# Patient Record
Sex: FEMALE | Race: WHITE | NOT HISPANIC OR LATINO | Employment: FULL TIME | URBAN - METROPOLITAN AREA
[De-identification: names, ages, dates, MRNs, and addresses within clinical notes are randomized per-mention and may not be internally consistent; named-entity substitution may affect disease eponyms.]

---

## 2017-04-27 ENCOUNTER — GENERIC CONVERSION - ENCOUNTER (OUTPATIENT)
Dept: OTHER | Facility: OTHER | Age: 42
End: 2017-04-27

## 2017-06-26 ENCOUNTER — LAB REQUISITION (OUTPATIENT)
Dept: LAB | Facility: HOSPITAL | Age: 42
End: 2017-06-26
Payer: COMMERCIAL

## 2017-06-26 ENCOUNTER — ALLSCRIPTS OFFICE VISIT (OUTPATIENT)
Dept: OTHER | Facility: OTHER | Age: 42
End: 2017-06-26

## 2017-06-26 DIAGNOSIS — Z12.31 ENCOUNTER FOR SCREENING MAMMOGRAM FOR MALIGNANT NEOPLASM OF BREAST: ICD-10-CM

## 2017-06-26 DIAGNOSIS — Z01.419 ENCOUNTER FOR GYNECOLOGICAL EXAMINATION WITHOUT ABNORMAL FINDING: ICD-10-CM

## 2017-06-26 PROCEDURE — G0145 SCR C/V CYTO,THINLAYER,RESCR: HCPCS | Performed by: OBSTETRICS & GYNECOLOGY

## 2017-06-26 PROCEDURE — 87624 HPV HI-RISK TYP POOLED RSLT: CPT | Performed by: OBSTETRICS & GYNECOLOGY

## 2017-06-29 LAB — HPV RRNA GENITAL QL NAA+PROBE: NORMAL

## 2017-07-05 ENCOUNTER — HOSPITAL ENCOUNTER (OUTPATIENT)
Dept: RADIOLOGY | Age: 42
Discharge: HOME | End: 2017-07-05
Payer: COMMERCIAL

## 2017-07-05 DIAGNOSIS — Z12.31 ENCOUNTER FOR SCREENING MAMMOGRAM FOR MALIGNANT NEOPLASM OF BREAST: ICD-10-CM

## 2017-07-05 PROCEDURE — G0202 SCR MAMMO BI INCL CAD: HCPCS

## 2017-07-10 LAB
LAB AP GYN PRIMARY INTERPRETATION: NORMAL
LAB AP LMP: NORMAL
Lab: NORMAL

## 2017-08-24 ENCOUNTER — TRANSCRIBE ORDERS (OUTPATIENT)
Dept: ADMINISTRATIVE | Facility: HOSPITAL | Age: 42
End: 2017-08-24

## 2017-08-24 DIAGNOSIS — K21.9 GASTROESOPHAGEAL REFLUX DISEASE, ESOPHAGITIS PRESENCE NOT SPECIFIED: Primary | ICD-10-CM

## 2017-08-30 ENCOUNTER — ALLSCRIPTS OFFICE VISIT (OUTPATIENT)
Dept: OTHER | Facility: OTHER | Age: 42
End: 2017-08-30

## 2017-08-30 DIAGNOSIS — I10 ESSENTIAL (PRIMARY) HYPERTENSION: ICD-10-CM

## 2017-08-30 DIAGNOSIS — E78.00 PURE HYPERCHOLESTEROLEMIA: ICD-10-CM

## 2017-08-30 DIAGNOSIS — D50.9 IRON DEFICIENCY ANEMIA: ICD-10-CM

## 2017-08-31 ENCOUNTER — LAB REQUISITION (OUTPATIENT)
Dept: LAB | Facility: HOSPITAL | Age: 42
End: 2017-08-31
Payer: COMMERCIAL

## 2017-08-31 DIAGNOSIS — E78.00 PURE HYPERCHOLESTEROLEMIA: ICD-10-CM

## 2017-08-31 LAB
ALBUMIN SERPL BCP-MCNC: 3.7 G/DL (ref 3.5–5)
ALP SERPL-CCNC: 77 U/L (ref 46–116)
ALT SERPL W P-5'-P-CCNC: 25 U/L (ref 12–78)
ANION GAP SERPL CALCULATED.3IONS-SCNC: 8 MMOL/L (ref 4–13)
AST SERPL W P-5'-P-CCNC: 19 U/L (ref 5–45)
BASOPHILS # BLD AUTO: 0.02 THOUSANDS/ΜL (ref 0–0.1)
BASOPHILS NFR BLD AUTO: 0 % (ref 0–1)
BILIRUB SERPL-MCNC: 0.58 MG/DL (ref 0.2–1)
BUN SERPL-MCNC: 11 MG/DL (ref 5–25)
CALCIUM SERPL-MCNC: 9.3 MG/DL (ref 8.3–10.1)
CHLORIDE SERPL-SCNC: 102 MMOL/L (ref 100–108)
CHOLEST SERPL-MCNC: 204 MG/DL (ref 50–200)
CK SERPL-CCNC: 61 U/L (ref 26–192)
CO2 SERPL-SCNC: 27 MMOL/L (ref 21–32)
CREAT SERPL-MCNC: 0.65 MG/DL (ref 0.6–1.3)
CREAT UR-MCNC: 149 MG/DL
EOSINOPHIL # BLD AUTO: 0.27 THOUSAND/ΜL (ref 0–0.61)
EOSINOPHIL NFR BLD AUTO: 5 % (ref 0–6)
ERYTHROCYTE [DISTWIDTH] IN BLOOD BY AUTOMATED COUNT: 16.2 % (ref 11.6–15.1)
GFR SERPL CREATININE-BSD FRML MDRD: 109 ML/MIN/1.73SQ M
GLUCOSE P FAST SERPL-MCNC: 88 MG/DL (ref 65–99)
HCT VFR BLD AUTO: 35.4 % (ref 34.8–46.1)
HDLC SERPL-MCNC: 53 MG/DL (ref 40–60)
HGB BLD-MCNC: 11.2 G/DL (ref 11.5–15.4)
LDLC SERPL CALC-MCNC: 103 MG/DL (ref 0–100)
LYMPHOCYTES # BLD AUTO: 2.02 THOUSANDS/ΜL (ref 0.6–4.47)
LYMPHOCYTES NFR BLD AUTO: 34 % (ref 14–44)
MCH RBC QN AUTO: 24.2 PG (ref 26.8–34.3)
MCHC RBC AUTO-ENTMCNC: 31.6 G/DL (ref 31.4–37.4)
MCV RBC AUTO: 77 FL (ref 82–98)
MICROALBUMIN UR-MCNC: 14.8 MG/L (ref 0–20)
MICROALBUMIN/CREAT 24H UR: 10 MG/G CREATININE (ref 0–30)
MONOCYTES # BLD AUTO: 0.43 THOUSAND/ΜL (ref 0.17–1.22)
MONOCYTES NFR BLD AUTO: 7 % (ref 4–12)
NEUTROPHILS # BLD AUTO: 3.12 THOUSANDS/ΜL (ref 1.85–7.62)
NEUTS SEG NFR BLD AUTO: 54 % (ref 43–75)
NRBC BLD AUTO-RTO: 0 /100 WBCS
PLATELET # BLD AUTO: 248 THOUSANDS/UL (ref 149–390)
PMV BLD AUTO: 11.3 FL (ref 8.9–12.7)
POTASSIUM SERPL-SCNC: 4 MMOL/L (ref 3.5–5.3)
PROT SERPL-MCNC: 7.1 G/DL (ref 6.4–8.2)
RBC # BLD AUTO: 4.62 MILLION/UL (ref 3.81–5.12)
SODIUM SERPL-SCNC: 137 MMOL/L (ref 136–145)
TRIGL SERPL-MCNC: 240 MG/DL
WBC # BLD AUTO: 5.87 THOUSAND/UL (ref 4.31–10.16)

## 2017-08-31 PROCEDURE — 82550 ASSAY OF CK (CPK): CPT | Performed by: NURSE PRACTITIONER

## 2017-08-31 PROCEDURE — 85025 COMPLETE CBC W/AUTO DIFF WBC: CPT | Performed by: NURSE PRACTITIONER

## 2017-08-31 PROCEDURE — 80061 LIPID PANEL: CPT | Performed by: NURSE PRACTITIONER

## 2017-08-31 PROCEDURE — 36415 COLL VENOUS BLD VENIPUNCTURE: CPT

## 2017-08-31 PROCEDURE — 82570 ASSAY OF URINE CREATININE: CPT | Performed by: NURSE PRACTITIONER

## 2017-08-31 PROCEDURE — 80053 COMPREHEN METABOLIC PANEL: CPT | Performed by: NURSE PRACTITIONER

## 2017-08-31 PROCEDURE — 86003 ALLG SPEC IGE CRUDE XTRC EA: CPT | Performed by: NURSE PRACTITIONER

## 2017-08-31 PROCEDURE — 86003 ALLG SPEC IGE CRUDE XTRC EA: CPT

## 2017-08-31 PROCEDURE — 82043 UR ALBUMIN QUANTITATIVE: CPT | Performed by: NURSE PRACTITIONER

## 2017-09-01 LAB
ALLERGEN COMMENT: ABNORMAL
ALLERGEN COMMENT: NORMAL
ALMOND IGE QN: 0.15 KUA/I
BRAZIL NUT IGE QN: <0.1 KUA/I
CASHEW NUT IGE QN: <0.1 KUA/I
HAZELNUT IGE QN: 0.94 KUA/L
PEANUT (RARA H) 1 IGE QN: <0.1 KUA/I
PEANUT (RARA H) 2 IGE QN: <0.1 KUA/I
PEANUT (RARA H) 3 IGE QN: <0.1 KUA/I
PEANUT (RARA H) 8 IGE QN: 0.37 KUA/I
PEANUT (RARA H) 9 IGE QN: <0.1 KUA/I
PEANUT IGE QN: 0.2 KUA/I
WALNUT IGE QN: <0.1 KUA/I

## 2017-09-20 ENCOUNTER — HOSPITAL ENCOUNTER (OUTPATIENT)
Dept: RADIOLOGY | Facility: HOSPITAL | Age: 42
Discharge: HOME | End: 2017-09-20
Attending: OTOLARYNGOLOGY
Payer: COMMERCIAL

## 2017-09-20 ENCOUNTER — GENERIC CONVERSION - ENCOUNTER (OUTPATIENT)
Dept: OTHER | Facility: OTHER | Age: 42
End: 2017-09-20

## 2017-09-20 DIAGNOSIS — K21.9 GASTROESOPHAGEAL REFLUX DISEASE, ESOPHAGITIS PRESENCE NOT SPECIFIED: ICD-10-CM

## 2017-09-20 PROCEDURE — G8997 SWALLOW GOAL STATUS: HCPCS

## 2017-09-20 PROCEDURE — 74220 X-RAY XM ESOPHAGUS 1CNTRST: CPT

## 2017-09-20 PROCEDURE — G8996 SWALLOW CURRENT STATUS: HCPCS

## 2017-09-20 PROCEDURE — 92611 MOTION FLUOROSCOPY/SWALLOW: CPT

## 2017-09-20 PROCEDURE — 74230 X-RAY XM SWLNG FUNCJ C+: CPT

## 2017-09-20 PROCEDURE — G8998 SWALLOW D/C STATUS: HCPCS

## 2017-09-26 ENCOUNTER — GENERIC CONVERSION - ENCOUNTER (OUTPATIENT)
Dept: OTHER | Facility: OTHER | Age: 42
End: 2017-09-26

## 2017-10-25 NOTE — PROGRESS NOTES
Assessment  1  Benign hypertension (401 1) (I10)   2  Hypercholesterolemia (272 0) (E78 00)   3  Dysphagia (787 20) (R13 10)   4  Family history of myocardial infarction (V17 3) (Z82 49) : Sister    Plan   Hypercholesterolemia    · Simvastatin 10 MG Oral Tablet; TAKE 1 TABLET DAILY   · (1) CBC/PLT/DIFF; Status:Active; Requested for:70Yra5999;    · (1) CK (CPK); Status:Active; Requested for:30Aug2017;    · (1) COMPREHENSIVE METABOLIC PANEL; Status:Active; Requested for:04Fbk5772;    · (1) LIPID PANEL, FASTING; Status:Active; Requested for:38Rae3997;    · (1) MICROALBUMIN CREATININE RATIO, RANDOM URINE; Status:Active; Requested for:30Aug2017;   Need for immunization against influenza    · Fluzone Quadrivalent 0 5 ML Intramuscular Suspension Prefilled Syringe  Screening for depression    · *VB-Depression Screening; Status:Complete;   Done: 51OMA6551 09:44AM   · *VB-Depression Screening ; every 1 year; Last 30Aug2017; Next 30Aug2018; Status:Active    HydroCHLOROthiazide 25 MG Oral Tablet; TAKE 1 TABLET DAILY  Requested for: 30Aug2017; Last Rx:30Aug2017; Status: ACTIVE Ordered  Rx By: Jazmin Vicente; Dispense: 90 Days ; #:90 Tablet; Refill: 1;   For: Benign hypertension; JOSHUA = N; Verified Transmission to [place] [address]; Last Updated By: System, [employer]Script[employer]; 9/1/2017 3:27:51 PM     Annotations              Pt uses [facility]  Metoprolol Succinate ER 50 MG Oral Tablet Extended Release 24 Hour; TAKE 1 TABLET DAILY; Therapy: 74Vav0466 to (Evaluate:81Atp1227)  Requested for: 30Aug2017; Last Rx:54Rfu7997; Status: ACTIVE Ordered  Rx By: Jazmin Vicente; Dispense: 90 Days ; #:90 Tablet Extended Release 24 Hour; Refill: 1;   For: Benign hypertension; JOSHUA = N; Verified Transmission to [place] [address]; Last Updated By: System, SureScripts; 9/1/2017 3:27:51 PM  STRESS TEST ONLY, EXERCISE; Status:Hold For - Scheduling; Requested for:30Aug2017;   Perform:[facility]; Due:44Zqh6309; Ordered; For:Benign hypertension, Hypercholesterolemia; Ordered By:Jose Antonio Omer;      Discussion/Summary  Discussion Summary:   HTN - Recommend checking BP at home  It is stable today  Will continue with same medications  - Will check lab work and call patient if medication needs to be changed  Reviewed prior lab work from Nov 2016 and cholesterol was stable and improving at that time  Since, the patient has cut out red meat from her diet, but still continues to eat poorly when out to eat  Reviewed healthy diet with patient  Dr Liv Domingo, due to pt personal active problem list of HTN and HDL as well as family history of sister with MI at age 48, will order exercise stress test for patient  up 6 months  Counseling Documentation With Imm: The patient was counseled regarding diagnostic results,-- instructions for management,-- risk factor reductions,-- prognosis,-- patient and family education,-- impressions,-- risks and benefits of treatment options,-- importance of compliance with treatment  Medication SE Review and Pt Understands Tx: Possible side effects of new medications were reviewed with the patient/guardian today  The treatment plan was reviewed with the patient/guardian  The patient/guardian understands and agrees with the treatment plan      Chief Complaint  Chief Complaint Free Text Note Form: PT here for follow up PT stated she did not have lab work done  History of Present Illness  HPI: Patient is here for 6 month follow up  She did not have her lab work done  She is complaint with her medications and has not experienced any side effects   - Patient saw ENT in Dr Patrice Tobin in [place]  She was having trouble swallowing  He ordered for her to go for a barium swallow with video on Sept 20  She had complained of this in the past, prior notes reviewed  She has previously been referred by our practice to GI for an endoscopy but never went   She did feel it got better, and has not been an issue lately  She notices most difficulty with sandwiches and dry chicken  Advised patient to have all records from ENT sent to our office  is concerned that she has an umbilical hernia  She is suspicious because her abdomen in bulging around her umbilicus  She was a surrogate 4 years ago and thinks that may be when it happened  She denies any abdominal pain or new symptoms, she just notices a bump on her belly button  She states she is not worried, concerned or bothered by the bump, but wanted to mention it  Hyperlipidemia (Follow-Up): The patient states her hyperlipidemia has been stable since the last visit  Comorbid Illnesses: hypertension  Symptoms: denies chest pain,-- denies intermittent leg claudication,-- denies muscle pain-- and-- denies muscle weakness  Medications: the patient is adherent with her medication regimen  -- She denies medication side effects  the patient's LDL goal is <130 mg/dL  -- the patient's last LDL was 100 mg/dL  -- Nov 2016  The patient is due for a lipid panel-- and-- liver function tests  Additional History: Patient cut out red meat since last visit  She will have her labs checked this weak  Diet could be better  Hypertension (Follow-Up): The patient presents for follow-up of essential hypertension  The patient states she has been doing well with her blood pressure control since the last visit  She has no comorbid illnesses  Symptoms: denies impaired vision,-- denies dyspnea,-- denies chest pain,-- denies intermittent leg claudication-- and-- denies lower extremity edema  Associated symptoms include no headache  Home monitoring: The patient is not checking blood pressure at home  Medications: the patient is adherent with her medication regimen  -- She denies medication side effects  The patient is due for a lipid panel,-- a serum creatinine,-- an eye exam-- and-- a urine microalbumin  Additional History: Reports her sister had a heart attack at age 48        Review of Systems  PHQ-9 Depression Scale: Over the past 2 weeks, how often have you been bothered by the following problems? 1 ) Little interest or pleasure in doing things? Not at all    2 ) Feeling down, depressed or hopeless? Not at all    3 ) Trouble falling asleep or sleeping too much? Not at all    4 ) Feeling tired or having little energy? Several days  5 ) Poor appetite or overeating? Several days  6 ) Feeling bad about yourself, or that you are a failure, or have let yourself or your family down? Not at all    7 ) Trouble concentrating on things, such as reading a newspaper or watching television? Not at all    8 ) Moving or speaking so slowly that other people could have noticed, or the opposite, moving or speaking faster than usual? Not at all  How difficult have these problems made it for you to do your work, take care of things at home, or get along with people? Not at all  Score 2   Complete-Female:   Constitutional: recent 10 lbs over 1 year lb weight gain, but-- no fever,-- not feeling poorly,-- no chills,-- not feeling tired-- and-- no recent weight loss  Eyes: no eyesight problems  Cardiovascular: the heart rate was not slow,-- no chest pain,-- the heart rate was not fast,-- no palpitations-- and-- no lower extremity edema  Respiratory: no shortness of breath,-- no cough,-- no wheezing-- and-- no shortness of breath during exertion  Musculoskeletal: no arthralgias-- and-- no myalgias  Neurological: no headache  Active Problems  1  Anxiety (300 00) (F41 9)   2  Benign hypertension (401 1) (I10)   3  Dysphagia (787 20) (R13 10)   4  Extrinsic asthma (493 00) (J45 909)   5  Generalized anxiety disorder (300 02) (F41 1)   6  Hypercholesterolemia (272 0) (E78 00)   7  Peanut allergy (V15 01) (Z91 010)    Past Medical History  1  Denied: History of Carrier Of STD   2  History of Gestational Diabetes Mellitus (V12 21)   3  History of hypertension (V12 59) (Z86 79)   4   History of impacted cerumen (V12 49) (Z86 69)   5  History of migraine with aura (V12 49) (Z86 69)   6  History of pregnancy (V13 29)   7  Denied: History of Mental Status Change   8  History of Sore throat (462) (J02 9)   9  History of Trigger finger (727 03) (M65 30)   10  History of Yeast infection (112 9) (B37 9)  Active Problems And Past Medical History Reviewed: The active problems and past medical history were reviewed and updated today  Surgical History  1  History of Dental Surgery   2  History of Hand Incision Tendon Sheath Of A Finger  Surgical History Reviewed: The surgical history was reviewed and updated today  Family History  Mother    1  Family history of Cancer   2  Family history of Hypertension (V17 49)   3  Family history of Type 2 Diabetes Mellitus  Father    4  Family history of Hypertension (V17 49)  Sister    5  Family history of myocardial infarction (V17 3) (Z82 49)  Maternal Grandfather    6  Family history of Myocardial Infarction Arrhythmias  Paternal Grandfather    9  Family history of Stroke Syndrome (V17 1)  Family History Reviewed: The family history was reviewed and updated today  Social History   · Being A Social Drinker   · Daily Coffee Consumption (2  Cups/Day)   · Denied: History of Drug Use   · Denied: History of Exercise Habits   · Marital History - Currently    · Denied: History of Mental disability   · Never A Smoker   · Never Used Drugs   · Occupation: Homemaker   · Two children  Social History Reviewed: The social history was reviewed and updated today  The social history was reviewed and is unchanged  Current Meds   1  HydroCHLOROthiazide 25 MG Oral Tablet; TAKE 1 TABLET DAILY  Requested for: 88Ttv1706; Last   Rx:70Hjj1080; Status: ACTIVE - Renewal Denied Ordered   2  Metoprolol Succinate ER 50 MG Oral Tablet Extended Release 24 Hour; TAKE 1 TABLET DAILY;    Therapy: 41MSR6169 to (Evaluate:84Bpq4714)  Requested for: 12Hxd1209; Last Rx:58Hez2004; Status: ACTIVE - Renewal Denied Ordered   3  Simvastatin 10 MG Oral Tablet; TAKE 1 TABLET DAILY; Therapy: 04Xvw4792 to (Evaluate:21Aoo9843)  Requested for: 81Jdr1313; Last Rx:38Vtu7408;   Status: ACTIVE - Renewal Denied Ordered   4  Ventolin  (90 Base) MCG/ACT Inhalation Aerosol Solution; INHALE 1-2 PUFFS EVERY 4-6   HOURS AS NEEDED AND AS DIRECTED; Therapy: 60MCG3385 to (Last Bette Rail)  Requested for: 90Kye5438 Ordered  Medication List Reviewed: The medication list was reviewed and updated today  Allergies  1  Codeine Sulfate TABS  2  Other   3  Peanuts   4  Seasonal    Vitals  Vital Signs    Recorded: 30Aug2017 09:44AM   Temperature 98 F, Tympanic   Heart Rate 82   Systolic 133, LUE, Sitting   Diastolic 80, LUE, Sitting   Height 5 ft 3 5 in   Weight 198 lb 2 oz   BMI Calculated 34 55   BSA Calculated 1 94   O2 Saturation 99, RA     Physical Exam    Constitutional   General appearance: No acute distress, well appearing and well nourished  overweight  Eyes   Conjunctiva and lids: No swelling, erythema or discharge  Pupils and irises: Equal, round and reactive to light  Ears, Nose, Mouth, and Throat   External inspection of ears and nose: Normal     Oropharynx: Normal with no erythema, edema, exudate or lesions  Pulmonary   Respiratory effort: No increased work of breathing or signs of respiratory distress  Auscultation of lungs: Clear to auscultation  Cardiovascular   Auscultation of heart: Normal rate and rhythm, normal S1 and S2, without murmurs  Examination of extremities for edema and/or varicosities: Normal     Carotid pulses: Normal     Abdomen   Abdomen: Non-tender, no masses  -- (small 1 inch protrusion noted within umbilicus, depressible, no pain  No masses noted  )   Liver and spleen: No hepatomegaly or splenomegaly  Lymphatic   Palpation of lymph nodes in neck: No lymphadenopathy      Musculoskeletal   Gait and station: Normal     Psychiatric   Orientation to person, place, and time: Normal     Mood and affect: Normal          Results/Data  *VB-Depression Screening 43Qjr7366 09:44AM Corrinne Parson     Test Name Result Flag Reference   Depression Scale Result      Depression Screen - Negative For Symptoms       Future Appointments    Date/Time Provider Specialty Site   02/14/2018 04:30 PM Toby Boas, MD Internal Medicine [facility]     Signatures   Electronically signed by : Matilde Velásquez;  Aug 30 2017 12:09PM EST                       (Author)    Electronically signed by : Andreas Leon MD; Sep  2 2017 10:42PM EST                       (Validation)

## 2017-10-28 ENCOUNTER — GENERIC CONVERSION - ENCOUNTER (OUTPATIENT)
Dept: OTHER | Facility: OTHER | Age: 42
End: 2017-10-28

## 2018-01-10 NOTE — MISCELLANEOUS
Message   Recorded as Task   Date: 09/13/2017 03:58 PM, Created By: Emilie Trivedi   Task Name: Call Back   Assigned To: Rickey Favorite   Regarding Patient: Emmanuel Harrison, Status: Active   Comment:    Judy Nichols - 13 Sep 2017 3:58 PM     TASK CREATED  Caller: Self; Care Coordination; [phone] (Home); [phone]  (Work)  This patient called the office today and would like someone to give her a call back to review the bloodwork she had done at the end of August which is in her chart  If she does not answer the phone, you are able to leave to results as a message  The best phone number to call her back at is [phone]   Robbie Jose Raul - 13 Sep 2017 4:46 PM     TASK EDITED  left message for pt and asked her to call back to discuss labs   Gucci Briones - 14 Sep 2017 5:30 PM     TASK REPLIED TO: Previously Assigned To [facility] Clinical,team                   Called LM requesting return clal to [facility]  Needs to go for further labs to evaluate anemia  Continue statin and fish oil for cholesterol  Jossy Martines - 14 Sep 2017 6:11 PM     TASK REASSIGNED: Previously Assigned To Gi Cervantes - 18 Sep 2017 4:29 PM     TASK EDITED  Message left on [phone]  requesting a call back to review results  Indu Love - 21 Sep 2017 12:17 PM     TASK EDITED  Message left on [phone] requesting a call back to review blood testing results  Judy Nichols - 25 Sep 2017 11:01 AM     TASK EDITED  This patient called the office today and was returning your phone call  The best number to reach her at is [phone]  She is a  and it is hard for her to answer her phone  Please leave all the informaiton on her answering machine  Clementine Wong - 26 Sep 2017 11:06 AM     TASK EDITED  Please call patient back at [phone]  you can leave a message    do NOT use the home number that is in previous messages     Indu Love - 26 Sep 2017 5:03 PM     TASK EDITED  Called and reviewed the results with Johanna Darden  All of her questions were answered  I will mail the additional testing orders to her  She was informed that the testing is not fasting  Indu Love - 26 Sep 2017 5:05 PM     TASK EDITED        Active Problems    1  Anxiety (300 00) (F41 9)   2  Benign hypertension (401 1) (I10)   3  Dysphagia (787 20) (R13 10)   4  Extrinsic asthma (493 00) (J45 909)   5  Generalized anxiety disorder (300 02) (F41 1)   6  Hypercholesterolemia (272 0) (E78 00)   7  Hypertriglyceridemia (272 1) (E78 1)   8  Microcytic anemia (280 9) (D50 9)   9  Need for immunization against influenza (V04 81) (Z23)   10  Peanut allergy (V15 01) (Z91 010)   11  Screening for depression (V79 0) (Z13 89)    Current Meds   1  Fish Oil 1000 MG Oral Capsule; Take 1 capsule twice daily; Therapy: 05Jsv9224 to (Last Rx:88Ffl9826) Ordered   2  HydroCHLOROthiazide 25 MG Oral Tablet; TAKE 1 TABLET DAILY  Requested for:   79Ugg4787; Last Rx:15Xfm6218 Ordered   3  Metoprolol Succinate ER 50 MG Oral Tablet Extended Release 24 Hour; TAKE 1 TABLET   DAILY; Therapy: 10RZR2010 to (Evaluate:01Oct2017)  Requested for: 55Tha1637; Last   Rx:41Ysl3407 Ordered   4  Simvastatin 10 MG Oral Tablet; TAKE 1 TABLET DAILY; Therapy: 46Xve1051 to (Evaluate:25Mar2018)  Requested for: 64Beg3662; Last   Rx:08Tyh5998; Status: ACTIVE - Retrospective By Protocol Authorization Ordered   5  Ventolin  (90 Base) MCG/ACT Inhalation Aerosol Solution; INHALE 1-2 PUFFS   EVERY 4-6 HOURS AS NEEDED AND AS DIRECTED; Therapy: 35KAJ0574 to (Last Rx:48Nsh3073)  Requested for: 10Mns8238 Ordered    Allergies    1  Codeine Sulfate TABS    2  Almonds   3  Hazelnuts   4  Other   5  Peanuts   6   Seasonal    Signatures   Electronically signed by : Sola Treviño RN; Sep 26 2017  5:05PM EST                       (Author)

## 2018-01-12 VITALS
SYSTOLIC BLOOD PRESSURE: 128 MMHG | WEIGHT: 194 LBS | BODY MASS INDEX: 33.12 KG/M2 | DIASTOLIC BLOOD PRESSURE: 84 MMHG | HEIGHT: 64 IN

## 2018-01-13 VITALS
HEART RATE: 82 BPM | TEMPERATURE: 98 F | BODY MASS INDEX: 33.83 KG/M2 | WEIGHT: 198.13 LBS | OXYGEN SATURATION: 99 % | DIASTOLIC BLOOD PRESSURE: 80 MMHG | SYSTOLIC BLOOD PRESSURE: 122 MMHG | HEIGHT: 64 IN

## 2018-01-14 NOTE — PROCEDURES
Procedures by OZIEL Gallego at 9/20/2017   9:45 AM      Author:  OZIEL Gallego Service:  (none) Author Type:  Speech and Language Pathologist    Filed:  9/20/2017 11:04 AM Date of Service:  9/20/2017  9:45 AM Status:  Signed    :  OZIEL Gallego (Speech and Language Pathologist)        Pre-procedure Diagnoses:       1  Gastroesophageal reflux disease, esophagitis presence not specified [K21 9]       2  Pharyngoesophageal dysphagia [R13 14]                Procedures:       1  FL BARIUM Maria Luisa  SPEECH [XQA699 (Custom)]                                                    Video Swallow Study      Patient Name: Roni Baxter  FGBUJ'R Date: 9/20/2017  par  par  Past Medical History  No past medical history on file  par  Past Surgical History  No past surgical history on file  General Information:    35 yo female referred to [facility]  for a VBS by Dr Jabier Santiago  for dysphagia w/  c/o food getting stuck in mid chest, usually dry meats, ie chicken and turkey and also hoagies  Liquid wash does not aid w/ clearance  Occas need for regurgitation  Pt has not seen GI doctor; laryngoscopy by ENT showed mild Teja's edema, laryngeal edema, and swollen L pyriform sinus  Pt was also noted to have mild hoarseness  Pt was started on  Zyrtek and omeprazole  Cognition:  WNL    Speech/Swallow Mech: Oral motor movements appeared  WNL; Dentition was  WNL; Cough was strong  Respiratory Status: WNL on RA;   Current diet: regular w/ thin liquids  Prior VBS none  Barium swallow/esophagram completed prior to this VBS  Pt was seen in radiology for a Video Barium Swallow Study, pt stood and was viewed laterally with the following consistencies: puree, soft/solid, hard solid, Nectar thick liquids, and thin liquids   Barium tablet was assessed on esophagram        Results are as follows:     **Images are available for review on PACS          Oral Stage:   bolus retrieval, mastication, manipulation, and transfer of all consistencies were WNL  Pharyngeal Stage:   Swallow initiation was timely w/ complete epiglottic inversion and airway closure  No pharyngeal retention, laryngeal penetration or aspiration was observed  Esophageal Stage:   see formal esophagram report for complete assessment  Briefly assessed, no overt abnormality, all consistencies cleared the esophagus into the stomach w/o difficulty  Assessment Summary:   Oral and pharyngeal stages of swallowing appear WNL    Diagnosis/Prognosis:            Recommendations:   Regular diet w/ thin liquids  meds as tolerated   no follow up tx needed                               Received for:Provider  EPIC   Sep 20 2017 11:05AM [facility] Standard Time

## 2018-01-15 NOTE — MISCELLANEOUS
Message   Recorded as Task   Date: 09/13/2017 03:58 PM, Created By: Gerardo Elliott   Task Name: Call Back   Assigned To: Quirino Castro   Regarding Patient: Tish Palacios, Status: Active   Comment:    Judy Nichols - 13 Sep 2017 3:58 PM     TASK CREATED  Caller: Self; Care Coordination; [phone] (Home); [phone] (Work)  This patient called the office today and would like someone to give her a call back to review the bloodwork she had done at the end of August which is in her chart  If she does not answer the phone, you are able to leave to results as a message  The best phone number to call her back at is [phone]   Kevin Julian - 13 Sep 2017 4:46 PM     TASK EDITED  left message for pt and asked her to call back to discuss labs   Gucci Briones - 14 Sep 2017 5:30 PM     TASK REPLIED TO: Previously Assigned To hospitals Clinical,team                   Called LMOM requesting return clal to [facility]  Needs to go for further labs to evaluate anemia  Continue statin and fish oil for cholesterol  Jossy Martines - 14 Sep 2017 6:11 PM     TASK REASSIGNED: Previously Assigned To Fartun Benitez - 18 Sep 2017 4:29 PM     TASK EDITED  Message left on [phone]  requesting a call back to review results  Indu Love - 21 Sep 2017 12:17 PM     TASK EDITED  Message left on [phone] requesting a call back to review blood testing results  SimoneJudy - 25 Sep 2017 11:01 AM     TASK EDITED  This patient called the office today and was returning your phone call  The best number to reach her at is [phone]  She is a  and it is hard for her to answer her phone  Please leave all the informaiton on her answering machine  Clementine Wong - 26 Sep 2017 11:06 AM     TASK EDITED  Please call patient back at [phone]  you can leave a message    do NOT use the home number that is in previous messages     Indu Love - 26 Sep 2017 5:03 PM     TASK EDITED  Called and reviewed the results with Yanique Dixon  All of her questions were answered  I will mail the additional testing orders to her  She was informed that the testing is not fasting  Active Problems    1  Anxiety (300 00) (F41 9)   2  Benign hypertension (401 1) (I10)   3  Dysphagia (787 20) (R13 10)   4  Extrinsic asthma (493 00) (J45 909)   5  Generalized anxiety disorder (300 02) (F41 1)   6  Hypercholesterolemia (272 0) (E78 00)   7  Hypertriglyceridemia (272 1) (E78 1)   8  Microcytic anemia (280 9) (D50 9)   9  Need for immunization against influenza (V04 81) (Z23)   10  Peanut allergy (V15 01) (Z91 010)   11  Screening for depression (V79 0) (Z13 89)    Current Meds   1  Fish Oil 1000 MG Oral Capsule; Take 1 capsule twice daily; Therapy: 43Gvr1919 to (Last Rx:62Jgi1669) Ordered   2  HydroCHLOROthiazide 25 MG Oral Tablet; TAKE 1 TABLET DAILY  Requested for:   37Cie3296; Last Rx:20Ozf6272 Ordered   3  Metoprolol Succinate ER 50 MG Oral Tablet Extended Release 24 Hour; TAKE 1 TABLET   DAILY; Therapy: 49QST1099 to (Evaluate:01Oct2017)  Requested for: 92Tej1898; Last   Rx:81Xim9002 Ordered   4  Simvastatin 10 MG Oral Tablet; TAKE 1 TABLET DAILY; Therapy: 67Qhf9223 to (Evaluate:09Exs5695)  Requested for: 97Dyv6794; Last   Rx:47Ydk2155 Ordered   5  Ventolin  (90 Base) MCG/ACT Inhalation Aerosol Solution; INHALE 1-2 PUFFS   EVERY 4-6 HOURS AS NEEDED AND AS DIRECTED; Therapy: 25OQJ2240 to (Last Rx:19Pqx4097)  Requested for: 38Ryw2083 Ordered    Allergies    1  Codeine Sulfate TABS    2  Almonds   3  Hazelnuts   4  Other   5  Peanuts   6   Seasonal    Plan  Hypercholesterolemia    · Simvastatin 10 MG Oral Tablet; TAKE 1 TABLET DAILY    Signatures   Electronically signed by : Miesha Chung RN; Sep 26 2017  5:03PM EST                       (Author)

## 2018-02-26 RX ORDER — HYDROCHLOROTHIAZIDE 25 MG/1
TABLET ORAL
Qty: 90 TABLET | Refills: 1 | OUTPATIENT
Start: 2018-02-26

## 2018-02-26 RX ORDER — METOPROLOL SUCCINATE 50 MG/1
TABLET, EXTENDED RELEASE ORAL
Qty: 90 TABLET | Refills: 1 | OUTPATIENT
Start: 2018-02-26

## 2018-03-20 ENCOUNTER — OFFICE VISIT (OUTPATIENT)
Dept: INTERNAL MEDICINE CLINIC | Facility: CLINIC | Age: 43
End: 2018-03-20
Payer: COMMERCIAL

## 2018-03-20 VITALS
DIASTOLIC BLOOD PRESSURE: 90 MMHG | SYSTOLIC BLOOD PRESSURE: 130 MMHG | OXYGEN SATURATION: 98 % | BODY MASS INDEX: 33.42 KG/M2 | TEMPERATURE: 97.9 F | HEART RATE: 76 BPM | HEIGHT: 65 IN | WEIGHT: 200.6 LBS

## 2018-03-20 DIAGNOSIS — I10 BENIGN HYPERTENSION: Primary | ICD-10-CM

## 2018-03-20 DIAGNOSIS — E78.00 HYPERCHOLESTEROLEMIA: ICD-10-CM

## 2018-03-20 PROBLEM — D50.9 MICROCYTIC ANEMIA: Status: ACTIVE | Noted: 2017-09-07

## 2018-03-20 PROBLEM — E78.1 HYPERTRIGLYCERIDEMIA: Status: ACTIVE | Noted: 2017-09-07

## 2018-03-20 PROCEDURE — 99214 OFFICE O/P EST MOD 30 MIN: CPT | Performed by: NURSE PRACTITIONER

## 2018-03-20 RX ORDER — CHLORAL HYDRATE 500 MG
2 CAPSULE ORAL
COMMUNITY
Start: 2017-09-07

## 2018-03-20 RX ORDER — METOPROLOL SUCCINATE 50 MG/1
50 TABLET, EXTENDED RELEASE ORAL DAILY
Qty: 90 TABLET | Refills: 0 | Status: SHIPPED | OUTPATIENT
Start: 2018-03-20 | End: 2018-06-19 | Stop reason: SDUPTHER

## 2018-03-20 RX ORDER — ALBUTEROL SULFATE 90 UG/1
1-2 AEROSOL, METERED RESPIRATORY (INHALATION) AS NEEDED
COMMUNITY
Start: 2014-02-06 | End: 2018-04-23 | Stop reason: SDUPTHER

## 2018-03-20 RX ORDER — LISINOPRIL 10 MG/1
10 TABLET ORAL DAILY
Qty: 30 TABLET | Refills: 1 | Status: SHIPPED | OUTPATIENT
Start: 2018-03-20 | End: 2018-04-09 | Stop reason: SINTOL

## 2018-03-20 RX ORDER — HYDROCHLOROTHIAZIDE 25 MG/1
1 TABLET ORAL DAILY
COMMUNITY
End: 2018-03-20 | Stop reason: SDUPTHER

## 2018-03-20 RX ORDER — METOPROLOL SUCCINATE 50 MG/1
1 TABLET, EXTENDED RELEASE ORAL DAILY
COMMUNITY
Start: 2016-12-02 | End: 2018-03-20 | Stop reason: SDUPTHER

## 2018-03-20 RX ORDER — HYDROCHLOROTHIAZIDE 25 MG/1
25 TABLET ORAL DAILY
Qty: 90 TABLET | Refills: 0 | Status: SHIPPED | OUTPATIENT
Start: 2018-03-20 | End: 2018-06-19 | Stop reason: SDUPTHER

## 2018-03-20 RX ORDER — SIMVASTATIN 10 MG
10 TABLET ORAL DAILY
Qty: 30 TABLET | Refills: 0 | Status: SHIPPED | OUTPATIENT
Start: 2018-03-20 | End: 2018-04-09 | Stop reason: DRUGHIGH

## 2018-03-20 RX ORDER — SIMVASTATIN 10 MG
1 TABLET ORAL DAILY
COMMUNITY
Start: 2016-08-29 | End: 2018-03-20 | Stop reason: SDUPTHER

## 2018-03-20 RX ORDER — LORATADINE 10 MG/1
10 TABLET ORAL
COMMUNITY

## 2018-03-20 NOTE — PROGRESS NOTES
Assessment/Plan:    No problem-specific Assessment & Plan notes found for this encounter  Diagnoses and all orders for this visit:    Benign hypertension  -     lisinopril (ZESTRIL) 10 mg tablet; Take 1 tablet (10 mg total) by mouth daily  -     CBC and differential; Future  -     Comprehensive metabolic panel; Future  -     Microalbumin / creatinine urine ratio  -     hydrochlorothiazide (HYDRODIURIL) 25 mg tablet; Take 1 tablet (25 mg total) by mouth daily  -     metoprolol succinate (TOPROL-XL) 50 mg 24 hr tablet; Take 1 tablet (50 mg total) by mouth daily    Hypercholesterolemia  -     Lipid Panel with Direct LDL reflex; Future  -     CK (with reflex to MB); Future  -     simvastatin (ZOCOR) 10 mg tablet; Take 1 tablet (10 mg total) by mouth daily    Other orders  -     albuterol (VENTOLIN HFA) 90 mcg/act inhaler; Inhale 1-2 puffs  -     Discontinue: simvastatin (ZOCOR) 10 mg tablet; Take 1 tablet by mouth daily  -     Discontinue: metoprolol succinate (TOPROL-XL) 50 mg 24 hr tablet; Take 1 tablet by mouth daily  -     Discontinue: hydrochlorothiazide (HYDRODIURIL) 25 mg tablet; Take 1 tablet by mouth daily  -     Omega-3 Fatty Acids (FISH OIL) 1,000 mg; Take 1 capsule by mouth 2 (two) times a day  -     loratadine (CLARITIN) 10 mg tablet; Take 10 mg by mouth daily  -     ELDERBERRY PO; Take 1 capsule by mouth daily      Will add lisinopril 10mg to regimen for blood pressure control  Labs ordered and medications refilled  Patient will follow up in 2 weeks to review labs and recheck blood pressure  Educated that Dosing for hypertension medication adjusted today  Advised to change positions slowly and not stand up too quickly until it is clear how the medication is affecting the patient  Monitor for dizziness, lightheadedness, headaches, or passing out  This may be an indication that blood pressure is too low and warrants a follow-up and medication adjustment  Check blood pressure at home 3 times weekly  Goal blood pressure is below 140/90  Exercise 30 minutes three times weekly as tolerated  Recommend yearly eye exam  Also educated that: You are started on an ACE inhibitor today  As discussed the most common side effect of this medication is a dry consistent cough  This is resolved on own with stopping the medication after 3-4 days  Please contact our office if you develop this cough and we can change your prescription to another hypertensive medication  Additionally an uncommon side effect, but life threatening, is to develop a reaction called angioedema  This is when your face, mouth, and/or tongue begin to swell  As discussed this is a life threatening reaction and you should call 911 immediately  Subjective:      Patient ID: Lauren Conner is a 39 y o  female  Patient presents for a follow-up visit on her hypertension and hyperlipidemia  She did not have any labs completed prior to this visit  Hypertension   This is a chronic problem  The current episode started more than 1 year ago  The problem is unchanged  The problem is uncontrolled  Associated symptoms include headaches (occasionally), neck pain (is being followed by a chiropractor) and PND (occasionally)  Pertinent negatives include no anxiety, blurred vision, chest pain, malaise/fatigue, orthopnea, palpitations, peripheral edema, shortness of breath or sweats  There are no associated agents to hypertension  Risk factors for coronary artery disease include dyslipidemia  Past treatments include beta blockers and diuretics  The current treatment provides mild improvement  There are no compliance problems  Hyperlipidemia   This is a chronic problem  The current episode started more than 1 year ago  Condition status: Unknown, patient has not had labs completed since starting statin  Factors aggravating her hyperlipidemia include beta blockers and thiazides   Pertinent negatives include no chest pain, focal sensory loss, leg pain, myalgias or shortness of breath  Current antihyperlipidemic treatment includes statins  Improvement on treatment: unknown at this time  There are no compliance problems  Risk factors for coronary artery disease include dyslipidemia and hypertension  The following portions of the patient's history were reviewed and updated as appropriate: allergies, current medications, past family history, past medical history, past social history, past surgical history and problem list     Review of Systems   Constitutional: Negative for chills, fatigue, fever and malaise/fatigue  HENT: Negative for sore throat  Eyes: Negative for blurred vision and pain  Respiratory: Negative for cough, chest tightness, shortness of breath and wheezing  Cardiovascular: Positive for PND (occasionally)  Negative for chest pain, palpitations, orthopnea and leg swelling  Gastrointestinal: Negative for abdominal distention, diarrhea, nausea and vomiting  Genitourinary: Negative for dysuria  Musculoskeletal: Positive for neck pain (is being followed by a chiropractor)  Negative for myalgias  Skin: Negative for rash  Neurological: Positive for headaches (occasionally)  Negative for dizziness and light-headedness  Psychiatric/Behavioral: The patient is not nervous/anxious            Past Medical History:   Diagnosis Date    Gestational diabetes mellitus     Hypertension     Pregnancy     Resolved: 07/29/2015    Trigger finger     Resolved: 12/02/2016    Yeast infection     Resolved: 07/29/2015         Current Outpatient Prescriptions:     albuterol (VENTOLIN HFA) 90 mcg/act inhaler, Inhale 1-2 puffs, Disp: , Rfl:     ELDERBERRY PO, Take 1 capsule by mouth daily, Disp: , Rfl:     hydrochlorothiazide (HYDRODIURIL) 25 mg tablet, Take 1 tablet (25 mg total) by mouth daily, Disp: 90 tablet, Rfl: 0    loratadine (CLARITIN) 10 mg tablet, Take 10 mg by mouth daily, Disp: , Rfl:     metoprolol succinate (TOPROL-XL) 50 mg 24 hr tablet, Take 1 tablet (50 mg total) by mouth daily, Disp: 90 tablet, Rfl: 0    Omega-3 Fatty Acids (FISH OIL) 1,000 mg, Take 1 capsule by mouth 2 (two) times a day, Disp: , Rfl:     simvastatin (ZOCOR) 10 mg tablet, Take 1 tablet (10 mg total) by mouth daily, Disp: 30 tablet, Rfl: 0    lisinopril (ZESTRIL) 10 mg tablet, Take 1 tablet (10 mg total) by mouth daily, Disp: 30 tablet, Rfl: 1    Allergies   Allergen Reactions    Codeine Shortness Of Breath    Percival (Diagnostic)     Nuts     Other      Annotation - [id number]: 9/2/15 ORAL ALLERGY SYNDROME    Peanut (Diagnostic)     Pollen Extract        Social History   Past Surgical History:   Procedure Laterality Date    INCISION TENDON SHEATH HAND      of a finger    WISDOM TOOTH EXTRACTION       Family History   Problem Relation Age of Onset    Endometrial cancer Mother      45s    Diabetes type II Mother     Hypertension Father     Heart attack Sister     Other Maternal Grandfather      Myocardial infarction arrhythmias    Stroke Paternal Grandfather      Syndrome       Objective:  /90 (BP Location: Left arm, Patient Position: Sitting, Cuff Size: Adult)   Pulse 76   Temp 97 9 °F (36 6 °C) (Oral)   Ht 5' 5" (1 651 m)   Wt 91 kg (200 lb 9 6 oz)   SpO2 98%   BMI 33 38 kg/m²        Physical Exam   Constitutional: She is oriented to person, place, and time  She appears well-developed and well-nourished  No distress  HENT:   Head: Normocephalic and atraumatic  Right Ear: External ear normal    Left Ear: External ear normal    Mouth/Throat: Oropharynx is clear and moist    Eyes: Conjunctivae are normal  Pupils are equal, round, and reactive to light  No scleral icterus  Neck: Normal range of motion  Neck supple  No thyromegaly present  Cardiovascular: Normal rate, regular rhythm and normal heart sounds  Pulmonary/Chest: Effort normal and breath sounds normal  No respiratory distress  Abdominal: Soft   Bowel sounds are normal  She exhibits no distension  Musculoskeletal: Normal range of motion  She exhibits no edema  Neurological: She is alert and oriented to person, place, and time  Skin: Skin is warm and dry  Psychiatric: She has a normal mood and affect  Her behavior is normal  Judgment and thought content normal    Vitals reviewed

## 2018-03-20 NOTE — PATIENT INSTRUCTIONS
Will add lisinopril 10mg to regimen for blood pressure control  Labs ordered and medications refilled  Patient will follow up in 2 weeks to review labs and recheck blood pressure  Educated that Dosing for hypertension medication adjusted today  Advised to change positions slowly and not stand up too quickly until it is clear how the medication is affecting the patient  Monitor for dizziness, lightheadedness, headaches, or passing out  This may be an indication that blood pressure is too low and warrants a follow-up and medication adjustment  Check blood pressure at home 3 times weekly  Goal blood pressure is below 140/90  Exercise 30 minutes three times weekly as tolerated  Recommend yearly eye exam  Also educated that: You are started on an ACE inhibitor today  As discussed the most common side effect of this medication is a dry consistent cough  This is resolved on own with stopping the medication after 3-4 days  Please contact our office if you develop this cough and we can change your prescription to another hypertensive medication  Additionally an uncommon side effect, but life threatening, is to develop a reaction called angioedema  This is when your face, mouth, and/or tongue begin to swell  As discussed this is a life threatening reaction and you should call 911 immediately

## 2018-03-25 DIAGNOSIS — E78.00 HYPERCHOLESTEROLEMIA: ICD-10-CM

## 2018-03-26 ENCOUNTER — APPOINTMENT (OUTPATIENT)
Dept: URGENT CARE | Facility: MEDICAL CENTER | Age: 43
End: 2018-03-26
Payer: COMMERCIAL

## 2018-03-26 PROCEDURE — 99214 OFFICE O/P EST MOD 30 MIN: CPT

## 2018-03-26 RX ORDER — SIMVASTATIN 10 MG
TABLET ORAL
Qty: 90 TABLET | Refills: 1 | OUTPATIENT
Start: 2018-03-26

## 2018-03-26 NOTE — TELEPHONE ENCOUNTER
Automated refill request received for this patient  Please contact patient and    1  Verify that patient is taking this medication (verify with patient and with prior records)  2  Verify the patients dose for the medication  3   Verify that patient needs refills  Once this is done and if rx needed please resend  Please let me know if any questions      Thanks   Paul Snell

## 2018-03-30 ENCOUNTER — CLINICAL SUPPORT (OUTPATIENT)
Dept: INTERNAL MEDICINE CLINIC | Facility: CLINIC | Age: 43
End: 2018-03-30
Payer: COMMERCIAL

## 2018-03-30 DIAGNOSIS — E78.00 HYPERCHOLESTEREMIA: ICD-10-CM

## 2018-03-30 DIAGNOSIS — I10 BENIGN HYPERTENSION: Primary | ICD-10-CM

## 2018-03-30 LAB
ALBUMIN SERPL BCP-MCNC: 3.9 G/DL (ref 3.5–5)
ALP SERPL-CCNC: 67 U/L (ref 46–116)
ALT SERPL W P-5'-P-CCNC: 24 U/L (ref 12–78)
ANION GAP SERPL CALCULATED.3IONS-SCNC: 8 MMOL/L (ref 4–13)
AST SERPL W P-5'-P-CCNC: 15 U/L (ref 5–45)
BASOPHILS # BLD AUTO: 0.05 THOUSANDS/ΜL (ref 0–0.1)
BASOPHILS NFR BLD AUTO: 1 % (ref 0–1)
BILIRUB SERPL-MCNC: 1.05 MG/DL (ref 0.2–1)
BUN SERPL-MCNC: 18 MG/DL (ref 5–25)
CALCIUM SERPL-MCNC: 9.1 MG/DL (ref 8.3–10.1)
CHLORIDE SERPL-SCNC: 103 MMOL/L (ref 100–108)
CHOLEST SERPL-MCNC: 221 MG/DL (ref 50–200)
CK SERPL-CCNC: 51 U/L (ref 26–192)
CO2 SERPL-SCNC: 27 MMOL/L (ref 21–32)
CREAT SERPL-MCNC: 0.82 MG/DL (ref 0.6–1.3)
CREAT UR-MCNC: 337 MG/DL
EOSINOPHIL # BLD AUTO: 0.28 THOUSAND/ΜL (ref 0–0.61)
EOSINOPHIL NFR BLD AUTO: 4 % (ref 0–6)
ERYTHROCYTE [DISTWIDTH] IN BLOOD BY AUTOMATED COUNT: 16.2 % (ref 11.6–15.1)
GFR SERPL CREATININE-BSD FRML MDRD: 87 ML/MIN/1.73SQ M
GLUCOSE P FAST SERPL-MCNC: 93 MG/DL (ref 65–99)
HCT VFR BLD AUTO: 39 % (ref 34.8–46.1)
HDLC SERPL-MCNC: 58 MG/DL (ref 40–60)
HGB BLD-MCNC: 12.8 G/DL (ref 11.5–15.4)
LDLC SERPL CALC-MCNC: 120 MG/DL (ref 0–100)
LYMPHOCYTES # BLD AUTO: 2.96 THOUSANDS/ΜL (ref 0.6–4.47)
LYMPHOCYTES NFR BLD AUTO: 41 % (ref 14–44)
MCH RBC QN AUTO: 26.1 PG (ref 26.8–34.3)
MCHC RBC AUTO-ENTMCNC: 32.8 G/DL (ref 31.4–37.4)
MCV RBC AUTO: 79 FL (ref 82–98)
MICROALBUMIN UR-MCNC: 40.4 MG/L (ref 0–20)
MICROALBUMIN/CREAT 24H UR: 12 MG/G CREATININE (ref 0–30)
MONOCYTES # BLD AUTO: 0.69 THOUSAND/ΜL (ref 0.17–1.22)
MONOCYTES NFR BLD AUTO: 10 % (ref 4–12)
NEUTROPHILS # BLD AUTO: 3.18 THOUSANDS/ΜL (ref 1.85–7.62)
NEUTS SEG NFR BLD AUTO: 44 % (ref 43–75)
NRBC BLD AUTO-RTO: 0 /100 WBCS
PLATELET # BLD AUTO: 283 THOUSANDS/UL (ref 149–390)
PMV BLD AUTO: 11.2 FL (ref 8.9–12.7)
POTASSIUM SERPL-SCNC: 3.7 MMOL/L (ref 3.5–5.3)
PROT SERPL-MCNC: 7.3 G/DL (ref 6.4–8.2)
RBC # BLD AUTO: 4.91 MILLION/UL (ref 3.81–5.12)
SODIUM SERPL-SCNC: 138 MMOL/L (ref 136–145)
TRIGL SERPL-MCNC: 213 MG/DL
WBC # BLD AUTO: 7.17 THOUSAND/UL (ref 4.31–10.16)

## 2018-03-30 PROCEDURE — 82550 ASSAY OF CK (CPK): CPT | Performed by: NURSE PRACTITIONER

## 2018-03-30 PROCEDURE — 80053 COMPREHEN METABOLIC PANEL: CPT | Performed by: NURSE PRACTITIONER

## 2018-03-30 PROCEDURE — 85025 COMPLETE CBC W/AUTO DIFF WBC: CPT | Performed by: NURSE PRACTITIONER

## 2018-03-30 PROCEDURE — 82570 ASSAY OF URINE CREATININE: CPT | Performed by: NURSE PRACTITIONER

## 2018-03-30 PROCEDURE — 80061 LIPID PANEL: CPT | Performed by: NURSE PRACTITIONER

## 2018-03-30 PROCEDURE — 82043 UR ALBUMIN QUANTITATIVE: CPT | Performed by: NURSE PRACTITIONER

## 2018-03-30 PROCEDURE — 36415 COLL VENOUS BLD VENIPUNCTURE: CPT

## 2018-04-02 ENCOUNTER — APPOINTMENT (OUTPATIENT)
Dept: URGENT CARE | Facility: MEDICAL CENTER | Age: 43
End: 2018-04-02
Payer: COMMERCIAL

## 2018-04-02 PROCEDURE — 99213 OFFICE O/P EST LOW 20 MIN: CPT | Performed by: NURSE PRACTITIONER

## 2018-04-09 ENCOUNTER — OFFICE VISIT (OUTPATIENT)
Dept: INTERNAL MEDICINE CLINIC | Age: 43
End: 2018-04-09
Payer: COMMERCIAL

## 2018-04-09 VITALS
WEIGHT: 197.2 LBS | BODY MASS INDEX: 32.86 KG/M2 | OXYGEN SATURATION: 98 % | TEMPERATURE: 97.6 F | DIASTOLIC BLOOD PRESSURE: 80 MMHG | SYSTOLIC BLOOD PRESSURE: 122 MMHG | HEART RATE: 72 BPM | HEIGHT: 65 IN

## 2018-04-09 DIAGNOSIS — I10 BENIGN HYPERTENSION: Primary | ICD-10-CM

## 2018-04-09 DIAGNOSIS — E78.00 HYPERCHOLESTEROLEMIA: ICD-10-CM

## 2018-04-09 DIAGNOSIS — E78.1 HYPERTRIGLYCERIDEMIA: ICD-10-CM

## 2018-04-09 PROCEDURE — 99214 OFFICE O/P EST MOD 30 MIN: CPT | Performed by: NURSE PRACTITIONER

## 2018-04-09 RX ORDER — SIMVASTATIN 20 MG
20 TABLET ORAL
Qty: 90 TABLET | Refills: 1 | Status: SHIPPED | OUTPATIENT
Start: 2018-04-09 | End: 2018-10-04 | Stop reason: SDUPTHER

## 2018-04-09 RX ORDER — LOSARTAN POTASSIUM 50 MG/1
50 TABLET ORAL DAILY
Qty: 30 TABLET | Refills: 0 | Status: SHIPPED | OUTPATIENT
Start: 2018-04-09 | End: 2018-04-23 | Stop reason: SDUPTHER

## 2018-04-09 NOTE — PROGRESS NOTES
Assessment/Plan:    No problem-specific Assessment & Plan notes found for this encounter  Diagnoses and all orders for this visit:    Benign hypertension  -     losartan (COZAAR) 50 mg tablet; Take 1 tablet (50 mg total) by mouth daily    Hypercholesterolemia  -     simvastatin (ZOCOR) 20 mg tablet; Take 1 tablet (20 mg total) by mouth daily at bedtime    Hypertriglyceridemia  -     simvastatin (ZOCOR) 20 mg tablet; Take 1 tablet (20 mg total) by mouth daily at bedtime      Due to cough, will discontinue the lisinopril and start losartan  Dosing for hypertension medication adjusted today  Advised to change positions slowly and not stand up too quickly until it is clear how the medication is affecting the patient  Monitor for dizziness, lightheadedness, headaches, or passing out  This may be an indication that blood pressure is too low and warrants a follow-up and medication adjustment  Check blood pressure at home 3 times weekly  Cholesterol is still not at goal  Will increase simvastatin from 10mg to 20mg daily  Return in 2 weeks for blood pressure recheck or sooner as needed  Subjective:      Patient ID: Kurtis Blanco is a 39 y o  female  Patient presents for a 2 week follow up on hypertension and to review her lab results  She reports that she is feeling well on her new addition of lisinopril to her medication regimen  She does report that she has a dry cough, which she initially thought was related to a recent cold, but does admit that it has been lingering  She describes the cough as feeling like she needs to clear her throat  Her other labs were reviewed, as well  She is on 10mg of simvastatin, which she has been on for some time  She denies myalgias or chest pain while taking it           The following portions of the patient's history were reviewed and updated as appropriate: allergies, current medications, past family history, past medical history, past social history, past surgical history and problem list     Review of Systems   Constitutional: Negative for chills, fatigue and fever  HENT: Negative for congestion, sinus pain, sinus pressure, sore throat and trouble swallowing  Eyes: Negative for pain  Respiratory: Positive for cough (dry cough since starting lisinopril)  Negative for chest tightness, shortness of breath and wheezing  Cardiovascular: Negative for chest pain, palpitations and leg swelling  Gastrointestinal: Negative for abdominal pain, constipation, diarrhea, nausea and vomiting  Genitourinary: Negative for dysuria  Musculoskeletal: Negative for gait problem  Skin: Negative for rash  Neurological: Negative for dizziness, light-headedness and headaches  Hematological: Does not bruise/bleed easily  Psychiatric/Behavioral: The patient is not nervous/anxious            Past Medical History:   Diagnosis Date    Gestational diabetes mellitus     Hypertension     Pregnancy     Resolved: 07/29/2015    Trigger finger     Resolved: 12/02/2016    Yeast infection     Resolved: 07/29/2015         Current Outpatient Prescriptions:     albuterol (VENTOLIN HFA) 90 mcg/act inhaler, Inhale 1-2 puffs, Disp: , Rfl:     hydrochlorothiazide (HYDRODIURIL) 25 mg tablet, Take 1 tablet (25 mg total) by mouth daily, Disp: 90 tablet, Rfl: 0    loratadine (CLARITIN) 10 mg tablet, Take 10 mg by mouth daily, Disp: , Rfl:     metoprolol succinate (TOPROL-XL) 50 mg 24 hr tablet, Take 1 tablet (50 mg total) by mouth daily, Disp: 90 tablet, Rfl: 0    Omega-3 Fatty Acids (FISH OIL) 1,000 mg, Take 1 capsule by mouth 2 (two) times a day, Disp: , Rfl:     ELDERBERRY PO, Take 1 capsule by mouth daily, Disp: , Rfl:     losartan (COZAAR) 50 mg tablet, Take 1 tablet (50 mg total) by mouth daily, Disp: 30 tablet, Rfl: 0    simvastatin (ZOCOR) 20 mg tablet, Take 1 tablet (20 mg total) by mouth daily at bedtime, Disp: 90 tablet, Rfl: 1    Allergies   Allergen Reactions    Codeine Shortness Of Breath    Pollen Extract Nasal Congestion    [place] (Diagnostic)     Nuts     Other      Annotation - [id number]: 9/2/15 ORAL ALLERGY SYNDROME    Peanut (Diagnostic)        Social History   Past Surgical History:   Procedure Laterality Date    INCISION TENDON SHEATH HAND      of a finger    WISDOM TOOTH EXTRACTION       Family History   Problem Relation Age of Onset    Endometrial cancer Mother      45s    Diabetes type II Mother     Hypertension Father     Heart attack Sister     Other Maternal Grandfather      Myocardial infarction arrhythmias    Stroke Paternal Grandfather      Syndrome       Objective:  /80 (BP Location: Left arm, Patient Position: Sitting, Cuff Size: Large)   Pulse 72   Temp 97 6 °F (36 4 °C) (Tympanic)   Ht 5' 5" (1 651 m)   Wt 89 4 kg (197 lb 3 2 oz)   SpO2 98%   BMI 32 82 kg/m²     Recent Results (from the past 504 hour(s))   CK (with reflex to MB)    Collection Time: 03/30/18  9:56 AM   Result Value Ref Range    Total CK 51 26 - 192 U/L   CBC and differential    Collection Time: 03/30/18  9:56 AM   Result Value Ref Range    WBC 7 17 4 31 - 10 16 Thousand/uL    RBC 4 91 3 81 - 5 12 Million/uL    Hemoglobin 12 8 11 5 - 15 4 g/dL    Hematocrit 39 0 34 8 - 46 1 %    MCV 79 (L) 82 - 98 fL    MCH 26 1 (L) 26 8 - 34 3 pg    MCHC 32 8 31 4 - 37 4 g/dL    RDW 16 2 (H) 11 6 - 15 1 %    MPV 11 2 8 9 - 12 7 fL    Platelets 478 430 - 519 Thousands/uL    nRBC 0 /100 WBCs    Neutrophils Relative 44 43 - 75 %    Lymphocytes Relative 41 14 - 44 %    Monocytes Relative 10 4 - 12 %    Eosinophils Relative 4 0 - 6 %    Basophils Relative 1 0 - 1 %    Neutrophils Absolute 3 18 1 85 - 7 62 Thousands/µL    Lymphocytes Absolute 2 96 0 60 - 4 47 Thousands/µL    Monocytes Absolute 0 69 0 17 - 1 22 Thousand/µL    Eosinophils Absolute 0 28 0 00 - 0 61 Thousand/µL    Basophils Absolute 0 05 0 00 - 0 10 Thousands/µL   Comprehensive metabolic panel    Collection Time: 03/30/18  9:56 AM   Result Value Ref Range    Sodium 138 136 - 145 mmol/L    Potassium 3 7 3 5 - 5 3 mmol/L    Chloride 103 100 - 108 mmol/L    CO2 27 21 - 32 mmol/L    Anion Gap 8 4 - 13 mmol/L    BUN 18 5 - 25 mg/dL    Creatinine 0 82 0 60 - 1 30 mg/dL    Glucose, Fasting 93 65 - 99 mg/dL    Calcium 9 1 8 3 - 10 1 mg/dL    AST 15 5 - 45 U/L    ALT 24 12 - 78 U/L    Alkaline Phosphatase 67 46 - 116 U/L    Total Protein 7 3 6 4 - 8 2 g/dL    Albumin 3 9 3 5 - 5 0 g/dL    Total Bilirubin 1 05 (H) 0 20 - 1 00 mg/dL    eGFR 87 ml/min/1 73sq m   Lipid Panel with Direct LDL reflex    Collection Time: 03/30/18  9:56 AM   Result Value Ref Range    Cholesterol 221 (H) 50 - 200 mg/dL    Triglycerides 213 (H) <=150 mg/dL    HDL, Direct 58 40 - 60 mg/dL    LDL Calculated 120 (H) 0 - 100 mg/dL   Microalbumin / creatinine urine ratio    Collection Time: 03/30/18  9:56 AM   Result Value Ref Range    Creatinine, Ur 337 0 mg/dL    Microalbum  ,U,Random 40 4 (H) 0 0 - 20 0 mg/L    Microalb Creat Ratio 12 0 - 30 mg/g creatinine        Physical Exam   Constitutional: She is oriented to person, place, and time  She appears well-developed and well-nourished  No distress  HENT:   Head: Normocephalic and atraumatic  Right Ear: External ear normal    Left Ear: External ear normal    Mouth/Throat: Oropharynx is clear and moist    Eyes: Conjunctivae are normal  Pupils are equal, round, and reactive to light  No scleral icterus  Neck: Normal range of motion  Neck supple  No thyromegaly present  Cardiovascular: Normal rate, regular rhythm and normal heart sounds  Pulmonary/Chest: Effort normal and breath sounds normal  No respiratory distress  Abdominal: Soft  Bowel sounds are normal  She exhibits no distension  Musculoskeletal: Normal range of motion  She exhibits no edema  Neurological: She is alert and oriented to person, place, and time  Skin: Skin is warm and dry  Psychiatric: She has a normal mood and affect   Her behavior is normal  Judgment and thought content normal    Vitals reviewed

## 2018-04-09 NOTE — PATIENT INSTRUCTIONS
Due to cough, will discontinue the lisinopril and start losartan  Dosing for hypertension medication adjusted today  Advised to change positions slowly and not stand up too quickly until it is clear how the medication is affecting the patient  Monitor for dizziness, lightheadedness, headaches, or passing out  This may be an indication that blood pressure is too low and warrants a follow-up and medication adjustment  Check blood pressure at home 3 times weekly  Cholesterol is still not at goal  Will increase simvastatin from 10mg to 20mg daily  Return in 2 weeks for blood pressure recheck or sooner as needed

## 2018-04-23 ENCOUNTER — OFFICE VISIT (OUTPATIENT)
Dept: INTERNAL MEDICINE CLINIC | Age: 43
End: 2018-04-23
Payer: COMMERCIAL

## 2018-04-23 VITALS
BODY MASS INDEX: 33.73 KG/M2 | HEIGHT: 64 IN | DIASTOLIC BLOOD PRESSURE: 78 MMHG | TEMPERATURE: 98 F | HEART RATE: 75 BPM | SYSTOLIC BLOOD PRESSURE: 114 MMHG | RESPIRATION RATE: 20 BRPM | WEIGHT: 197.6 LBS | OXYGEN SATURATION: 98 %

## 2018-04-23 DIAGNOSIS — I10 BENIGN HYPERTENSION: Primary | ICD-10-CM

## 2018-04-23 DIAGNOSIS — J45.40 MODERATE PERSISTENT EXTRINSIC ASTHMA WITHOUT COMPLICATION: ICD-10-CM

## 2018-04-23 PROCEDURE — 99213 OFFICE O/P EST LOW 20 MIN: CPT | Performed by: NURSE PRACTITIONER

## 2018-04-23 RX ORDER — LOSARTAN POTASSIUM 50 MG/1
50 TABLET ORAL DAILY
Qty: 90 TABLET | Refills: 1 | Status: SHIPPED | OUTPATIENT
Start: 2018-04-23 | End: 2018-10-04 | Stop reason: SDUPTHER

## 2018-04-23 RX ORDER — ALBUTEROL SULFATE 90 UG/1
1-2 AEROSOL, METERED RESPIRATORY (INHALATION) AS NEEDED
Qty: 2 INHALER | Refills: 0 | Status: SHIPPED | OUTPATIENT
Start: 2018-04-23 | End: 2019-11-08 | Stop reason: SDUPTHER

## 2018-04-23 NOTE — PROGRESS NOTES
Assessment/Plan:    No problem-specific Assessment & Plan notes found for this encounter  Diagnoses and all orders for this visit:    Benign hypertension  -     losartan (COZAAR) 50 mg tablet; Take 1 tablet (50 mg total) by mouth daily    Moderate persistent extrinsic asthma without complication  -     albuterol (VENTOLIN HFA) 90 mcg/act inhaler; Inhale 1-2 puffs as needed for wheezing        Will continue with current dosing of losartan at this time  No changes to medications at present  Will have patient follow up in 3 months or sooner as needed  Did mention to patient that she does qualify for the pneumonia vaccine based on her diagnosis of asthma  She has declined at this time however will consider in the future  Subjective:      Patient ID: Yue Alvarez is a 39 y o  female  Patient presents for 2 week follow-up on her hypertension  She was previously placed on lisinopril, however she developed a cough and the lisinopril was discontinued  She was subsequently placed on losartan and this appointment is to evaluate the efficacy of the losartan  The patient reports that she is feeling well on the current medications that she is on  The patient denies any side effects on losartan, including dizziness or lightheadedness  The following portions of the patient's history were reviewed and updated as appropriate: allergies, current medications, past family history, past medical history, past social history, past surgical history and problem list     Review of Systems   Constitutional: Negative for chills and fever  HENT: Negative for sinus pain (the patient believes that this is related to allergies) and trouble swallowing  Eyes: Negative for pain  Respiratory: Negative for cough (The cough that the patient had while on lisinopril has resolved    The patient does report a slight tickle in the back of her throat, but she does believe that this might be related to allergies ), chest tightness, shortness of breath and wheezing  Cardiovascular: Negative for chest pain, palpitations and leg swelling  Gastrointestinal: Negative for abdominal pain, diarrhea, nausea and vomiting  Genitourinary: Negative for dysuria  Musculoskeletal: Negative for gait problem  Skin: Negative for rash  Neurological: Negative for dizziness, syncope, numbness and headaches  Hematological: Does not bruise/bleed easily  Psychiatric/Behavioral: Negative for confusion and decreased concentration  The patient is not nervous/anxious            Past Medical History:   Diagnosis Date    Gestational diabetes mellitus     Hypertension     Pregnancy     Resolved: 07/29/2015    Trigger finger     Resolved: 12/02/2016    Yeast infection     Resolved: 07/29/2015         Current Outpatient Prescriptions:     albuterol (VENTOLIN HFA) 90 mcg/act inhaler, Inhale 1-2 puffs as needed for wheezing, Disp: 2 Inhaler, Rfl: 0    ELDERBERRY PO, Take 1 capsule by mouth daily, Disp: , Rfl:     hydrochlorothiazide (HYDRODIURIL) 25 mg tablet, Take 1 tablet (25 mg total) by mouth daily, Disp: 90 tablet, Rfl: 0    loratadine (CLARITIN) 10 mg tablet, Take 10 mg by mouth daily, Disp: , Rfl:     losartan (COZAAR) 50 mg tablet, Take 1 tablet (50 mg total) by mouth daily, Disp: 90 tablet, Rfl: 1    metoprolol succinate (TOPROL-XL) 50 mg 24 hr tablet, Take 1 tablet (50 mg total) by mouth daily, Disp: 90 tablet, Rfl: 0    Omega-3 Fatty Acids (FISH OIL) 1,000 mg, Take 2 capsules by mouth daily  , Disp: , Rfl:     simvastatin (ZOCOR) 20 mg tablet, Take 1 tablet (20 mg total) by mouth daily at bedtime, Disp: 90 tablet, Rfl: 1    Allergies   Allergen Reactions    Codeine Shortness Of Breath    Pollen Extract Nasal Congestion    [place] (Diagnostic)     Nuts     Other      Annotation - [id number]: 9/2/15 ORAL ALLERGY SYNDROME    Peanut (Diagnostic)        Social History   Past Surgical History:   Procedure Laterality Date    INCISION TENDON SHEATH HAND      of a finger    WISDOM TOOTH EXTRACTION       Family History   Problem Relation Age of Onset    Endometrial cancer Mother      45s    Diabetes type II Mother     Hypertension Father     Heart attack Sister     Other Maternal Grandfather      Myocardial infarction arrhythmias    Stroke Paternal Grandfather      Syndrome       Objective:  /78 (BP Location: Left arm, Patient Position: Sitting, Cuff Size: Adult)   Pulse 75   Temp 98 °F (36 7 °C) (Tympanic)   Resp 20   Ht 5' 3 82" (1 621 m)   Wt 89 6 kg (197 lb 9 6 oz)   SpO2 98%   BMI 34 11 kg/m²        Physical Exam   Constitutional: She is oriented to person, place, and time  She appears well-developed and well-nourished  No distress  HENT:   Head: Normocephalic and atraumatic  Right Ear: External ear normal    Left Ear: External ear normal    Mouth/Throat: Oropharynx is clear and moist    Eyes: Conjunctivae are normal  Pupils are equal, round, and reactive to light  No scleral icterus  Neck: Normal range of motion  Neck supple  No thyromegaly present  Cardiovascular: Normal rate, regular rhythm and normal heart sounds  Pulmonary/Chest: Effort normal and breath sounds normal  No respiratory distress  Abdominal: Soft  Bowel sounds are normal  She exhibits no distension  Musculoskeletal: Normal range of motion  She exhibits no edema  Neurological: She is alert and oriented to person, place, and time  Skin: Skin is warm and dry  No rash noted  Psychiatric: She has a normal mood and affect  Her behavior is normal  Judgment and thought content normal    Vitals reviewed

## 2018-05-12 DIAGNOSIS — E78.00 HYPERCHOLESTEROLEMIA: ICD-10-CM

## 2018-05-14 RX ORDER — SIMVASTATIN 10 MG
TABLET ORAL
Qty: 30 TABLET | Refills: 0 | OUTPATIENT
Start: 2018-05-14

## 2018-06-13 DIAGNOSIS — J45.40 MODERATE PERSISTENT EXTRINSIC ASTHMA WITHOUT COMPLICATION: ICD-10-CM

## 2018-06-19 DIAGNOSIS — I10 BENIGN HYPERTENSION: ICD-10-CM

## 2018-06-19 RX ORDER — METOPROLOL SUCCINATE 50 MG/1
50 TABLET, EXTENDED RELEASE ORAL DAILY
Qty: 10 TABLET | Refills: 0 | Status: SHIPPED | OUTPATIENT
Start: 2018-06-19 | End: 2018-10-04 | Stop reason: SDUPTHER

## 2018-06-19 RX ORDER — HYDROCHLOROTHIAZIDE 25 MG/1
25 TABLET ORAL DAILY
Qty: 10 TABLET | Refills: 0 | Status: SHIPPED | OUTPATIENT
Start: 2018-06-19 | End: 2018-10-04 | Stop reason: SDUPTHER

## 2018-06-19 RX ORDER — HYDROCHLOROTHIAZIDE 25 MG/1
25 TABLET ORAL DAILY
Qty: 90 TABLET | Refills: 0 | Status: SHIPPED | OUTPATIENT
Start: 2018-06-19 | End: 2018-06-19 | Stop reason: SDUPTHER

## 2018-06-19 RX ORDER — METOPROLOL SUCCINATE 50 MG/1
50 TABLET, EXTENDED RELEASE ORAL DAILY
Qty: 90 TABLET | Refills: 0 | Status: SHIPPED | OUTPATIENT
Start: 2018-06-19 | End: 2018-06-19 | Stop reason: SDUPTHER

## 2018-07-02 ENCOUNTER — ANNUAL EXAM (OUTPATIENT)
Dept: OBGYN CLINIC | Facility: CLINIC | Age: 43
End: 2018-07-02
Payer: COMMERCIAL

## 2018-07-02 VITALS
DIASTOLIC BLOOD PRESSURE: 78 MMHG | WEIGHT: 198 LBS | BODY MASS INDEX: 33.8 KG/M2 | HEIGHT: 64 IN | SYSTOLIC BLOOD PRESSURE: 116 MMHG

## 2018-07-02 DIAGNOSIS — Z12.31 ENCOUNTER FOR SCREENING MAMMOGRAM FOR MALIGNANT NEOPLASM OF BREAST: Primary | ICD-10-CM

## 2018-07-02 DIAGNOSIS — Z01.419 ENCOUNTER FOR ANNUAL ROUTINE GYNECOLOGICAL EXAMINATION: Primary | ICD-10-CM

## 2018-07-02 PROCEDURE — S0612 ANNUAL GYNECOLOGICAL EXAMINA: HCPCS | Performed by: OBSTETRICS & GYNECOLOGY

## 2018-07-02 NOTE — PROGRESS NOTES
Assessment/Plan:    Pap smear deferred due to low risk status  Encouraged self-breast examination as well as calcium supplementation  Continue annual mammogram   Again discussed treatment options for menorrhagia including medical versus surgical   Her CBC was stable from March  She will continue to follow-up with her family physician as scheduled for hypertension hypercholesterolemia, strong family history of heart disease  She was given names of general surgeons to be further evaluated on umbilical hernia  Return to office in 1 year  No problem-specific Assessment & Plan notes found for this encounter  Diagnoses and all orders for this visit:    Encounter for annual routine gynecological examination          Subjective:      Patient ID: Willow Caldera is a 39 y o  female  HPI     This is a 53-year-old female  (last pregnancy patient was steroid get,  x3) presents for annual gyn exam   Patient states her menstrual cycles are regular every 4 weeks lasting 5-7 days described as heavy on day 2 and 3  She denies any breakthrough bleeding  She states that she did have a Miren IUD before first and second pregnancy and did see significant improvement in her menstrual cycle however now she was concerned about weight gain and was reluctant to go back to the Mirena  She does have a history of hypertension hypercholesterolemia which is being followed through her family physician  She denies any changes in bowel function  Over the year she has had increase episodes of mixed urinary incontinence  She is sexually active and has been in a monogamous relationship with her  for over 15 years  All her Pap smears have been normal   Method of contraception is vasectomy  Last Pap smear  within normal limits      The following portions of the patient's history were reviewed and updated as appropriate: allergies, current medications, past family history, past medical history, past social history, past surgical history and problem list     Review of Systems   Constitutional: Negative for fatigue, fever and unexpected weight change  Respiratory: Negative for cough, chest tightness, shortness of breath and wheezing  Cardiovascular: Negative  Negative for chest pain and palpitations  Gastrointestinal: Negative  Negative for abdominal distention, abdominal pain, blood in stool, constipation, diarrhea, nausea and vomiting  Genitourinary: Negative  Negative for difficulty urinating, dyspareunia, dysuria, flank pain, frequency, genital sores, hematuria, pelvic pain, urgency, vaginal bleeding, vaginal discharge and vaginal pain  Skin: Negative for rash  Objective:      /78   Ht 5' 4" (1 626 m)   Wt 89 8 kg (198 lb)   LMP 06/04/2018 (Exact Date)   Breastfeeding? No   BMI 33 99 kg/m²          Physical Exam   Constitutional: She appears well-developed and well-nourished  Cardiovascular: Normal rate and regular rhythm  Pulmonary/Chest: Effort normal and breath sounds normal  Right breast exhibits no inverted nipple, no mass, no nipple discharge, no skin change and no tenderness  Left breast exhibits no inverted nipple, no mass, no nipple discharge, no skin change and no tenderness  Abdominal: Soft  Bowel sounds are normal  She exhibits no distension  There is no tenderness  There is no rebound and no guarding  Small umbilical hernia noted   Genitourinary: Vagina normal and uterus normal  There is no lesion on the right labia  There is no lesion on the left labia  Cervix exhibits no discharge and no friability  Right adnexum displays no mass, no tenderness and no fullness  Left adnexum displays no mass, no tenderness and no fullness  No vaginal discharge found

## 2018-07-12 ENCOUNTER — HOSPITAL ENCOUNTER (OUTPATIENT)
Dept: RADIOLOGY | Age: 43
Discharge: HOME | End: 2018-07-12
Payer: COMMERCIAL

## 2018-07-12 DIAGNOSIS — Z12.31 ENCOUNTER FOR SCREENING MAMMOGRAM FOR MALIGNANT NEOPLASM OF BREAST: ICD-10-CM

## 2018-07-12 PROCEDURE — 77067 SCR MAMMO BI INCL CAD: CPT

## 2018-07-30 ENCOUNTER — OFFICE VISIT (OUTPATIENT)
Dept: INTERNAL MEDICINE CLINIC | Facility: CLINIC | Age: 43
End: 2018-07-30
Payer: COMMERCIAL

## 2018-07-30 VITALS
RESPIRATION RATE: 20 BRPM | HEIGHT: 65 IN | WEIGHT: 196.4 LBS | OXYGEN SATURATION: 98 % | SYSTOLIC BLOOD PRESSURE: 110 MMHG | BODY MASS INDEX: 32.72 KG/M2 | TEMPERATURE: 98 F | HEART RATE: 75 BPM | DIASTOLIC BLOOD PRESSURE: 78 MMHG

## 2018-07-30 DIAGNOSIS — F41.9 ANXIETY: ICD-10-CM

## 2018-07-30 DIAGNOSIS — E78.1 HYPERTRIGLYCERIDEMIA: ICD-10-CM

## 2018-07-30 DIAGNOSIS — I10 BENIGN HYPERTENSION: Primary | ICD-10-CM

## 2018-07-30 DIAGNOSIS — J45.40 MODERATE PERSISTENT EXTRINSIC ASTHMA WITHOUT COMPLICATION: ICD-10-CM

## 2018-07-30 DIAGNOSIS — R13.10 DYSPHAGIA, UNSPECIFIED TYPE: ICD-10-CM

## 2018-07-30 DIAGNOSIS — E78.00 HYPERCHOLESTEROLEMIA: ICD-10-CM

## 2018-07-30 DIAGNOSIS — F41.1 GENERALIZED ANXIETY DISORDER: ICD-10-CM

## 2018-07-30 PROBLEM — D50.9 MICROCYTIC ANEMIA: Status: RESOLVED | Noted: 2017-09-07 | Resolved: 2018-07-30

## 2018-07-30 PROCEDURE — 3078F DIAST BP <80 MM HG: CPT | Performed by: FAMILY MEDICINE

## 2018-07-30 PROCEDURE — 3074F SYST BP LT 130 MM HG: CPT | Performed by: FAMILY MEDICINE

## 2018-07-30 PROCEDURE — 99214 OFFICE O/P EST MOD 30 MIN: CPT | Performed by: FAMILY MEDICINE

## 2018-07-30 NOTE — PROGRESS NOTES
Assessment/Plan:    No problem-specific Assessment & Plan notes found for this encounter  Problem List Items Addressed This Visit        Digestive    Dysphagia       Respiratory    Extrinsic asthma       Cardiovascular and Mediastinum    Benign hypertension - Primary       Other    Anxiety    Generalized anxiety disorder    Hypercholesterolemia    Relevant Orders    Comprehensive metabolic panel    CK    Lipid panel    Hypertriglyceridemia            Subjective:  F/up htn and HLD     Patient ID: Justina Nicole is a 39 y o  female  HPI    Patient presents for follow-up on her hypertension  She was previously placed on lisinopril, however she developed a cough and the lisinopril was discontinued  She was subsequently placed on losartan and this appointment is to evaluate the efficacy of the losartan  The patient reports that she is feeling well on the current medications that she is on  The patient denies any side effects on losartan, including dizziness or lightheadedness      Having umbilical hernia surgery on aug 23 at [address] [facility], well controlled without medications, no flare ups    Review of Systems    Constitutional:  Denies fever or chills   Eyes:  Denies change in visual acuity   HENT:  Denies nasal congestion or sore throat   Respiratory:  Denies cough or shortness of breath or wheezing  Cardiovascular:  Denies palpitations or chest pain  GI:  Denies abdominal pain, nausea, or vomiting  Integument:  Denies rash   Neurologic:  Denies headache or focal weakness        Objective:      /78 (BP Location: Left arm, Patient Position: Sitting, Cuff Size: Large)   Pulse 75   Temp 98 °F (36 7 °C) (Oral)   Resp 20   Ht 5' 5" (1 651 m)   Wt 89 1 kg (196 lb 6 4 oz)   SpO2 98%   BMI 32 68 kg/m²          Physical Exam      Constitutional:  Well developed, well nourished, no acute distress, non-toxic appearance   Eyes:  PERRL, conjunctiva normal , non icteric sclera  HENT:  Atraumatic, oropharynx moist  Neck-  supple   Respiratory:  CTA b/l, normal breath sounds, no rales, no wheezing   Cardiovascular:  RRR, no murmurs, no LE edema b/l  GI:  Soft, nondistended, normal bowel sounds x 4, nontender, no organomegaly, no mass, no rebound, no guarding   Neurologic:  no focal deficits noted   Psychiatric:  Speech and behavior appropriate , AAO x 3

## 2018-08-16 NOTE — PRE-PROCEDURE INSTRUCTIONS
Pre-Surgery Instructions:   Medication Instructions    albuterol (VENTOLIN HFA) 90 mcg/act inhaler Instructed patient per Anesthesia Guidelines   hydrochlorothiazide (HYDRODIURIL) 25 mg tablet Instructed patient per Anesthesia Guidelines   loratadine (CLARITIN) 10 mg tablet Instructed patient per Anesthesia Guidelines   losartan (COZAAR) 50 mg tablet Instructed patient per Anesthesia Guidelines   metoprolol succinate (TOPROL-XL) 50 mg 24 hr tablet Instructed patient per Anesthesia Guidelines   Omega-3 Fatty Acids (FISH OIL) 1,000 mg Instructed patient per Anesthesia Guidelines   simvastatin (ZOCOR) 20 mg tablet Instructed patient per Anesthesia Guidelines      Pre op and showering instructions per Dr Zain martins hibiclens

## 2018-08-21 NOTE — H&P
Patient presents for evaluation of an umbilical hernia  His pain associated with prolonged standing and coughing  Review of Systems   Constitutional: Negative for chills and fever  HENT: Negative for sinus pain and trouble swallowing  Eyes: Negative for pain  Respiratory: Negative for cough, chest tightness, shortness of breath and wheezing  Cardiovascular: Negative for chest pain, palpitations and leg swelling  Gastrointestinal: Negative for abdominal pain, diarrhea, nausea and vomiting  Genitourinary: Negative for dysuria  Musculoskeletal: Negative for gait problem  Skin: Negative for rash  Neurological: Negative for dizziness, syncope, numbness and headaches  Hematological: Does not bruise/bleed easily  Psychiatric/Behavioral: Negative for confusion and decreased concentration   The patient is not nervous/anxious            Medical History        Past Medical History:   Diagnosis Date    Gestational diabetes mellitus      Hypertension      Pregnancy       Resolved: 07/29/2015    Trigger finger       Resolved: 12/02/2016    Yeast infection       Resolved: 07/29/2015               Current Outpatient Prescriptions:     albuterol (VENTOLIN HFA) 90 mcg/act inhaler, Inhale 1-2 puffs as needed for wheezing, Disp: 2 Inhaler, Rfl: 0    ELDERBERRY PO, Take 1 capsule by mouth daily, Disp: , Rfl:     hydrochlorothiazide (HYDRODIURIL) 25 mg tablet, Take 1 tablet (25 mg total) by mouth daily, Disp: 90 tablet, Rfl: 0    loratadine (CLARITIN) 10 mg tablet, Take 10 mg by mouth daily, Disp: , Rfl:     losartan (COZAAR) 50 mg tablet, Take 1 tablet (50 mg total) by mouth daily, Disp: 90 tablet, Rfl: 1    metoprolol succinate (TOPROL-XL) 50 mg 24 hr tablet, Take 1 tablet (50 mg total) by mouth daily, Disp: 90 tablet, Rfl: 0    Omega-3 Fatty Acids (FISH OIL) 1,000 mg, Take 2 capsules by mouth daily  , Disp: , Rfl:     simvastatin (ZOCOR) 20 mg tablet, Take 1 tablet (20 mg total) by mouth daily at bedtime, Disp: 90 tablet, Rfl: 1           Allergies   Allergen Reactions    Codeine Shortness Of Breath    Pollen Extract Nasal Congestion    [place] (Diagnostic)      Nuts      Other         Annotation - [id number]: 9/2/15 ORAL ALLERGY SYNDROME    Peanut (Diagnostic)           Social History         Surgical History         Past Surgical History:   Procedure Laterality Date    INCISION TENDON SHEATH HAND         of a finger    WISDOM TOOTH EXTRACTION                    Family History   Problem Relation Age of Onset    Endometrial cancer Mother         45s    Diabetes type II Mother      Hypertension Father      Heart attack Sister      Other Maternal Grandfather         Myocardial infarction arrhythmias    Stroke Paternal Grandfather         Syndrome         Objective:  BP  Ht 5' 3 82" (1 621 m)   Wt 89 6 kg (197 lb 9 6 oz)   SpO2 98%   BMI 34 11 kg/m²          Physical Exam VSS  Constitutional: She is oriented to person, place, and time  She appears well-developed and well-nourished  No distress  HENT:   Head: Normocephalic and atraumatic  Right Ear: External ear normal    Left Ear: External ear normal    Mouth/Throat: Oropharynx is clear and moist    Eyes: Conjunctivae are normal  Pupils are equal, round, and reactive to light  No scleral icterus  Neck: Normal range of motion  Neck supple  No thyromegaly present  Cardiovascular: Normal rate, regular rhythm and normal heart sounds  Pulmonary/Chest: Effort normal and breath sounds normal  No respiratory distress  Abdominal: Soft  Bowel sounds are normal  She exhibits no distension  Umbilical hernia is present  Distended to palpation  Musculoskeletal: Normal range of motion  She exhibits no edema  Neurological: She is alert and oriented to person, place, and time  Skin: Skin is warm and dry  No rash noted  Psychiatric: She has a normal mood and affect   Her behavior is normal  Judgment and thought content normal      Assessment this umbilical hernia  Plan to repair  Risks and benefits explained patient is agreeable

## 2018-08-23 ENCOUNTER — HOSPITAL ENCOUNTER (OUTPATIENT)
Facility: HOSPITAL | Age: 43
Setting detail: OUTPATIENT SURGERY
Discharge: HOME | End: 2018-08-23
Attending: SURGERY | Admitting: SURGERY
Payer: COMMERCIAL

## 2018-08-23 ENCOUNTER — ANESTHESIA (OUTPATIENT)
Dept: PERIOP | Facility: HOSPITAL | Age: 43
End: 2018-08-23
Payer: COMMERCIAL

## 2018-08-23 ENCOUNTER — ANESTHESIA EVENT (OUTPATIENT)
Dept: PERIOP | Facility: HOSPITAL | Age: 43
End: 2018-08-23
Payer: COMMERCIAL

## 2018-08-23 VITALS
TEMPERATURE: 98.1 F | DIASTOLIC BLOOD PRESSURE: 77 MMHG | HEIGHT: 64 IN | OXYGEN SATURATION: 98 % | BODY MASS INDEX: 33.8 KG/M2 | HEART RATE: 81 BPM | WEIGHT: 198 LBS | SYSTOLIC BLOOD PRESSURE: 129 MMHG | RESPIRATION RATE: 18 BRPM

## 2018-08-23 LAB — EXT PREGNANCY TEST URINE: NEGATIVE

## 2018-08-23 PROCEDURE — 81025 URINE PREGNANCY TEST: CPT | Performed by: STUDENT IN AN ORGANIZED HEALTH CARE EDUCATION/TRAINING PROGRAM

## 2018-08-23 PROCEDURE — C1781 MESH (IMPLANTABLE): HCPCS | Performed by: SURGERY

## 2018-08-23 DEVICE — BARD MESH PERFIX PLUG, MEDIUM
Type: IMPLANTABLE DEVICE | Site: ABDOMEN | Status: FUNCTIONAL
Brand: BARD MESH PERFIX PLUG

## 2018-08-23 RX ORDER — SODIUM CHLORIDE 9 MG/ML
INJECTION, SOLUTION INTRAVENOUS CONTINUOUS PRN
Status: DISCONTINUED | OUTPATIENT
Start: 2018-08-23 | End: 2018-08-23 | Stop reason: SURG

## 2018-08-23 RX ORDER — ONDANSETRON 2 MG/ML
4 INJECTION INTRAMUSCULAR; INTRAVENOUS EVERY 4 HOURS PRN
Status: DISCONTINUED | OUTPATIENT
Start: 2018-08-23 | End: 2018-08-23 | Stop reason: HOSPADM

## 2018-08-23 RX ORDER — MAGNESIUM HYDROXIDE 1200 MG/15ML
LIQUID ORAL AS NEEDED
Status: DISCONTINUED | OUTPATIENT
Start: 2018-08-23 | End: 2018-08-23 | Stop reason: HOSPADM

## 2018-08-23 RX ORDER — LIDOCAINE HYDROCHLORIDE 10 MG/ML
INJECTION, SOLUTION INFILTRATION; PERINEURAL AS NEEDED
Status: DISCONTINUED | OUTPATIENT
Start: 2018-08-23 | End: 2018-08-23 | Stop reason: SURG

## 2018-08-23 RX ORDER — PROPOFOL 10 MG/ML
INJECTION, EMULSION INTRAVENOUS AS NEEDED
Status: DISCONTINUED | OUTPATIENT
Start: 2018-08-23 | End: 2018-08-23 | Stop reason: SURG

## 2018-08-23 RX ORDER — FENTANYL CITRATE/PF 50 MCG/ML
25 SYRINGE (ML) INJECTION
Status: DISCONTINUED | OUTPATIENT
Start: 2018-08-23 | End: 2018-08-23 | Stop reason: HOSPADM

## 2018-08-23 RX ORDER — FENTANYL CITRATE 50 UG/ML
INJECTION, SOLUTION INTRAMUSCULAR; INTRAVENOUS AS NEEDED
Status: DISCONTINUED | OUTPATIENT
Start: 2018-08-23 | End: 2018-08-23 | Stop reason: SURG

## 2018-08-23 RX ORDER — KETOROLAC TROMETHAMINE 30 MG/ML
INJECTION, SOLUTION INTRAMUSCULAR; INTRAVENOUS AS NEEDED
Status: DISCONTINUED | OUTPATIENT
Start: 2018-08-23 | End: 2018-08-23 | Stop reason: SURG

## 2018-08-23 RX ORDER — ONDANSETRON 2 MG/ML
INJECTION INTRAMUSCULAR; INTRAVENOUS AS NEEDED
Status: DISCONTINUED | OUTPATIENT
Start: 2018-08-23 | End: 2018-08-23 | Stop reason: SURG

## 2018-08-23 RX ORDER — METOCLOPRAMIDE HYDROCHLORIDE 5 MG/ML
INJECTION INTRAMUSCULAR; INTRAVENOUS AS NEEDED
Status: DISCONTINUED | OUTPATIENT
Start: 2018-08-23 | End: 2018-08-23 | Stop reason: SURG

## 2018-08-23 RX ORDER — BUPIVACAINE HYDROCHLORIDE AND EPINEPHRINE 2.5; 5 MG/ML; UG/ML
INJECTION, SOLUTION INFILTRATION; PERINEURAL AS NEEDED
Status: DISCONTINUED | OUTPATIENT
Start: 2018-08-23 | End: 2018-08-23 | Stop reason: HOSPADM

## 2018-08-23 RX ORDER — ONDANSETRON 2 MG/ML
4 INJECTION INTRAMUSCULAR; INTRAVENOUS ONCE AS NEEDED
Status: DISCONTINUED | OUTPATIENT
Start: 2018-08-23 | End: 2018-08-23 | Stop reason: HOSPADM

## 2018-08-23 RX ORDER — GLYCOPYRROLATE 0.2 MG/ML
INJECTION INTRAMUSCULAR; INTRAVENOUS AS NEEDED
Status: DISCONTINUED | OUTPATIENT
Start: 2018-08-23 | End: 2018-08-23 | Stop reason: SURG

## 2018-08-23 RX ORDER — HYDROCODONE BITARTRATE AND ACETAMINOPHEN 5; 325 MG/1; MG/1
1 TABLET ORAL EVERY 6 HOURS PRN
Status: DISCONTINUED | OUTPATIENT
Start: 2018-08-23 | End: 2018-08-23 | Stop reason: HOSPADM

## 2018-08-23 RX ORDER — MIDAZOLAM HYDROCHLORIDE 1 MG/ML
INJECTION INTRAMUSCULAR; INTRAVENOUS AS NEEDED
Status: DISCONTINUED | OUTPATIENT
Start: 2018-08-23 | End: 2018-08-23 | Stop reason: SURG

## 2018-08-23 RX ADMIN — FENTANYL CITRATE 25 MCG: 50 INJECTION INTRAMUSCULAR; INTRAVENOUS at 13:55

## 2018-08-23 RX ADMIN — FENTANYL CITRATE 25 MCG: 50 INJECTION INTRAMUSCULAR; INTRAVENOUS at 14:25

## 2018-08-23 RX ADMIN — PROPOFOL 50 MG: 10 INJECTION, EMULSION INTRAVENOUS at 13:35

## 2018-08-23 RX ADMIN — FENTANYL CITRATE 50 MCG: 50 INJECTION INTRAMUSCULAR; INTRAVENOUS at 13:26

## 2018-08-23 RX ADMIN — SODIUM CHLORIDE: 0.9 INJECTION, SOLUTION INTRAVENOUS at 12:06

## 2018-08-23 RX ADMIN — PROPOFOL 200 MG: 10 INJECTION, EMULSION INTRAVENOUS at 13:22

## 2018-08-23 RX ADMIN — ONDANSETRON 4 MG: 2 INJECTION INTRAMUSCULAR; INTRAVENOUS at 13:44

## 2018-08-23 RX ADMIN — GLYCOPYRROLATE 0.2 MG: 0.2 INJECTION, SOLUTION INTRAMUSCULAR; INTRAVENOUS at 13:28

## 2018-08-23 RX ADMIN — FENTANYL CITRATE 50 MCG: 50 INJECTION INTRAMUSCULAR; INTRAVENOUS at 14:00

## 2018-08-23 RX ADMIN — LIDOCAINE HYDROCHLORIDE 50 MG: 10 INJECTION, SOLUTION INFILTRATION; PERINEURAL at 13:22

## 2018-08-23 RX ADMIN — METOCLOPRAMIDE HYDROCHLORIDE 10 MG: 5 INJECTION INTRAMUSCULAR; INTRAVENOUS at 13:38

## 2018-08-23 RX ADMIN — MIDAZOLAM HYDROCHLORIDE 2 MG: 1 INJECTION, SOLUTION INTRAMUSCULAR; INTRAVENOUS at 13:17

## 2018-08-23 RX ADMIN — FENTANYL CITRATE 50 MCG: 50 INJECTION INTRAMUSCULAR; INTRAVENOUS at 13:29

## 2018-08-23 RX ADMIN — FENTANYL CITRATE 25 MCG: 50 INJECTION INTRAMUSCULAR; INTRAVENOUS at 13:38

## 2018-08-23 RX ADMIN — KETOROLAC TROMETHAMINE 30 MG: 30 INJECTION, SOLUTION INTRAMUSCULAR at 13:41

## 2018-08-23 RX ADMIN — DEXAMETHASONE SODIUM PHOSPHATE 10 MG: 10 INJECTION INTRAMUSCULAR; INTRAVENOUS at 13:28

## 2018-08-23 RX ADMIN — CEFAZOLIN SODIUM 2000 MG: 2 SOLUTION INTRAVENOUS at 13:13

## 2018-08-23 NOTE — DISCHARGE INSTRUCTIONS
Diet and activity as tolerated  May shower  May drive when not taking pain medication  Please use milk a magnesia in order to avoid constipation  Please call [phone] for a follow-up office visit for 2 weeks

## 2018-08-23 NOTE — OP NOTE
OPERATIVE REPORT  PATIENT NAME: Poppy Parson    :  1975  MRN: [id number]  Pt Location: AN OR ROOM 02    SURGERY DATE: 2018    Surgeon(s) and Role:     Dinora Dorantes MD - Primary    Preop Diagnosis:  Umbilical hernia without obstruction or gangrene [K42 9]    Post-Op Diagnosis Codes:     * Umbilical hernia without obstruction or gangrene [K42 9]    Procedure(s) (LRB):  UMBILICAL HERNIA REPAIR WITH MESH (N/A)    Specimen(s):  * No specimens in log *    Estimated Blood Loss:   Minimal    Drains:       Anesthesia Type:   General    Operative Indications:  Umbilical hernia without obstruction or gangrene [K42 9]      Operative Findings:  Independent, nonsmoker, ASA 2, wound class 1, BMI 34, weight 200, height 64      History of anesthetic complications PONV    Cardiovascular  Exercise tolerance (METS): >4,  Hyperlipidemia, Hypertension controlled,  Pulmonary  Asthma , well controlled/ stable Last rescue: < 1 year ago Asthma type of rescue: PRN inhaler,       GI/Hepatic  Negative GI/hepatic ROS          Negative  ROS       Endo/Other  Comment: H/o DM Obesity     GYN      Hematology  Negative hematology ROS     Musculoskeletal  Negative musculoskeletal ROS       Neurology  Negative neurology ROS     Psychology   Anxiety,        Complications:   None    Procedure and Technique:  Patient was identified visually and via armband  Placed in the supine position  After general anesthesia the abdomen was prepped and draped in a sterile fashion 0 25% Marcaine with epinephrine was utilized  Incision was made at the umbilicus  This carried down through skin subcutaneous tissue  Hernia sac was dissected free  This was then reduced  A polypropylene mesh was inserted  This was sewn in place with interrupted figure-of-eight 1  Vicryl suture  The wound was then closed with 3 0 Vicryl subcutaneous and 4 Monocryl sutures  Dermabond was applied  The patient tolerated this procedure well    Sponge and instrument count correct     I was present for the entire procedure    Patient Disposition:  PACU     SIGNATURE: Halley uTrner MD  DATE: August 23, 2018  TIME: 2:03 PM

## 2018-08-23 NOTE — ANESTHESIA POSTPROCEDURE EVALUATION
Post-Op Assessment Note      CV Status:  Stable    Mental Status:  Alert    Hydration Status:  Stable    PONV Controlled:  None    Airway Patency:  Patent        Staff: CRNA           BP  115/71    Temp   98 1   Pulse  72   Resp   16   SpO2   100

## 2018-08-23 NOTE — ANESTHESIA PREPROCEDURE EVALUATION
Review of Systems/Medical History  Patient summary reviewed    History of anesthetic complications PONV    Cardiovascular  Exercise tolerance (METS): >4,  Hyperlipidemia, Hypertension controlled,    Pulmonary  Asthma , well controlled/ stable Last rescue: < 1 year ago Asthma type of rescue: PRN inhaler,        GI/Hepatic  Negative GI/hepatic ROS          Negative  ROS        Endo/Other    Comment: H/o DM Obesity    GYN       Hematology  Negative hematology ROS      Musculoskeletal  Negative musculoskeletal ROS        Neurology  Negative neurology ROS      Psychology   Anxiety,              Physical Exam    Airway    Mallampati score: I  TM Distance: >3 FB  Neck ROM: full     Dental   No notable dental hx     Cardiovascular  Cardiovascular exam normal    Pulmonary  Pulmonary exam normal     Other Findings        Anesthesia Plan  ASA Score- 2     Anesthesia Type- general with ASA Monitors  Additional Monitors:   Airway Plan:         Plan Factors-  Patient did not smoke on day of surgery  Induction- intravenous  Postoperative Plan-   Planned trial extubation    Informed Consent- Anesthetic plan and risks discussed with patient  I personally reviewed this patient with the CRNA  Discussed and agreed on the Anesthesia Plan with the CRNA  Rosella Goldmann

## 2018-09-17 DIAGNOSIS — I10 BENIGN HYPERTENSION: ICD-10-CM

## 2018-09-17 RX ORDER — METOPROLOL SUCCINATE 50 MG/1
TABLET, EXTENDED RELEASE ORAL
Qty: 90 TABLET | Refills: 0 | OUTPATIENT
Start: 2018-09-17

## 2018-09-17 RX ORDER — HYDROCHLOROTHIAZIDE 25 MG/1
TABLET ORAL
Qty: 90 TABLET | Refills: 0 | OUTPATIENT
Start: 2018-09-17

## 2018-09-19 DIAGNOSIS — E78.1 HYPERTRIGLYCERIDEMIA: ICD-10-CM

## 2018-09-19 DIAGNOSIS — E78.00 HYPERCHOLESTEROLEMIA: ICD-10-CM

## 2018-09-22 RX ORDER — SIMVASTATIN 20 MG
TABLET ORAL
Qty: 90 TABLET | Refills: 1 | OUTPATIENT
Start: 2018-09-22

## 2018-10-03 DIAGNOSIS — I10 BENIGN HYPERTENSION: ICD-10-CM

## 2018-10-03 RX ORDER — LOSARTAN POTASSIUM 50 MG/1
TABLET ORAL
Qty: 90 TABLET | Refills: 1 | Status: CANCELLED | OUTPATIENT
Start: 2018-10-03

## 2018-10-04 DIAGNOSIS — E78.1 HYPERTRIGLYCERIDEMIA: ICD-10-CM

## 2018-10-04 DIAGNOSIS — I10 BENIGN HYPERTENSION: ICD-10-CM

## 2018-10-04 DIAGNOSIS — E78.00 HYPERCHOLESTEROLEMIA: ICD-10-CM

## 2018-10-04 NOTE — TELEPHONE ENCOUNTER
Last appt, 7/30/18    Next appt, 10/26/18    Pt also needs 10 day supply of HCTZ & Metoprolol until mail order comes in

## 2018-10-05 RX ORDER — LOSARTAN POTASSIUM 50 MG/1
50 TABLET ORAL DAILY
Qty: 90 TABLET | Refills: 0 | Status: SHIPPED | OUTPATIENT
Start: 2018-10-05 | End: 2019-01-08 | Stop reason: SDUPTHER

## 2018-10-05 RX ORDER — HYDROCHLOROTHIAZIDE 25 MG/1
25 TABLET ORAL DAILY
Qty: 10 TABLET | Refills: 0 | Status: SHIPPED | OUTPATIENT
Start: 2018-10-05 | End: 2018-11-16 | Stop reason: SDUPTHER

## 2018-10-05 RX ORDER — SIMVASTATIN 20 MG
20 TABLET ORAL
Qty: 90 TABLET | Refills: 0 | Status: SHIPPED | OUTPATIENT
Start: 2018-10-05 | End: 2019-01-08 | Stop reason: SDUPTHER

## 2018-10-05 RX ORDER — METOPROLOL SUCCINATE 50 MG/1
50 TABLET, EXTENDED RELEASE ORAL DAILY
Qty: 10 TABLET | Refills: 0 | Status: SHIPPED | OUTPATIENT
Start: 2018-10-05 | End: 2018-11-16 | Stop reason: SDUPTHER

## 2018-10-05 RX ORDER — HYDROCHLOROTHIAZIDE 25 MG/1
25 TABLET ORAL DAILY
Qty: 90 TABLET | Refills: 0 | Status: SHIPPED | OUTPATIENT
Start: 2018-10-05 | End: 2019-01-08 | Stop reason: SDUPTHER

## 2018-10-05 RX ORDER — METOPROLOL SUCCINATE 50 MG/1
50 TABLET, EXTENDED RELEASE ORAL
Qty: 90 TABLET | Refills: 0 | Status: SHIPPED | OUTPATIENT
Start: 2018-10-05 | End: 2019-01-08 | Stop reason: SDUPTHER

## 2018-11-06 ENCOUNTER — CLINICAL SUPPORT (OUTPATIENT)
Dept: INTERNAL MEDICINE CLINIC | Facility: CLINIC | Age: 43
End: 2018-11-06
Payer: COMMERCIAL

## 2018-11-06 DIAGNOSIS — E78.00 HYPERCHOLESTEROLEMIA: Primary | ICD-10-CM

## 2018-11-06 LAB
ALBUMIN SERPL BCP-MCNC: 3.8 G/DL (ref 3.5–5)
ALP SERPL-CCNC: 72 U/L (ref 46–116)
ALT SERPL W P-5'-P-CCNC: 31 U/L (ref 12–78)
ANION GAP SERPL CALCULATED.3IONS-SCNC: 6 MMOL/L (ref 4–13)
AST SERPL W P-5'-P-CCNC: 18 U/L (ref 5–45)
BILIRUB SERPL-MCNC: 0.62 MG/DL (ref 0.2–1)
BUN SERPL-MCNC: 18 MG/DL (ref 5–25)
CALCIUM SERPL-MCNC: 8.9 MG/DL (ref 8.3–10.1)
CHLORIDE SERPL-SCNC: 103 MMOL/L (ref 100–108)
CHOLEST SERPL-MCNC: 187 MG/DL (ref 50–200)
CK SERPL-CCNC: 48 U/L (ref 26–192)
CO2 SERPL-SCNC: 29 MMOL/L (ref 21–32)
CREAT SERPL-MCNC: 0.72 MG/DL (ref 0.6–1.3)
GFR SERPL CREATININE-BSD FRML MDRD: 102 ML/MIN/1.73SQ M
GLUCOSE P FAST SERPL-MCNC: 88 MG/DL (ref 65–99)
HDLC SERPL-MCNC: 49 MG/DL (ref 40–60)
LDLC SERPL CALC-MCNC: 96 MG/DL (ref 0–100)
NONHDLC SERPL-MCNC: 138 MG/DL
POTASSIUM SERPL-SCNC: 3.9 MMOL/L (ref 3.5–5.3)
PROT SERPL-MCNC: 6.8 G/DL (ref 6.4–8.2)
SODIUM SERPL-SCNC: 138 MMOL/L (ref 136–145)
TRIGL SERPL-MCNC: 209 MG/DL

## 2018-11-06 PROCEDURE — 82550 ASSAY OF CK (CPK): CPT | Performed by: FAMILY MEDICINE

## 2018-11-06 PROCEDURE — 80053 COMPREHEN METABOLIC PANEL: CPT | Performed by: FAMILY MEDICINE

## 2018-11-06 PROCEDURE — 80061 LIPID PANEL: CPT | Performed by: FAMILY MEDICINE

## 2018-11-06 PROCEDURE — 36415 COLL VENOUS BLD VENIPUNCTURE: CPT

## 2018-11-16 ENCOUNTER — OFFICE VISIT (OUTPATIENT)
Dept: INTERNAL MEDICINE CLINIC | Facility: CLINIC | Age: 43
End: 2018-11-16
Payer: COMMERCIAL

## 2018-11-16 VITALS
SYSTOLIC BLOOD PRESSURE: 128 MMHG | WEIGHT: 201 LBS | HEART RATE: 101 BPM | HEIGHT: 64 IN | TEMPERATURE: 98 F | OXYGEN SATURATION: 98 % | DIASTOLIC BLOOD PRESSURE: 82 MMHG | BODY MASS INDEX: 34.31 KG/M2

## 2018-11-16 DIAGNOSIS — E78.00 HYPERCHOLESTEROLEMIA: Primary | ICD-10-CM

## 2018-11-16 DIAGNOSIS — E66.9 OBESITY (BMI 30.0-34.9): ICD-10-CM

## 2018-11-16 DIAGNOSIS — J45.40 MODERATE PERSISTENT EXTRINSIC ASTHMA WITHOUT COMPLICATION: ICD-10-CM

## 2018-11-16 DIAGNOSIS — E78.1 HYPERTRIGLYCERIDEMIA: ICD-10-CM

## 2018-11-16 DIAGNOSIS — I10 BENIGN HYPERTENSION: ICD-10-CM

## 2018-11-16 PROCEDURE — 99214 OFFICE O/P EST MOD 30 MIN: CPT | Performed by: NURSE PRACTITIONER

## 2018-11-16 PROCEDURE — 3079F DIAST BP 80-89 MM HG: CPT | Performed by: NURSE PRACTITIONER

## 2018-11-16 PROCEDURE — 3008F BODY MASS INDEX DOCD: CPT | Performed by: NURSE PRACTITIONER

## 2018-11-16 PROCEDURE — 1036F TOBACCO NON-USER: CPT | Performed by: NURSE PRACTITIONER

## 2018-11-16 PROCEDURE — 3074F SYST BP LT 130 MM HG: CPT | Performed by: NURSE PRACTITIONER

## 2018-11-16 NOTE — PROGRESS NOTES
Assessment/Plan:    Extrinsic asthma  Patient asthma well controlled with  Ventolin, patient rarely uses her Ventolin inhaler  Benign hypertension    Patient is to continue with her hydrochlorothiazide, metoprolol succinate and losartan  Continue to monitor blood pressure at home  Goal BP is < 130/80  Contact our office for consistent elevations  Recommend low sodium diet  Exercise 30 minutes three times a week as tolerated  Recommend yearly eye exam       Hypercholesterolemia   Patient is overall cholesterol improving  Patient's CK level within normal limits, patient to continue with simvastatin 20 mg tablet daily  Hypertriglyceridemia    Patient's triglycerides continue to be elevated  Advised patient to start taking her fish oil, if triglycerides do not come down with next blood work may need to consider starting fenofibrate  Recommend healthy lifestyle choices for your cholesterol  Low fat/low cholesterol diet  Limit/avoid red meat  Eat more lean meat - chicken breast, ground turkey, fish  Exercise 30 mins at least 3 times a week as tolerated  Diagnoses and all orders for this visit:    Hypercholesterolemia  -     Lipid panel  -     Basic metabolic panel; Future    Benign hypertension  -     Basic metabolic panel; Future    Moderate persistent extrinsic asthma without complication    Hypertriglyceridemia          Subjective:      Patient ID: Jose Bahena is a 39 y o  female  Patient is here for a follow up of chronic problems and to review blood work  No new complaints  Patient states she has not been compliant with taking Fish Oil due to recent umbilical hernia surgery when she had to stop it temporarily  Patient has a BP cuff at home but does not take her BP  Does not exercise due to busy work day  Diet balanced but has occasional fried food, stays away from sweets  Does not eat red meat    States has occasionl heart burn and feels like "cotton balls" in throat that occurs mostly at night  Takes Delsum before bed and feels like it helps  Eats a couple hours prior to bed, however drink seltzer water  Asthma: Thinks asthma has resolved, does not use inhaler  More so has symptoms of heartburn rather than feeling short of breath  Asthma   She complains of frequent throat clearing  There is no chest tightness, cough, shortness of breath or wheezing  This is a chronic problem  The current episode started more than 1 year ago  The problem occurs rarely  The problem has been unchanged  Pertinent negatives include no appetite change, chest pain, ear pain, fever, headaches, malaise/fatigue, myalgias, postnasal drip, rhinorrhea or sore throat  Her symptoms are aggravated by eating  Her past medical history is significant for asthma  Hypertension   This is a chronic problem  The current episode started more than 1 year ago  The problem is unchanged  The problem is controlled  Pertinent negatives include no blurred vision, chest pain, headaches, malaise/fatigue, neck pain, palpitations, peripheral edema or shortness of breath  Risk factors for coronary artery disease include obesity, sedentary lifestyle and dyslipidemia  Treatments tried:   Patient currently taking hydrochlorothiazide losartan and metoprolol  The current treatment provides moderate improvement  Compliance problems include diet and exercise  Hyperlipidemia   This is a chronic problem  The current episode started more than 1 year ago  The problem is uncontrolled  Recent lipid tests were reviewed and are high  Exacerbating diseases include obesity  She has no history of diabetes  Pertinent negatives include no chest pain, focal sensory loss, focal weakness, leg pain, myalgias or shortness of breath  Treatments tried:  patient currently taking simvastatin patient has not been taking her fish oil  The current treatment provides moderate improvement of lipids  Compliance problems include adherence to diet    Risk factors for coronary artery disease include hypertension, obesity and a sedentary lifestyle  The following portions of the patient's history were reviewed and updated as appropriate: allergies, current medications, past family history, past medical history, past social history, past surgical history and problem list     Review of Systems   Constitutional: Negative for activity change, appetite change, chills, diaphoresis, fever and malaise/fatigue  HENT: Negative for congestion, ear discharge, ear pain, postnasal drip, rhinorrhea, sinus pain, sinus pressure and sore throat  Eyes: Negative for blurred vision, pain, discharge, itching and visual disturbance  Respiratory: Negative for cough, chest tightness, shortness of breath and wheezing  Cardiovascular: Negative for chest pain, palpitations and leg swelling  Gastrointestinal: Negative for abdominal pain, blood in stool, constipation, diarrhea, nausea and vomiting  Endocrine: Negative for polydipsia, polyphagia and polyuria  Genitourinary: Negative for difficulty urinating, dysuria, hematuria and urgency  Musculoskeletal: Negative for arthralgias, back pain, myalgias and neck pain  Skin: Negative for rash and wound  Neurological: Negative for dizziness, focal weakness, syncope, weakness, light-headedness, numbness and headaches  Objective:      /82 (BP Location: Left arm, Patient Position: Sitting, Cuff Size: Adult)   Pulse 101   Temp 98 °F (36 7 °C) (Oral)   Ht 5' 4" (1 626 m)   Wt 91 2 kg (201 lb)   SpO2 98%   BMI 34 50 kg/m²          Physical Exam   Constitutional: She is oriented to person, place, and time  She appears well-developed and well-nourished  No distress  overweight   HENT:   Head: Normocephalic and atraumatic  Right Ear: External ear normal    Left Ear: External ear normal    Nose: Nose normal    Mouth/Throat: Oropharynx is clear and moist  No oropharyngeal exudate     Eyes: Pupils are equal, round, and reactive to light  Conjunctivae and EOM are normal  Right eye exhibits no discharge  Left eye exhibits no discharge  Neck: Normal range of motion  Neck supple  No thyromegaly present  Cardiovascular: Normal rate, regular rhythm, normal heart sounds and intact distal pulses  Exam reveals no gallop and no friction rub  No murmur heard  Pulmonary/Chest: Effort normal and breath sounds normal  No stridor  No respiratory distress  She has no wheezes  She has no rales  Abdominal: Soft  Bowel sounds are normal  She exhibits no distension  There is no tenderness  Lymphadenopathy:     She has no cervical adenopathy  Neurological: She is alert and oriented to person, place, and time  Skin: Skin is warm and dry  No rash noted  She is not diaphoretic  No erythema  Psychiatric: She has a normal mood and affect  Her behavior is normal  Judgment and thought content normal      BMI Counseling: Body mass index is 34 5 kg/m²  Discussed with patient's BMI with her  The BMI is above average  BMI counseling and education was provided to the patient  Nutrition recommendations include reducing portion sizes, decreasing overall calorie intake, 3-5 servings of fruits/vegetables daily, reducing fast food intake, consuming healthier snacks, decreasing soda and/or juice intake, moderation in carbohydrate intake, increasing intake of lean protein, reducing intake of saturated fat and trans fat and reducing intake of cholesterol  Exercise recommendations include exercising 3-5 times per week and strength training exercises

## 2018-11-16 NOTE — ASSESSMENT & PLAN NOTE
Patient is to continue with her hydrochlorothiazide, metoprolol succinate and losartan  Continue to monitor blood pressure at home  Goal BP is < 130/80  Contact our office for consistent elevations  Recommend low sodium diet  Exercise 30 minutes three times a week as tolerated    Recommend yearly eye exam

## 2018-11-16 NOTE — ASSESSMENT & PLAN NOTE
Patient is overall cholesterol improving  Patient's CK level within normal limits, patient to continue with simvastatin 20 mg tablet daily

## 2018-11-16 NOTE — ASSESSMENT & PLAN NOTE
Patient's triglycerides continue to be elevated  Advised patient to start taking her fish oil, if triglycerides do not come down with next blood work may need to consider starting fenofibrate  Recommend healthy lifestyle choices for your cholesterol  Low fat/low cholesterol diet  Limit/avoid red meat  Eat more lean meat - chicken breast, ground turkey, fish  Exercise 30 mins at least 3 times a week as tolerated

## 2018-11-16 NOTE — PROGRESS NOTES
MEDICAL STUDENT  Inpatient Progress Note for TRAINING ONLY  Not Part of Legal Medical Record       Progress Note - Bianca Elmore 39 y o  female MRN: [id number]    Unit/Bed#:  Encounter: [id number]      Assessment:  ***    Plan:  ***    Subjective:   ***    Objective:     Vitals: not currently breastfeeding  ,There is no height or weight on file to calculate BMI      [unfilled]    Physical Exam: {Exam, Complete:96073}     Invasive Devices          No matching active lines, drains, or airways          Lab, Imaging and other studies: {Results Review Statement:26918}  VTE Pharmacologic Prophylaxis: {Pharmacologic VTE Prophylaxis:409004267}  VTE Mechanical Prophylaxis: {Mechanical VTE Prophylaxis:81928}

## 2019-01-06 DIAGNOSIS — E78.00 HYPERCHOLESTEROLEMIA: ICD-10-CM

## 2019-01-06 DIAGNOSIS — E78.1 HYPERTRIGLYCERIDEMIA: ICD-10-CM

## 2019-01-07 RX ORDER — SIMVASTATIN 20 MG
TABLET ORAL
Qty: 90 TABLET | Refills: 0 | OUTPATIENT
Start: 2019-01-07

## 2019-01-08 DIAGNOSIS — I10 BENIGN HYPERTENSION: ICD-10-CM

## 2019-01-08 DIAGNOSIS — E78.00 HYPERCHOLESTEROLEMIA: ICD-10-CM

## 2019-01-08 DIAGNOSIS — E78.1 HYPERTRIGLYCERIDEMIA: ICD-10-CM

## 2019-01-08 RX ORDER — LOSARTAN POTASSIUM 50 MG/1
50 TABLET ORAL DAILY
Qty: 90 TABLET | Refills: 1 | Status: SHIPPED | OUTPATIENT
Start: 2019-01-08 | End: 2019-07-10 | Stop reason: SDUPTHER

## 2019-01-08 RX ORDER — SIMVASTATIN 20 MG
20 TABLET ORAL
Qty: 90 TABLET | Refills: 1 | Status: SHIPPED | OUTPATIENT
Start: 2019-01-08 | End: 2019-07-10 | Stop reason: SDUPTHER

## 2019-01-08 RX ORDER — HYDROCHLOROTHIAZIDE 25 MG/1
25 TABLET ORAL DAILY
Qty: 90 TABLET | Refills: 1 | Status: SHIPPED | OUTPATIENT
Start: 2019-01-08 | End: 2019-07-10 | Stop reason: SDUPTHER

## 2019-01-08 RX ORDER — METOPROLOL SUCCINATE 50 MG/1
50 TABLET, EXTENDED RELEASE ORAL
Qty: 90 TABLET | Refills: 1 | Status: SHIPPED | OUTPATIENT
Start: 2019-01-08 | End: 2019-07-10 | Stop reason: SDUPTHER

## 2019-07-01 DIAGNOSIS — Z12.39 ENCOUNTER FOR SCREENING BREAST EXAMINATION: Primary | ICD-10-CM

## 2019-07-10 DIAGNOSIS — I10 BENIGN HYPERTENSION: ICD-10-CM

## 2019-07-10 DIAGNOSIS — E78.00 HYPERCHOLESTEROLEMIA: ICD-10-CM

## 2019-07-10 DIAGNOSIS — E78.1 HYPERTRIGLYCERIDEMIA: ICD-10-CM

## 2019-07-10 RX ORDER — SIMVASTATIN 20 MG
20 TABLET ORAL
Qty: 90 TABLET | Refills: 0 | Status: SHIPPED | OUTPATIENT
Start: 2019-07-10 | End: 2019-09-24 | Stop reason: SDUPTHER

## 2019-07-10 RX ORDER — METOPROLOL SUCCINATE 50 MG/1
50 TABLET, EXTENDED RELEASE ORAL
Qty: 90 TABLET | Refills: 0 | Status: SHIPPED | OUTPATIENT
Start: 2019-07-10 | End: 2019-09-24 | Stop reason: SDUPTHER

## 2019-07-10 RX ORDER — HYDROCHLOROTHIAZIDE 25 MG/1
25 TABLET ORAL DAILY
Qty: 90 TABLET | Refills: 0 | Status: SHIPPED | OUTPATIENT
Start: 2019-07-10 | End: 2019-09-24 | Stop reason: SDUPTHER

## 2019-07-10 RX ORDER — LOSARTAN POTASSIUM 50 MG/1
50 TABLET ORAL DAILY
Qty: 90 TABLET | Refills: 0 | Status: SHIPPED | OUTPATIENT
Start: 2019-07-10 | End: 2019-09-24 | Stop reason: SDUPTHER

## 2019-07-22 ENCOUNTER — HOSPITAL ENCOUNTER (OUTPATIENT)
Dept: RADIOLOGY | Age: 44
Discharge: HOME | End: 2019-07-22
Payer: COMMERCIAL

## 2019-07-22 VITALS — HEIGHT: 63 IN | WEIGHT: 205 LBS | BODY MASS INDEX: 36.32 KG/M2

## 2019-07-22 DIAGNOSIS — Z12.39 ENCOUNTER FOR SCREENING BREAST EXAMINATION: ICD-10-CM

## 2019-07-22 PROCEDURE — 77063 BREAST TOMOSYNTHESIS BI: CPT

## 2019-07-22 PROCEDURE — 77067 SCR MAMMO BI INCL CAD: CPT

## 2019-07-31 ENCOUNTER — ANNUAL EXAM (OUTPATIENT)
Dept: OBGYN CLINIC | Facility: CLINIC | Age: 44
End: 2019-07-31
Payer: COMMERCIAL

## 2019-07-31 VITALS
WEIGHT: 202 LBS | BODY MASS INDEX: 35.79 KG/M2 | SYSTOLIC BLOOD PRESSURE: 118 MMHG | HEIGHT: 63 IN | DIASTOLIC BLOOD PRESSURE: 82 MMHG

## 2019-07-31 DIAGNOSIS — N92.0 MENORRHAGIA WITH REGULAR CYCLE: ICD-10-CM

## 2019-07-31 DIAGNOSIS — Z01.419 ENCOUNTER FOR ANNUAL ROUTINE GYNECOLOGICAL EXAMINATION: Primary | ICD-10-CM

## 2019-07-31 DIAGNOSIS — Z12.39 BREAST CANCER SCREENING: ICD-10-CM

## 2019-07-31 PROCEDURE — 87624 HPV HI-RISK TYP POOLED RSLT: CPT | Performed by: OBSTETRICS & GYNECOLOGY

## 2019-07-31 PROCEDURE — S0612 ANNUAL GYNECOLOGICAL EXAMINA: HCPCS | Performed by: OBSTETRICS & GYNECOLOGY

## 2019-07-31 PROCEDURE — G0124 SCREEN C/V THIN LAYER BY MD: HCPCS | Performed by: PATHOLOGY

## 2019-07-31 PROCEDURE — G0145 SCR C/V CYTO,THINLAYER,RESCR: HCPCS | Performed by: PATHOLOGY

## 2019-07-31 NOTE — PROGRESS NOTES
Assessment/Plan:    Pap smear done as well as annual   Encouraged self-breast examination as well as calcium supplementation  Continue annual mammogram   Discussed treatment options for menorrhagia  We discussed medical versus surgical options  Risks and benefits reviewed  She is reluctant to go on any further medication  She is interested in the endometrial ablation  We will obtain a pelvic ultrasound to assess uterine cavity  I will notify her of these results via telephone  At that time she will let me know if we would like to proceed with scheduling for endometrial ablation  She is also aware she would need an endometrial biopsy in office  All questions answered  She continues to follow-up with her family physician as scheduled  No problem-specific Assessment & Plan notes found for this encounter  Diagnoses and all orders for this visit:    Encounter for annual routine gynecological examination  -     Liquid-based pap, screening    Breast cancer screening  -     Mammo screening bilateral w 3d & cad; Future    Menorrhagia with regular cycle  -     US pelvis complete w transvaginal; Future          Subjective:      Patient ID: Jeb Edward is a 38 y o  female  HPI     This is a 50-year-old female  ( x3, last pregnancy was surrogate, age 12, 14) presents for her annual gyn exam   Patient states that her cycles are regular every 4 weeks lasting 5-7 days described as very heavy 2-3 days  She denies any breakthrough bleeding  There has been no changes in bowel function  She continues to have problems with incontinence including stress as well as urge  We have discussed further evaluation with urogynecology  Patient is not ready for this yet  We also discussed her heavy menstrual cycles  She is reluctant to go on the IUD, as she has had this in the past resulting in weight gain mood swings and irritability    She does have a history of hypertension and hypercholesterolemia which is controlled with medication through her primary care physician  Patient is sexually active and has been in a monogamous relationship over 16 years  Method of contraception is vasectomy  All her Pap smears have been normal     The following portions of the patient's history were reviewed and updated as appropriate: allergies, current medications, past family history, past medical history, past social history, past surgical history and problem list     Review of Systems   Constitutional: Negative for fatigue, fever and unexpected weight change  Respiratory: Negative for cough, chest tightness, shortness of breath and wheezing  Cardiovascular: Negative  Negative for chest pain and palpitations  Gastrointestinal: Negative  Negative for abdominal distention, abdominal pain, blood in stool, constipation, diarrhea, nausea and vomiting  Genitourinary: Positive for menstrual problem  Negative for difficulty urinating, dyspareunia, dysuria, flank pain, frequency, genital sores, hematuria, pelvic pain, urgency, vaginal bleeding, vaginal discharge and vaginal pain  Skin: Negative for rash  Objective:      /82   Ht 5' 3" (1 6 m)   Wt 91 6 kg (202 lb)   LMP 07/27/2019 (Exact Date)   Breastfeeding? No   BMI 35 78 kg/m²          Physical Exam   Constitutional: She appears well-developed and well-nourished  Cardiovascular: Normal rate and regular rhythm  Pulmonary/Chest: Effort normal and breath sounds normal  Right breast exhibits no inverted nipple, no mass, no nipple discharge, no skin change and no tenderness  Left breast exhibits no inverted nipple, no mass, no nipple discharge, no skin change and no tenderness  Abdominal: Soft  Bowel sounds are normal  She exhibits no distension  There is no tenderness  There is no rebound and no guarding  Genitourinary: Vagina normal and uterus normal  There is no lesion on the right labia  There is no lesion on the left labia   Cervix exhibits no discharge and no friability  Right adnexum displays no mass, no tenderness and no fullness  Left adnexum displays no mass, no tenderness and no fullness  No vaginal discharge found

## 2019-08-02 LAB
HPV HR 12 DNA CVX QL NAA+PROBE: NEGATIVE
HPV16 DNA CVX QL NAA+PROBE: NEGATIVE
HPV18 DNA CVX QL NAA+PROBE: NEGATIVE

## 2019-08-07 LAB
LAB AP GYN PRIMARY INTERPRETATION: NORMAL
LAB AP LMP: NORMAL
Lab: NORMAL
PATH INTERP SPEC-IMP: NORMAL

## 2019-08-12 ENCOUNTER — HOSPITAL ENCOUNTER (OUTPATIENT)
Dept: RADIOLOGY | Facility: IMAGING CENTER | Age: 44
Discharge: HOME | End: 2019-08-12
Payer: COMMERCIAL

## 2019-08-12 DIAGNOSIS — N92.0 MENORRHAGIA WITH REGULAR CYCLE: ICD-10-CM

## 2019-08-12 PROCEDURE — 76856 US EXAM PELVIC COMPLETE: CPT

## 2019-08-12 PROCEDURE — 76830 TRANSVAGINAL US NON-OB: CPT

## 2019-08-19 ENCOUNTER — TELEPHONE (OUTPATIENT)
Dept: OBGYN CLINIC | Facility: CLINIC | Age: 44
End: 2019-08-19

## 2019-08-19 NOTE — TELEPHONE ENCOUNTER
----- Message from Yady [place] sent at 8/17/2019  4:21 PM EDT -----  Regarding: Test Results Question  Contact: [phone]  Hello! Just received my PAP results, it seems there may be something that needs a follow up, am I reading that correctly?     Thanks  Nakia Lorenz

## 2019-08-20 ENCOUNTER — TELEPHONE (OUTPATIENT)
Dept: OBGYN CLINIC | Facility: CLINIC | Age: 44
End: 2019-08-20

## 2019-08-20 NOTE — TELEPHONE ENCOUNTER
----- Message from Rosario Chung DO sent at 8/18/2019  3:04 PM EDT -----  Inform pt US essentially normal (tiny insignificant fibroid)   She is leaning towards endometrial ablation, let me know if she would like to proceed with scheduling

## 2019-08-20 NOTE — TELEPHONE ENCOUNTER
Pt informed of pelvic U/S results  Pt would like to proceed with scheduling ablation  Needs EMB prior to procedure

## 2019-09-17 ENCOUNTER — TRANSCRIBE ORDERS (OUTPATIENT)
Dept: OBGYN CLINIC | Facility: CLINIC | Age: 44
End: 2019-09-17

## 2019-09-17 ENCOUNTER — PREP FOR PROCEDURE (OUTPATIENT)
Dept: OBGYN CLINIC | Facility: CLINIC | Age: 44
End: 2019-09-17

## 2019-09-17 ENCOUNTER — PROCEDURE VISIT (OUTPATIENT)
Dept: OBGYN CLINIC | Facility: CLINIC | Age: 44
End: 2019-09-17
Payer: COMMERCIAL

## 2019-09-17 VITALS
DIASTOLIC BLOOD PRESSURE: 84 MMHG | SYSTOLIC BLOOD PRESSURE: 122 MMHG | WEIGHT: 204.8 LBS | BODY MASS INDEX: 36.29 KG/M2 | HEIGHT: 63 IN

## 2019-09-17 DIAGNOSIS — N92.0 MENORRHAGIA WITH REGULAR CYCLE: Primary | ICD-10-CM

## 2019-09-17 DIAGNOSIS — Z01.818 PREOP TESTING: Primary | ICD-10-CM

## 2019-09-17 PROCEDURE — 88305 TISSUE EXAM BY PATHOLOGIST: CPT | Performed by: PATHOLOGY

## 2019-09-17 PROCEDURE — 58100 BIOPSY OF UTERUS LINING: CPT | Performed by: OBSTETRICS & GYNECOLOGY

## 2019-09-17 NOTE — PROGRESS NOTES
Procedures  Patient was consented for endometrial biopsy  Next the cervix was cleansed with Betadine solution  The anterior lip of the cervix was grasped using an Allis clamp  Cervical os was then dilated without difficulty  Uterus sounded to 10 5 cm  2 passes were obtained with ample amount of tissue  Patient tolerated the procedure well  She will call for results in 1 week  Next consent was obtained today for examine anesthesia, D and C, NovaSure ablation, hysteroscopy  Risks and benefits reviewed including failure rate less than 5%  Patient will have pre-admission labs including CBC with diff, basic metabolic panel, pregnancy test an EKG  She does take all her medication at night time including her cholesterol and hypertension medicine  Patient will also schedule a postop visit 2-3 weeks from her postop date

## 2019-09-21 ENCOUNTER — TRANSCRIBE ORDERS (OUTPATIENT)
Dept: ADMINISTRATIVE | Facility: HOSPITAL | Age: 44
End: 2019-09-21

## 2019-09-21 ENCOUNTER — APPOINTMENT (OUTPATIENT)
Dept: LAB | Facility: IMAGING CENTER | Age: 44
End: 2019-09-21
Payer: COMMERCIAL

## 2019-09-21 DIAGNOSIS — Z01.818 PREOP TESTING: ICD-10-CM

## 2019-09-21 LAB
ALBUMIN SERPL BCP-MCNC: 3.8 G/DL (ref 3.5–5)
ALP SERPL-CCNC: 70 U/L (ref 46–116)
ALT SERPL W P-5'-P-CCNC: 25 U/L (ref 12–78)
ANION GAP SERPL CALCULATED.3IONS-SCNC: 5 MMOL/L (ref 4–13)
AST SERPL W P-5'-P-CCNC: 15 U/L (ref 5–45)
B-HCG SERPL-ACNC: <2 MIU/ML
BASOPHILS # BLD AUTO: 0.05 THOUSANDS/ΜL (ref 0–0.1)
BASOPHILS NFR BLD AUTO: 1 % (ref 0–1)
BILIRUB SERPL-MCNC: 0.78 MG/DL (ref 0.2–1)
BUN SERPL-MCNC: 13 MG/DL (ref 5–25)
CALCIUM SERPL-MCNC: 9.3 MG/DL (ref 8.3–10.1)
CHLORIDE SERPL-SCNC: 102 MMOL/L (ref 100–108)
CO2 SERPL-SCNC: 28 MMOL/L (ref 21–32)
CREAT SERPL-MCNC: 0.71 MG/DL (ref 0.6–1.3)
EOSINOPHIL # BLD AUTO: 0.43 THOUSAND/ΜL (ref 0–0.61)
EOSINOPHIL NFR BLD AUTO: 6 % (ref 0–6)
ERYTHROCYTE [DISTWIDTH] IN BLOOD BY AUTOMATED COUNT: 16.3 % (ref 11.6–15.1)
GFR SERPL CREATININE-BSD FRML MDRD: 103 ML/MIN/1.73SQ M
GLUCOSE SERPL-MCNC: 87 MG/DL (ref 65–140)
HCT VFR BLD AUTO: 39 % (ref 34.8–46.1)
HGB BLD-MCNC: 12 G/DL (ref 11.5–15.4)
IMM GRANULOCYTES # BLD AUTO: 0.02 THOUSAND/UL (ref 0–0.2)
IMM GRANULOCYTES NFR BLD AUTO: 0 % (ref 0–2)
LYMPHOCYTES # BLD AUTO: 2.21 THOUSANDS/ΜL (ref 0.6–4.47)
LYMPHOCYTES NFR BLD AUTO: 30 % (ref 14–44)
MCH RBC QN AUTO: 25 PG (ref 26.8–34.3)
MCHC RBC AUTO-ENTMCNC: 30.8 G/DL (ref 31.4–37.4)
MCV RBC AUTO: 81 FL (ref 82–98)
MONOCYTES # BLD AUTO: 0.53 THOUSAND/ΜL (ref 0.17–1.22)
MONOCYTES NFR BLD AUTO: 7 % (ref 4–12)
NEUTROPHILS # BLD AUTO: 4.11 THOUSANDS/ΜL (ref 1.85–7.62)
NEUTS SEG NFR BLD AUTO: 56 % (ref 43–75)
NRBC BLD AUTO-RTO: 0 /100 WBCS
PLATELET # BLD AUTO: 290 THOUSANDS/UL (ref 149–390)
PMV BLD AUTO: 12.2 FL (ref 8.9–12.7)
POTASSIUM SERPL-SCNC: 3.5 MMOL/L (ref 3.5–5.3)
PROT SERPL-MCNC: 7.9 G/DL (ref 6.4–8.2)
RBC # BLD AUTO: 4.8 MILLION/UL (ref 3.81–5.12)
SODIUM SERPL-SCNC: 135 MMOL/L (ref 136–145)
WBC # BLD AUTO: 7.35 THOUSAND/UL (ref 4.31–10.16)

## 2019-09-21 PROCEDURE — 36415 COLL VENOUS BLD VENIPUNCTURE: CPT

## 2019-09-21 PROCEDURE — 80053 COMPREHEN METABOLIC PANEL: CPT

## 2019-09-21 PROCEDURE — 84702 CHORIONIC GONADOTROPIN TEST: CPT

## 2019-09-21 PROCEDURE — 85025 COMPLETE CBC W/AUTO DIFF WBC: CPT

## 2019-09-23 ENCOUNTER — TELEPHONE (OUTPATIENT)
Dept: OBGYN CLINIC | Facility: CLINIC | Age: 44
End: 2019-09-23

## 2019-09-23 PROBLEM — N92.0 MENORRHAGIA WITH REGULAR CYCLE: Status: ACTIVE | Noted: 2019-09-23

## 2019-09-24 ENCOUNTER — OFFICE VISIT (OUTPATIENT)
Dept: INTERNAL MEDICINE CLINIC | Facility: CLINIC | Age: 44
End: 2019-09-24
Payer: COMMERCIAL

## 2019-09-24 VITALS
TEMPERATURE: 98 F | BODY MASS INDEX: 37.91 KG/M2 | WEIGHT: 206 LBS | DIASTOLIC BLOOD PRESSURE: 82 MMHG | SYSTOLIC BLOOD PRESSURE: 126 MMHG | HEIGHT: 62 IN | HEART RATE: 73 BPM | OXYGEN SATURATION: 97 %

## 2019-09-24 DIAGNOSIS — E78.00 HYPERCHOLESTEROLEMIA: ICD-10-CM

## 2019-09-24 DIAGNOSIS — E78.1 HYPERTRIGLYCERIDEMIA: ICD-10-CM

## 2019-09-24 DIAGNOSIS — Z01.818 PREOPERATIVE TESTING: Primary | ICD-10-CM

## 2019-09-24 DIAGNOSIS — I10 BENIGN HYPERTENSION: ICD-10-CM

## 2019-09-24 PROCEDURE — 93000 ELECTROCARDIOGRAM COMPLETE: CPT | Performed by: NURSE PRACTITIONER

## 2019-09-24 PROCEDURE — 99242 OFF/OP CONSLTJ NEW/EST SF 20: CPT | Performed by: NURSE PRACTITIONER

## 2019-09-24 RX ORDER — LOSARTAN POTASSIUM 50 MG/1
50 TABLET ORAL DAILY
Qty: 90 TABLET | Refills: 1 | Status: SHIPPED | OUTPATIENT
Start: 2019-09-24 | End: 2020-03-24 | Stop reason: SDUPTHER

## 2019-09-24 RX ORDER — METOPROLOL SUCCINATE 50 MG/1
50 TABLET, EXTENDED RELEASE ORAL
Qty: 90 TABLET | Refills: 1 | Status: SHIPPED | OUTPATIENT
Start: 2019-09-24 | End: 2020-03-24 | Stop reason: SDUPTHER

## 2019-09-24 RX ORDER — SIMVASTATIN 20 MG
20 TABLET ORAL
Qty: 90 TABLET | Refills: 1 | Status: SHIPPED | OUTPATIENT
Start: 2019-09-24 | End: 2020-01-27 | Stop reason: SDUPTHER

## 2019-09-24 RX ORDER — HYDROCHLOROTHIAZIDE 25 MG/1
25 TABLET ORAL DAILY
Qty: 90 TABLET | Refills: 1 | Status: SHIPPED | OUTPATIENT
Start: 2019-09-24 | End: 2020-03-24 | Stop reason: SDUPTHER

## 2019-09-24 NOTE — PROGRESS NOTES
Assessment/Plan:       Problem List Items Addressed This Visit        Cardiovascular and Mediastinum    Benign hypertension     BP stable on current medications, continue present regimen, meds filled today  Relevant Medications    hydrochlorothiazide (HYDRODIURIL) 25 mg tablet    losartan (COZAAR) 50 mg tablet    metoprolol succinate (TOPROL-XL) 50 mg 24 hr tablet       Other    Preoperative testing - Primary     EKG that was ordered by Dr Lilly Price for ablation was completed in office today  NSR, no ectopy  No previous for comparison  Confirmed with Dr Netta Jamil  Relevant Orders    POCT ECG    Hypertriglyceridemia     On simvastatin, check lipid panel before next appt  Relevant Medications    simvastatin (ZOCOR) 20 mg tablet    Other Relevant Orders    Lipid panel    Hypercholesterolemia     On simvastatin  Check lipid panel  Relevant Medications    simvastatin (ZOCOR) 20 mg tablet    Other Relevant Orders    Lipid panel            Subjective:      Patient ID: Kristen Figueroa is a 38 y o  female  Patient presents today for a follow-up on hypertension and hyperlipidemia  She will be having an ablation procedure performed by Dr Caitie Sandoval on 10/14 and has in order for an EKG that she would like to have completed today as part of her pre surgery testing  Her last labs were performed on 09/21 as part of her preop testing  Her cholesterol was last checked in November 2018  She denies any new concerns today  She has been taking her medications as prescribed  The following portions of the patient's history were reviewed and updated as appropriate: allergies, current medications, past family history, past medical history, past social history, past surgical history and problem list     Review of Systems   Constitutional: Positive for fatigue (towards the end of the day)  Negative for chills, diaphoresis and fever     HENT: Negative for ear pain, facial swelling and trouble swallowing  Respiratory: Negative for cough and shortness of breath  Cardiovascular: Negative for chest pain, palpitations and leg swelling  Gastrointestinal: Negative for abdominal pain, blood in stool, constipation, diarrhea, nausea and vomiting  Genitourinary: Positive for menstrual problem (Heavy periods, which is why the patient is having ablation as noted in HPI)  Negative for dysuria  Musculoskeletal: Positive for gait problem (Due to arthritis in feet)  Negative for myalgias  Skin: Negative for rash and wound  Neurological: Negative for dizziness, syncope, light-headedness and headaches  Hematological: Does not bruise/bleed easily  Psychiatric/Behavioral: Negative for sleep disturbance and suicidal ideas  The patient is nervous/anxious (Stable)            Past Medical History:   Diagnosis Date    Anemia     Anxiety     Asthma     Gestational diabetes mellitus     Hyperlipidemia     Hypertension     PONV (postoperative nausea and vomiting)     Pregnancy     Resolved: 07/29/2015    Trigger finger     Resolved: 12/02/2016    Yeast infection     Resolved: 07/29/2015         Current Outpatient Medications:     albuterol (VENTOLIN HFA) 90 mcg/act inhaler, Inhale 1-2 puffs as needed for wheezing, Disp: 2 Inhaler, Rfl: 0    hydrochlorothiazide (HYDRODIURIL) 25 mg tablet, Take 1 tablet (25 mg total) by mouth daily, Disp: 90 tablet, Rfl: 1    loratadine (CLARITIN) 10 mg tablet, Take 10 mg by mouth daily at bedtime  , Disp: , Rfl:     losartan (COZAAR) 50 mg tablet, Take 1 tablet (50 mg total) by mouth daily, Disp: 90 tablet, Rfl: 1    metoprolol succinate (TOPROL-XL) 50 mg 24 hr tablet, Take 1 tablet (50 mg total) by mouth daily at bedtime, Disp: 90 tablet, Rfl: 1    Omega-3 Fatty Acids (FISH OIL) 1,000 mg, Take 2 capsules by mouth daily at bedtime  , Disp: , Rfl:     simvastatin (ZOCOR) 20 mg tablet, Take 1 tablet (20 mg total) by mouth daily at bedtime, Disp: 90 tablet, Rfl: 1    Allergies   Allergen Reactions    Pollen Extract Nasal Congestion       Social History   Past Surgical History:   Procedure Laterality Date    HERNIA REPAIR      INCISION TENDON SHEATH HAND      of a finger    AR REPAIR UMBILICAL HQII,0+Q/I,JNARW N/A 8/23/2018    Procedure: UMBILICAL HERNIA REPAIR WITH MESH;  Surgeon: Bhakti Nuñez MD;  Location: [facility];  Service: General    WISDOM TOOTH EXTRACTION       Family History   Problem Relation Age of Onset    Endometrial cancer Mother 55    Diabetes type II Mother     Hypertension Father     Heart attack Sister     Other Maternal Grandfather         Myocardial infarction arrhythmias    Stroke Paternal Grandfather         Syndrome    No Known Problems Daughter     No Known Problems Maternal Grandmother     No Known Problems Paternal Grandmother     No Known Problems Brother     No Known Problems Son     No Known Problems Paternal Aunt        Objective:  /82   Pulse 73   Temp 98 °F (36 7 °C) (Oral)   Ht 5' 2" (1 575 m)   Wt 93 4 kg (206 lb)   SpO2 97%   BMI 37 68 kg/m²        Physical Exam   Constitutional: She is oriented to person, place, and time  She appears well-developed and well-nourished  No distress  HENT:   Head: Normocephalic and atraumatic  Right Ear: External ear normal    Left Ear: External ear normal    Mouth/Throat: Oropharynx is clear and moist    Eyes: Pupils are equal, round, and reactive to light  Conjunctivae are normal  No scleral icterus  Neck: Normal range of motion  Neck supple  No thyromegaly present  Cardiovascular: Normal rate, regular rhythm and normal heart sounds  Pulmonary/Chest: Effort normal and breath sounds normal  No respiratory distress  Abdominal: Soft  Bowel sounds are normal  She exhibits no distension  There is no tenderness  Musculoskeletal: Normal range of motion  She exhibits no edema  Lymphadenopathy:     She has no cervical adenopathy     Neurological: She is alert and oriented to person, place, and time  Skin: Skin is warm and dry  Psychiatric: She has a normal mood and affect   Her behavior is normal  Judgment and thought content normal

## 2019-09-24 NOTE — ASSESSMENT & PLAN NOTE
EKG that was ordered by Dr Jenelle Bernard for ablation was completed in office today  NSR, no ectopy  No previous for comparison  Confirmed with Dr Jose Andres

## 2019-09-30 ENCOUNTER — TELEPHONE (OUTPATIENT)
Dept: OBGYN CLINIC | Facility: CLINIC | Age: 44
End: 2019-09-30

## 2019-09-30 NOTE — TELEPHONE ENCOUNTER
Patient would like to have "anti nausea" medication with her anesthesia that she was given with a prior surgery   I told her that she may have to discuss it with anesthesia

## 2019-10-02 ENCOUNTER — ANESTHESIA EVENT (OUTPATIENT)
Dept: PERIOP | Facility: HOSPITAL | Age: 44
End: 2019-10-02
Payer: COMMERCIAL

## 2019-10-02 DIAGNOSIS — Z91.89 RISK FACTORS FOR OBSTRUCTIVE SLEEP APNEA: Primary | ICD-10-CM

## 2019-10-10 NOTE — H&P
H&P Exam - Gynecology   Perla Champagne 38 y o  female MRN: [id number]  Unit/Bed#:  Encounter: [id number]    Assessment/Plan     Assessment:  Menorrhagia  Plan:  Patient was consented for D+ C, NovaSure ablation, hysteroscopy  Risks and benefits as well as alternative treatments reviewed  She is aware of failure rate less than 5%  Will obtain pre-admission testing  Discussed DVT prophylaxis  She will schedule a 2 week postop visit  History of Present Illness     HPI:  Perla Champagne is a 38 y o  female  ( x3) presents for D and C, NovaSure ablation, hysteroscopy  Patient's menstrual cycles are regular, every 4 weeks lasting 7 days described as very heavy x3 days  She denies any breakthrough bleeding  There has been no changes in bowel or bladder function  She was counseled on treatment options  Patient is reluctant to go on any hormones/medication and is now requesting ablation  She does have a history of hypertension and hypercholesterolemia which is well controlled through her primary care physician  Patient has been in a monogamous relationship with her  for over 16 years  Her method of contraception is vasectomy  Pelvic ultrasound (2019) reveals normal size uterus, anteverted measuring 11 5 x 4 2 x 5 9, normal ovaries x2, endometrial stripe 9 mm  Endometrial biopsy 2019 reveals  endometrium    Review of Systems   Respiratory: Negative  Cardiovascular: Negative  Gastrointestinal: Negative  Genitourinary: Positive for menstrual problem         Historical Information   Past Medical History:   Diagnosis Date    Anemia     Anxiety     Asthma     controlled  seasonal    Gestational diabetes mellitus     Hyperlipidemia     Hypertension     PONV (postoperative nausea and vomiting)     Pregnancy     Resolved: 2015    Trigger finger     Resolved: 2016    Yeast infection     Resolved: 2015     Past Surgical History:   Procedure Laterality Date  HERNIA REPAIR      INCISION TENDON SHEATH HAND      of a finger    PA REPAIR UMBILICAL PBZV,4+C/G,RTVED N/A 8/23/2018    Procedure: UMBILICAL HERNIA REPAIR WITH MESH;  Surgeon: Yadi Browning MD;  [facility];  Service: General    WISDOM TOOTH EXTRACTION       OB/GYN History:   As above  Family History   Problem Relation Age of Onset    Endometrial cancer Mother 55    Diabetes type II Mother     Hypertension Father     Heart attack Sister     Other Maternal Grandfather         Myocardial infarction arrhythmias    Stroke Paternal Grandfather         Syndrome    No Known Problems Daughter     No Known Problems Maternal Grandmother     No Known Problems Paternal Grandmother     No Known Problems Brother     No Known Problems Son     No Known Problems Paternal Aunt      Social History   Social History     Substance and Sexual Activity   Alcohol Use Yes    Comment: 1-2 drinks per week on average typically consumes beer     Social History     Substance and Sexual Activity   Drug Use No     Social History     Tobacco Use   Smoking Status Never Smoker   Smokeless Tobacco Never Used       Meds/Allergies   all current active meds have been reviewed  Allergies   Allergen Reactions    Pollen Extract Nasal Congestion       Objective   Vitals: not currently breastfeeding  No intake or output data in the 24 hours ending 10/10/19 1249    Invasive Devices: Invasive Devices     None                 Physical Exam   Constitutional: She is oriented to person, place, and time  She appears well-developed and well-nourished  Cardiovascular: Normal rate, regular rhythm and normal heart sounds  Pulmonary/Chest: Effort normal and breath sounds normal    Abdominal: Soft  Bowel sounds are normal  She exhibits no distension  There is no tenderness  There is no rebound and no guarding  Genitourinary: Vagina normal and uterus normal  There is no lesion on the right labia  There is no lesion on the left labia  Cervix exhibits no discharge and no friability  Right adnexum displays no mass and no tenderness  Left adnexum displays no mass and no tenderness  Neurological: She is alert and oriented to person, place, and time  Lab Results: I have personally reviewed pertinent reports  Imaging: I have personally reviewed pertinent reports  EKG, Pathology, and Other Studies: I have personally reviewed pertinent reports  Code Status: No Order  Advance Directive and Living Will:      Power of :    POLST:      Counseling / Coordination of Care  Total floor / unit time spent today 30 minutes  Greater than 50% of total time was spent with the patient and / or family counseling and / or coordination of care  A description of the counseling / coordination of care

## 2019-10-14 ENCOUNTER — ANESTHESIA (OUTPATIENT)
Dept: PERIOP | Facility: HOSPITAL | Age: 44
End: 2019-10-14
Payer: COMMERCIAL

## 2019-10-14 ENCOUNTER — HOSPITAL ENCOUNTER (OUTPATIENT)
Facility: HOSPITAL | Age: 44
Setting detail: OUTPATIENT SURGERY
Discharge: HOME | End: 2019-10-14
Attending: OBSTETRICS & GYNECOLOGY | Admitting: OBSTETRICS & GYNECOLOGY
Payer: COMMERCIAL

## 2019-10-14 VITALS
OXYGEN SATURATION: 99 % | SYSTOLIC BLOOD PRESSURE: 132 MMHG | HEART RATE: 63 BPM | DIASTOLIC BLOOD PRESSURE: 86 MMHG | RESPIRATION RATE: 18 BRPM | BODY MASS INDEX: 34.83 KG/M2 | HEIGHT: 64 IN | WEIGHT: 204 LBS | TEMPERATURE: 99.2 F

## 2019-10-14 DIAGNOSIS — N92.0 MENORRHAGIA WITH REGULAR CYCLE: Primary | ICD-10-CM

## 2019-10-14 LAB
EXT PREGNANCY TEST URINE: NEGATIVE
EXT. CONTROL: NORMAL

## 2019-10-14 PROCEDURE — 88305 TISSUE EXAM BY PATHOLOGIST: CPT | Performed by: PATHOLOGY

## 2019-10-14 PROCEDURE — 58563 HYSTEROSCOPY ABLATION: CPT | Performed by: OBSTETRICS & GYNECOLOGY

## 2019-10-14 PROCEDURE — 81025 URINE PREGNANCY TEST: CPT | Performed by: OBSTETRICS & GYNECOLOGY

## 2019-10-14 RX ORDER — KETOROLAC TROMETHAMINE 30 MG/ML
INJECTION, SOLUTION INTRAMUSCULAR; INTRAVENOUS AS NEEDED
Status: DISCONTINUED | OUTPATIENT
Start: 2019-10-14 | End: 2019-10-14 | Stop reason: SURG

## 2019-10-14 RX ORDER — HYDROMORPHONE HCL/PF 1 MG/ML
0.2 SYRINGE (ML) INJECTION
Status: DISCONTINUED | OUTPATIENT
Start: 2019-10-14 | End: 2019-10-14 | Stop reason: HOSPADM

## 2019-10-14 RX ORDER — METOCLOPRAMIDE HYDROCHLORIDE 5 MG/ML
INJECTION INTRAMUSCULAR; INTRAVENOUS AS NEEDED
Status: DISCONTINUED | OUTPATIENT
Start: 2019-10-14 | End: 2019-10-14 | Stop reason: SURG

## 2019-10-14 RX ORDER — PROMETHAZINE HYDROCHLORIDE 25 MG/ML
25 INJECTION, SOLUTION INTRAMUSCULAR; INTRAVENOUS ONCE AS NEEDED
Status: DISCONTINUED | OUTPATIENT
Start: 2019-10-14 | End: 2019-10-14 | Stop reason: HOSPADM

## 2019-10-14 RX ORDER — IBUPROFEN 200 MG
600 TABLET ORAL EVERY 6 HOURS PRN
Refills: 0
Start: 2019-10-14 | End: 2020-10-05

## 2019-10-14 RX ORDER — ALBUTEROL SULFATE 2.5 MG/3ML
2.5 SOLUTION RESPIRATORY (INHALATION) ONCE AS NEEDED
Status: DISCONTINUED | OUTPATIENT
Start: 2019-10-14 | End: 2019-10-14 | Stop reason: HOSPADM

## 2019-10-14 RX ORDER — LIDOCAINE HYDROCHLORIDE 10 MG/ML
INJECTION, SOLUTION INFILTRATION; PERINEURAL AS NEEDED
Status: DISCONTINUED | OUTPATIENT
Start: 2019-10-14 | End: 2019-10-14 | Stop reason: SURG

## 2019-10-14 RX ORDER — PROPOFOL 10 MG/ML
INJECTION, EMULSION INTRAVENOUS AS NEEDED
Status: DISCONTINUED | OUTPATIENT
Start: 2019-10-14 | End: 2019-10-14 | Stop reason: SURG

## 2019-10-14 RX ORDER — ACETAMINOPHEN 325 MG/1
650 TABLET ORAL EVERY 6 HOURS PRN
Status: DISCONTINUED | OUTPATIENT
Start: 2019-10-14 | End: 2019-10-15 | Stop reason: HOSPADM

## 2019-10-14 RX ORDER — FENTANYL CITRATE/PF 50 MCG/ML
25 SYRINGE (ML) INJECTION
Status: DISCONTINUED | OUTPATIENT
Start: 2019-10-14 | End: 2019-10-14 | Stop reason: HOSPADM

## 2019-10-14 RX ORDER — FENTANYL CITRATE 50 UG/ML
INJECTION, SOLUTION INTRAMUSCULAR; INTRAVENOUS AS NEEDED
Status: DISCONTINUED | OUTPATIENT
Start: 2019-10-14 | End: 2019-10-14 | Stop reason: SURG

## 2019-10-14 RX ORDER — MAGNESIUM HYDROXIDE 1200 MG/15ML
LIQUID ORAL AS NEEDED
Status: DISCONTINUED | OUTPATIENT
Start: 2019-10-14 | End: 2019-10-14 | Stop reason: HOSPADM

## 2019-10-14 RX ORDER — OXYCODONE HYDROCHLORIDE AND ACETAMINOPHEN 5; 325 MG/1; MG/1
1 TABLET ORAL EVERY 4 HOURS PRN
Qty: 5 TABLET | Refills: 0 | Status: SHIPPED | OUTPATIENT
Start: 2019-10-14 | End: 2019-10-24

## 2019-10-14 RX ORDER — SODIUM CHLORIDE, SODIUM LACTATE, POTASSIUM CHLORIDE, CALCIUM CHLORIDE 600; 310; 30; 20 MG/100ML; MG/100ML; MG/100ML; MG/100ML
75 INJECTION, SOLUTION INTRAVENOUS CONTINUOUS
Status: DISCONTINUED | OUTPATIENT
Start: 2019-10-14 | End: 2019-10-15 | Stop reason: HOSPADM

## 2019-10-14 RX ORDER — ONDANSETRON 2 MG/ML
4 INJECTION INTRAMUSCULAR; INTRAVENOUS EVERY 6 HOURS PRN
Status: DISCONTINUED | OUTPATIENT
Start: 2019-10-14 | End: 2019-10-15 | Stop reason: HOSPADM

## 2019-10-14 RX ORDER — PROPOFOL 10 MG/ML
INJECTION, EMULSION INTRAVENOUS CONTINUOUS PRN
Status: DISCONTINUED | OUTPATIENT
Start: 2019-10-14 | End: 2019-10-14 | Stop reason: SURG

## 2019-10-14 RX ORDER — ONDANSETRON 2 MG/ML
4 INJECTION INTRAMUSCULAR; INTRAVENOUS ONCE AS NEEDED
Status: DISCONTINUED | OUTPATIENT
Start: 2019-10-14 | End: 2019-10-14 | Stop reason: HOSPADM

## 2019-10-14 RX ORDER — SODIUM CHLORIDE, SODIUM LACTATE, POTASSIUM CHLORIDE, CALCIUM CHLORIDE 600; 310; 30; 20 MG/100ML; MG/100ML; MG/100ML; MG/100ML
INJECTION, SOLUTION INTRAVENOUS CONTINUOUS PRN
Status: DISCONTINUED | OUTPATIENT
Start: 2019-10-14 | End: 2019-10-14

## 2019-10-14 RX ORDER — DEXAMETHASONE SODIUM PHOSPHATE 10 MG/ML
INJECTION, SOLUTION INTRAMUSCULAR; INTRAVENOUS AS NEEDED
Status: DISCONTINUED | OUTPATIENT
Start: 2019-10-14 | End: 2019-10-14 | Stop reason: SURG

## 2019-10-14 RX ORDER — ONDANSETRON 2 MG/ML
INJECTION INTRAMUSCULAR; INTRAVENOUS AS NEEDED
Status: DISCONTINUED | OUTPATIENT
Start: 2019-10-14 | End: 2019-10-14 | Stop reason: SURG

## 2019-10-14 RX ORDER — IBUPROFEN 600 MG/1
600 TABLET ORAL EVERY 6 HOURS PRN
Status: DISCONTINUED | OUTPATIENT
Start: 2019-10-14 | End: 2019-10-15 | Stop reason: HOSPADM

## 2019-10-14 RX ORDER — MIDAZOLAM HYDROCHLORIDE 1 MG/ML
INJECTION INTRAMUSCULAR; INTRAVENOUS AS NEEDED
Status: DISCONTINUED | OUTPATIENT
Start: 2019-10-14 | End: 2019-10-14 | Stop reason: SURG

## 2019-10-14 RX ADMIN — METOCLOPRAMIDE 10 MG: 5 INJECTION, SOLUTION INTRAMUSCULAR; INTRAVENOUS at 07:49

## 2019-10-14 RX ADMIN — ONDANSETRON 4 MG: 2 INJECTION INTRAMUSCULAR; INTRAVENOUS at 07:42

## 2019-10-14 RX ADMIN — SODIUM CHLORIDE, SODIUM LACTATE, POTASSIUM CHLORIDE, AND CALCIUM CHLORIDE: .6; .31; .03; .02 INJECTION, SOLUTION INTRAVENOUS at 07:42

## 2019-10-14 RX ADMIN — DEXAMETHASONE SODIUM PHOSPHATE 10 MG: 10 INJECTION, SOLUTION INTRAMUSCULAR; INTRAVENOUS at 07:49

## 2019-10-14 RX ADMIN — LIDOCAINE HYDROCHLORIDE 50 MG: 10 INJECTION, SOLUTION INFILTRATION; PERINEURAL at 07:49

## 2019-10-14 RX ADMIN — ACETAMINOPHEN 650 MG: 325 TABLET ORAL at 10:29

## 2019-10-14 RX ADMIN — PROPOFOL 200 MG: 10 INJECTION, EMULSION INTRAVENOUS at 07:49

## 2019-10-14 RX ADMIN — FENTANYL CITRATE 25 MCG: 50 INJECTION, SOLUTION INTRAMUSCULAR; INTRAVENOUS at 08:06

## 2019-10-14 RX ADMIN — PROPOFOL 140 MCG/KG/MIN: 10 INJECTION, EMULSION INTRAVENOUS at 07:51

## 2019-10-14 RX ADMIN — MIDAZOLAM 2 MG: 1 INJECTION INTRAMUSCULAR; INTRAVENOUS at 07:42

## 2019-10-14 RX ADMIN — KETOROLAC TROMETHAMINE 30 MG: 30 INJECTION, SOLUTION INTRAMUSCULAR at 08:17

## 2019-10-14 RX ADMIN — FENTANYL CITRATE 25 MCG: 50 INJECTION, SOLUTION INTRAMUSCULAR; INTRAVENOUS at 08:12

## 2019-10-14 RX ADMIN — FENTANYL CITRATE 50 MCG: 50 INJECTION, SOLUTION INTRAMUSCULAR; INTRAVENOUS at 07:49

## 2019-10-14 NOTE — OP NOTE
OPERATIVE REPORT  PATIENT NAME: Maria Victoria Tapia    :  1975  MRN: [id number]  Pt Location: [facility] ROOM 03    SURGERY DATE: 10/14/2019    Surgeon(s) and Role:     * Gill Dubois DO - Primary     * Mell Luna MD - Assisting    Preop Diagnosis:  Menorrhagia with regular cycle [N92 0]    Post-Op Diagnosis Codes:     * Menorrhagia with regular cycle [N92 0]    Procedure(s) (LRB):  DILATATION AND CURETTAGE (D&C) WITH HYSTEROSCOPY W/ NOVASURE (N/A)  ABLATION ENDOMETRIAL NOVASURE (N/A)    Specimen(s):  ID Type Source Tests Collected by Time Destination   1 : [facility] [place] Tissue Endometrium TISSUE EXAM Gill Dubois DO 10/14/2019 0807        Estimated Blood Loss:   Minimal    Drains:  * No LDAs found *    Anesthesia Type:   General LMA    Operative Indications:  Menorrhagia with regular cycle [N92 0]    Operative Findings:  Grossly normal external female genitalia  Parous cervix  Mobile, anteverted uterus, sounded to 10 5cm  Empty uterine cavity after ablation    Complications:   None    Procedure and Technique:  Patient was identified in the preop holding area as well as the operating suite  Procedure was reviewed and patient consented verbally once again  She underwent successful induction of general anesthesia using LMA  She was placed in the dorsal lithotomy position using yellowfin stirrups  She had pneumatic compression boots in place  She had a Betadine vaginal prep and was draped in sterile fashion  A operative timeout was accomplished  Exam under anesthesia revealed no vaginal or cervical lesions  The cervix was parous  Uterus was anteverted and normal in size  There were no adnexal masses noted  The anterior lip of the cervix was grasped using a Allis clamp  Uterus sounded in midposition fashion to 10 5 cm, with the cervical length measuring 4cm  The endocervix was dilated to 7mm using Hegar dilators   A medium sharp curette was then inserted and endometrial curettage was then undertaken with specimen being sent for routine pathology  The NovaSure ablation device was inserted through the cervix and seated into the endometrial cavity  Device was deployed and rotated in all directions to maximize expansion of the bipolar electrode  Uterine length was determined to be 6 5 cm and uterine width was determined to be 4 1 cm  A test of the system was performed and the uterine cavity was insufflated with CO2 to ensure cavity integrity and proper placement of the device  No leakage of gas was appreciated  After successful testing, the Novasure system was activated and bipolar cauterization with a Moisture Transport Vacuum System facilitated ablation of the endometrium in approximately 64 seconds under 147 naranjo of power  The Novasure system was completely retracted prior to its removal from the uterine cavity  Inspection of the bipolar electrode showed evidence of burnt endometrial tissue  The hysteroscope was then inserted under direct visualization  Endometrial cavity showed blanching white endometrial tissue on both anterior and posterior walls of the uterus  Ablation was felt to be successful based on this evidence and the hysteroscope was withdrawn  On withdrawal of the hysteroscope from the uterine cavity, there was note of transition from white ablated endometrium to pink normal cevical mucosa  The Allis was removed  The patient had excellent hemostasis at this time  She was cleansed and was awakened from anesthesia without incident and was taken to the recovery room in satisfactory condition  Dr Wu Ravi was present and participated in all key portions of the case      Patient Disposition:  PACU     SIGNATURE: Janis Live MD  DATE: October 14, 2019  TIME: 8:27 AM

## 2019-10-14 NOTE — ANESTHESIA POSTPROCEDURE EVALUATION
Post-Op Assessment Note    CV Status:  Stable    Pain management: adequate     Mental Status:  Awake and alert   Hydration Status:  Euvolemic   PONV Controlled:  Controlled   Airway Patency:  Patent   Post Op Vitals Reviewed: Yes      Staff: CRNA           BP   131/79   Temp (!) (P) 97 4 °F (36 3 °C) (10/14/19 0826)    Pulse (P) 82 (10/14/19 0826)   Resp (P) 20 (10/14/19 0826)    SpO2 (P) 99 % (10/14/19 0826)

## 2019-10-14 NOTE — ANESTHESIA PREPROCEDURE EVALUATION
Review of Systems/Medical History  Patient summary reviewed  Chart reviewed  History of anesthetic complications PONV    Cardiovascular  Exercise tolerance (METS): >4,  Hyperlipidemia, Hypertension controlled, No angina , No JACOB,    Pulmonary  Asthma , well controlled/ stable Last rescue: < 1 year ago Asthma type of rescue: PRN inhaler, No recent URI ,        GI/Hepatic       Negative  ROS        Endo/Other    Obesity    GYN       Hematology  Anemia ,     Musculoskeletal  Negative musculoskeletal ROS        Neurology  Negative neurology ROS      Psychology   Anxiety,              Physical Exam    Airway    Mallampati score: I  TM Distance: >3 FB  Neck ROM: full     Dental   No notable dental hx     Cardiovascular  Cardiovascular exam normal    Pulmonary  Pulmonary exam normal     Other Findings        Anesthesia Plan  ASA Score- 2     Anesthesia Type- general with ASA Monitors  Additional Monitors:   Airway Plan: LMA  Comment: Did will with hernia repair surgery - will give Decardron, Zofran, Reglan, + TIVA for PONV prophylaxis   Plan Factors-  Patient did not smoke on day of surgery  Induction- intravenous  Postoperative Plan- Plan for postoperative opioid use  Planned trial extubation    Informed Consent- Anesthetic plan and risks discussed with patient  I personally reviewed this patient with the CRNA  Discussed and agreed on the Anesthesia Plan with the CRNA  Homer More

## 2019-10-14 NOTE — DISCHARGE INSTRUCTIONS
Endometrial Ablation   WHAT YOU SHOULD KNOW:   Endometrial ablation (EA) is a procedure to destroy the endometrium (lining of your uterus)  You may need EA if you have heavy or abnormal vaginal bleeding  AFTER YOU LEAVE:   Medicines:   · Medicines  can help decrease pain, calm your stomach, and control vomiting  · Take your medicine as directed  Call your healthcare provider if you think your medicine is not helping or if you have side effects  Tell your provider if you are allergic to any medicine  Keep a list of the medicines, vitamins, and herbs you take  Include the amounts, and when and why you take them  Bring the list or the pill bottles to follow-up visits  Carry your medicine list with you in case of an emergency  What to expect after your procedure:   · Cramps, similar to menstrual cramps, for 1 to 2 days    · Watery, bloody discharge for 2 to 3 days that may become light and last a few weeks    · Frequent urination for 24 hours    · Nausea  Activity:  Ask when you can return to your normal activities  You may need to avoid sex for 6 weeks after your procedure  Birth control: You may need to use birth control after your procedure to prevent pregnancy  Pregnancy risks, such as a miscarriage, are higher after EA  Talk to your healthcare provider about birth control and having children after EA  Follow up with your healthcare provider as directed:  Write down your questions so you remember to ask them during your visits  Contact your healthcare provider if:   · The bleeding during your monthly periods has not decreased  · You have pain when you urinate  · You have questions or concerns about your condition or care  Seek care immediately or call 911 if:   · You feel lightheaded, short of breath, and have chest pain  · You cough up blood  · Your arm or leg feels warm, tender, and painful  It may look swollen and red      · You have vaginal bleeding and it is not time for your monthly period  · You have a fever  · You feel dizzy, weak, and confused  · You cannot stop vomiting  · You have severe pain

## 2019-10-14 NOTE — INTERVAL H&P NOTE
H&P reviewed  After examining the patient I find no changes in the patients condition since the H&P had been written      Vitals:    10/14/19 0633   BP: 113/82   Pulse: 67   Resp: 18   Temp: 98 7 °F (37 1 °C)   SpO2: 97%

## 2019-10-26 ENCOUNTER — APPOINTMENT (OUTPATIENT)
Dept: LAB | Facility: IMAGING CENTER | Age: 44
End: 2019-10-26
Payer: COMMERCIAL

## 2019-10-26 ENCOUNTER — TRANSCRIBE ORDERS (OUTPATIENT)
Dept: ADMINISTRATIVE | Facility: HOSPITAL | Age: 44
End: 2019-10-26

## 2019-10-26 DIAGNOSIS — E78.1 HYPERTRIGLYCERIDEMIA: ICD-10-CM

## 2019-10-26 DIAGNOSIS — E78.00 HYPERCHOLESTEROLEMIA: ICD-10-CM

## 2019-10-26 LAB
CHOLEST SERPL-MCNC: 213 MG/DL (ref 50–200)
HDLC SERPL-MCNC: 52 MG/DL
LDLC SERPL CALC-MCNC: 120 MG/DL (ref 0–100)
NONHDLC SERPL-MCNC: 161 MG/DL
TRIGL SERPL-MCNC: 203 MG/DL

## 2019-10-26 PROCEDURE — 36415 COLL VENOUS BLD VENIPUNCTURE: CPT

## 2019-10-26 PROCEDURE — 80061 LIPID PANEL: CPT

## 2019-11-05 ENCOUNTER — OFFICE VISIT (OUTPATIENT)
Dept: OBGYN CLINIC | Facility: CLINIC | Age: 44
End: 2019-11-05

## 2019-11-05 VITALS
BODY MASS INDEX: 34.93 KG/M2 | HEIGHT: 64 IN | DIASTOLIC BLOOD PRESSURE: 84 MMHG | SYSTOLIC BLOOD PRESSURE: 130 MMHG | WEIGHT: 204.6 LBS

## 2019-11-05 DIAGNOSIS — Z09 POSTOP CHECK: Primary | ICD-10-CM

## 2019-11-05 PROCEDURE — 99024 POSTOP FOLLOW-UP VISIT: CPT | Performed by: OBSTETRICS & GYNECOLOGY

## 2019-11-05 NOTE — PROGRESS NOTES
Assessment/Plan:   All restrictions lifted  Patient will keep menstrual diary over the next 3 months and call with update  Resume annual gyn exam 2020  Diagnoses and all orders for this visit:    Postop check          Subjective:     Patient ID: Vicente Smart is a 38 y o  female  HPI   This is a 58-year-old female  ( x3) status post D and C, [facility] ablation hysteroscopy, now 3 weeks postop  Overall she is doing well  She has had her first menstrual cycle since the ablation and has noticed significant decreased menstrual flow  She denies any changes in bowel or bladder function  There has been no fever or chills  Patient's form of contraception is vasectomy  Review of Systems   Constitutional: Negative for fatigue, fever and unexpected weight change  Respiratory: Negative for cough, chest tightness, shortness of breath and wheezing  Cardiovascular: Negative  Negative for chest pain and palpitations  Gastrointestinal: Negative  Negative for abdominal distention, abdominal pain, blood in stool, constipation, diarrhea, nausea and vomiting  Genitourinary: Negative  Negative for difficulty urinating, dyspareunia, dysuria, flank pain, frequency, genital sores, hematuria, pelvic pain, urgency, vaginal bleeding, vaginal discharge and vaginal pain  Skin: Negative for rash  Objective:     Physical Exam   Constitutional: She is oriented to person, place, and time  She appears well-developed and well-nourished  Cardiovascular: Normal rate and regular rhythm  Abdominal: Soft  Bowel sounds are normal    Genitourinary: Vagina normal and uterus normal    Genitourinary Comments: The vagina is well estrogenized  Cervix is closed there is no signs of infection  Neurological: She is alert and oriented to person, place, and time

## 2019-11-08 ENCOUNTER — OFFICE VISIT (OUTPATIENT)
Dept: INTERNAL MEDICINE CLINIC | Age: 44
End: 2019-11-08
Payer: COMMERCIAL

## 2019-11-08 VITALS
SYSTOLIC BLOOD PRESSURE: 118 MMHG | HEIGHT: 64 IN | TEMPERATURE: 97.4 F | BODY MASS INDEX: 34.66 KG/M2 | OXYGEN SATURATION: 98 % | WEIGHT: 203 LBS | HEART RATE: 85 BPM | DIASTOLIC BLOOD PRESSURE: 68 MMHG

## 2019-11-08 DIAGNOSIS — J06.9 URTI (ACUTE UPPER RESPIRATORY INFECTION): Primary | ICD-10-CM

## 2019-11-08 DIAGNOSIS — J45.40 MODERATE PERSISTENT EXTRINSIC ASTHMA WITHOUT COMPLICATION: ICD-10-CM

## 2019-11-08 PROCEDURE — 99213 OFFICE O/P EST LOW 20 MIN: CPT | Performed by: INTERNAL MEDICINE

## 2019-11-08 PROCEDURE — 1036F TOBACCO NON-USER: CPT | Performed by: INTERNAL MEDICINE

## 2019-11-08 RX ORDER — FLUTICASONE PROPIONATE 50 MCG
1 SPRAY, SUSPENSION (ML) NASAL DAILY
Qty: 1 BOTTLE | Refills: 0 | Status: SHIPPED | OUTPATIENT
Start: 2019-11-08

## 2019-11-08 RX ORDER — GUAIFENESIN 600 MG
600 TABLET, EXTENDED RELEASE 12 HR ORAL EVERY 12 HOURS SCHEDULED
Qty: 20 TABLET | Refills: 0 | Status: SHIPPED | OUTPATIENT
Start: 2019-11-08 | End: 2020-10-05

## 2019-11-08 RX ORDER — AMOXICILLIN AND CLAVULANATE POTASSIUM 875; 125 MG/1; MG/1
1 TABLET, FILM COATED ORAL EVERY 12 HOURS SCHEDULED
Qty: 14 TABLET | Refills: 0 | Status: SHIPPED | OUTPATIENT
Start: 2019-11-08 | End: 2019-11-15

## 2019-11-08 RX ORDER — ALBUTEROL SULFATE 90 UG/1
1-2 AEROSOL, METERED RESPIRATORY (INHALATION) AS NEEDED
Qty: 2 INHALER | Refills: 0 | Status: SHIPPED | OUTPATIENT
Start: 2019-11-08 | End: 2020-10-05 | Stop reason: SDUPTHER

## 2019-11-08 NOTE — PROGRESS NOTES
Assessment/Plan:    Upper respiratory tract infection  - likely viral versus viral with bacterial superinfection  -will start patient on Mucinex for her cough and Flonase for her rhinitis  -will give patient a script for Augmentin 875-125 mg twice daily for 7 days and she has been counseled to fill the antibiotic script if her symptoms worsen or persist beyond a total of 7-10 days which would indicate a bacterial superinfection  - patient has been counseled to drink lots of fluids, get plenty of rest and wash her hands liberally  - she may use Tylenol or ibuprofen with meals for any aches and pains and may also use warm salt water gargles for her sore throat  - she should return to the office or go to the emergency department if her symptoms worsen or do not improve    Moderate persistent visit asthma without complication  - without exacerbation at this time  -we will refill albuterol inhaler     Diagnoses and all orders for this visit:    URTI (acute upper respiratory infection)  -     amoxicillin-clavulanate (AUGMENTIN) 875-125 mg per tablet; Take 1 tablet by mouth every 12 (twelve) hours for 7 days  -     guaiFENesin (MUCINEX) 600 mg 12 hr tablet; Take 1 tablet (600 mg total) by mouth every 12 (twelve) hours  -     fluticasone (FLONASE) 50 mcg/act nasal spray; 1 spray into each nostril daily    Moderate persistent extrinsic asthma without complication  -     albuterol (VENTOLIN HFA) 90 mcg/act inhaler; Inhale 1-2 puffs as needed for wheezing          Subjective:      Patient ID: Libby Hilliard is a 38 y o  female  HPI  Patient presents with complaints of chest tightness, sore throat and chest congestion that started 2 days ago  Prior to that, she states that about a week ago she had sinus congestion and pressure and actually went to the urgent care center but left without being seen and the symptoms resolved at that time    Unfortunately, patient has been crying today(because her  lost his job and has a foot fracture)  and now has nasal congestion, runny nose and headache which make it difficult to decipher which symptoms are due to her upper respiratory tract infection and which ones are due to her crying  She denies wheezing, fever, chills, night sweats, chest pain, shortness of breath, palpitations, nausea, vomiting, abdominal pain, diarrhea, constipation  Of note, patient denies any history of contact but 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  She does not smoke  Of note, she has asthma and states that around this time of the year, she usually gets exacerbations of her asthma  She would like a refill of her albuterol inhaler  The following portions of the patient's history were reviewed and updated as appropriate:   She  has a past medical history of Anemia, Anxiety, Asthma, Gestational diabetes mellitus, Hyperlipidemia, Hypertension, PONV (postoperative nausea and vomiting), Pregnancy, Trigger finger, and Yeast infection  She   Patient Active Problem List    Diagnosis Date Noted    URTI (acute upper respiratory infection) 11/08/2019    Preoperative testing 09/24/2019    Menorrhagia with regular cycle 09/23/2019    Hypertriglyceridemia 09/07/2017    Benign hypertension 03/08/2016    Dysphagia 09/02/2015    Anxiety 11/13/2013    Extrinsic asthma 11/13/2013    Generalized anxiety disorder 11/13/2013    Hypercholesterolemia 11/13/2013     She  has a past surgical history that includes [place] tooth extraction; Incision tendon sheath hand; pr repair umbilical XTCT,9+G/M,NJW (N/A, 8/23/2018); Hernia repair; pr hysteroscopy,w/endo bx (N/A, 10/14/2019); and pr hysteroscopy,w/endometrial ablation (N/A, 10/14/2019)  Her family history includes Diabetes type II in her mother; Endometrial cancer (age of onset: 55) in her mother; Heart attack in her sister; Hypertension in her father; No Known Problems in her brother, daughter, maternal grandmother, paternal aunt, paternal grandmother, and son;  Other in her maternal grandfather; Stroke in her paternal grandfather  She  reports that she has never smoked  She has never used smokeless tobacco  She reports that she drinks alcohol  She reports that she does not use drugs  Current Outpatient Medications   Medication Sig Dispense Refill    albuterol (VENTOLIN HFA) 90 mcg/act inhaler Inhale 1-2 puffs as needed for wheezing 2 Inhaler 0    hydrochlorothiazide (HYDRODIURIL) 25 mg tablet Take 1 tablet (25 mg total) by mouth daily 90 tablet 1    loratadine (CLARITIN) 10 mg tablet Take 10 mg by mouth daily at bedtime        losartan (COZAAR) 50 mg tablet Take 1 tablet (50 mg total) by mouth daily 90 tablet 1    metoprolol succinate (TOPROL-XL) 50 mg 24 hr tablet Take 1 tablet (50 mg total) by mouth daily at bedtime 90 tablet 1    Omega-3 Fatty Acids (FISH OIL) 1,000 mg Take 2 capsules by mouth daily at bedtime        simvastatin (ZOCOR) 20 mg tablet Take 1 tablet (20 mg total) by mouth daily at bedtime 90 tablet 1    amoxicillin-clavulanate (AUGMENTIN) 875-125 mg per tablet Take 1 tablet by mouth every 12 (twelve) hours for 7 days 14 tablet 0    fluticasone (FLONASE) 50 mcg/act nasal spray 1 spray into each nostril daily 1 Bottle 0    guaiFENesin (MUCINEX) 600 mg 12 hr tablet Take 1 tablet (600 mg total) by mouth every 12 (twelve) hours 20 tablet 0    ibuprofen (MOTRIN) 200 mg tablet Take 3 tablets (600 mg total) by mouth every 6 (six) hours as needed for mild pain, moderate pain or cramping (Patient not taking: Reported on 11/5/2019)  0     No current facility-administered medications for this visit        Current Outpatient Medications on File Prior to Visit   Medication Sig    hydrochlorothiazide (HYDRODIURIL) 25 mg tablet Take 1 tablet (25 mg total) by mouth daily    loratadine (CLARITIN) 10 mg tablet Take 10 mg by mouth daily at bedtime      losartan (COZAAR) 50 mg tablet Take 1 tablet (50 mg total) by mouth daily    metoprolol succinate (TOPROL-XL) 50 mg 24 hr tablet Take 1 tablet (50 mg total) by mouth daily at bedtime    Omega-3 Fatty Acids (FISH OIL) 1,000 mg Take 2 capsules by mouth daily at bedtime      simvastatin (ZOCOR) 20 mg tablet Take 1 tablet (20 mg total) by mouth daily at bedtime    [DISCONTINUED] albuterol (VENTOLIN HFA) 90 mcg/act inhaler Inhale 1-2 puffs as needed for wheezing    ibuprofen (MOTRIN) 200 mg tablet Take 3 tablets (600 mg total) by mouth every 6 (six) hours as needed for mild pain, moderate pain or cramping (Patient not taking: Reported on 11/5/2019)     No current facility-administered medications on file prior to visit  She is allergic to hibiclens [chlorhexidine gluconate] and pollen extract       Review of Systems   Constitutional: Negative for activity change, chills, fatigue, fever and unexpected weight change  HENT: Positive for congestion, sinus pressure (started a week ago and relieved) and sore throat  Negative for ear pain, postnasal drip and rhinorrhea  Eyes: Negative for pain  Respiratory: Positive for cough and chest tightness (started 2 days ago)  Negative for choking, shortness of breath and wheezing  Cardiovascular: Negative for chest pain, palpitations and leg swelling  Gastrointestinal: Negative for abdominal pain, constipation, diarrhea, nausea and vomiting  Genitourinary: Negative for dysuria and hematuria  Musculoskeletal: Negative for arthralgias, back pain, gait problem, joint swelling, myalgias and neck stiffness  Skin: Negative for pallor and rash  Neurological: Positive for headaches (has been crying a lot today)  Negative for dizziness, tremors, seizures, syncope and light-headedness  Hematological: Negative for adenopathy  Psychiatric/Behavioral: Negative for behavioral problems           Objective:      /68 (BP Location: Left arm, Patient Position: Sitting, Cuff Size: Standard)   Pulse 85   Temp (!) 97 4 °F (36 3 °C) (Tympanic)   Ht 5' 4 37" (1 635 m) Comment: shoes on Wt 92 1 kg (203 lb) Comment: shoes on  SpO2 98%   BMI 34 45 kg/m²          Physical Exam   Constitutional: She is oriented to person, place, and time  She appears well-developed and well-nourished  No distress  HENT:   Head: Normocephalic and atraumatic  Nose: Mucosal edema (Mild nasal mucosa erythema and edema) present  Mouth/Throat: Posterior oropharyngeal edema and posterior oropharyngeal erythema ( mild oropharyngeal erythema and edema) present  No oropharyngeal exudate  Tonsils are 3+ on the right  Tonsils are 3+ on the left  Bilateral external auditory canal erythema, mild   Eyes: Pupils are equal, round, and reactive to light  Conjunctivae and EOM are normal  Right eye exhibits no discharge  Left eye exhibits no discharge  No scleral icterus  Neck: Normal range of motion  Neck supple  No JVD present  No tracheal deviation present  No thyromegaly present  Cardiovascular: Normal rate, regular rhythm, normal heart sounds and intact distal pulses  Exam reveals no gallop and no friction rub  No murmur heard  Pulmonary/Chest: Effort normal and breath sounds normal  No respiratory distress  She has no wheezes  She has no rales  She exhibits no tenderness  Abdominal: Soft  Bowel sounds are normal  She exhibits no distension and no mass  There is no tenderness  There is no rebound and no guarding  Musculoskeletal: Normal range of motion  She exhibits no edema, tenderness or deformity  Lymphadenopathy:     She has no cervical adenopathy  Neurological: She is alert and oriented to person, place, and time  She has normal reflexes  No cranial nerve deficit  She exhibits normal muscle tone  Coordination normal    Skin: Skin is warm and dry  No rash noted  She is not diaphoretic  No erythema  No pallor  Psychiatric: She has a normal mood and affect   Her behavior is normal          Appointment on 10/26/2019   Component Date Value Ref Range Status    Cholesterol 10/26/2019 213* 50 - 200 mg/dL Final Cholesterol:       Desirable         <200 mg/dl       Borderline         200-239 mg/dl       High              >239           Triglycerides 10/26/2019 203* <=150 mg/dL Final      Triglyceride:     Normal          <150 mg/dl     Borderline High 150-199 mg/dl     High            200-499 mg/dl        Very High       >499 mg/dl    Specimen collection should occur prior to N-Acetylcysteine or Metamizole administration due to the potential for falsely depressed results   HDL, Direct 10/26/2019 52  >=40 mg/dL Final      HDL Cholesterol:       Low     <41 mg/dL  Specimen collection should occur prior to Metamizole administration due to the potential for falsley depressed results   LDL Calculated 10/26/2019 120* 0 - 100 mg/dL Final      LDL Cholesterol:     Optimal           <100 mg/dl     Near Optimal      100-129 mg/dl     Above Optimal       Borderline High 130-159 mg/dl       High            160-189 mg/dl       Very High       >189 mg/dl         This screening LDL is a calculated result  It does not have the accuracy of the Direct Measured LDL in the monitoring of patients with hyperlipidemia and/or statin therapy  Direct Measure LDL (EIT677) must be ordered separately in these patients      Non-HDL-Chol (CHOL-HDL) 10/26/2019 161  mg/dl Final   Admission on 10/14/2019, Discharged on 10/14/2019   Component Date Value Ref Range Status    EXT Preg Test, Ur 10/14/2019 Negative  Negative Final    Control 10/14/2019 Valid  Valid Final    Case Report 10/14/2019    Final                    Value:Surgical Pathology Report                         Case: [id number]                                   Authorizing Provider:  Maxwell Rodríguez DO       Collected:           10/14/2019 7818              Ordering Location:     [address] [place] [address]      Received:            10/14/2019 [address] [place] [address] Operating Room                                                      Pathologist: Meggan Goldstein MD                                                            Specimen:    Endometrium, [facility]                                                                           Final Diagnosis 10/14/2019    Final                    Value: This result contains rich text formatting which cannot be displayed here   Additional Information 10/14/2019    Final                    Value: This result contains rich text formatting which cannot be displayed here  [facility] Description 10/14/2019    Final                    Value: This result contains rich text formatting which cannot be displayed here     Appointment on 09/21/2019   Component Date Value Ref Range Status    WBC 09/21/2019 7 35  4 31 - 10 16 Thousand/uL Final    RBC 09/21/2019 4 80  3 81 - 5 12 Million/uL Final    Hemoglobin 09/21/2019 12 0  11 5 - 15 4 g/dL Final    Hematocrit 09/21/2019 39 0  34 8 - 46 1 % Final    MCV 09/21/2019 81* 82 - 98 fL Final    MCH 09/21/2019 25 0* 26 8 - 34 3 pg Final    MCHC 09/21/2019 30 8* 31 4 - 37 4 g/dL Final    RDW 09/21/2019 16 3* 11 6 - 15 1 % Final    MPV 09/21/2019 12 2  8 9 - 12 7 fL Final    Platelets 55/51/4771 290  149 - 390 Thousands/uL Final    nRBC 09/21/2019 0  /100 WBCs Final    Neutrophils Relative 09/21/2019 56  43 - 75 % Final    Immat GRANS % 09/21/2019 0  0 - 2 % Final    Lymphocytes Relative 09/21/2019 30  14 - 44 % Final    Monocytes Relative 09/21/2019 7  4 - 12 % Final    Eosinophils Relative 09/21/2019 6  0 - 6 % Final    Basophils Relative 09/21/2019 1  0 - 1 % Final    Neutrophils Absolute 09/21/2019 4 11  1 85 - 7 62 Thousands/µL Final    Immature Grans Absolute 09/21/2019 0 02  0 00 - 0 20 Thousand/uL Final    Lymphocytes Absolute 09/21/2019 2 21  0 60 - 4 47 Thousands/µL Final    Monocytes Absolute 09/21/2019 0 53  0 17 - 1 22 Thousand/µL Final    Eosinophils Absolute 09/21/2019 0 43  0 00 - 0 61 Thousand/µL Final    Basophils Absolute 09/21/2019 0 05  0 00 - 0 10 Thousands/µL Final    Sodium 09/21/2019 135* 136 - 145 mmol/L Final    Potassium 09/21/2019 3 5  3 5 - 5 3 mmol/L Final    Chloride 09/21/2019 102  100 - 108 mmol/L Final    CO2 09/21/2019 28  21 - 32 mmol/L Final    ANION GAP 09/21/2019 5  4 - 13 mmol/L Final    BUN 09/21/2019 13  5 - 25 mg/dL Final    Creatinine 09/21/2019 0 71  0 60 - 1 30 mg/dL Final    Standardized to IDMS reference method    Glucose 09/21/2019 87  65 - 140 mg/dL Final      If the patient is fasting, the ADA then defines impaired fasting glucose as > 100 mg/dL and diabetes as > or equal to 123 mg/dL  Specimen collection should occur prior to Sulfasalazine administration due to the potential for falsely depressed results  Specimen collection should occur prior to Sulfapyridine administration due to the potential for falsely elevated results   Calcium 09/21/2019 9 3  8 3 - 10 1 mg/dL Final    AST 09/21/2019 15  5 - 45 U/L Final      Specimen collection should occur prior to Sulfasalazine administration due to the potential for falsely depressed results   ALT 09/21/2019 25  12 - 78 U/L Final      Specimen collection should occur prior to Sulfasalazine and/or Sulfapyridine administration due to the potential for falsely depressed results       Alkaline Phosphatase 09/21/2019 70  46 - 116 U/L Final    Total Protein 09/21/2019 7 9  6 4 - 8 2 g/dL Final    Albumin 09/21/2019 3 8  3 5 - 5 0 g/dL Final    Total Bilirubin 09/21/2019 0 78  0 20 - 1 00 mg/dL Final    eGFR 09/21/2019 103  ml/min/1 73sq m Final    HCG, Quant 09/21/2019 <2  <=6 mIU/mL Final   Procedure visit on 09/17/2019   Component Date Value Ref Range Status    Case Report 09/17/2019    Final                    Value:Surgical Pathology Report                         Case: [id number]                                   Authorizing Provider:  Kaylene Silver DO       Collected:           09/17/2019 1500              Ordering Location:     [address] [place] [address] Received:            09/17/2019 1500              Pathologist:           Priya Dubois MD                                                        Specimen:    Endometrium                                                                                Final Diagnosis 09/17/2019    Final                    Value: This result contains rich text formatting which cannot be displayed here   Additional Information 09/17/2019    Final                    Value: This result contains rich text formatting which cannot be displayed here  [facility] Gross Description 09/17/2019    Final                    Value: This result contains rich text formatting which cannot be displayed here  Annual Exam on 07/31/2019   Component Date Value Ref Range Status    Case Report 07/31/2019    Final                    Value:Gynecologic Cytology Report                       Case: [id number]                                  Authorizing Provider:  Fam Hagan DO       Collected:           07/31/2019 1109              Ordering Location:     [facility]      Received:            07/31/2019 1110              First Screen:          Rachelle Herrera CT                                                       Rescreen:              Mckayla Nicolas                                                                  Pathologist:           Dc Negro MD                                                             Specimen:    LIQUID-BASED PAP, SCREENING, Cervix                                                        Primary Interpretation 07/31/2019 Other   Final    Interpretation 07/31/2019 Negative for squamous intraepithelial lesion  Endometrial cells present in a woman age 39 or above  See Note  Final    Specimen Adequacy 07/31/2019 Satisfactory for evaluation  Endocervical/transformation zone component present  Final    Note 07/31/2019    Final                    Value: This result contains rich text formatting which cannot be displayed here   Additional Information 07/31/2019    Final                    Value: This result contains rich text formatting which cannot be displayed here   LMP 07/31/2019 7/27/2019   Final    HPV Other HR 07/31/2019 Negative  Negative Final    HPV types: 03,26,26,78,00,77,99,67,36,74,04 and 68 DNA are undetectable or below the pre-set threshold      HPV16 07/31/2019 Negative  Negative Final    HPV18 07/31/2019 Negative  Negative Final

## 2019-11-08 NOTE — PATIENT INSTRUCTIONS
- Drink plenty of fluids  - Get plenty of rest  - You may use salt water gargles for your sore throat  - You may use over the counter tylenol or Ibuprofen (motrin or Advil) for any headache, muscle aches and fever  - Call the office if your symptoms persist beyond a total of 7-10 days        Upper Respiratory Infection   AMBULATORY CARE:   An upper respiratory infection  is also called a common cold  It can affect your nose, throat, ears, and sinuses  Common signs and symptoms include the following:  Cold symptoms are usually worst for the first 3 to 5 days  You may have any of the following:  · Runny or stuffy nose    · Sneezing and coughing    · Sore throat or hoarseness    · Red, watery, and sore eyes    · Fatigue     · Chills and fever    · Headache, body aches, or sore muscles  Seek care immediately if:   · You have chest pain or trouble breathing  Contact your healthcare provider if:   · You have a fever over 102ºF (39°C)  · Your sore throat gets worse or you see white or yellow spots in your throat  · Your symptoms get worse after 3 to 5 days or your cold is not better in 14 days  · You have a rash anywhere on your skin  · You have large, tender lumps in your neck  · You have thick, green or yellow drainage from your nose  · You cough up thick yellow, green, or bloody mucus  · You have vomiting for more than 24 hours and cannot keep fluids down  · You have a bad earache  · You have questions or concerns about your condition or care  Treatment for a cold: There is no cure for the common cold  Colds are caused by viruses and do not get better with antibiotics  Most people get better in 7 to 14 days  You may continue to cough for 2 to 3 weeks  The following may help decrease your symptoms:  · Decongestants  help reduce nasal congestion and help you breathe more easily  If you take decongestant pills, they may make you feel restless or not able to sleep   Do not use decongestant sprays for more than a few days  · Cough suppressants  help reduce coughing  Ask your healthcare provider which type of cough medicine is best for you  · NSAIDs , such as ibuprofen, help decrease swelling, pain, and fever  NSAIDs can cause stomach bleeding or kidney problems in certain people  If you take blood thinner medicine, always ask your healthcare provider if NSAIDs are safe for you  Always read the medicine label and follow directions  · Acetaminophen  decreases pain and fever  It is available without a doctor's order  Ask how much to take and how often to take it  Follow directions  Read the labels of all other medicines you are using to see if they also contain acetaminophen, or ask your doctor or pharmacist  Acetaminophen can cause liver damage if not taken correctly  Do not use more than 4 grams (4,000 milligrams) total of acetaminophen in one day  Manage your cold:   · Rest as much as possible  Slowly start to do more each day  · Drink more liquids as directed  Liquids will help thin and loosen mucus so you can cough it up  Liquids will also help prevent dehydration  Liquids that help prevent dehydration include water, fruit juice, and broth  Do not drink liquids that contain caffeine  Caffeine can increase your risk for dehydration  Ask your healthcare provider how much liquid to drink each day  · Soothe a sore throat  Gargle with warm salt water  This helps your sore throat feel better  Make salt water by dissolving ¼ teaspoon salt in 1 cup warm water  You may also suck on hard candy or throat lozenges  You may use a sore throat spray  · Use a humidifier or vaporizer  Use a cool mist humidifier or a vaporizer to increase air moisture in your home  This may make it easier for you to breathe and help decrease your cough  · Use saline nasal drops as directed  These help relieve congestion  · Apply petroleum-based jelly around the outside of your nostrils    This can decrease irritation from blowing your nose  · Do not smoke  Nicotine and other chemicals in cigarettes and cigars can make your symptoms worse  They can also cause infections such as bronchitis or pneumonia  Ask your healthcare provider for information if you currently smoke and need help to quit  E-cigarettes or smokeless tobacco still contain nicotine  Talk to your healthcare provider before you use these products  Prevent spreading your cold to others:   · Try to stay away from other people during the first 2 to 3 days of your cold when it is more easily spread  · Do not share food or drinks  · Do not share hand towels with household members  · Wash your hands often, especially after you blow your nose  Turn away from other people and cover your mouth and nose with a tissue when you sneeze or cough  Follow up with your healthcare provider as directed:  Write down your questions so you remember to ask them during your visits  © 2017 [employer] Information is for End User's use only and may not be sold, redistributed or otherwise used for commercial purposes  All illustrations and images included in CareNotes® are the copyrighted property of [employer]  or [employer]  The above information is an  only  It is not intended as medical advice for individual conditions or treatments  Talk to your doctor, nurse or pharmacist before following any medical regimen to see if it is safe and effective for you

## 2020-01-14 DIAGNOSIS — J45.40 MODERATE PERSISTENT EXTRINSIC ASTHMA WITHOUT COMPLICATION: ICD-10-CM

## 2020-01-27 ENCOUNTER — OFFICE VISIT (OUTPATIENT)
Dept: INTERNAL MEDICINE CLINIC | Facility: CLINIC | Age: 45
End: 2020-01-27
Payer: COMMERCIAL

## 2020-01-27 VITALS
OXYGEN SATURATION: 98 % | BODY MASS INDEX: 35.55 KG/M2 | HEIGHT: 64 IN | DIASTOLIC BLOOD PRESSURE: 88 MMHG | TEMPERATURE: 98.5 F | SYSTOLIC BLOOD PRESSURE: 122 MMHG | WEIGHT: 208.2 LBS | HEART RATE: 82 BPM

## 2020-01-27 DIAGNOSIS — E78.00 HYPERCHOLESTEROLEMIA: Primary | ICD-10-CM

## 2020-01-27 DIAGNOSIS — J06.9 VIRAL URI: ICD-10-CM

## 2020-01-27 DIAGNOSIS — R07.89 OTHER CHEST PAIN: ICD-10-CM

## 2020-01-27 DIAGNOSIS — E78.1 HYPERTRIGLYCERIDEMIA: ICD-10-CM

## 2020-01-27 DIAGNOSIS — I10 BENIGN HYPERTENSION: ICD-10-CM

## 2020-01-27 PROCEDURE — 99214 OFFICE O/P EST MOD 30 MIN: CPT | Performed by: NURSE PRACTITIONER

## 2020-01-27 PROCEDURE — 3074F SYST BP LT 130 MM HG: CPT | Performed by: NURSE PRACTITIONER

## 2020-01-27 PROCEDURE — 93000 ELECTROCARDIOGRAM COMPLETE: CPT | Performed by: NURSE PRACTITIONER

## 2020-01-27 PROCEDURE — 3079F DIAST BP 80-89 MM HG: CPT | Performed by: NURSE PRACTITIONER

## 2020-01-27 PROCEDURE — 1036F TOBACCO NON-USER: CPT | Performed by: NURSE PRACTITIONER

## 2020-01-27 PROCEDURE — 3008F BODY MASS INDEX DOCD: CPT | Performed by: NURSE PRACTITIONER

## 2020-01-27 RX ORDER — SIMVASTATIN 40 MG
40 TABLET ORAL
Qty: 90 TABLET | Refills: 1 | Status: SHIPPED | OUTPATIENT
Start: 2020-01-27 | End: 2020-07-07

## 2020-01-27 NOTE — PROGRESS NOTES
Assessment/Plan:       Problem List Items Addressed This Visit        Respiratory    Viral URI     No s/s bacterial infection  Continue flonase and mucinex  Take tylenol/ibuprofen for pain  Supportive care discussed  F/u if sx worsen or change  Cardiovascular and Mediastinum    Benign hypertension     BP elevated d/t pain today  Will recheck with next visit  No med changes today  Other    Other chest pain     EKG unchanged from previous in September  Likely r/t reflux/spicy foods  Continue to monitor symptoms, return if worsens  Reviewed red flag signs to call the office with or go to the Emergency Department with  Patient verbalizes understanding  Relevant Orders    POCT ECG (Completed)    Hypertriglyceridemia    Relevant Medications    simvastatin (ZOCOR) 40 mg tablet    Hypercholesterolemia - Primary     Will increase simvastatin from 20mg to 40mg and monitor effect  Relevant Medications    simvastatin (ZOCOR) 40 mg tablet    Other Relevant Orders    Lipid panel    Comprehensive metabolic panel            BMI Counseling: Body mass index is 35 74 kg/m²  The BMI is above normal  Nutrition recommendations include encouraging healthy choices of fruits and vegetables  Subjective:      Patient ID: Helder Banda is a 38 y o  female  Patient presents for a follow up on hypertension  She also notes that she had chest pain over the weekend and she has a headache now  Pt reports that she had chest pain this weekend  She checked her BP and it was in the 61'T diastolic  She had the chest pain twice over the weekend, for about an hour the first time, and then the second time was 2 days later, which she took an acid reducer, which improved her symptoms  She reports that she ate spicy food on both days and believes that is likely the cause of her symptoms  She denies SOB, dizziness during the chest pain episodes       She has a "terrible headache" that she has had for the past hour  She notes PND, pain behind her eyes, and slight nasal congestion  She has not yet taken anything to relieve the pain  She started flonase and Mucinex for her symptoms  She has the sensation of mucous in her throat, as well  A repeat fasting lipid profile was done on 10/26/19  The results are as follows: Total cholesterol 213 ; Triglycerides 203 ; HDL 52 ;    Previous history of cardiac disease includes: none  Lipid abnormalities are unchanged  Compliance with treatment has been excellent  The patient exercises infrequently  Patient denies muscle pain associated with her medications  Headache    This is a new problem  The current episode started today  The problem occurs constantly  The problem has been unchanged  The pain is located in the retro-orbital region  The pain does not radiate  The pain quality is similar to prior headaches  The quality of the pain is described as aching and pulsating  The pain is moderate  Associated symptoms include rhinorrhea, sinus pressure and a sore throat  Pertinent negatives include no abdominal pain, blurred vision, coughing, dizziness, ear pain, eye pain (jsut pain behind the eyes), fever, nausea, neck pain, photophobia, vomiting or weakness  Nothing aggravates the symptoms  Treatments tried: applying pressure  Hypertension   This is a chronic problem  The current episode started more than 1 year ago  The problem has been waxing and waning since onset  The problem is controlled  Associated symptoms include chest pain (per HPI) and headaches  Pertinent negatives include no anxiety, blurred vision, neck pain, palpitations, peripheral edema, shortness of breath or sweats  There are no associated agents to hypertension  Risk factors for coronary artery disease include obesity and stress  Past treatments include beta blockers, alpha 1 blockers and diuretics  The current treatment provides moderate improvement  There are no compliance problems  The following portions of the patient's history were reviewed and updated as appropriate: allergies, current medications, past family history, past medical history, past social history, past surgical history and problem list     Review of Systems   Constitutional: Negative for chills, fatigue and fever  HENT: Positive for postnasal drip, rhinorrhea, sinus pressure and sore throat  Negative for ear pain, sneezing and trouble swallowing  Eyes: Negative for blurred vision, photophobia and pain (jsut pain behind the eyes)  Respiratory: Negative for cough and shortness of breath  Cardiovascular: Positive for chest pain (per HPI)  Negative for palpitations and leg swelling  Gastrointestinal: Negative for abdominal pain, diarrhea, nausea and vomiting  Musculoskeletal: Negative for neck pain  Skin: Negative for rash  Neurological: Positive for headaches  Negative for dizziness and weakness  Psychiatric/Behavioral: The patient is not nervous/anxious  All other systems reviewed and are negative          Past Medical History:   Diagnosis Date    Anemia     Anxiety     Asthma     controlled  seasonal    Gestational diabetes mellitus     Hyperlipidemia     Hypertension     PONV (postoperative nausea and vomiting)     Pregnancy     Resolved: 07/29/2015    Trigger finger     Resolved: 12/02/2016    Yeast infection     Resolved: 07/29/2015         Current Outpatient Medications:     albuterol (VENTOLIN HFA) 90 mcg/act inhaler, Inhale 1-2 puffs as needed for wheezing, Disp: 2 Inhaler, Rfl: 0    fluticasone (FLONASE) 50 mcg/act nasal spray, 1 spray into each nostril daily, Disp: 1 Bottle, Rfl: 0    guaiFENesin (MUCINEX) 600 mg 12 hr tablet, Take 1 tablet (600 mg total) by mouth every 12 (twelve) hours, Disp: 20 tablet, Rfl: 0    hydrochlorothiazide (HYDRODIURIL) 25 mg tablet, Take 1 tablet (25 mg total) by mouth daily, Disp: 90 tablet, Rfl: 1    ibuprofen (MOTRIN) 200 mg tablet, Take 3 tablets (600 mg total) by mouth every 6 (six) hours as needed for mild pain, moderate pain or cramping, Disp: , Rfl: 0    loratadine (CLARITIN) 10 mg tablet, Take 10 mg by mouth daily at bedtime  , Disp: , Rfl:     losartan (COZAAR) 50 mg tablet, Take 1 tablet (50 mg total) by mouth daily, Disp: 90 tablet, Rfl: 1    metoprolol succinate (TOPROL-XL) 50 mg 24 hr tablet, Take 1 tablet (50 mg total) by mouth daily at bedtime, Disp: 90 tablet, Rfl: 1    Omega-3 Fatty Acids (FISH OIL) 1,000 mg, Take 2 capsules by mouth daily at bedtime  , Disp: , Rfl:     simvastatin (ZOCOR) 40 mg tablet, Take 1 tablet (40 mg total) by mouth daily at bedtime, Disp: 90 tablet, Rfl: 1    Allergies   Allergen Reactions    Hibiclens [Chlorhexidine Gluconate] Rash     Red with pimples    Pollen Extract Nasal Congestion       Social History   Past Surgical History:   Procedure Laterality Date    HERNIA REPAIR      INCISION TENDON SHEATH HAND      of a finger    [facility] HYSTEROSCOPY,W/ENDO BX N/A 10/14/2019    Procedure: DILATATION AND CURETTAGE (D&C) WITH HYSTEROSCOPY W/ Panda Esposito;  Surgeon: Daniel Muhammad DO;  [facility];  Service: Gynecology    VA HYSTEROSCOPY,W/ENDOMETRIAL ABLATION N/A 10/14/2019    Procedure: Kevin Better;  Surgeon: Daniel Muhammad DO;  [facility]: [facility];  Service: Gynecology    VA REPAIR UMBILICAL EFUD,4+Y/U,UKEID N/A 8/23/2018    Procedure: UMBILICAL HERNIA REPAIR WITH MESH;  Surgeon: Boubacar Rosenbaum MD;  [facility];  Service: General    WISDOM TOOTH EXTRACTION       Family History   Problem Relation Age of Onset    Endometrial cancer Mother 55    Diabetes type II Mother     Hypertension Father     Heart attack Sister     Other Maternal Grandfather         Myocardial infarction arrhythmias    Stroke Paternal Grandfather         Syndrome    No Known Problems Daughter     No Known Problems Maternal Grandmother     No Known Problems Paternal Grandmother     No Known Problems Brother     No Known Problems Son     No Known Problems Paternal Aunt        Objective:  /88 (BP Location: Left arm, Patient Position: Sitting, Cuff Size: Large)   Pulse 82   Temp 98 5 °F (36 9 °C) (Oral)   Ht 5' 4" (1 626 m)   Wt 94 4 kg (208 lb 3 2 oz)   SpO2 98%   BMI 35 74 kg/m²        Physical Exam   Constitutional: She is oriented to person, place, and time  She appears well-developed and well-nourished  No distress  HENT:   Head: Normocephalic and atraumatic  Right Ear: External ear normal  Tympanic membrane is not erythematous and not bulging  Left Ear: External ear normal  Tympanic membrane is not erythematous and not bulging  Nose: Rhinorrhea present  Right sinus exhibits no maxillary sinus tenderness  Left sinus exhibits no maxillary sinus tenderness and no frontal sinus tenderness  Mouth/Throat: Uvula is midline  Posterior oropharyngeal erythema (mild irritation) present  No oropharyngeal exudate  Eyes: Pupils are equal, round, and reactive to light  No scleral icterus  Neck: Normal range of motion  Neck supple  No thyromegaly present  Cardiovascular: Normal rate and regular rhythm  Pulmonary/Chest: Effort normal and breath sounds normal  No respiratory distress  Abdominal: Soft  Bowel sounds are normal  She exhibits no distension  Musculoskeletal: Normal range of motion  She exhibits no edema  Lymphadenopathy:     She has no cervical adenopathy  Neurological: She is alert and oriented to person, place, and time  No cranial nerve deficit  Skin: Skin is warm and dry  Psychiatric: She has a normal mood and affect   Her behavior is normal

## 2020-01-28 NOTE — ASSESSMENT & PLAN NOTE
EKG unchanged from previous in September  Likely r/t reflux/spicy foods  Continue to monitor symptoms, return if worsens  Reviewed red flag signs to call the office with or go to the Emergency Department with  Patient verbalizes understanding

## 2020-01-28 NOTE — ASSESSMENT & PLAN NOTE
No s/s bacterial infection  Continue flonase and mucinex  Take tylenol/ibuprofen for pain  Supportive care discussed  F/u if sx worsen or change

## 2020-03-06 ENCOUNTER — TELEPHONE (OUTPATIENT)
Dept: OBGYN CLINIC | Facility: CLINIC | Age: 45
End: 2020-03-06

## 2020-03-06 NOTE — TELEPHONE ENCOUNTER
----- Message from Shane Fortune sent at 3/6/2020  1:11 PM EST -----  Regarding: Pre-Op/Post-Op Question  Contact: [phone]  Hello,    I wanted to send a quick message to follow up on the ablation procedure that I had done in Oct, 2019  Since the procedure, my periods have been light, so light that I can get away with only a panty liner for most of the duration of my period  However, this period that I am experiencing now (start date 3/3) seems to be a bit heavier, especially today, day 4  This morning I experienced some pain in my lower abdomen, and didnt think too much about it, just figured it was some cramping, but went to the bathroom to discover heavy bleeding  This has not happened in the months between the procedure and now, until today  I am not sure if it is something to worry about, but thought I should send a quick note  Please note, the bleeding is not near as heavy as before the procedure, but enough that a full size pad is needed, which has not been normal up until today      Thank you,  Rl Meeks

## 2020-03-09 NOTE — TELEPHONE ENCOUNTER
Inform pt, not worried about cycle, rec keep diary since still improved compared to prior ablation   Call with update 2-3 months

## 2020-03-22 DIAGNOSIS — I10 BENIGN HYPERTENSION: ICD-10-CM

## 2020-03-23 RX ORDER — HYDROCHLOROTHIAZIDE 25 MG/1
TABLET ORAL
Qty: 90 TABLET | Refills: 3 | OUTPATIENT
Start: 2020-03-23

## 2020-03-23 RX ORDER — METOPROLOL SUCCINATE 50 MG/1
TABLET, EXTENDED RELEASE ORAL
Qty: 90 TABLET | Refills: 3 | OUTPATIENT
Start: 2020-03-23

## 2020-03-23 RX ORDER — LOSARTAN POTASSIUM 50 MG/1
TABLET ORAL
Qty: 90 TABLET | Refills: 3 | OUTPATIENT
Start: 2020-03-23

## 2020-03-24 DIAGNOSIS — I10 BENIGN HYPERTENSION: ICD-10-CM

## 2020-03-24 RX ORDER — LOSARTAN POTASSIUM 50 MG/1
50 TABLET ORAL DAILY
Qty: 90 TABLET | Refills: 1 | Status: SHIPPED | OUTPATIENT
Start: 2020-03-24 | End: 2020-10-05 | Stop reason: SDUPTHER

## 2020-03-24 RX ORDER — METOPROLOL SUCCINATE 50 MG/1
50 TABLET, EXTENDED RELEASE ORAL
Qty: 90 TABLET | Refills: 1 | Status: SHIPPED | OUTPATIENT
Start: 2020-03-24 | End: 2020-10-05 | Stop reason: SDUPTHER

## 2020-03-24 RX ORDER — HYDROCHLOROTHIAZIDE 25 MG/1
25 TABLET ORAL DAILY
Qty: 90 TABLET | Refills: 1 | Status: SHIPPED | OUTPATIENT
Start: 2020-03-24 | End: 2020-10-05 | Stop reason: SDUPTHER

## 2020-03-24 NOTE — TELEPHONE ENCOUNTER
----- Message from Cassandra Heller sent at 3/24/2020 10:19 AM EDT -----  Regarding: Prescription Question  Contact: [phone]  Henrihaile Judson,    I got a notification that [facility] will not full my prescriptions for; Losartan 50mg, Hydrochlorothiazide 25mg, and Metoprolol 50mg, because the doctor needed to speak with me? I am happy to take a phone call, but I'm terrified to leave my house, let along visit a doctor's office!! This crazy virus!! Keo Guzman I'm scared during Flu season!!      Is it possible for you to have [facility]ill these please  For now I am keeping my 4/26 appointment scheduled, but I may have to reassess as we get closer to that date      Thank you  Anastasiia Fischer

## 2020-04-21 ENCOUNTER — TELEPHONE (OUTPATIENT)
Dept: INTERNAL MEDICINE CLINIC | Facility: CLINIC | Age: 45
End: 2020-04-21

## 2020-07-07 DIAGNOSIS — E78.00 HYPERCHOLESTEROLEMIA: ICD-10-CM

## 2020-07-07 DIAGNOSIS — E78.1 HYPERTRIGLYCERIDEMIA: ICD-10-CM

## 2020-07-07 RX ORDER — SIMVASTATIN 40 MG
TABLET ORAL
Qty: 90 TABLET | Refills: 3 | Status: SHIPPED | OUTPATIENT
Start: 2020-07-07 | End: 2020-10-05 | Stop reason: SDUPTHER

## 2020-08-11 ENCOUNTER — HOSPITAL ENCOUNTER (OUTPATIENT)
Dept: RADIOLOGY | Facility: MEDICAL CENTER | Age: 45
Discharge: HOME | End: 2020-08-11
Payer: COMMERCIAL

## 2020-08-11 VITALS — HEIGHT: 64 IN | WEIGHT: 208 LBS | BODY MASS INDEX: 35.51 KG/M2

## 2020-08-11 DIAGNOSIS — Z12.31 ENCOUNTER FOR SCREENING MAMMOGRAM FOR MALIGNANT NEOPLASM OF BREAST: ICD-10-CM

## 2020-08-11 PROCEDURE — 77067 SCR MAMMO BI INCL CAD: CPT

## 2020-08-11 PROCEDURE — 77063 BREAST TOMOSYNTHESIS BI: CPT

## 2020-08-17 ENCOUNTER — TELEPHONE (OUTPATIENT)
Dept: OBGYN CLINIC | Facility: CLINIC | Age: 45
End: 2020-08-17

## 2020-08-17 NOTE — TELEPHONE ENCOUNTER
----- Message from Anastasia Bonner DO sent at 8/16/2020  2:39 PM EDT -----  Inform pt needs addn views

## 2020-08-26 ENCOUNTER — HOSPITAL ENCOUNTER (OUTPATIENT)
Dept: ULTRASOUND IMAGING | Facility: CLINIC | Age: 45
Discharge: HOME | End: 2020-08-26
Payer: COMMERCIAL

## 2020-08-26 ENCOUNTER — HOSPITAL ENCOUNTER (OUTPATIENT)
Dept: MAMMOGRAPHY | Facility: CLINIC | Age: 45
Discharge: HOME | End: 2020-08-26
Payer: COMMERCIAL

## 2020-08-26 ENCOUNTER — APPOINTMENT (OUTPATIENT)
Dept: LAB | Facility: IMAGING CENTER | Age: 45
End: 2020-08-26
Payer: COMMERCIAL

## 2020-08-26 ENCOUNTER — TRANSCRIBE ORDERS (OUTPATIENT)
Dept: ADMINISTRATIVE | Facility: HOSPITAL | Age: 45
End: 2020-08-26

## 2020-08-26 VITALS — TEMPERATURE: 97.5 F | BODY MASS INDEX: 35.51 KG/M2 | HEIGHT: 64 IN | WEIGHT: 208 LBS

## 2020-08-26 DIAGNOSIS — E78.00 HYPERCHOLESTEROLEMIA: ICD-10-CM

## 2020-08-26 DIAGNOSIS — R92.8 ABNORMAL SCREENING MAMMOGRAM: ICD-10-CM

## 2020-08-26 LAB
ALBUMIN SERPL BCP-MCNC: 4 G/DL (ref 3.5–5)
ALP SERPL-CCNC: 64 U/L (ref 46–116)
ALT SERPL W P-5'-P-CCNC: 36 U/L (ref 12–78)
ANION GAP SERPL CALCULATED.3IONS-SCNC: 7 MMOL/L (ref 4–13)
AST SERPL W P-5'-P-CCNC: 21 U/L (ref 5–45)
BILIRUB SERPL-MCNC: 1.32 MG/DL (ref 0.2–1)
BUN SERPL-MCNC: 13 MG/DL (ref 5–25)
CALCIUM SERPL-MCNC: 9.2 MG/DL (ref 8.3–10.1)
CHLORIDE SERPL-SCNC: 104 MMOL/L (ref 100–108)
CHOLEST SERPL-MCNC: 182 MG/DL (ref 50–200)
CO2 SERPL-SCNC: 27 MMOL/L (ref 21–32)
CREAT SERPL-MCNC: 0.75 MG/DL (ref 0.6–1.3)
GFR SERPL CREATININE-BSD FRML MDRD: 96 ML/MIN/1.73SQ M
GLUCOSE P FAST SERPL-MCNC: 90 MG/DL (ref 65–99)
HDLC SERPL-MCNC: 55 MG/DL
LDLC SERPL CALC-MCNC: 88 MG/DL (ref 0–100)
NONHDLC SERPL-MCNC: 127 MG/DL
POTASSIUM SERPL-SCNC: 3.6 MMOL/L (ref 3.5–5.3)
PROT SERPL-MCNC: 7.6 G/DL (ref 6.4–8.2)
SODIUM SERPL-SCNC: 138 MMOL/L (ref 136–145)
TRIGL SERPL-MCNC: 193 MG/DL

## 2020-08-26 PROCEDURE — G0279 TOMOSYNTHESIS, MAMMO: HCPCS

## 2020-08-26 PROCEDURE — 76642 ULTRASOUND BREAST LIMITED: CPT

## 2020-08-26 PROCEDURE — 77065 DX MAMMO INCL CAD UNI: CPT

## 2020-08-26 PROCEDURE — 80053 COMPREHEN METABOLIC PANEL: CPT

## 2020-08-26 PROCEDURE — 36415 COLL VENOUS BLD VENIPUNCTURE: CPT

## 2020-08-26 PROCEDURE — 80061 LIPID PANEL: CPT

## 2020-08-27 ENCOUNTER — TELEPHONE (OUTPATIENT)
Dept: OBGYN CLINIC | Facility: CLINIC | Age: 45
End: 2020-08-27

## 2020-08-27 NOTE — TELEPHONE ENCOUNTER
----- Message from Carlos Manuel Schwartz DO sent at 8/26/2020  4:08 PM EDT -----  Inform patient stable mammogram follow-up diagnostic mammogram in 6 months

## 2020-09-04 ENCOUNTER — TRANSCRIBE ORDERS (OUTPATIENT)
Dept: ADMINISTRATIVE | Facility: HOSPITAL | Age: 45
End: 2020-09-04

## 2020-09-04 DIAGNOSIS — R92.8 ABNORMAL MAMMOGRAM: Primary | ICD-10-CM

## 2020-09-20 DIAGNOSIS — I10 BENIGN HYPERTENSION: ICD-10-CM

## 2020-09-20 RX ORDER — HYDROCHLOROTHIAZIDE 25 MG/1
TABLET ORAL
Qty: 90 TABLET | Refills: 3 | OUTPATIENT
Start: 2020-09-20

## 2020-09-20 RX ORDER — METOPROLOL SUCCINATE 50 MG/1
TABLET, EXTENDED RELEASE ORAL
Qty: 90 TABLET | Refills: 3 | OUTPATIENT
Start: 2020-09-20

## 2020-09-20 RX ORDER — LOSARTAN POTASSIUM 50 MG/1
TABLET ORAL
Qty: 90 TABLET | Refills: 3 | OUTPATIENT
Start: 2020-09-20

## 2020-10-05 ENCOUNTER — OFFICE VISIT (OUTPATIENT)
Dept: INTERNAL MEDICINE CLINIC | Facility: CLINIC | Age: 45
End: 2020-10-05
Payer: COMMERCIAL

## 2020-10-05 VITALS
BODY MASS INDEX: 33.63 KG/M2 | HEART RATE: 68 BPM | SYSTOLIC BLOOD PRESSURE: 118 MMHG | HEIGHT: 64 IN | TEMPERATURE: 98.5 F | OXYGEN SATURATION: 98 % | DIASTOLIC BLOOD PRESSURE: 80 MMHG | WEIGHT: 197 LBS

## 2020-10-05 DIAGNOSIS — J45.40 MODERATE PERSISTENT EXTRINSIC ASTHMA WITHOUT COMPLICATION: ICD-10-CM

## 2020-10-05 DIAGNOSIS — R17 ELEVATED BILIRUBIN: ICD-10-CM

## 2020-10-05 DIAGNOSIS — E78.00 HYPERCHOLESTEROLEMIA: ICD-10-CM

## 2020-10-05 DIAGNOSIS — I10 BENIGN HYPERTENSION: ICD-10-CM

## 2020-10-05 DIAGNOSIS — E78.1 HYPERTRIGLYCERIDEMIA: ICD-10-CM

## 2020-10-05 DIAGNOSIS — Z23 NEED FOR INFLUENZA VACCINATION: Primary | ICD-10-CM

## 2020-10-05 PROBLEM — Z01.818 PREOPERATIVE TESTING: Status: RESOLVED | Noted: 2019-09-24 | Resolved: 2020-10-05

## 2020-10-05 PROBLEM — R07.89 OTHER CHEST PAIN: Status: RESOLVED | Noted: 2020-01-27 | Resolved: 2020-10-05

## 2020-10-05 PROBLEM — J06.9 URTI (ACUTE UPPER RESPIRATORY INFECTION): Status: RESOLVED | Noted: 2019-11-08 | Resolved: 2020-10-05

## 2020-10-05 PROBLEM — J06.9 VIRAL URI: Status: RESOLVED | Noted: 2020-01-27 | Resolved: 2020-10-05

## 2020-10-05 PROCEDURE — 90686 IIV4 VACC NO PRSV 0.5 ML IM: CPT

## 2020-10-05 PROCEDURE — 3079F DIAST BP 80-89 MM HG: CPT | Performed by: NURSE PRACTITIONER

## 2020-10-05 PROCEDURE — 1036F TOBACCO NON-USER: CPT | Performed by: NURSE PRACTITIONER

## 2020-10-05 PROCEDURE — 99214 OFFICE O/P EST MOD 30 MIN: CPT | Performed by: NURSE PRACTITIONER

## 2020-10-05 PROCEDURE — 3725F SCREEN DEPRESSION PERFORMED: CPT | Performed by: NURSE PRACTITIONER

## 2020-10-05 PROCEDURE — 90471 IMMUNIZATION ADMIN: CPT

## 2020-10-05 RX ORDER — SIMVASTATIN 40 MG
40 TABLET ORAL
Qty: 90 TABLET | Refills: 1 | Status: SHIPPED | OUTPATIENT
Start: 2020-10-05 | End: 2021-04-05 | Stop reason: SDUPTHER

## 2020-10-05 RX ORDER — HYDROCHLOROTHIAZIDE 12.5 MG/1
12.5 TABLET ORAL DAILY
Qty: 90 TABLET | Refills: 1 | Status: SHIPPED | OUTPATIENT
Start: 2020-10-05 | End: 2021-04-05 | Stop reason: SDUPTHER

## 2020-10-05 RX ORDER — ALBUTEROL SULFATE 90 UG/1
1-2 AEROSOL, METERED RESPIRATORY (INHALATION) AS NEEDED
Qty: 2 INHALER | Refills: 0 | Status: SHIPPED | OUTPATIENT
Start: 2020-10-05 | End: 2021-04-05 | Stop reason: SDUPTHER

## 2020-10-05 RX ORDER — LOSARTAN POTASSIUM 50 MG/1
50 TABLET ORAL DAILY
Qty: 90 TABLET | Refills: 1 | Status: SHIPPED | OUTPATIENT
Start: 2020-10-05 | End: 2021-04-05 | Stop reason: SDUPTHER

## 2020-10-05 RX ORDER — METOPROLOL SUCCINATE 50 MG/1
50 TABLET, EXTENDED RELEASE ORAL
Qty: 90 TABLET | Refills: 1 | Status: SHIPPED | OUTPATIENT
Start: 2020-10-05 | End: 2021-04-05 | Stop reason: SDUPTHER

## 2020-10-13 ENCOUNTER — ANNUAL EXAM (OUTPATIENT)
Dept: OBGYN CLINIC | Facility: CLINIC | Age: 45
End: 2020-10-13
Payer: COMMERCIAL

## 2020-10-13 VITALS
SYSTOLIC BLOOD PRESSURE: 132 MMHG | DIASTOLIC BLOOD PRESSURE: 80 MMHG | HEIGHT: 64 IN | WEIGHT: 196 LBS | TEMPERATURE: 97.4 F | BODY MASS INDEX: 33.46 KG/M2

## 2020-10-13 DIAGNOSIS — Z01.419 ENCOUNTER FOR ANNUAL ROUTINE GYNECOLOGICAL EXAMINATION: Primary | ICD-10-CM

## 2020-10-13 DIAGNOSIS — N60.19 FIBROCYSTIC BREAST DISEASE (FCBD), UNSPECIFIED LATERALITY: ICD-10-CM

## 2020-10-13 DIAGNOSIS — Z12.39 ENCOUNTER FOR SCREENING FOR MALIGNANT NEOPLASM OF BREAST, UNSPECIFIED SCREENING MODALITY: ICD-10-CM

## 2020-10-13 DIAGNOSIS — R92.8 ABNORMAL MAMMOGRAM: ICD-10-CM

## 2020-10-13 PROCEDURE — 87624 HPV HI-RISK TYP POOLED RSLT: CPT | Performed by: OBSTETRICS & GYNECOLOGY

## 2020-10-13 PROCEDURE — S0612 ANNUAL GYNECOLOGICAL EXAMINA: HCPCS | Performed by: OBSTETRICS & GYNECOLOGY

## 2020-10-13 PROCEDURE — G0145 SCR C/V CYTO,THINLAYER,RESCR: HCPCS | Performed by: OBSTETRICS & GYNECOLOGY

## 2020-10-21 LAB
LAB AP GYN PRIMARY INTERPRETATION: NORMAL
Lab: NORMAL

## 2020-11-03 ENCOUNTER — APPOINTMENT (OUTPATIENT)
Dept: RADIOLOGY | Facility: MEDICAL CENTER | Age: 45
End: 2020-11-03
Payer: COMMERCIAL

## 2020-11-03 ENCOUNTER — OFFICE VISIT (OUTPATIENT)
Dept: OBGYN CLINIC | Facility: MEDICAL CENTER | Age: 45
End: 2020-11-03
Payer: COMMERCIAL

## 2020-11-03 VITALS
TEMPERATURE: 98.7 F | SYSTOLIC BLOOD PRESSURE: 136 MMHG | HEIGHT: 63 IN | BODY MASS INDEX: 34.73 KG/M2 | WEIGHT: 196 LBS | HEART RATE: 75 BPM | DIASTOLIC BLOOD PRESSURE: 91 MMHG

## 2020-11-03 DIAGNOSIS — M17.12 PRIMARY OSTEOARTHRITIS OF LEFT KNEE: Primary | ICD-10-CM

## 2020-11-03 DIAGNOSIS — G89.29 HEEL PAIN, CHRONIC, LEFT: ICD-10-CM

## 2020-11-03 DIAGNOSIS — M21.41 PES PLANUS OF BOTH FEET: ICD-10-CM

## 2020-11-03 DIAGNOSIS — Z01.89 ENCOUNTER FOR LOWER EXTREMITY COMPARISON IMAGING STUDY: ICD-10-CM

## 2020-11-03 DIAGNOSIS — M79.672 HEEL PAIN, CHRONIC, LEFT: ICD-10-CM

## 2020-11-03 DIAGNOSIS — M21.42 PES PLANUS OF BOTH FEET: ICD-10-CM

## 2020-11-03 DIAGNOSIS — M25.562 LEFT KNEE PAIN, UNSPECIFIED CHRONICITY: ICD-10-CM

## 2020-11-03 DIAGNOSIS — M22.2X2 PATELLOFEMORAL PAIN SYNDROME OF LEFT KNEE: ICD-10-CM

## 2020-11-03 PROCEDURE — 73564 X-RAY EXAM KNEE 4 OR MORE: CPT

## 2020-11-03 PROCEDURE — 73562 X-RAY EXAM OF KNEE 3: CPT

## 2020-11-03 PROCEDURE — 99203 OFFICE O/P NEW LOW 30 MIN: CPT | Performed by: ORTHOPAEDIC SURGERY

## 2020-11-08 ENCOUNTER — OFFICE VISIT (OUTPATIENT)
Dept: URGENT CARE | Age: 45
End: 2020-11-08
Payer: COMMERCIAL

## 2020-11-08 VITALS
OXYGEN SATURATION: 97 % | HEART RATE: 92 BPM | BODY MASS INDEX: 34.73 KG/M2 | WEIGHT: 196 LBS | HEIGHT: 63 IN | RESPIRATION RATE: 18 BRPM | TEMPERATURE: 98.8 F

## 2020-11-08 DIAGNOSIS — J06.9 UPPER RESPIRATORY TRACT INFECTION, UNSPECIFIED TYPE: Primary | ICD-10-CM

## 2020-11-08 PROCEDURE — 99213 OFFICE O/P EST LOW 20 MIN: CPT | Performed by: PHYSICIAN ASSISTANT

## 2020-11-08 PROCEDURE — U0003 INFECTIOUS AGENT DETECTION BY NUCLEIC ACID (DNA OR RNA); SEVERE ACUTE RESPIRATORY SYNDROME CORONAVIRUS 2 (SARS-COV-2) (CORONAVIRUS DISEASE [COVID-19]), AMPLIFIED PROBE TECHNIQUE, MAKING USE OF HIGH THROUGHPUT TECHNOLOGIES AS DESCRIBED BY CMS-2020-01-R: HCPCS | Performed by: PHYSICIAN ASSISTANT

## 2020-11-10 ENCOUNTER — TELEPHONE (OUTPATIENT)
Dept: INTERNAL MEDICINE CLINIC | Age: 45
End: 2020-11-10

## 2020-11-10 LAB — SARS-COV-2 RNA SPEC QL NAA+PROBE: NOT DETECTED

## 2020-11-11 ENCOUNTER — DOCUMENTATION (OUTPATIENT)
Dept: URGENT CARE | Age: 45
End: 2020-11-11

## 2020-11-13 ENCOUNTER — APPOINTMENT (OUTPATIENT)
Dept: RADIOLOGY | Facility: AMBULARY SURGERY CENTER | Age: 45
End: 2020-11-13
Attending: ORTHOPAEDIC SURGERY
Payer: COMMERCIAL

## 2020-11-13 ENCOUNTER — OFFICE VISIT (OUTPATIENT)
Dept: OBGYN CLINIC | Facility: CLINIC | Age: 45
End: 2020-11-13
Payer: COMMERCIAL

## 2020-11-13 VITALS
SYSTOLIC BLOOD PRESSURE: 121 MMHG | HEART RATE: 76 BPM | DIASTOLIC BLOOD PRESSURE: 85 MMHG | HEIGHT: 63 IN | WEIGHT: 196 LBS | TEMPERATURE: 98.8 F | BODY MASS INDEX: 34.73 KG/M2

## 2020-11-13 DIAGNOSIS — M79.672 LEFT FOOT PAIN: ICD-10-CM

## 2020-11-13 DIAGNOSIS — M21.41 PES PLANUS OF BOTH FEET: ICD-10-CM

## 2020-11-13 DIAGNOSIS — M72.2 PLANTAR FASCIITIS, LEFT: Primary | ICD-10-CM

## 2020-11-13 DIAGNOSIS — G89.29 HEEL PAIN, CHRONIC, LEFT: ICD-10-CM

## 2020-11-13 DIAGNOSIS — M79.672 HEEL PAIN, CHRONIC, LEFT: ICD-10-CM

## 2020-11-13 DIAGNOSIS — M21.42 PES PLANUS OF BOTH FEET: ICD-10-CM

## 2020-11-13 PROCEDURE — 73630 X-RAY EXAM OF FOOT: CPT

## 2020-11-13 PROCEDURE — 3074F SYST BP LT 130 MM HG: CPT | Performed by: ORTHOPAEDIC SURGERY

## 2020-11-13 PROCEDURE — 99213 OFFICE O/P EST LOW 20 MIN: CPT | Performed by: ORTHOPAEDIC SURGERY

## 2020-11-13 PROCEDURE — 1036F TOBACCO NON-USER: CPT | Performed by: ORTHOPAEDIC SURGERY

## 2020-11-13 PROCEDURE — 3079F DIAST BP 80-89 MM HG: CPT | Performed by: ORTHOPAEDIC SURGERY

## 2020-11-13 PROCEDURE — 3008F BODY MASS INDEX DOCD: CPT | Performed by: NURSE PRACTITIONER

## 2020-11-17 ENCOUNTER — TELEMEDICINE (OUTPATIENT)
Dept: INTERNAL MEDICINE CLINIC | Facility: CLINIC | Age: 45
End: 2020-11-17
Payer: COMMERCIAL

## 2020-11-17 ENCOUNTER — AMB VIDEO VISIT (OUTPATIENT)
Dept: OTHER | Facility: HOSPITAL | Age: 45
End: 2020-11-17

## 2020-11-17 VITALS — TEMPERATURE: 97.7 F

## 2020-11-17 DIAGNOSIS — B34.9 VIRAL INFECTION, UNSPECIFIED: Primary | ICD-10-CM

## 2020-11-17 DIAGNOSIS — B34.9 VIRAL INFECTION, UNSPECIFIED: ICD-10-CM

## 2020-11-17 PROCEDURE — 99213 OFFICE O/P EST LOW 20 MIN: CPT | Performed by: NURSE PRACTITIONER

## 2020-11-17 PROCEDURE — U0003 INFECTIOUS AGENT DETECTION BY NUCLEIC ACID (DNA OR RNA); SEVERE ACUTE RESPIRATORY SYNDROME CORONAVIRUS 2 (SARS-COV-2) (CORONAVIRUS DISEASE [COVID-19]), AMPLIFIED PROBE TECHNIQUE, MAKING USE OF HIGH THROUGHPUT TECHNOLOGIES AS DESCRIBED BY CMS-2020-01-R: HCPCS | Performed by: NURSE PRACTITIONER

## 2020-11-17 PROCEDURE — 1036F TOBACCO NON-USER: CPT | Performed by: NURSE PRACTITIONER

## 2020-11-18 ENCOUNTER — TELEPHONE (OUTPATIENT)
Dept: INTERNAL MEDICINE CLINIC | Age: 45
End: 2020-11-18

## 2020-11-18 LAB — SARS-COV-2 RNA SPEC QL NAA+PROBE: NOT DETECTED

## 2020-12-08 ENCOUNTER — EVALUATION (OUTPATIENT)
Dept: PHYSICAL THERAPY | Facility: REHABILITATION | Age: 45
End: 2020-12-08
Payer: COMMERCIAL

## 2020-12-08 DIAGNOSIS — M72.2 PLANTAR FASCIITIS OF LEFT FOOT: Primary | ICD-10-CM

## 2020-12-08 DIAGNOSIS — M79.672 LEFT FOOT PAIN: ICD-10-CM

## 2020-12-08 PROCEDURE — 97161 PT EVAL LOW COMPLEX 20 MIN: CPT | Performed by: PHYSICAL THERAPIST

## 2020-12-08 PROCEDURE — 97112 NEUROMUSCULAR REEDUCATION: CPT | Performed by: PHYSICAL THERAPIST

## 2020-12-10 ENCOUNTER — APPOINTMENT (OUTPATIENT)
Dept: PHYSICAL THERAPY | Facility: REHABILITATION | Age: 45
End: 2020-12-10
Payer: COMMERCIAL

## 2020-12-10 ENCOUNTER — OFFICE VISIT (OUTPATIENT)
Dept: PHYSICAL THERAPY | Facility: REHABILITATION | Age: 45
End: 2020-12-10
Payer: COMMERCIAL

## 2020-12-10 DIAGNOSIS — M79.672 LEFT FOOT PAIN: ICD-10-CM

## 2020-12-10 DIAGNOSIS — M72.2 PLANTAR FASCIITIS OF LEFT FOOT: Primary | ICD-10-CM

## 2020-12-10 PROCEDURE — 97110 THERAPEUTIC EXERCISES: CPT | Performed by: PHYSICAL THERAPIST

## 2020-12-10 PROCEDURE — 97140 MANUAL THERAPY 1/> REGIONS: CPT | Performed by: PHYSICAL THERAPIST

## 2020-12-15 ENCOUNTER — OFFICE VISIT (OUTPATIENT)
Dept: PHYSICAL THERAPY | Facility: REHABILITATION | Age: 45
End: 2020-12-15
Payer: COMMERCIAL

## 2020-12-15 DIAGNOSIS — M79.672 LEFT FOOT PAIN: ICD-10-CM

## 2020-12-15 DIAGNOSIS — M72.2 PLANTAR FASCIITIS OF LEFT FOOT: Primary | ICD-10-CM

## 2020-12-15 PROCEDURE — 97140 MANUAL THERAPY 1/> REGIONS: CPT | Performed by: PHYSICAL THERAPIST

## 2020-12-15 PROCEDURE — 97110 THERAPEUTIC EXERCISES: CPT | Performed by: PHYSICAL THERAPIST

## 2020-12-17 ENCOUNTER — OFFICE VISIT (OUTPATIENT)
Dept: PHYSICAL THERAPY | Facility: REHABILITATION | Age: 45
End: 2020-12-17
Payer: COMMERCIAL

## 2020-12-17 DIAGNOSIS — M79.672 LEFT FOOT PAIN: ICD-10-CM

## 2020-12-17 DIAGNOSIS — M72.2 PLANTAR FASCIITIS OF LEFT FOOT: Primary | ICD-10-CM

## 2020-12-17 PROCEDURE — 97110 THERAPEUTIC EXERCISES: CPT | Performed by: PHYSICAL THERAPIST

## 2020-12-17 PROCEDURE — 97140 MANUAL THERAPY 1/> REGIONS: CPT | Performed by: PHYSICAL THERAPIST

## 2021-02-24 ENCOUNTER — HOSPITAL ENCOUNTER (OUTPATIENT)
Dept: ULTRASOUND IMAGING | Facility: CLINIC | Age: 46
Discharge: HOME | End: 2021-02-24
Payer: COMMERCIAL

## 2021-02-24 ENCOUNTER — HOSPITAL ENCOUNTER (OUTPATIENT)
Dept: MAMMOGRAPHY | Facility: CLINIC | Age: 46
Discharge: HOME | End: 2021-02-24
Payer: COMMERCIAL

## 2021-02-24 VITALS — HEIGHT: 63 IN | WEIGHT: 196 LBS | BODY MASS INDEX: 34.73 KG/M2

## 2021-02-24 DIAGNOSIS — R92.8 ABNORMAL MAMMOGRAM: ICD-10-CM

## 2021-02-24 PROCEDURE — G0279 TOMOSYNTHESIS, MAMMO: HCPCS

## 2021-02-24 PROCEDURE — 77065 DX MAMMO INCL CAD UNI: CPT

## 2021-03-10 DIAGNOSIS — Z23 ENCOUNTER FOR IMMUNIZATION: ICD-10-CM

## 2021-04-05 ENCOUNTER — OFFICE VISIT (OUTPATIENT)
Dept: INTERNAL MEDICINE CLINIC | Facility: CLINIC | Age: 46
End: 2021-04-05
Payer: COMMERCIAL

## 2021-04-05 VITALS
HEART RATE: 93 BPM | SYSTOLIC BLOOD PRESSURE: 132 MMHG | RESPIRATION RATE: 18 BRPM | BODY MASS INDEX: 36.04 KG/M2 | DIASTOLIC BLOOD PRESSURE: 82 MMHG | HEIGHT: 63 IN | TEMPERATURE: 99.5 F | WEIGHT: 203.4 LBS | OXYGEN SATURATION: 97 %

## 2021-04-05 DIAGNOSIS — F41.9 ANXIETY: Primary | ICD-10-CM

## 2021-04-05 DIAGNOSIS — J45.40 MODERATE PERSISTENT EXTRINSIC ASTHMA WITHOUT COMPLICATION: ICD-10-CM

## 2021-04-05 DIAGNOSIS — E78.00 HYPERCHOLESTEROLEMIA: ICD-10-CM

## 2021-04-05 DIAGNOSIS — I10 BENIGN HYPERTENSION: ICD-10-CM

## 2021-04-05 DIAGNOSIS — E78.1 HYPERTRIGLYCERIDEMIA: ICD-10-CM

## 2021-04-05 PROCEDURE — 3008F BODY MASS INDEX DOCD: CPT | Performed by: NURSE PRACTITIONER

## 2021-04-05 PROCEDURE — 1036F TOBACCO NON-USER: CPT | Performed by: NURSE PRACTITIONER

## 2021-04-05 PROCEDURE — 99214 OFFICE O/P EST MOD 30 MIN: CPT | Performed by: NURSE PRACTITIONER

## 2021-04-05 PROCEDURE — 3725F SCREEN DEPRESSION PERFORMED: CPT | Performed by: NURSE PRACTITIONER

## 2021-04-05 PROCEDURE — 3075F SYST BP GE 130 - 139MM HG: CPT | Performed by: NURSE PRACTITIONER

## 2021-04-05 PROCEDURE — 3079F DIAST BP 80-89 MM HG: CPT | Performed by: NURSE PRACTITIONER

## 2021-04-05 RX ORDER — LOSARTAN POTASSIUM 50 MG/1
50 TABLET ORAL DAILY
Qty: 90 TABLET | Refills: 1 | Status: SHIPPED | OUTPATIENT
Start: 2021-04-05 | End: 2021-10-07 | Stop reason: SDUPTHER

## 2021-04-05 RX ORDER — SIMVASTATIN 40 MG
40 TABLET ORAL
Qty: 90 TABLET | Refills: 1 | Status: SHIPPED | OUTPATIENT
Start: 2021-04-05 | End: 2021-10-07 | Stop reason: SDUPTHER

## 2021-04-05 RX ORDER — METOPROLOL SUCCINATE 50 MG/1
50 TABLET, EXTENDED RELEASE ORAL
Qty: 90 TABLET | Refills: 1 | Status: SHIPPED | OUTPATIENT
Start: 2021-04-05 | End: 2021-10-07 | Stop reason: SDUPTHER

## 2021-04-05 RX ORDER — ESCITALOPRAM OXALATE 10 MG/1
10 TABLET ORAL DAILY
Qty: 30 TABLET | Refills: 2 | Status: SHIPPED | OUTPATIENT
Start: 2021-04-05 | End: 2021-06-23 | Stop reason: SINTOL

## 2021-04-05 RX ORDER — ALBUTEROL SULFATE 90 UG/1
1-2 AEROSOL, METERED RESPIRATORY (INHALATION) AS NEEDED
Qty: 1 INHALER | Refills: 1 | Status: SHIPPED | OUTPATIENT
Start: 2021-04-05 | End: 2021-10-07 | Stop reason: SDUPTHER

## 2021-04-05 RX ORDER — HYDROCHLOROTHIAZIDE 12.5 MG/1
12.5 TABLET ORAL DAILY
Qty: 90 TABLET | Refills: 1 | Status: SHIPPED | OUTPATIENT
Start: 2021-04-05 | End: 2021-10-07 | Stop reason: SDUPTHER

## 2021-04-05 NOTE — PROGRESS NOTES
Assessment/Plan:       Problem List Items Addressed This Visit        Respiratory    Extrinsic asthma     Stable, refill albuterol         Relevant Medications    albuterol (Ventolin HFA) 90 mcg/act inhaler       Cardiovascular and Mediastinum    Benign hypertension     Will get updated labs  No med changes          Relevant Medications    metoprolol succinate (TOPROL-XL) 50 mg 24 hr tablet    losartan (Cozaar) 50 mg tablet    hydrochlorothiazide (HYDRODIURIL) 12 5 mg tablet    Other Relevant Orders    CBC and differential    Comprehensive metabolic panel       Other    Hypertriglyceridemia    Relevant Medications    simvastatin (ZOCOR) 40 mg tablet    Hypercholesterolemia     Will get updated labs         Relevant Medications    simvastatin (ZOCOR) 40 mg tablet    Other Relevant Orders    CBC and differential    Comprehensive metabolic panel    Lipid Panel with Direct LDL reflex    Anxiety - Primary     Start Lexapro 10mg daily  Counseled on lifestyle modifications, encouraged a counselor for psychotherapy  Discussed expected onset of benefit, potential SEs  Will check labs  F/u in 1 month or sooner as needed  Relevant Medications    escitalopram (LEXAPRO) 10 mg tablet    Other Relevant Orders    TSH, 3rd generation with Free T4 reflex            BMI Counseling: Body mass index is 36 03 kg/m²  The BMI is above normal  Nutrition recommendations include encouraging healthy choices of fruits and vegetables and moderation in carbohydrate intake  Subjective:      Patient ID: Dylan Galaviz is a 51 y o  female  Pt presents for follow up on HTN, HLD, and anxiety  Last seen 11/17/2020  Last labs 8/26/2020  She denies any new complaints today  Anxiety: She admits to a lot of anxiety regarding her kids and school  She is not currently on any medications for anxiety/depression  She admits she stopped exercising over the winter after walking a lot   She has plantar fasciitis, which impacted her ability to exercise, but she has restarting  Hypertension  This is a chronic problem  The current episode started more than 1 year ago  The problem has been gradually improving since onset  The problem is controlled  Pertinent negatives include no anxiety, blurred vision, chest pain, headaches, malaise/fatigue, neck pain, palpitations, peripheral edema, shortness of breath or sweats  There are no associated agents to hypertension  Past treatments include diuretics, angiotensin blockers and beta blockers  The current treatment provides moderate improvement  There are no compliance problems  Anxiety  Presents for initial visit  Onset was 6 to 12 months ago  Symptoms include decreased concentration, excessive worry, feeling of choking, irritability, nervous/anxious behavior, panic and restlessness  Patient reports no chest pain, compulsions, depressed mood, dizziness, dry mouth, insomnia, muscle tension, nausea, obsessions, palpitations, shortness of breath or suicidal ideas  Symptoms occur most days  The severity of symptoms is moderate, causing significant distress and interfering with daily activities  The symptoms are aggravated by family issues  The quality of sleep is good  Risk factors include a major life event  Her past medical history is significant for anxiety/panic attacks and asthma  There is no history of anemia, arrhythmia, bipolar disorder, CAD, CHF, chronic lung disease, depression, fibromyalgia, hyperthyroidism or suicide attempts  Past treatments include SSRIs and non-SSRI antidepressants (Lexapro and wellbutrin in the past, were helpful)  The following portions of the patient's history were reviewed and updated as appropriate: allergies, current medications, past family history, past medical history, past social history, past surgical history and problem list     Review of Systems   Constitutional: Positive for irritability  Negative for chills, fatigue, fever and malaise/fatigue  HENT: Positive for trouble swallowing (has been worked up)  Eyes: Negative for blurred vision  Respiratory: Negative for shortness of breath and wheezing  Cardiovascular: Negative for chest pain and palpitations  Gastrointestinal: Negative for nausea  Musculoskeletal: Negative for neck pain  Neurological: Negative for dizziness and headaches  Psychiatric/Behavioral: Positive for decreased concentration  Negative for suicidal ideas  The patient is nervous/anxious  The patient does not have insomnia            Past Medical History:   Diagnosis Date    Anemia     Anxiety     Asthma     controlled  seasonal    Gestational diabetes mellitus     Hyperlipidemia     Hypertension     Other chest pain 1/27/2020    PONV (postoperative nausea and vomiting)     Pregnancy     Resolved: 07/29/2015    Trigger finger     Resolved: 12/02/2016    Yeast infection     Resolved: 07/29/2015         Current Outpatient Medications:     albuterol (Ventolin HFA) 90 mcg/act inhaler, Inhale 1-2 puffs as needed for wheezing, Disp: 1 Inhaler, Rfl: 1    fluticasone (FLONASE) 50 mcg/act nasal spray, 1 spray into each nostril daily (Patient taking differently: 1 spray into each nostril as needed ), Disp: 1 Bottle, Rfl: 0    hydrochlorothiazide (HYDRODIURIL) 12 5 mg tablet, Take 1 tablet (12 5 mg total) by mouth daily, Disp: 90 tablet, Rfl: 1    loratadine (CLARITIN) 10 mg tablet, Take 10 mg by mouth daily at bedtime  , Disp: , Rfl:     losartan (Cozaar) 50 mg tablet, Take 1 tablet (50 mg total) by mouth daily, Disp: 90 tablet, Rfl: 1    metoprolol succinate (TOPROL-XL) 50 mg 24 hr tablet, Take 1 tablet (50 mg total) by mouth daily at bedtime, Disp: 90 tablet, Rfl: 1    Omega-3 Fatty Acids (FISH OIL) 1,000 mg, Take 2 capsules by mouth daily at bedtime  , Disp: , Rfl:     simvastatin (ZOCOR) 40 mg tablet, Take 1 tablet (40 mg total) by mouth daily at bedtime, Disp: 90 tablet, Rfl: 1    escitalopram (LEXAPRO) 10 mg tablet, Take 1 tablet (10 mg total) by mouth daily, Disp: 30 tablet, Rfl: 2    Allergies   Allergen Reactions    Hibiclens [Chlorhexidine Gluconate] Rash     Red with pimples    Pollen Extract Nasal Congestion       Social History   Past Surgical History:   Procedure Laterality Date    HERNIA REPAIR      INCISION TENDON SHEATH HAND      of a finger    VT HYSTEROSCOPY,W/ENDO BX N/A 10/14/2019    Procedure: DILATATION AND CURETTAGE (D&C) WITH HYSTEROSCOPY W/ NOVASURE;  Surgeon: Laina Ward DO;  [facility];  Service: Gynecology    VT HYSTEROSCOPY,W/ENDOMETRIAL ABLATION N/A 10/14/2019    Procedure: ABLATION ENDOMETRIAL Paolo Dye;  Surgeon: Laina Ward DO;  [facility];  Service: Gynecology    VT REPAIR UMBILICAL FAAN,3+Y/I,CSPBC N/A 8/23/2018    Procedure: UMBILICAL HERNIA REPAIR WITH MESH;  Surgeon: Abril Starr MD;  [facility];  Service: General    WISDOM TOOTH EXTRACTION       Family History   Problem Relation Age of Onset    Endometrial cancer Mother 55    Diabetes type II Mother     Hypertension Father     Heart attack Sister     Other Maternal Grandfather         Myocardial infarction arrhythmias    Stroke Paternal Grandfather         Syndrome    No Known Problems Daughter     No Known Problems Maternal Grandmother     No Known Problems Paternal Grandmother     No Known Problems Brother     No Known Problems Son     No Known Problems Paternal Aunt        Objective:  /82 (BP Location: Left arm, Patient Position: Sitting, Cuff Size: Standard)   Pulse 93   Temp 99 5 °F (37 5 °C) (Tympanic)   Resp 18   Ht 5' 3" (1 6 m)   Wt 92 3 kg (203 lb 6 4 oz)   SpO2 97%   BMI 36 03 kg/m²        Physical Exam  Constitutional:       General: She is not in acute distress  Appearance: She is well-developed  She is obese  HENT:      Head: Normocephalic and atraumatic        Right Ear: External ear normal       Left Ear: External ear normal  Mouth/Throat:      Pharynx: No oropharyngeal exudate  Eyes:      General: No scleral icterus  Pupils: Pupils are equal, round, and reactive to light  Neck:      Musculoskeletal: Normal range of motion and neck supple  Cardiovascular:      Rate and Rhythm: Normal rate and regular rhythm  Pulmonary:      Effort: Pulmonary effort is normal       Breath sounds: Normal breath sounds  Abdominal:      Palpations: Abdomen is soft  Musculoskeletal: Normal range of motion  Lymphadenopathy:      Cervical: No cervical adenopathy  Skin:     General: Skin is warm  Neurological:      Mental Status: She is alert and oriented to person, place, and time     Psychiatric:         Behavior: Behavior normal

## 2021-04-05 NOTE — PATIENT INSTRUCTIONS
Prescribed a new medication today for depression/anxiety  This medication can take up to 3-12 weeks to take full effect  Any adverse reactions, such as worsening symptoms, decreased libido, or suicidal ideation, warrant a call to the office urgently and a change in medication  These negative effects likely show up early in the medication course  Follow up in 1 month or sooner as needed

## 2021-04-05 NOTE — ASSESSMENT & PLAN NOTE
Start Lexapro 10mg daily  Counseled on lifestyle modifications, encouraged a counselor for psychotherapy  Discussed expected onset of benefit, potential SEs  Will check labs  F/u in 1 month or sooner as needed

## 2021-05-08 ENCOUNTER — APPOINTMENT (OUTPATIENT)
Dept: LAB | Facility: IMAGING CENTER | Age: 46
End: 2021-05-08
Payer: COMMERCIAL

## 2021-05-08 DIAGNOSIS — E78.00 HYPERCHOLESTEROLEMIA: ICD-10-CM

## 2021-05-08 DIAGNOSIS — F41.9 ANXIETY: ICD-10-CM

## 2021-05-08 DIAGNOSIS — I10 BENIGN HYPERTENSION: ICD-10-CM

## 2021-05-08 LAB
ALBUMIN SERPL BCP-MCNC: 4 G/DL (ref 3.5–5)
ALP SERPL-CCNC: 71 U/L (ref 46–116)
ALT SERPL W P-5'-P-CCNC: 34 U/L (ref 12–78)
ANION GAP SERPL CALCULATED.3IONS-SCNC: 8 MMOL/L (ref 4–13)
AST SERPL W P-5'-P-CCNC: 20 U/L (ref 5–45)
BASOPHILS # BLD AUTO: 0.04 THOUSANDS/ΜL (ref 0–0.1)
BASOPHILS NFR BLD AUTO: 1 % (ref 0–1)
BILIRUB SERPL-MCNC: 1.06 MG/DL (ref 0.2–1)
BUN SERPL-MCNC: 16 MG/DL (ref 5–25)
CALCIUM SERPL-MCNC: 9.5 MG/DL (ref 8.3–10.1)
CHLORIDE SERPL-SCNC: 107 MMOL/L (ref 100–108)
CHOLEST SERPL-MCNC: 178 MG/DL (ref 50–200)
CO2 SERPL-SCNC: 23 MMOL/L (ref 21–32)
CREAT SERPL-MCNC: 0.71 MG/DL (ref 0.6–1.3)
EOSINOPHIL # BLD AUTO: 0.32 THOUSAND/ΜL (ref 0–0.61)
EOSINOPHIL NFR BLD AUTO: 5 % (ref 0–6)
ERYTHROCYTE [DISTWIDTH] IN BLOOD BY AUTOMATED COUNT: 14.5 % (ref 11.6–15.1)
GFR SERPL CREATININE-BSD FRML MDRD: 102 ML/MIN/1.73SQ M
GLUCOSE P FAST SERPL-MCNC: 85 MG/DL (ref 65–99)
HCT VFR BLD AUTO: 39.9 % (ref 34.8–46.1)
HDLC SERPL-MCNC: 61 MG/DL
HGB BLD-MCNC: 12.5 G/DL (ref 11.5–15.4)
IMM GRANULOCYTES # BLD AUTO: 0.03 THOUSAND/UL (ref 0–0.2)
IMM GRANULOCYTES NFR BLD AUTO: 1 % (ref 0–2)
LDLC SERPL CALC-MCNC: 83 MG/DL (ref 0–100)
LYMPHOCYTES # BLD AUTO: 1.78 THOUSANDS/ΜL (ref 0.6–4.47)
LYMPHOCYTES NFR BLD AUTO: 28 % (ref 14–44)
MCH RBC QN AUTO: 27.6 PG (ref 26.8–34.3)
MCHC RBC AUTO-ENTMCNC: 31.3 G/DL (ref 31.4–37.4)
MCV RBC AUTO: 88 FL (ref 82–98)
MONOCYTES # BLD AUTO: 0.47 THOUSAND/ΜL (ref 0.17–1.22)
MONOCYTES NFR BLD AUTO: 7 % (ref 4–12)
NEUTROPHILS # BLD AUTO: 3.8 THOUSANDS/ΜL (ref 1.85–7.62)
NEUTS SEG NFR BLD AUTO: 58 % (ref 43–75)
NRBC BLD AUTO-RTO: 0 /100 WBCS
PLATELET # BLD AUTO: 258 THOUSANDS/UL (ref 149–390)
PMV BLD AUTO: 11.5 FL (ref 8.9–12.7)
POTASSIUM SERPL-SCNC: 4 MMOL/L (ref 3.5–5.3)
PROT SERPL-MCNC: 7.9 G/DL (ref 6.4–8.2)
RBC # BLD AUTO: 4.53 MILLION/UL (ref 3.81–5.12)
SODIUM SERPL-SCNC: 138 MMOL/L (ref 136–145)
TRIGL SERPL-MCNC: 170 MG/DL
TSH SERPL DL<=0.05 MIU/L-ACNC: 1.39 UIU/ML (ref 0.36–3.74)
WBC # BLD AUTO: 6.44 THOUSAND/UL (ref 4.31–10.16)

## 2021-05-08 PROCEDURE — 80061 LIPID PANEL: CPT

## 2021-05-08 PROCEDURE — 84443 ASSAY THYROID STIM HORMONE: CPT

## 2021-05-08 PROCEDURE — 85025 COMPLETE CBC W/AUTO DIFF WBC: CPT

## 2021-05-08 PROCEDURE — 36415 COLL VENOUS BLD VENIPUNCTURE: CPT

## 2021-05-08 PROCEDURE — 80053 COMPREHEN METABOLIC PANEL: CPT

## 2021-05-21 ENCOUNTER — TELEPHONE (OUTPATIENT)
Dept: OBGYN CLINIC | Facility: CLINIC | Age: 46
End: 2021-05-21

## 2021-05-21 NOTE — TELEPHONE ENCOUNTER
----- Message from Rocio Cortez sent at 5/20/2021  9:57 PM EDT -----  Regarding: Visit Follow-Up Question  Contact: [phone]  Hello,     I'm writing regarding my daughter, Billie Jensen (3/9/04)  She is also a patient of Dr Gordy Graf  She was in for a visit regarding her NuvaRing causing discharge  Dr Gordy Graf ran some tests and found bacteria as well as yeast  Zofiabahman Mujica took meds and things cleared up for a bit, but she believes she has a yeast infection again  She has some discharge and itching again  She seems uncomfortable  Is it possible to call in another prescription for the yeast infection?      Thank you  Maia Perla

## 2021-05-21 NOTE — TELEPHONE ENCOUNTER
Spoke to Nayla, informed dr Griselda Stone is not in the office today  She will try monistat and call the office on Monday if symptoms do not improve, or sooner if symptoms worsen

## 2021-06-16 ENCOUNTER — OFFICE VISIT (OUTPATIENT)
Dept: URGENT CARE | Age: 46
End: 2021-06-16
Payer: COMMERCIAL

## 2021-06-16 VITALS
BODY MASS INDEX: 35.17 KG/M2 | HEART RATE: 76 BPM | WEIGHT: 206 LBS | HEIGHT: 64 IN | TEMPERATURE: 98.6 F | OXYGEN SATURATION: 97 %

## 2021-06-16 DIAGNOSIS — J02.9 SORE THROAT: Primary | ICD-10-CM

## 2021-06-16 DIAGNOSIS — Z11.59 SPECIAL SCREENING EXAMINATION FOR VIRAL DISEASE: ICD-10-CM

## 2021-06-16 LAB — S PYO AG THROAT QL: NEGATIVE

## 2021-06-16 PROCEDURE — 87880 STREP A ASSAY W/OPTIC: CPT | Performed by: PHYSICIAN ASSISTANT

## 2021-06-16 PROCEDURE — U0005 INFEC AGEN DETEC AMPLI PROBE: HCPCS | Performed by: PHYSICIAN ASSISTANT

## 2021-06-16 PROCEDURE — U0003 INFECTIOUS AGENT DETECTION BY NUCLEIC ACID (DNA OR RNA); SEVERE ACUTE RESPIRATORY SYNDROME CORONAVIRUS 2 (SARS-COV-2) (CORONAVIRUS DISEASE [COVID-19]), AMPLIFIED PROBE TECHNIQUE, MAKING USE OF HIGH THROUGHPUT TECHNOLOGIES AS DESCRIBED BY CMS-2020-01-R: HCPCS | Performed by: PHYSICIAN ASSISTANT

## 2021-06-16 PROCEDURE — 87070 CULTURE OTHR SPECIMN AEROBIC: CPT | Performed by: PHYSICIAN ASSISTANT

## 2021-06-16 PROCEDURE — 99213 OFFICE O/P EST LOW 20 MIN: CPT | Performed by: PHYSICIAN ASSISTANT

## 2021-06-16 RX ORDER — MOMETASONE FUROATE 50 UG/1
2 SPRAY, METERED NASAL DAILY PRN
COMMUNITY
End: 2022-03-04 | Stop reason: ALTCHOICE

## 2021-06-16 NOTE — PROGRESS NOTES
[facility]        NAME: Mari Sanchez is a 51 y o  female  : 1975    MRN: [id number]  DATE: 2021  TIME: 11:38 AM    Assessment and Plan   Sore throat [J02 9]  1  Sore throat  POCT rapid strepA    Throat culture   2  Special screening examination for viral disease  Novel Coronavirus (Covid-19),PCR [facility] - Office Collection         Patient Instructions     Follow up with PCP in 3-5 days  Proceed to  ER if symptoms worsen  Chief Complaint     Chief Complaint   Patient presents with    COVID-19     Pt reports sore throat x two days  Fully vaccinated against Covid-19  Denies exposure to Covid-19  History of Present Illness        25-year-old female presents for sore throat for 2 days  Patient states her throat became sore yesterday  She denies any other symptoms  Patient denies exposure to COVID  She is fully vaccinated against COVID  Review of Systems   Review of Systems   Constitutional: Negative  HENT: Positive for sore throat  Negative for congestion, ear pain, postnasal drip, rhinorrhea, sinus pressure, sinus pain, sneezing, trouble swallowing and voice change  Eyes: Negative  Respiratory: Negative  Gastrointestinal: Negative  Skin: Negative  Neurological: Negative for headaches           Current Medications       Current Outpatient Medications:     albuterol (Ventolin HFA) 90 mcg/act inhaler, Inhale 1-2 puffs as needed for wheezing, Disp: 1 Inhaler, Rfl: 1    escitalopram (LEXAPRO) 10 mg tablet, Take 1 tablet (10 mg total) by mouth daily, Disp: 30 tablet, Rfl: 2    hydrochlorothiazide (HYDRODIURIL) 12 5 mg tablet, Take 1 tablet (12 5 mg total) by mouth daily, Disp: 90 tablet, Rfl: 1    loratadine (CLARITIN) 10 mg tablet, Take 10 mg by mouth daily at bedtime  , Disp: , Rfl:     losartan (Cozaar) 50 mg tablet, Take 1 tablet (50 mg total) by mouth daily, Disp: 90 tablet, Rfl: 1    metoprolol succinate (TOPROL-XL) 50 mg 24 hr tablet, Take 1 tablet (50 mg total) by mouth daily at bedtime, Disp: 90 tablet, Rfl: 1    mometasone (NASONEX) 50 mcg/act nasal spray, 2 sprays into each nostril daily, Disp: , Rfl:     Omega-3 Fatty Acids (FISH OIL) 1,000 mg, Take 2 capsules by mouth daily at bedtime  , Disp: , Rfl:     simvastatin (ZOCOR) 40 mg tablet, Take 1 tablet (40 mg total) by mouth daily at bedtime, Disp: 90 tablet, Rfl: 1    fluticasone (FLONASE) 50 mcg/act nasal spray, 1 spray into each nostril daily (Patient taking differently: 1 spray into each nostril as needed ), Disp: 1 Bottle, Rfl: 0    Current Allergies     Allergies as of 06/16/2021 - Reviewed 06/16/2021   Allergen Reaction Noted    Hibiclens [chlorhexidine gluconate] Rash 10/14/2019    Pollen extract Nasal Congestion 09/02/2015            The following portions of the patient's history were reviewed and updated as appropriate: allergies, current medications, past family history, past medical history, past social history, past surgical history and problem list     Objective   Pulse 76   Temp 98 6 °F (37 °C)   Ht 5' 4" (1 626 m)   Wt 93 4 kg (206 lb)   LMP 06/09/2021   SpO2 97%   BMI 35 36 kg/m²        Physical Exam     Physical Exam  Vitals and nursing note reviewed  Constitutional:       General: She is not in acute distress  Appearance: She is not ill-appearing  HENT:      Head: Normocephalic and atraumatic  Right Ear: Tympanic membrane, ear canal and external ear normal       Left Ear: Tympanic membrane, ear canal and external ear normal       Nose: Nose normal       Mouth/Throat:      Mouth: Mucous membranes are moist       Pharynx: Oropharyngeal exudate and posterior oropharyngeal erythema present  Eyes:      Conjunctiva/sclera: Conjunctivae normal    Cardiovascular:      Rate and Rhythm: Normal rate and regular rhythm  Pulmonary:      Effort: Pulmonary effort is normal       Breath sounds: Normal breath sounds     Neurological:      Mental Status: She is alert

## 2021-06-16 NOTE — PATIENT INSTRUCTIONS
COVID testing was obtained today  Isolate until test results are available  You may view your results on MyChart  If you do not have a MyChart, you can create one with the activation code you were given today  If results are negative and feeling well, may resume normal activities  If results are positive, will need to isolate for 10-14 days from onset of symptoms  Any worsening of symptoms especially any difficulty breathing go to the emergency room  Follow-up with your family doctor for further treatment if needed  You may take vitamin-D3 2000 IU daily                          vitamin-C 1 gram every 12 hours                         Multivitamin daily    rapid strep test was negative  Culture was ordered  If antibiotic is necessary one will be called in for you

## 2021-06-17 LAB — SARS-COV-2 RNA RESP QL NAA+PROBE: NEGATIVE

## 2021-06-18 LAB — BACTERIA THROAT CULT: NORMAL

## 2021-06-23 ENCOUNTER — OFFICE VISIT (OUTPATIENT)
Dept: INTERNAL MEDICINE CLINIC | Facility: CLINIC | Age: 46
End: 2021-06-23
Payer: COMMERCIAL

## 2021-06-23 VITALS
HEIGHT: 64 IN | TEMPERATURE: 98.3 F | BODY MASS INDEX: 35.41 KG/M2 | HEART RATE: 72 BPM | WEIGHT: 207.4 LBS | SYSTOLIC BLOOD PRESSURE: 118 MMHG | OXYGEN SATURATION: 97 % | DIASTOLIC BLOOD PRESSURE: 72 MMHG

## 2021-06-23 DIAGNOSIS — R19.7 DIARRHEA, UNSPECIFIED TYPE: ICD-10-CM

## 2021-06-23 DIAGNOSIS — Z11.59 NEED FOR HEPATITIS C SCREENING TEST: Primary | ICD-10-CM

## 2021-06-23 DIAGNOSIS — E78.00 HYPERCHOLESTEROLEMIA: ICD-10-CM

## 2021-06-23 DIAGNOSIS — I10 BENIGN HYPERTENSION: ICD-10-CM

## 2021-06-23 DIAGNOSIS — R17 ELEVATED BILIRUBIN: ICD-10-CM

## 2021-06-23 DIAGNOSIS — F41.9 ANXIETY: ICD-10-CM

## 2021-06-23 PROCEDURE — 99214 OFFICE O/P EST MOD 30 MIN: CPT | Performed by: NURSE PRACTITIONER

## 2021-06-23 PROCEDURE — 3078F DIAST BP <80 MM HG: CPT | Performed by: NURSE PRACTITIONER

## 2021-06-23 PROCEDURE — 1036F TOBACCO NON-USER: CPT | Performed by: NURSE PRACTITIONER

## 2021-06-23 PROCEDURE — 3008F BODY MASS INDEX DOCD: CPT | Performed by: NURSE PRACTITIONER

## 2021-06-23 PROCEDURE — 3725F SCREEN DEPRESSION PERFORMED: CPT | Performed by: NURSE PRACTITIONER

## 2021-06-23 PROCEDURE — 3074F SYST BP LT 130 MM HG: CPT | Performed by: NURSE PRACTITIONER

## 2021-06-23 RX ORDER — ESCITALOPRAM OXALATE 10 MG/1
10 TABLET ORAL DAILY
Qty: 90 TABLET | Refills: 1 | Status: CANCELLED | OUTPATIENT
Start: 2021-06-23

## 2021-06-23 RX ORDER — CITALOPRAM 20 MG/1
20 TABLET ORAL DAILY
Qty: 30 TABLET | Refills: 0 | Status: SHIPPED | OUTPATIENT
Start: 2021-06-23 | End: 2021-08-16 | Stop reason: SDUPTHER

## 2021-06-23 RX ORDER — CITALOPRAM 20 MG/1
20 TABLET ORAL DAILY
Qty: 30 TABLET | Refills: 2 | Status: SHIPPED | OUTPATIENT
Start: 2021-06-23 | End: 2021-08-16 | Stop reason: ALTCHOICE

## 2021-06-23 NOTE — ASSESSMENT & PLAN NOTE
Will change lexapro to celexa  Monitor symptoms  If not improved in 1 week, get labs done, keep symptom log  Return for reeval

## 2021-06-23 NOTE — ASSESSMENT & PLAN NOTE
Will d/c lexapro 10mg d/t diarrhea in patietn  Will start celexa 20mg and see if this has better effect     F/u in 1 month or sooner as needed/

## 2021-06-23 NOTE — PROGRESS NOTES
Assessment/Plan:       Problem List Items Addressed This Visit        Cardiovascular and Mediastinum    Benign hypertension     BP stable, no med changes  Other    Hypercholesterolemia     Stable on simvastatin         Elevated bilirubin     Recheck CMP with new diarrhea         Diarrhea     Will change lexapro to celexa  Monitor symptoms  If not improved in 1 week, get labs done, keep symptom log  Return for reeval          Relevant Orders    Comprehensive metabolic panel    CBC and differential    Anxiety     Will d/c lexapro 10mg d/t diarrhea in patietn  Will start celexa 20mg and see if this has better effect  F/u in 1 month or sooner as needed/          Relevant Medications    citalopram (CeleXA) 20 mg tablet    citalopram (CeleXA) 20 mg tablet      Other Visit Diagnoses     Need for hepatitis C screening test    -  Primary    Relevant Orders    Hepatitis C antibody                 Subjective:      Patient ID: Matt Pichardo is a 51 y o  female  Pt presents for follow up on HTN and anxiety  She has labs on 5/8  Results reviewed  She reports that her anxiety symptoms seem to be improving  She is on 10mg lexapro daily  She is happy with this dosing and feels that it is controlling her symptoms  She reports that every time she eats, she is running to have a BM- diarrhea  She does not think that any particular food triggers it  This is every time she eats  She does think that it correlates to when she started the lexapro  She denies abdominal pain, dizziness, headaches  Hypertension  This is a chronic problem  The current episode started more than 1 year ago  The problem has been waxing and waning since onset  The problem is controlled  Pertinent negatives include no blurred vision, chest pain, headaches, malaise/fatigue, palpitations, peripheral edema or shortness of breath  There are no associated agents to hypertension   Past treatments include angiotensin blockers, diuretics and beta blockers  The current treatment provides moderate improvement  There are no compliance problems  The following portions of the patient's history were reviewed and updated as appropriate: allergies, current medications, past family history, past medical history, past social history, past surgical history and problem list     Review of Systems   Constitutional: Negative for chills, fatigue, fever and malaise/fatigue  HENT: Negative for trouble swallowing  Eyes: Negative for blurred vision  Respiratory: Negative for shortness of breath  Cardiovascular: Negative for chest pain and palpitations  Gastrointestinal: Positive for diarrhea  Negative for abdominal pain, constipation, nausea and vomiting  Genitourinary: Negative for difficulty urinating  Musculoskeletal: Negative for gait problem  Skin: Negative for rash  Neurological: Negative for dizziness, light-headedness and headaches  Psychiatric/Behavioral: Negative for dysphoric mood and sleep disturbance  The patient is not nervous/anxious            Past Medical History:   Diagnosis Date    Anemia     Anxiety     Asthma     controlled  seasonal    Gestational diabetes mellitus     Hyperlipidemia     Hypertension     Other chest pain 1/27/2020    PONV (postoperative nausea and vomiting)     Pregnancy     Resolved: 07/29/2015    Trigger finger     Resolved: 12/02/2016    Yeast infection     Resolved: 07/29/2015         Current Outpatient Medications:     albuterol (Ventolin HFA) 90 mcg/act inhaler, Inhale 1-2 puffs as needed for wheezing, Disp: 1 Inhaler, Rfl: 1    fluticasone (FLONASE) 50 mcg/act nasal spray, 1 spray into each nostril daily (Patient taking differently: 1 spray into each nostril as needed ), Disp: 1 Bottle, Rfl: 0    hydrochlorothiazide (HYDRODIURIL) 12 5 mg tablet, Take 1 tablet (12 5 mg total) by mouth daily, Disp: 90 tablet, Rfl: 1    loratadine (CLARITIN) 10 mg tablet, Take 10 mg by mouth daily at bedtime  , Disp: , Rfl:     losartan (Cozaar) 50 mg tablet, Take 1 tablet (50 mg total) by mouth daily, Disp: 90 tablet, Rfl: 1    metoprolol succinate (TOPROL-XL) 50 mg 24 hr tablet, Take 1 tablet (50 mg total) by mouth daily at bedtime, Disp: 90 tablet, Rfl: 1    Omega-3 Fatty Acids (FISH OIL) 1,000 mg, Take 2 capsules by mouth daily at bedtime  , Disp: , Rfl:     simvastatin (ZOCOR) 40 mg tablet, Take 1 tablet (40 mg total) by mouth daily at bedtime, Disp: 90 tablet, Rfl: 1    citalopram (CeleXA) 20 mg tablet, Take 1 tablet (20 mg total) by mouth daily, Disp: 30 tablet, Rfl: 2    citalopram (CeleXA) 20 mg tablet, Take 1 tablet (20 mg total) by mouth daily, Disp: 30 tablet, Rfl: 0    mometasone (NASONEX) 50 mcg/act nasal spray, 2 sprays into each nostril daily, Disp: , Rfl:     Allergies   Allergen Reactions    Hibiclens [Chlorhexidine Gluconate] Rash     Red with pimples    Pollen Extract Nasal Congestion       Social History   Past Surgical History:   Procedure Laterality Date    HERNIA REPAIR      INCISION TENDON SHEATH HAND      of a finger    MN HYSTEROSCOPY,W/ENDO BX N/A 10/14/2019    Procedure: DILATATION AND CURETTAGE (D&C) WITH HYSTEROSCOPY W/ NOVASURE;  Surgeon: Crystal Pagan DO;  [facility];  Service: Gynecology    MN HYSTEROSCOPY,W/ENDOMETRIAL ABLATION N/A 10/14/2019    Procedure: ABLATION ENDOMETRIAL Mariela Hyatt;  Surgeon: Crystal Pagan DO;  [facility];  Service: Gynecology    MN REPAIR UMBILICAL OMMJ,8+K/Y,VBGEA N/A 8/23/2018    Procedure: UMBILICAL HERNIA REPAIR WITH MESH;  Surgeon: Adriana Ferrer MD;  [facility];  Service: General    WISDOM TOOTH EXTRACTION       Family History   Problem Relation Age of Onset    Endometrial cancer Mother 55    Diabetes type II Mother     Hypertension Father     Heart attack Sister     Other Maternal Grandfather         Myocardial infarction arrhythmias    Stroke Paternal Grandfather         Syndrome    No Known Problems Daughter     No Known Problems Maternal Grandmother     No Known Problems Paternal Grandmother     No Known Problems Brother     No Known Problems Son     No Known Problems Paternal Aunt        Objective:  /72   Pulse 72   Temp 98 3 °F (36 8 °C) (Tympanic)   Ht 5' 4 37" (1 635 m)   Wt 94 1 kg (207 lb 6 4 oz)   LMP 06/09/2021   SpO2 97% Comment: Room Air  BMI 35 19 kg/m²        Physical Exam  Constitutional:       General: She is not in acute distress  Appearance: She is well-developed  HENT:      Head: Normocephalic and atraumatic  Right Ear: External ear normal       Left Ear: External ear normal       Mouth/Throat:      Pharynx: No oropharyngeal exudate  Eyes:      General: No scleral icterus  Pupils: Pupils are equal, round, and reactive to light  Cardiovascular:      Rate and Rhythm: Normal rate and regular rhythm  Pulmonary:      Effort: Pulmonary effort is normal       Breath sounds: Normal breath sounds  Abdominal:      Palpations: Abdomen is soft  Musculoskeletal:         General: Normal range of motion  Cervical back: Normal range of motion and neck supple  Lymphadenopathy:      Cervical: No cervical adenopathy  Skin:     General: Skin is warm  Neurological:      Mental Status: She is alert and oriented to person, place, and time     Psychiatric:         Behavior: Behavior normal

## 2021-06-30 DIAGNOSIS — F41.9 ANXIETY: ICD-10-CM

## 2021-07-21 ENCOUNTER — APPOINTMENT (OUTPATIENT)
Dept: LAB | Facility: IMAGING CENTER | Age: 46
End: 2021-07-21
Payer: COMMERCIAL

## 2021-07-21 DIAGNOSIS — Z11.59 NEED FOR HEPATITIS C SCREENING TEST: ICD-10-CM

## 2021-07-21 DIAGNOSIS — R19.7 DIARRHEA, UNSPECIFIED TYPE: ICD-10-CM

## 2021-07-21 LAB
ALBUMIN SERPL BCP-MCNC: 3.9 G/DL (ref 3.5–5)
ALP SERPL-CCNC: 71 U/L (ref 46–116)
ALT SERPL W P-5'-P-CCNC: 44 U/L (ref 12–78)
ANION GAP SERPL CALCULATED.3IONS-SCNC: 6 MMOL/L (ref 4–13)
AST SERPL W P-5'-P-CCNC: 30 U/L (ref 5–45)
BASOPHILS # BLD AUTO: 0.04 THOUSANDS/ΜL (ref 0–0.1)
BASOPHILS NFR BLD AUTO: 1 % (ref 0–1)
BILIRUB SERPL-MCNC: 0.99 MG/DL (ref 0.2–1)
BUN SERPL-MCNC: 11 MG/DL (ref 5–25)
CALCIUM SERPL-MCNC: 9.4 MG/DL (ref 8.3–10.1)
CHLORIDE SERPL-SCNC: 103 MMOL/L (ref 100–108)
CO2 SERPL-SCNC: 24 MMOL/L (ref 21–32)
CREAT SERPL-MCNC: 0.69 MG/DL (ref 0.6–1.3)
EOSINOPHIL # BLD AUTO: 0.21 THOUSAND/ΜL (ref 0–0.61)
EOSINOPHIL NFR BLD AUTO: 4 % (ref 0–6)
ERYTHROCYTE [DISTWIDTH] IN BLOOD BY AUTOMATED COUNT: 14.8 % (ref 11.6–15.1)
GFR SERPL CREATININE-BSD FRML MDRD: 104 ML/MIN/1.73SQ M
GLUCOSE P FAST SERPL-MCNC: 102 MG/DL (ref 65–99)
HCT VFR BLD AUTO: 42 % (ref 34.8–46.1)
HCV AB SER QL: NORMAL
HGB BLD-MCNC: 13.2 G/DL (ref 11.5–15.4)
IMM GRANULOCYTES # BLD AUTO: 0.02 THOUSAND/UL (ref 0–0.2)
IMM GRANULOCYTES NFR BLD AUTO: 0 % (ref 0–2)
LYMPHOCYTES # BLD AUTO: 1.56 THOUSANDS/ΜL (ref 0.6–4.47)
LYMPHOCYTES NFR BLD AUTO: 27 % (ref 14–44)
MCH RBC QN AUTO: 27.6 PG (ref 26.8–34.3)
MCHC RBC AUTO-ENTMCNC: 31.4 G/DL (ref 31.4–37.4)
MCV RBC AUTO: 88 FL (ref 82–98)
MONOCYTES # BLD AUTO: 0.47 THOUSAND/ΜL (ref 0.17–1.22)
MONOCYTES NFR BLD AUTO: 8 % (ref 4–12)
NEUTROPHILS # BLD AUTO: 3.41 THOUSANDS/ΜL (ref 1.85–7.62)
NEUTS SEG NFR BLD AUTO: 60 % (ref 43–75)
NRBC BLD AUTO-RTO: 0 /100 WBCS
PLATELET # BLD AUTO: 282 THOUSANDS/UL (ref 149–390)
PMV BLD AUTO: 11.4 FL (ref 8.9–12.7)
POTASSIUM SERPL-SCNC: 4 MMOL/L (ref 3.5–5.3)
PROT SERPL-MCNC: 7.8 G/DL (ref 6.4–8.2)
RBC # BLD AUTO: 4.78 MILLION/UL (ref 3.81–5.12)
SODIUM SERPL-SCNC: 133 MMOL/L (ref 136–145)
WBC # BLD AUTO: 5.71 THOUSAND/UL (ref 4.31–10.16)

## 2021-07-21 PROCEDURE — 80053 COMPREHEN METABOLIC PANEL: CPT

## 2021-07-21 PROCEDURE — 36415 COLL VENOUS BLD VENIPUNCTURE: CPT

## 2021-07-21 PROCEDURE — 86803 HEPATITIS C AB TEST: CPT

## 2021-07-21 PROCEDURE — 85025 COMPLETE CBC W/AUTO DIFF WBC: CPT

## 2021-08-16 ENCOUNTER — OFFICE VISIT (OUTPATIENT)
Dept: INTERNAL MEDICINE CLINIC | Facility: CLINIC | Age: 46
End: 2021-08-16
Payer: COMMERCIAL

## 2021-08-16 VITALS
OXYGEN SATURATION: 97 % | WEIGHT: 205 LBS | BODY MASS INDEX: 34.16 KG/M2 | TEMPERATURE: 97.8 F | HEIGHT: 65 IN | SYSTOLIC BLOOD PRESSURE: 128 MMHG | DIASTOLIC BLOOD PRESSURE: 60 MMHG | HEART RATE: 74 BPM

## 2021-08-16 DIAGNOSIS — F41.1 GENERALIZED ANXIETY DISORDER: ICD-10-CM

## 2021-08-16 DIAGNOSIS — R17 ELEVATED BILIRUBIN: ICD-10-CM

## 2021-08-16 DIAGNOSIS — E78.00 HYPERCHOLESTEROLEMIA: ICD-10-CM

## 2021-08-16 DIAGNOSIS — E87.1 HYPONATREMIA: ICD-10-CM

## 2021-08-16 DIAGNOSIS — K59.1 FUNCTIONAL DIARRHEA: Primary | ICD-10-CM

## 2021-08-16 PROCEDURE — 99214 OFFICE O/P EST MOD 30 MIN: CPT | Performed by: NURSE PRACTITIONER

## 2021-08-16 RX ORDER — ESCITALOPRAM OXALATE 10 MG/1
10 TABLET ORAL DAILY
Qty: 90 TABLET | Refills: 1 | Status: SHIPPED | OUTPATIENT
Start: 2021-08-16 | End: 2022-02-15 | Stop reason: SDUPTHER

## 2021-08-16 NOTE — PROGRESS NOTES
Assessment/Plan:       Problem List Items Addressed This Visit        Other    Hyponatremia     Sodium 133, most likely due to losses with diarrhea  Will repeat BMP at this time         Relevant Orders    Basic metabolic panel    Hypercholesterolemia     Patient is on simvastatin, will recheck lipid panel         Relevant Orders    Lipid Panel with Direct LDL reflex    Generalized anxiety disorder       Will restart patient on Lexapro after trialing Celexa due to diarrhea  Lexapro was not the causative agent in therefore will resume Lexapro as patient's preference         Relevant Medications    escitalopram (LEXAPRO) 10 mg tablet    Elevated bilirubin     LFTs within normal limits at this time         Diarrhea - Primary     Will place patient back on Lexapro, as this does not appear to be the causative agent    Will refer patient to gastroenterology for further assessment         Relevant Orders    Ambulatory referral to Gastroenterology                 M*Modal software was used to dictate this note  It may contain errors with dictating incorrect words or incorrect spelling  Please contact the provider directly with any questions  Subjective:      Patient ID: Kurtis Blanco is a 51 y o  female  Patient presents for follow-up on blood pressure and to review labs  Patient reports that she has continued to have loose stools  She reports her symptoms have persisted despite changing from Lexapro to Celexa  She is interested in restarting the Lexapro discontinuing the Celexa at due to feeling like this provided her better benefit  Regarding her bowel movements, she reports that she has loose stools immediately after eating  She denies any irritating foods, feels that it is with a variety of food  She reports that she gets abdominal cramping, which is alleviated by having a bowel movement    When asked about blood in her stool, the patient reports that she does not have it often, however when she does, it is after a long day of loose stools  Hypertension  This is a chronic problem  The current episode started more than 1 year ago  The problem has been waxing and waning since onset  The problem is uncontrolled  Associated symptoms include blurred vision (At times, in the morning, has not been wearing glasses) and malaise/fatigue  Pertinent negatives include no anxiety, chest pain, headaches, palpitations, peripheral edema or shortness of breath  There are no associated agents to hypertension  Risk factors for coronary artery disease include dyslipidemia and obesity  Past treatments include diuretics, angiotensin blockers and beta blockers  The current treatment provides mild improvement  There are no compliance problems  The following portions of the patient's history were reviewed and updated as appropriate: allergies, current medications, past family history, past medical history, past social history, past surgical history and problem list     Review of Systems   Constitutional: Positive for fatigue and malaise/fatigue  Negative for chills and fever  HENT: Negative for trouble swallowing  Eyes: Positive for blurred vision (At times, in the morning, has not been wearing glasses)  Respiratory: Negative for shortness of breath and wheezing  Cardiovascular: Negative for chest pain and palpitations  Gastrointestinal: Positive for abdominal pain, blood in stool and diarrhea  Negative for nausea and vomiting  Genitourinary: Negative for difficulty urinating  Musculoskeletal: Negative for gait problem  Skin: Negative for rash  Neurological: Negative for dizziness, light-headedness and headaches  Psychiatric/Behavioral: Negative for sleep disturbance  The patient is nervous/anxious            Past Medical History:   Diagnosis Date    Anemia     Anxiety     Asthma     controlled  seasonal    Gestational diabetes mellitus     Hyperlipidemia     Hypertension     Other chest pain 1/27/2020    PONV (postoperative nausea and vomiting)     Pregnancy     Resolved: 07/29/2015    Trigger finger     Resolved: 12/02/2016    Yeast infection     Resolved: 07/29/2015         Current Outpatient Medications:     albuterol (Ventolin HFA) 90 mcg/act inhaler, Inhale 1-2 puffs as needed for wheezing, Disp: 1 Inhaler, Rfl: 1    fluticasone (FLONASE) 50 mcg/act nasal spray, 1 spray into each nostril daily (Patient taking differently: 1 spray into each nostril as needed ), Disp: 1 Bottle, Rfl: 0    hydrochlorothiazide (HYDRODIURIL) 12 5 mg tablet, Take 1 tablet (12 5 mg total) by mouth daily, Disp: 90 tablet, Rfl: 1    loratadine (CLARITIN) 10 mg tablet, Take 10 mg by mouth daily at bedtime  , Disp: , Rfl:     losartan (Cozaar) 50 mg tablet, Take 1 tablet (50 mg total) by mouth daily, Disp: 90 tablet, Rfl: 1    metoprolol succinate (TOPROL-XL) 50 mg 24 hr tablet, Take 1 tablet (50 mg total) by mouth daily at bedtime, Disp: 90 tablet, Rfl: 1    mometasone (NASONEX) 50 mcg/act nasal spray, 2 sprays into each nostril daily, Disp: , Rfl:     Omega-3 Fatty Acids (FISH OIL) 1,000 mg, Take 2 capsules by mouth daily at bedtime  , Disp: , Rfl:     simvastatin (ZOCOR) 40 mg tablet, Take 1 tablet (40 mg total) by mouth daily at bedtime, Disp: 90 tablet, Rfl: 1    escitalopram (LEXAPRO) 10 mg tablet, Take 1 tablet (10 mg total) by mouth daily, Disp: 90 tablet, Rfl: 1    Allergies   Allergen Reactions    Hibiclens [Chlorhexidine Gluconate] Rash     Red with pimples    Pollen Extract Nasal Congestion       Social History   Past Surgical History:   Procedure Laterality Date    HERNIA REPAIR      INCISION TENDON SHEATH HAND      of a finger    MD HYSTEROSCOPY,W/ENDO BX N/A 10/14/2019    Procedure: DILATATION AND CURETTAGE (D&C) WITH HYSTEROSCOPY W/ Renee Mcintyre;  Surgeon: Chichi Daley DO;  Location: [facility];  Service: Gynecology    MD HYSTEROSCOPY,W/ENDOMETRIAL ABLATION N/A 10/14/2019    Procedure: ABLATION ENDOMETRIAL Di Hock;  Surgeon: Anselmo Giron DO;  Location: [facility];  Service: Gynecology    OK REPAIR UMBILICAL CKTN,4+O/M,LCXFE N/A 8/23/2018    Procedure: UMBILICAL HERNIA REPAIR WITH MESH;  Surgeon: Rosita Ramos MD;  Location: [facility];  Service: General    WISDOM TOOTH EXTRACTION       Family History   Problem Relation Age of Onset    Endometrial cancer Mother 55    Diabetes type II Mother     Hypertension Father     Heart attack Sister     Other Maternal Grandfather         Myocardial infarction arrhythmias    Stroke Paternal Grandfather         Syndrome    No Known Problems Daughter     No Known Problems Maternal Grandmother     No Known Problems Paternal Grandmother     No Known Problems Brother     No Known Problems Son     No Known Problems Paternal Aunt        Objective:  /60   Pulse 74   Temp 97 8 °F (36 6 °C) (Tympanic)   Ht 5' 4 5" (1 638 m)   Wt 93 kg (205 lb)   SpO2 97% Comment: Room Air  BMI 34 64 kg/m²        Physical Exam  Constitutional:       General: She is not in acute distress  Appearance: She is well-developed  HENT:      Head: Normocephalic and atraumatic  Right Ear: External ear normal       Left Ear: External ear normal       Mouth/Throat:      Pharynx: No oropharyngeal exudate  Eyes:      General: No scleral icterus  Pupils: Pupils are equal, round, and reactive to light  Cardiovascular:      Rate and Rhythm: Normal rate and regular rhythm  Pulmonary:      Effort: Pulmonary effort is normal       Breath sounds: Normal breath sounds  Abdominal:      Palpations: Abdomen is soft  Musculoskeletal:         General: Normal range of motion  Cervical back: Normal range of motion and neck supple  Lymphadenopathy:      Cervical: No cervical adenopathy  Skin:     General: Skin is warm  Neurological:      Mental Status: She is alert and oriented to person, place, and time     Psychiatric:         Behavior: Behavior normal

## 2021-08-16 NOTE — ASSESSMENT & PLAN NOTE
Will place patient back on Lexapro, as this does not appear to be the causative agent      Will refer patient to gastroenterology for further assessment

## 2021-08-16 NOTE — ASSESSMENT & PLAN NOTE
Will restart patient on Lexapro after trialing Celexa due to diarrhea    Lexapro was not the causative agent in therefore will resume Lexapro as patient's preference

## 2021-08-23 ENCOUNTER — OFFICE VISIT (OUTPATIENT)
Dept: INTERNAL MEDICINE CLINIC | Age: 46
End: 2021-08-23
Payer: COMMERCIAL

## 2021-08-23 VITALS
SYSTOLIC BLOOD PRESSURE: 128 MMHG | OXYGEN SATURATION: 97 % | HEIGHT: 65 IN | HEART RATE: 73 BPM | TEMPERATURE: 99 F | DIASTOLIC BLOOD PRESSURE: 82 MMHG | BODY MASS INDEX: 34.42 KG/M2 | WEIGHT: 206.6 LBS

## 2021-08-23 DIAGNOSIS — H60.311 ACUTE DIFFUSE OTITIS EXTERNA OF RIGHT EAR: Primary | ICD-10-CM

## 2021-08-23 PROCEDURE — 1036F TOBACCO NON-USER: CPT | Performed by: INTERNAL MEDICINE

## 2021-08-23 PROCEDURE — 3074F SYST BP LT 130 MM HG: CPT | Performed by: INTERNAL MEDICINE

## 2021-08-23 PROCEDURE — 3079F DIAST BP 80-89 MM HG: CPT | Performed by: INTERNAL MEDICINE

## 2021-08-23 PROCEDURE — 99213 OFFICE O/P EST LOW 20 MIN: CPT | Performed by: INTERNAL MEDICINE

## 2021-08-23 PROCEDURE — 3008F BODY MASS INDEX DOCD: CPT | Performed by: INTERNAL MEDICINE

## 2021-08-23 PROCEDURE — 3725F SCREEN DEPRESSION PERFORMED: CPT | Performed by: INTERNAL MEDICINE

## 2021-08-23 RX ORDER — CIPROFLOXACIN AND DEXAMETHASONE 3; 1 MG/ML; MG/ML
4 SUSPENSION/ DROPS AURICULAR (OTIC) 2 TIMES DAILY
Qty: 7.5 ML | Refills: 0 | Status: SHIPPED | OUTPATIENT
Start: 2021-08-23 | End: 2021-10-18
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
## 2021-09-14 ENCOUNTER — OFFICE VISIT (OUTPATIENT)
Dept: OBGYN CLINIC | Facility: MEDICAL CENTER | Age: 46
End: 2021-09-14
Payer: COMMERCIAL

## 2021-09-14 VITALS
HEIGHT: 64 IN | DIASTOLIC BLOOD PRESSURE: 94 MMHG | HEART RATE: 67 BPM | SYSTOLIC BLOOD PRESSURE: 148 MMHG | BODY MASS INDEX: 34.83 KG/M2 | WEIGHT: 204 LBS

## 2021-09-14 DIAGNOSIS — M25.562 CHRONIC PAIN OF LEFT KNEE: Primary | ICD-10-CM

## 2021-09-14 DIAGNOSIS — G89.29 CHRONIC PAIN OF LEFT KNEE: Primary | ICD-10-CM

## 2021-09-14 PROCEDURE — 99213 OFFICE O/P EST LOW 20 MIN: CPT | Performed by: ORTHOPAEDIC SURGERY

## 2021-09-14 NOTE — PROGRESS NOTES
Orthopaedic Surgery - Office Note  Ginger Ho (78 y o  female)   : 1975   MRN: [id number]  Encounter Date: 2021    Chief Complaint   Patient presents with    Left Knee - Follow-up       Assessment / Plan  Left knee pain      Due to continued pain after 6 weeks of physical therapy and anti-inflammatories with minimal arthritis on x-ray we will order an MRI  She continue NSAIDs over-the-counter and follow-up with us after the MRI to discuss further options such as injections or surgery  · MRI of left knee     History of Present Illness  Ginger Ho is a 51 y o  female who presents   For follow-up on her left knee  She was seen in 2020 and did physical therapy  She says her pain improved and she switched to cycling instead of walking which helped her symptoms  She said in the spring she started walking again and for last 2 months her knee pain has been worse  She has pain about the front of her knee and pain with stair climbing and squatting  She reports pain is worse after sitting for what and standing and walking for the 1st few steps  She is taking ibuprofen over-the-counter with minimal relief  She denies catching or popping  Review of Systems  Pertinent items are noted in HPI  All other systems were reviewed and are negative  Physical Exam  /94   Pulse 67   Ht 5' 4" (1 626 m)   Wt 92 5 kg (204 lb)   BMI 35 02 kg/m²   Cons: Appears well  No apparent distress  Psych: Alert  Oriented x3  Mood and affect normal   Eyes: PERRLA, EOMI  Resp: Normal effort  No audible wheezing or stridor  CV: Palpable pulse  No discernable arrhythmia  No LE edema  Lymph:  No palpable cervical, axillary, or inguinal lymphadenopathy  Skin: Warm  No palpable masses  No visible lesions  Neuro: Normal muscle tone  Normal and symmetric DTR's  Left Knee Exam  Alignment:  Normal knee alignment  Inspection:  No swelling  No edema  No erythema  No ecchymosis   No muscle atrophy  No deformity  Palpation:  mild tenderness at patellar tendon and anterior knee  small effusion  No warmth  ROM:  Normal knee ROM  Knee Extension 0  Knee Flexion 125  Strength:  5/5 hip flexors and abductors  5/5 quadriceps and hamstrings  Able to actively extend knee against gravity  Stability:  (-) Varus instability  (-) Valgus instability  (-) Lachman  Tests:  (-) Noah  (-) Straight leg raise  Patella:  Patella tracks centrally with crepitus  Normal patellar mobility  Neurovascular:  Sensation intact in DP/SP/Salazar/Sa/T nerve distributions  Toes warm and perfused  Gait:  Antalgic  Studies Reviewed  I have personally reviewed pertinent films in PACS  X-ray left knee  11/03/2020 shows mild medial and patellofemoral arthritis    Procedures  No procedures today  Medical, Surgical, Family, and Social History  The patient's medical history, family history, and social history, were reviewed and updated as appropriate      Past Medical History:   Diagnosis Date    Anemia     Anxiety     Asthma     controlled  seasonal    Gestational diabetes mellitus     Hyperlipidemia     Hypertension     Other chest pain 1/27/2020    PONV (postoperative nausea and vomiting)     Pregnancy     Resolved: 07/29/2015    Trigger finger     Resolved: 12/02/2016    Yeast infection     Resolved: 07/29/2015       Past Surgical History:   Procedure Laterality Date    HERNIA REPAIR      INCISION TENDON SHEATH HAND      of a finger    IN HYSTEROSCOPY,W/ENDO BX N/A 10/14/2019    Procedure: DILATATION AND CURETTAGE (D&C) WITH HYSTEROSCOPY W/ Rony Whitley;  Surgeon: Renuka Olivares DO;  [facility];  Service: Gynecology    IN HYSTEROSCOPY,W/ENDOMETRIAL ABLATION N/A 10/14/2019    Procedure: ABLATION ENDOMETRIAL Shayya Angi;  Surgeon: Renuka Olivares DO;  [facility];  Service: Gynecology    IN REPAIR UMBILICAL FCCL,2+U/F,RINAS N/A 8/23/2018    Procedure: UMBILICAL HERNIA REPAIR WITH MESH; Surgeon: Merced López MD;  [facility];  Service: General    WISDOM TOOTH EXTRACTION         Family History   Problem Relation Age of Onset    Endometrial cancer Mother 55    Diabetes type II Mother     Hypertension Father     Heart attack Sister     Other Maternal Grandfather         Myocardial infarction arrhythmias    Stroke Paternal Grandfather         Syndrome    No Known Problems Daughter     No Known Problems Maternal Grandmother     No Known Problems Paternal Grandmother     No Known Problems Brother     No Known Problems Son     No Known Problems Paternal Aunt        Social History     Occupational History    Not on file   Tobacco Use    Smoking status: Never Smoker    Smokeless tobacco: Never Used   Vaping Use    Vaping Use: Never used   Substance and Sexual Activity    Alcohol use: Yes     Comment: 1-2 drinks per week on average typically consumes beer    Drug use: No    Sexual activity: Yes     Partners: Male     Birth control/protection: Male Sterilization       Allergies   Allergen Reactions    Hibiclens [Chlorhexidine Gluconate] Rash     Red with pimples    Pollen Extract Nasal Congestion         Current Outpatient Medications:     albuterol (Ventolin HFA) 90 mcg/act inhaler, Inhale 1-2 puffs as needed for wheezing, Disp: 1 Inhaler, Rfl: 1    ciprofloxacin-dexamethasone (CIPRODEX) otic suspension, Administer 4 drops to the right ear 2 (two) times a day Use for 7 days, Disp: 7 5 mL, Rfl: 0    escitalopram (LEXAPRO) 10 mg tablet, Take 1 tablet (10 mg total) by mouth daily, Disp: 90 tablet, Rfl: 1    fluticasone (FLONASE) 50 mcg/act nasal spray, 1 spray into each nostril daily (Patient taking differently: 1 spray into each nostril as needed ), Disp: 1 Bottle, Rfl: 0    hydrochlorothiazide (HYDRODIURIL) 12 5 mg tablet, Take 1 tablet (12 5 mg total) by mouth daily, Disp: 90 tablet, Rfl: 1    loratadine (CLARITIN) 10 mg tablet, Take 10 mg by mouth daily at bedtime  , Disp: , Rfl:     losartan (Cozaar) 50 mg tablet, Take 1 tablet (50 mg total) by mouth daily, Disp: 90 tablet, Rfl: 1    metoprolol succinate (TOPROL-XL) 50 mg 24 hr tablet, Take 1 tablet (50 mg total) by mouth daily at bedtime, Disp: 90 tablet, Rfl: 1    mometasone (NASONEX) 50 mcg/act nasal spray, 2 sprays into each nostril daily as needed , Disp: , Rfl:     Omega-3 Fatty Acids (FISH OIL) 1,000 mg, Take 2 capsules by mouth daily at bedtime  , Disp: , Rfl:     simvastatin (ZOCOR) 40 mg tablet, Take 1 tablet (40 mg total) by mouth daily at bedtime, Disp: 90 tablet, Rfl: 1      Carlitos Nesbitt PA-C    Scribe Attestation    I,:   am acting as a scribe while in the presence of the attending physician :       I,:   personally performed the services described in this documentation    as scribed in my presence :

## 2021-09-16 ENCOUNTER — APPOINTMENT (OUTPATIENT)
Dept: LAB | Facility: IMAGING CENTER | Age: 46
End: 2021-09-16
Payer: COMMERCIAL

## 2021-09-16 DIAGNOSIS — E87.1 HYPONATREMIA: ICD-10-CM

## 2021-09-16 DIAGNOSIS — E78.00 HYPERCHOLESTEROLEMIA: ICD-10-CM

## 2021-09-16 LAB
ANION GAP SERPL CALCULATED.3IONS-SCNC: 4 MMOL/L (ref 4–13)
BUN SERPL-MCNC: 13 MG/DL (ref 5–25)
CALCIUM SERPL-MCNC: 9.2 MG/DL (ref 8.3–10.1)
CHLORIDE SERPL-SCNC: 104 MMOL/L (ref 100–108)
CHOLEST SERPL-MCNC: 194 MG/DL (ref 50–200)
CO2 SERPL-SCNC: 27 MMOL/L (ref 21–32)
CREAT SERPL-MCNC: 0.65 MG/DL (ref 0.6–1.3)
GFR SERPL CREATININE-BSD FRML MDRD: 106 ML/MIN/1.73SQ M
GLUCOSE P FAST SERPL-MCNC: 93 MG/DL (ref 65–99)
HDLC SERPL-MCNC: 61 MG/DL
LDLC SERPL CALC-MCNC: 97 MG/DL (ref 0–100)
POTASSIUM SERPL-SCNC: 4 MMOL/L (ref 3.5–5.3)
SODIUM SERPL-SCNC: 135 MMOL/L (ref 136–145)
TRIGL SERPL-MCNC: 180 MG/DL

## 2021-09-16 PROCEDURE — 80061 LIPID PANEL: CPT

## 2021-09-16 PROCEDURE — 80048 BASIC METABOLIC PNL TOTAL CA: CPT

## 2021-09-16 PROCEDURE — 36415 COLL VENOUS BLD VENIPUNCTURE: CPT

## 2021-09-22 ENCOUNTER — HOSPITAL ENCOUNTER (OUTPATIENT)
Dept: RADIOLOGY | Facility: IMAGING CENTER | Age: 46
Discharge: HOME | End: 2021-09-22
Payer: COMMERCIAL

## 2021-09-22 DIAGNOSIS — G89.29 CHRONIC PAIN OF LEFT KNEE: ICD-10-CM

## 2021-09-22 DIAGNOSIS — M25.562 CHRONIC PAIN OF LEFT KNEE: ICD-10-CM

## 2021-09-22 PROCEDURE — 73721 MRI JNT OF LWR EXTRE W/O DYE: CPT

## 2021-09-22 PROCEDURE — G1004 CDSM NDSC: HCPCS

## 2021-09-28 ENCOUNTER — OFFICE VISIT (OUTPATIENT)
Dept: OBGYN CLINIC | Facility: MEDICAL CENTER | Age: 46
End: 2021-09-28
Payer: COMMERCIAL

## 2021-09-28 VITALS
DIASTOLIC BLOOD PRESSURE: 90 MMHG | SYSTOLIC BLOOD PRESSURE: 136 MMHG | WEIGHT: 202 LBS | BODY MASS INDEX: 34.49 KG/M2 | HEIGHT: 64 IN | HEART RATE: 81 BPM

## 2021-09-28 DIAGNOSIS — S83.242D OTHER TEAR OF MEDIAL MENISCUS OF LEFT KNEE AS CURRENT INJURY, SUBSEQUENT ENCOUNTER: ICD-10-CM

## 2021-09-28 DIAGNOSIS — M22.2X2 PATELLOFEMORAL PAIN SYNDROME OF LEFT KNEE: ICD-10-CM

## 2021-09-28 DIAGNOSIS — M17.12 OSTEOARTHRITIS OF LEFT PATELLOFEMORAL JOINT: Primary | ICD-10-CM

## 2021-09-28 PROCEDURE — 3075F SYST BP GE 130 - 139MM HG: CPT | Performed by: ORTHOPAEDIC SURGERY

## 2021-09-28 PROCEDURE — 3080F DIAST BP >= 90 MM HG: CPT | Performed by: ORTHOPAEDIC SURGERY

## 2021-09-28 PROCEDURE — 99213 OFFICE O/P EST LOW 20 MIN: CPT | Performed by: ORTHOPAEDIC SURGERY

## 2021-09-28 PROCEDURE — 3008F BODY MASS INDEX DOCD: CPT | Performed by: ORTHOPAEDIC SURGERY

## 2021-09-28 PROCEDURE — 1036F TOBACCO NON-USER: CPT | Performed by: ORTHOPAEDIC SURGERY

## 2021-09-28 NOTE — PROGRESS NOTES
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     · Activity as tolerated   · Continue with hip, thigh and core strengthening to help manage patellofemoral osteoarthritis  Patient understands the importance of this  · Based on her physical exam the pain she is feeling is likely coming from the patellofemoral OA vs the meniscus tear  We discussed surgical vs non-surgical treatments  At this time patient is going to continued with non-surgical treatment  All of patients questions and concerns were answered to satisfaction  · Order placed for Visco supplementation   · Discussed weight management   · Recommend compression sleeve for compression and comfort  · Continue with ice and anti-inflammatories prn pain   Return for Visco injection   History of Present Illness  Kathia White is a 51 y o  female who presents for follow up left knee pain and MRI results  She has been having pain since 2020, she completed PT and switched to cycling which improved her symptoms  In the spring 2021, she began having an increase of pain when she started walking again for exercise  She states the past 2 months the pain has gotten worse  She describes the pain as being anterior and increasing with squatting, steps and prolonged sitting and walking  She continues to take ibuprofen with minimal relief in symptoms  She has had visco injections in past which provided her some relief in pain  She denies any radiating symptoms  Review of Systems  Pertinent items are noted in HPI  All other systems were reviewed and are negative  Physical Exam  /90   Pulse 81   Ht 5' 4" (1 626 m)   Wt 91 6 kg (202 lb)   BMI 34 67 kg/m²   Cons: Appears well    No apparent distress  Psych: Alert  Oriented x3  Mood and affect normal   Eyes: PERRLA, EOMI  Resp: Normal effort  No audible wheezing or stridor  CV: Palpable pulse  No discernable arrhythmia  No LE edema  Lymph:  No palpable cervical, axillary, or inguinal lymphadenopathy  Skin: Warm  No palpable masses  No visible lesions  Neuro: Normal muscle tone  Normal and symmetric DTR's  Left Knee Exam  Alignment:  Normal knee alignment  Inspection:  No swelling  No ecchymosis  Palpation:  moderate patella and medial joint line tenderness  small effusion  ROM:  Knee Extension 0  Knee Flexion 125  Strength:  Able to SLR without lag  Able to actively extend knee against gravity  Stability:  No objective knee instability  Stable Varus / Valgus stress, Lachman, and Posterior drawer  Tests:  (-) Noah  Patella:  Patella tracks centrally with crepitus  Neurovascular:  Sensation intact in DP/SP/Salazar/Sa/T nerve distributions  2+ DP & PT pulses  Gait:  Normal     Studies Reviewed  I have personally reviewed pertinent films in PACS  X-ray left knee  11/03/2020 shows mild medial and patellofemoral arthritis  MRI of left knee- images from 9/22/2021 which show horizontal tear of posterior horn medial meniscus extending into the posterior root, grade 3-4 patellar chondrosis and grade 3 medial femoral condylar chondrosis    Procedures  No procedures today  Medical, Surgical, Family, and Social History  The patient's medical history, family history, and social history, were reviewed and updated as appropriate      Past Medical History:   Diagnosis Date    Anemia     Anxiety     Asthma     controlled  seasonal    Gestational diabetes mellitus     Hyperlipidemia     Hypertension     Other chest pain 1/27/2020    PONV (postoperative nausea and vomiting)     Pregnancy     Resolved: 07/29/2015    Trigger finger     Resolved: 12/02/2016    Yeast infection     Resolved: 07/29/2015       Past Surgical History: Procedure Laterality Date    HERNIA REPAIR      INCISION TENDON SHEATH HAND      of a finger    IN HYSTEROSCOPY,W/ENDO BX N/A 10/14/2019    Procedure: DILATATION AND CURETTAGE (D&C) WITH HYSTEROSCOPY W/ Kiskimere Dotiffany;  Surgeon: Ramsey Dupree DO;  [facility];  Service: Gynecology    IN HYSTEROSCOPY,W/ENDOMETRIAL ABLATION N/A 10/14/2019    Procedure: ABLATION ENDOMETRIAL Kiskimere Douse;  Surgeon: Ramsey Dupree DO;  [facility];  Service: Gynecology    IN REPAIR UMBILICAL DYBG,7+O/V,NNMSD N/A 8/23/2018    Procedure: UMBILICAL HERNIA REPAIR WITH MESH;  Surgeon: Merced López MD;  [facility];  Service: General    WISDOM TOOTH EXTRACTION         Family History   Problem Relation Age of Onset    Endometrial cancer Mother 55    Diabetes type II Mother     Hypertension Father     Heart attack Sister     Other Maternal Grandfather         Myocardial infarction arrhythmias    Stroke Paternal Grandfather         Syndrome    No Known Problems Daughter     No Known Problems Maternal Grandmother     No Known Problems Paternal Grandmother     No Known Problems Brother     No Known Problems Son     No Known Problems Paternal Aunt        Social History     Occupational History    Not on file   Tobacco Use    Smoking status: Never Smoker    Smokeless tobacco: Never Used   Vaping Use    Vaping Use: Never used   Substance and Sexual Activity    Alcohol use: Yes     Comment: 1-2 drinks per week on average typically consumes beer    Drug use: No    Sexual activity: Yes     Partners: Male     Birth control/protection: Male Sterilization       Allergies   Allergen Reactions    Hibiclens [Chlorhexidine Gluconate] Rash     Red with pimples    Pollen Extract Nasal Congestion         Current Outpatient Medications:     albuterol (Ventolin HFA) 90 mcg/act inhaler, Inhale 1-2 puffs as needed for wheezing, Disp: 1 Inhaler, Rfl: 1    ciprofloxacin-dexamethasone (CIPRODEX) otic suspension, Administer 4 drops to the right ear 2 (two) times a day Use for 7 days, Disp: 7 5 mL, Rfl: 0    escitalopram (LEXAPRO) 10 mg tablet, Take 1 tablet (10 mg total) by mouth daily, Disp: 90 tablet, Rfl: 1    fluticasone (FLONASE) 50 mcg/act nasal spray, 1 spray into each nostril daily (Patient taking differently: 1 spray into each nostril as needed ), Disp: 1 Bottle, Rfl: 0    hydrochlorothiazide (HYDRODIURIL) 12 5 mg tablet, Take 1 tablet (12 5 mg total) by mouth daily, Disp: 90 tablet, Rfl: 1    loratadine (CLARITIN) 10 mg tablet, Take 10 mg by mouth daily at bedtime  , Disp: , Rfl:     losartan (Cozaar) 50 mg tablet, Take 1 tablet (50 mg total) by mouth daily, Disp: 90 tablet, Rfl: 1    metoprolol succinate (TOPROL-XL) 50 mg 24 hr tablet, Take 1 tablet (50 mg total) by mouth daily at bedtime, Disp: 90 tablet, Rfl: 1    mometasone (NASONEX) 50 mcg/act nasal spray, 2 sprays into each nostril daily as needed , Disp: , Rfl:     Omega-3 Fatty Acids (FISH OIL) 1,000 mg, Take 2 capsules by mouth daily at bedtime  , Disp: , Rfl:     simvastatin (ZOCOR) 40 mg tablet, Take 1 tablet (40 mg total) by mouth daily at bedtime, Disp: 90 tablet, Rfl: 1      [employer]    I,:  Gilma Sorto am acting as a scribe while in the presence of the attending physician :       I,:  Feli Chin MD personally performed the services described in this documentation    as scribed in my presence :

## 2021-10-07 DIAGNOSIS — I10 BENIGN HYPERTENSION: ICD-10-CM

## 2021-10-07 DIAGNOSIS — E78.00 HYPERCHOLESTEROLEMIA: ICD-10-CM

## 2021-10-07 DIAGNOSIS — J45.40 MODERATE PERSISTENT EXTRINSIC ASTHMA WITHOUT COMPLICATION: ICD-10-CM

## 2021-10-07 DIAGNOSIS — E78.1 HYPERTRIGLYCERIDEMIA: ICD-10-CM

## 2021-10-07 RX ORDER — ALBUTEROL SULFATE 90 UG/1
1-2 AEROSOL, METERED RESPIRATORY (INHALATION) AS NEEDED
Qty: 18 G | Refills: 2 | Status: SHIPPED | OUTPATIENT
Start: 2021-10-07

## 2021-10-07 RX ORDER — SIMVASTATIN 40 MG
40 TABLET ORAL
Qty: 90 TABLET | Refills: 1 | Status: SHIPPED | OUTPATIENT
Start: 2021-10-07 | End: 2022-05-25 | Stop reason: SDUPTHER

## 2021-10-07 RX ORDER — HYDROCHLOROTHIAZIDE 12.5 MG/1
12.5 TABLET ORAL DAILY
Qty: 90 TABLET | Refills: 1 | Status: SHIPPED | OUTPATIENT
Start: 2021-10-07 | End: 2022-04-06 | Stop reason: SDUPTHER

## 2021-10-07 RX ORDER — METOPROLOL SUCCINATE 50 MG/1
50 TABLET, EXTENDED RELEASE ORAL
Qty: 90 TABLET | Refills: 1 | Status: SHIPPED | OUTPATIENT
Start: 2021-10-07 | End: 2021-12-01 | Stop reason: SDUPTHER

## 2021-10-07 RX ORDER — LOSARTAN POTASSIUM 50 MG/1
50 TABLET ORAL DAILY
Qty: 90 TABLET | Refills: 1 | Status: SHIPPED | OUTPATIENT
Start: 2021-10-07 | End: 2021-10-18 | Stop reason: SDUPTHER

## 2021-10-11 ENCOUNTER — PATIENT MESSAGE (OUTPATIENT)
Dept: OBGYN CLINIC | Facility: CLINIC | Age: 46
End: 2021-10-11

## 2021-10-11 DIAGNOSIS — Z12.31 ENCOUNTER FOR SCREENING MAMMOGRAM FOR BREAST CANCER: Primary | ICD-10-CM

## 2021-10-18 ENCOUNTER — TELEMEDICINE (OUTPATIENT)
Dept: INTERNAL MEDICINE CLINIC | Facility: CLINIC | Age: 46
End: 2021-10-18
Payer: COMMERCIAL

## 2021-10-18 ENCOUNTER — OFFICE VISIT (OUTPATIENT)
Dept: OBGYN CLINIC | Facility: OTHER | Age: 46
End: 2021-10-18
Payer: COMMERCIAL

## 2021-10-18 VITALS
WEIGHT: 206 LBS | DIASTOLIC BLOOD PRESSURE: 83 MMHG | HEART RATE: 80 BPM | SYSTOLIC BLOOD PRESSURE: 135 MMHG | HEIGHT: 64 IN | BODY MASS INDEX: 35.17 KG/M2

## 2021-10-18 VITALS
HEIGHT: 64 IN | HEART RATE: 80 BPM | BODY MASS INDEX: 35.24 KG/M2 | DIASTOLIC BLOOD PRESSURE: 83 MMHG | SYSTOLIC BLOOD PRESSURE: 135 MMHG | WEIGHT: 206.4 LBS

## 2021-10-18 DIAGNOSIS — S83.232A COMPLEX TEAR OF MEDIAL MENISCUS OF LEFT KNEE AS CURRENT INJURY, INITIAL ENCOUNTER: Primary | ICD-10-CM

## 2021-10-18 DIAGNOSIS — F41.9 ANXIETY: ICD-10-CM

## 2021-10-18 DIAGNOSIS — E78.00 HYPERCHOLESTEROLEMIA: ICD-10-CM

## 2021-10-18 DIAGNOSIS — I10 BENIGN HYPERTENSION: Primary | ICD-10-CM

## 2021-10-18 DIAGNOSIS — E78.1 HYPERTRIGLYCERIDEMIA: ICD-10-CM

## 2021-10-18 PROBLEM — H60.311 ACUTE DIFFUSE OTITIS EXTERNA OF RIGHT EAR: Status: RESOLVED | Noted: 2021-08-23 | Resolved: 2021-10-18

## 2021-10-18 PROCEDURE — 3075F SYST BP GE 130 - 139MM HG: CPT | Performed by: ORTHOPAEDIC SURGERY

## 2021-10-18 PROCEDURE — 99213 OFFICE O/P EST LOW 20 MIN: CPT | Performed by: NURSE PRACTITIONER

## 2021-10-18 PROCEDURE — 99214 OFFICE O/P EST MOD 30 MIN: CPT | Performed by: ORTHOPAEDIC SURGERY

## 2021-10-18 PROCEDURE — 1036F TOBACCO NON-USER: CPT | Performed by: NURSE PRACTITIONER

## 2021-10-18 PROCEDURE — 3008F BODY MASS INDEX DOCD: CPT | Performed by: NURSE PRACTITIONER

## 2021-10-18 PROCEDURE — 3079F DIAST BP 80-89 MM HG: CPT | Performed by: ORTHOPAEDIC SURGERY

## 2021-10-18 RX ORDER — LOSARTAN POTASSIUM 50 MG/1
50 TABLET ORAL DAILY
Qty: 90 TABLET | Refills: 1 | Status: SHIPPED | OUTPATIENT
Start: 2021-10-18 | End: 2022-07-25 | Stop reason: SDUPTHER

## 2021-10-19 ENCOUNTER — TELEPHONE (OUTPATIENT)
Dept: GASTROENTEROLOGY | Facility: MEDICAL CENTER | Age: 46
End: 2021-10-19

## 2021-10-22 ENCOUNTER — TELEPHONE (OUTPATIENT)
Dept: OBGYN CLINIC | Facility: HOSPITAL | Age: 46
End: 2021-10-22

## 2021-10-25 ENCOUNTER — ANESTHESIA EVENT (OUTPATIENT)
Dept: PERIOP | Facility: AMBULARY SURGERY CENTER | Age: 46
End: 2021-10-25
Payer: COMMERCIAL

## 2021-11-03 ENCOUNTER — ANNUAL EXAM (OUTPATIENT)
Dept: OBGYN CLINIC | Facility: CLINIC | Age: 46
End: 2021-11-03
Payer: COMMERCIAL

## 2021-11-03 VITALS
DIASTOLIC BLOOD PRESSURE: 84 MMHG | HEIGHT: 64 IN | BODY MASS INDEX: 35 KG/M2 | WEIGHT: 205 LBS | SYSTOLIC BLOOD PRESSURE: 134 MMHG

## 2021-11-03 DIAGNOSIS — Z01.419 ENCOUNTER FOR ANNUAL ROUTINE GYNECOLOGICAL EXAMINATION: Primary | ICD-10-CM

## 2021-11-03 DIAGNOSIS — Z12.31 ENCOUNTER FOR SCREENING MAMMOGRAM FOR BREAST CANCER: ICD-10-CM

## 2021-11-03 PROCEDURE — S0612 ANNUAL GYNECOLOGICAL EXAMINA: HCPCS | Performed by: OBSTETRICS & GYNECOLOGY

## 2021-11-05 ENCOUNTER — ANESTHESIA (OUTPATIENT)
Dept: PERIOP | Facility: AMBULARY SURGERY CENTER | Age: 46
End: 2021-11-05
Payer: COMMERCIAL

## 2021-11-05 ENCOUNTER — HOSPITAL ENCOUNTER (OUTPATIENT)
Facility: AMBULARY SURGERY CENTER | Age: 46
Setting detail: OUTPATIENT SURGERY
Discharge: HOME | End: 2021-11-05
Attending: ORTHOPAEDIC SURGERY | Admitting: ORTHOPAEDIC SURGERY
Payer: COMMERCIAL

## 2021-11-05 VITALS
DIASTOLIC BLOOD PRESSURE: 80 MMHG | HEIGHT: 64 IN | BODY MASS INDEX: 34.66 KG/M2 | TEMPERATURE: 97.6 F | HEART RATE: 67 BPM | WEIGHT: 203 LBS | OXYGEN SATURATION: 97 % | SYSTOLIC BLOOD PRESSURE: 144 MMHG | RESPIRATION RATE: 16 BRPM

## 2021-11-05 DIAGNOSIS — S83.232D COMPLEX TEAR OF MEDIAL MENISCUS OF LEFT KNEE AS CURRENT INJURY, SUBSEQUENT ENCOUNTER: Primary | ICD-10-CM

## 2021-11-05 LAB
EXT PREGNANCY TEST URINE: NEGATIVE
EXT. CONTROL: NORMAL

## 2021-11-05 PROCEDURE — 29881 ARTHRS KNE SRG MNISECTMY M/L: CPT | Performed by: PHYSICIAN ASSISTANT

## 2021-11-05 PROCEDURE — C9290 INJ, BUPIVACAINE LIPOSOME: HCPCS | Performed by: STUDENT IN AN ORGANIZED HEALTH CARE EDUCATION/TRAINING PROGRAM

## 2021-11-05 PROCEDURE — 81025 URINE PREGNANCY TEST: CPT | Performed by: ORTHOPAEDIC SURGERY

## 2021-11-05 PROCEDURE — 29881 ARTHRS KNE SRG MNISECTMY M/L: CPT | Performed by: ORTHOPAEDIC SURGERY

## 2021-11-05 RX ORDER — METOCLOPRAMIDE HYDROCHLORIDE 5 MG/ML
INJECTION INTRAMUSCULAR; INTRAVENOUS AS NEEDED
Status: DISCONTINUED | OUTPATIENT
Start: 2021-11-05 | End: 2021-11-05

## 2021-11-05 RX ORDER — CEFAZOLIN SODIUM 2 G/50ML
2000 SOLUTION INTRAVENOUS ONCE
Status: COMPLETED | OUTPATIENT
Start: 2021-11-05 | End: 2021-11-05

## 2021-11-05 RX ORDER — MIDAZOLAM HYDROCHLORIDE 2 MG/2ML
INJECTION, SOLUTION INTRAMUSCULAR; INTRAVENOUS AS NEEDED
Status: DISCONTINUED | OUTPATIENT
Start: 2021-11-05 | End: 2021-11-05

## 2021-11-05 RX ORDER — ALBUTEROL SULFATE 2.5 MG/3ML
2.5 SOLUTION RESPIRATORY (INHALATION) ONCE AS NEEDED
Status: DISCONTINUED | OUTPATIENT
Start: 2021-11-05 | End: 2021-11-05 | Stop reason: HOSPADM

## 2021-11-05 RX ORDER — DEXAMETHASONE SODIUM PHOSPHATE 10 MG/ML
INJECTION, SOLUTION INTRAMUSCULAR; INTRAVENOUS AS NEEDED
Status: DISCONTINUED | OUTPATIENT
Start: 2021-11-05 | End: 2021-11-05

## 2021-11-05 RX ORDER — KETOROLAC TROMETHAMINE 30 MG/ML
INJECTION, SOLUTION INTRAMUSCULAR; INTRAVENOUS AS NEEDED
Status: DISCONTINUED | OUTPATIENT
Start: 2021-11-05 | End: 2021-11-05

## 2021-11-05 RX ORDER — PROMETHAZINE HYDROCHLORIDE 25 MG/ML
25 INJECTION, SOLUTION INTRAMUSCULAR; INTRAVENOUS ONCE AS NEEDED
Status: DISCONTINUED | OUTPATIENT
Start: 2021-11-05 | End: 2021-11-05 | Stop reason: HOSPADM

## 2021-11-05 RX ORDER — ONDANSETRON 2 MG/ML
INJECTION INTRAMUSCULAR; INTRAVENOUS AS NEEDED
Status: DISCONTINUED | OUTPATIENT
Start: 2021-11-05 | End: 2021-11-05

## 2021-11-05 RX ORDER — ONDANSETRON 2 MG/ML
4 INJECTION INTRAMUSCULAR; INTRAVENOUS ONCE AS NEEDED
Status: DISCONTINUED | OUTPATIENT
Start: 2021-11-05 | End: 2021-11-05 | Stop reason: HOSPADM

## 2021-11-05 RX ORDER — PROPOFOL 10 MG/ML
INJECTION, EMULSION INTRAVENOUS AS NEEDED
Status: DISCONTINUED | OUTPATIENT
Start: 2021-11-05 | End: 2021-11-05

## 2021-11-05 RX ORDER — EPHEDRINE SULFATE 50 MG/ML
INJECTION INTRAVENOUS AS NEEDED
Status: DISCONTINUED | OUTPATIENT
Start: 2021-11-05 | End: 2021-11-05

## 2021-11-05 RX ORDER — LIDOCAINE HYDROCHLORIDE 10 MG/ML
INJECTION, SOLUTION EPIDURAL; INFILTRATION; INTRACAUDAL; PERINEURAL AS NEEDED
Status: DISCONTINUED | OUTPATIENT
Start: 2021-11-05 | End: 2021-11-05

## 2021-11-05 RX ORDER — FENTANYL CITRATE 50 UG/ML
INJECTION, SOLUTION INTRAMUSCULAR; INTRAVENOUS AS NEEDED
Status: DISCONTINUED | OUTPATIENT
Start: 2021-11-05 | End: 2021-11-05

## 2021-11-05 RX ORDER — ONDANSETRON 2 MG/ML
4 INJECTION INTRAMUSCULAR; INTRAVENOUS EVERY 6 HOURS PRN
Status: DISCONTINUED | OUTPATIENT
Start: 2021-11-05 | End: 2021-11-05 | Stop reason: HOSPADM

## 2021-11-05 RX ORDER — OXYCODONE HYDROCHLORIDE 5 MG/1
TABLET ORAL
Qty: 8 TABLET | Refills: 0 | Status: SHIPPED | OUTPATIENT
Start: 2021-11-05 | End: 2022-03-04 | Stop reason: ALTCHOICE

## 2021-11-05 RX ORDER — PROPOFOL 10 MG/ML
INJECTION, EMULSION INTRAVENOUS CONTINUOUS PRN
Status: DISCONTINUED | OUTPATIENT
Start: 2021-11-05 | End: 2021-11-05

## 2021-11-05 RX ORDER — OXYCODONE HYDROCHLORIDE 5 MG/1
5 TABLET ORAL EVERY 4 HOURS PRN
Status: DISCONTINUED | OUTPATIENT
Start: 2021-11-05 | End: 2021-11-05 | Stop reason: HOSPADM

## 2021-11-05 RX ORDER — SODIUM CHLORIDE, SODIUM LACTATE, POTASSIUM CHLORIDE, CALCIUM CHLORIDE 600; 310; 30; 20 MG/100ML; MG/100ML; MG/100ML; MG/100ML
INJECTION, SOLUTION INTRAVENOUS CONTINUOUS PRN
Status: DISCONTINUED | OUTPATIENT
Start: 2021-11-05 | End: 2021-11-05

## 2021-11-05 RX ORDER — FENTANYL CITRATE/PF 50 MCG/ML
25 SYRINGE (ML) INJECTION
Status: DISCONTINUED | OUTPATIENT
Start: 2021-11-05 | End: 2021-11-05 | Stop reason: HOSPADM

## 2021-11-05 RX ORDER — BUPIVACAINE HYDROCHLORIDE 2.5 MG/ML
INJECTION, SOLUTION EPIDURAL; INFILTRATION; INTRACAUDAL AS NEEDED
Status: DISCONTINUED | OUTPATIENT
Start: 2021-11-05 | End: 2021-11-05 | Stop reason: HOSPADM

## 2021-11-05 RX ORDER — BUPIVACAINE HYDROCHLORIDE 5 MG/ML
INJECTION, SOLUTION PERINEURAL
Status: COMPLETED | OUTPATIENT
Start: 2021-11-05 | End: 2021-11-05

## 2021-11-05 RX ORDER — ACETAMINOPHEN 325 MG/1
650 TABLET ORAL EVERY 6 HOURS PRN
Status: DISCONTINUED | OUTPATIENT
Start: 2021-11-05 | End: 2021-11-05 | Stop reason: HOSPADM

## 2021-11-05 RX ORDER — ALBUTEROL SULFATE 90 UG/1
AEROSOL, METERED RESPIRATORY (INHALATION) AS NEEDED
Status: DISCONTINUED | OUTPATIENT
Start: 2021-11-05 | End: 2021-11-05

## 2021-11-05 RX ADMIN — EPHEDRINE SULFATE 5 MG: 50 INJECTION, SOLUTION INTRAVENOUS at 08:42

## 2021-11-05 RX ADMIN — DEXAMETHASONE SODIUM PHOSPHATE 4 MG: 10 INJECTION, SOLUTION INTRAMUSCULAR; INTRAVENOUS at 08:30

## 2021-11-05 RX ADMIN — BUPIVACAINE HYDROCHLORIDE 5 ML: 5 INJECTION, SOLUTION PERINEURAL at 08:04

## 2021-11-05 RX ADMIN — METOCLOPRAMIDE HYDROCHLORIDE 10 MG: 5 INJECTION INTRAMUSCULAR; INTRAVENOUS at 08:45

## 2021-11-05 RX ADMIN — CEFAZOLIN SODIUM 2000 MG: 2 SOLUTION INTRAVENOUS at 08:26

## 2021-11-05 RX ADMIN — FENTANYL CITRATE 50 MCG: 50 INJECTION, SOLUTION INTRAMUSCULAR; INTRAVENOUS at 08:30

## 2021-11-05 RX ADMIN — FENTANYL CITRATE 25 MCG: 50 INJECTION, SOLUTION INTRAMUSCULAR; INTRAVENOUS at 08:55

## 2021-11-05 RX ADMIN — ALBUTEROL SULFATE 2 PUFF: 90 AEROSOL, METERED RESPIRATORY (INHALATION) at 08:28

## 2021-11-05 RX ADMIN — ONDANSETRON 2 MG: 2 INJECTION INTRAMUSCULAR; INTRAVENOUS at 08:26

## 2021-11-05 RX ADMIN — PROPOFOL 180 MG: 10 INJECTION, EMULSION INTRAVENOUS at 08:30

## 2021-11-05 RX ADMIN — KETOROLAC TROMETHAMINE 30 MG: 30 INJECTION, SOLUTION INTRAMUSCULAR at 09:07

## 2021-11-05 RX ADMIN — PROPOFOL 40 MCG/KG/MIN: 10 INJECTION, EMULSION INTRAVENOUS at 08:33

## 2021-11-05 RX ADMIN — LIDOCAINE HYDROCHLORIDE 50 MG: 10 INJECTION, SOLUTION EPIDURAL; INFILTRATION; INTRACAUDAL at 08:30

## 2021-11-05 RX ADMIN — SODIUM CHLORIDE, SODIUM LACTATE, POTASSIUM CHLORIDE, AND CALCIUM CHLORIDE: .6; .31; .03; .02 INJECTION, SOLUTION INTRAVENOUS at 08:26

## 2021-11-05 RX ADMIN — MIDAZOLAM HYDROCHLORIDE 2 MG: 1 INJECTION, SOLUTION INTRAMUSCULAR; INTRAVENOUS at 08:26

## 2021-11-05 RX ADMIN — OXYCODONE HYDROCHLORIDE 5 MG: 5 TABLET ORAL at 10:10

## 2021-11-05 RX ADMIN — FENTANYL CITRATE 25 MCG: 50 INJECTION, SOLUTION INTRAMUSCULAR; INTRAVENOUS at 08:51

## 2021-11-08 ENCOUNTER — OFFICE VISIT (OUTPATIENT)
Dept: OBGYN CLINIC | Facility: OTHER | Age: 46
End: 2021-11-08

## 2021-11-08 VITALS
DIASTOLIC BLOOD PRESSURE: 89 MMHG | SYSTOLIC BLOOD PRESSURE: 131 MMHG | HEART RATE: 67 BPM | HEIGHT: 64 IN | BODY MASS INDEX: 34.62 KG/M2 | WEIGHT: 202.8 LBS

## 2021-11-08 DIAGNOSIS — Z98.890 S/P LEFT KNEE ARTHROSCOPY: Primary | ICD-10-CM

## 2021-11-08 PROCEDURE — 99024 POSTOP FOLLOW-UP VISIT: CPT | Performed by: PHYSICIAN ASSISTANT

## 2021-11-08 PROCEDURE — 3008F BODY MASS INDEX DOCD: CPT | Performed by: ORTHOPAEDIC SURGERY

## 2021-11-10 ENCOUNTER — TELEPHONE (OUTPATIENT)
Dept: OBGYN CLINIC | Facility: HOSPITAL | Age: 46
End: 2021-11-10

## 2021-12-06 ENCOUNTER — OFFICE VISIT (OUTPATIENT)
Dept: OBGYN CLINIC | Facility: OTHER | Age: 46
End: 2021-12-06

## 2021-12-06 VITALS
WEIGHT: 209 LBS | SYSTOLIC BLOOD PRESSURE: 118 MMHG | HEIGHT: 64 IN | BODY MASS INDEX: 35.68 KG/M2 | DIASTOLIC BLOOD PRESSURE: 80 MMHG

## 2021-12-06 DIAGNOSIS — Z98.890 S/P LEFT KNEE ARTHROSCOPY: ICD-10-CM

## 2021-12-06 DIAGNOSIS — S83.232D COMPLEX TEAR OF MEDIAL MENISCUS OF LEFT KNEE AS CURRENT INJURY, SUBSEQUENT ENCOUNTER: Primary | ICD-10-CM

## 2021-12-06 PROCEDURE — 99024 POSTOP FOLLOW-UP VISIT: CPT | Performed by: PHYSICIAN ASSISTANT

## 2022-01-24 ENCOUNTER — HOSPITAL ENCOUNTER (OUTPATIENT)
Dept: RADIOLOGY | Age: 47
Discharge: HOME | End: 2022-01-24
Payer: COMMERCIAL

## 2022-01-24 VITALS — HEIGHT: 64 IN | WEIGHT: 209 LBS | BODY MASS INDEX: 35.68 KG/M2

## 2022-01-24 DIAGNOSIS — Z12.31 ENCOUNTER FOR SCREENING MAMMOGRAM FOR BREAST CANCER: ICD-10-CM

## 2022-01-24 PROCEDURE — 77067 SCR MAMMO BI INCL CAD: CPT

## 2022-01-24 PROCEDURE — 77063 BREAST TOMOSYNTHESIS BI: CPT

## 2022-02-15 ENCOUNTER — PATIENT MESSAGE (OUTPATIENT)
Dept: INTERNAL MEDICINE CLINIC | Age: 47
End: 2022-02-15

## 2022-02-15 DIAGNOSIS — F41.1 GENERALIZED ANXIETY DISORDER: ICD-10-CM

## 2022-02-15 RX ORDER — ESCITALOPRAM OXALATE 10 MG/1
10 TABLET ORAL
Qty: 30 TABLET | Refills: 0 | Status: SHIPPED | OUTPATIENT
Start: 2022-02-15 | End: 2022-03-29 | Stop reason: SDUPTHER

## 2022-03-04 ENCOUNTER — AMB VIDEO VISIT (OUTPATIENT)
Dept: OTHER | Facility: HOSPITAL | Age: 47
End: 2022-03-04

## 2022-03-04 DIAGNOSIS — J00 ACUTE NASOPHARYNGITIS: Primary | ICD-10-CM

## 2022-03-04 DIAGNOSIS — R03.0 SINGLE EPISODE OF ELEVATED BLOOD PRESSURE: ICD-10-CM

## 2022-03-04 PROBLEM — B34.9 VIRAL INFECTION, UNSPECIFIED: Status: RESOLVED | Noted: 2020-11-17 | Resolved: 2022-03-04

## 2022-03-04 PROCEDURE — ECARE PR SL URGENT CARE VIRTUAL VISIT: Performed by: PHYSICIAN ASSISTANT

## 2022-03-04 NOTE — PATIENT INSTRUCTIONS
As discussed, sinus infections are typically viral and will get better on their own in 7-10 days  You should try symptomatic relief in the meantime with OTC (Claritin or Zyrtec), Flonase, and Sudafed  You can also try sinus rinses with a neti pot or nasal lavage (be sure to use distilled water ) If your symptoms do not improve after 7-10 days, you can take antibiotics because it is more likely to be bacterial at that point  Follow-up with your primary care physician for recheck in 2-3 business days  Go to the ER for sudden severe headache, high fevers, change in vision, seizure activity or anything else that is concerning  You need to have your blood pressure rechecked this week  It was elevated during your visit  Having chronically elevated blood pressure significantly increases your risk for heart attack, stroke, blindness, and chronic kidney disease  It is imperative that you see a family doctor to have this rechecked as you may need changes to your medication to help lower your blood pressure

## 2022-03-04 NOTE — PROGRESS NOTES
Video Visit - Kendy Silence 49 y o  female MRN: [id number]    REQUIRED DOCUMENTATION:         1  This service was provided via [employer]  2  Provider located at [address] [place] [phone]  3  [facility] provider: Pramod Brown PA-C   4  Identify all parties in room with patient during [facility] visit:  No one else  5  After connecting through [place], patient was identified by name and date of birth  Patient was then informed that this was a Telemedicine visit and that the exam was being conducted confidentially over secure lines  My office door was closed  No one else was in the room  Patient acknowledged consent and understanding of privacy and security of the Telemedicine visit  I informed the patient that I have reviewed their record in Epic and presented the opportunity for them to ask any questions regarding the visit today  The patient agreed to participate  VITALS: Heart Rate: 80 BPM, Respiratory Rate: 14 RPM, Temperature Unavailable° F, Blood Pressure 153/103 mmHg, Pulse Ox Unavailable % on RA    HPI  Pt reports URI  Last night sinus congestion in ethmoid and maxillary sinus region, yellow nasal drainage, dry hacking cough, nasal burning  Started with sore throat 3 days ago  Taking mucinex and tylenol cold & flu with some relief  No known exposures to COVID  Son recently sick with sinus infection and friend has URI recently too  COVID vaccine x 3  Physical Exam  Constitutional:       General: She is not in acute distress  Appearance: Normal appearance  She is obese  She is not toxic-appearing  HENT:      Head: Normocephalic and atraumatic  Nose: No rhinorrhea  Comments: Sounds mildly congested     Mouth/Throat:      Mouth: Mucous membranes are moist       Comments: Hoarse voice  Eyes:      Conjunctiva/sclera: Conjunctivae normal    Cardiovascular:      Rate and Rhythm: Normal rate     Pulmonary:      Effort: Pulmonary effort is normal  No respiratory distress  Breath sounds: No wheezing (no gross audible wheeze through computer)  Musculoskeletal:      Cervical back: Normal range of motion  Skin:     Findings: No rash (on face or neck)  Neurological:      Mental Status: She is alert  Cranial Nerves: No dysarthria or facial asymmetry  Psychiatric:         Mood and Affect: Mood normal          Behavior: Behavior normal          Diagnoses and all orders for this visit:    Acute nasopharyngitis    Single episode of elevated blood pressure      Patient Instructions   As discussed, sinus infections are typically viral and will get better on their own in 7-10 days  You should try symptomatic relief in the meantime with OTC (Claritin or Zyrtec), Flonase, and Sudafed  You can also try sinus rinses with a neti pot or nasal lavage (be sure to use distilled water ) If your symptoms do not improve after 7-10 days, you can take antibiotics because it is more likely to be bacterial at that point  Follow-up with your primary care physician for recheck in 2-3 business days  Go to the ER for sudden severe headache, high fevers, change in vision, seizure activity or anything else that is concerning  You need to have your blood pressure rechecked this week  It was elevated during your visit  Having chronically elevated blood pressure significantly increases your risk for heart attack, stroke, blindness, and chronic kidney disease  It is imperative that you see a family doctor to have this rechecked as you may need changes to your medication to help lower your blood pressure

## 2022-03-04 NOTE — CARE ANYWHERE EVISITS
Visit Summary for Karson Wakefield - Gender: Female - Date of Birth: [id number]  Date: [id number] - Duration: 13 minutes  Patient: Karson Wakefield  Provider: Helen Blanchard PA-C    Patient Contact Information  Address  [address] [place] [address]  Yelena Pabon; [address] [facility]  [phone]    Visit Topics    Triage Questions   What is your current physical address in the event of a medical emergency? Answer []  Are you allergic to any medications? Answer []  Are you now or could you be pregnant? Answer []  Do you have any immune system compromise or chronic lung   disease? Answer []  Do you have any vulnerable family members in the home (infant, pregnant, cancer, elderly)? Answer []     Conversation Transcripts  [0A][0A] [Notification] You are connected with Helen Blanchard PA-C, Urgent Care Specialist [0A][Notification] Karson Wakefield is located in [place]  [0A][Notification] Karson Wakefield has shared health history  Attila Manifold  [0A]    Diagnosis  Acute nasopharyngitis [common cold]    Procedures  Value: [id number] Code: CPT-4 UNLISTED E&M SERVICE    Medications Prescribed    No prescriptions ordered    Electronically signed by: Shelly Zhou(NPI [id number])

## 2022-03-07 ENCOUNTER — OFFICE VISIT (OUTPATIENT)
Dept: INTERNAL MEDICINE CLINIC | Facility: OTHER | Age: 47
End: 2022-03-07
Payer: COMMERCIAL

## 2022-03-07 VITALS
TEMPERATURE: 98.3 F | WEIGHT: 209.6 LBS | BODY MASS INDEX: 35.78 KG/M2 | OXYGEN SATURATION: 98 % | RESPIRATION RATE: 20 BRPM | HEIGHT: 64 IN | HEART RATE: 83 BPM | SYSTOLIC BLOOD PRESSURE: 134 MMHG | DIASTOLIC BLOOD PRESSURE: 88 MMHG

## 2022-03-07 DIAGNOSIS — E87.1 HYPONATREMIA: ICD-10-CM

## 2022-03-07 DIAGNOSIS — I10 BENIGN HYPERTENSION: ICD-10-CM

## 2022-03-07 DIAGNOSIS — J45.40 MODERATE PERSISTENT EXTRINSIC ASTHMA WITHOUT COMPLICATION: ICD-10-CM

## 2022-03-07 DIAGNOSIS — E78.00 HYPERCHOLESTEROLEMIA: Primary | ICD-10-CM

## 2022-03-07 PROCEDURE — 3079F DIAST BP 80-89 MM HG: CPT | Performed by: INTERNAL MEDICINE

## 2022-03-07 PROCEDURE — 99214 OFFICE O/P EST MOD 30 MIN: CPT | Performed by: INTERNAL MEDICINE

## 2022-03-07 PROCEDURE — 3075F SYST BP GE 130 - 139MM HG: CPT | Performed by: INTERNAL MEDICINE

## 2022-03-07 PROCEDURE — 3008F BODY MASS INDEX DOCD: CPT | Performed by: INTERNAL MEDICINE

## 2022-03-07 PROCEDURE — 1036F TOBACCO NON-USER: CPT | Performed by: INTERNAL MEDICINE

## 2022-03-07 RX ORDER — MULTIVITAMIN
1 TABLET ORAL DAILY
COMMUNITY

## 2022-03-07 NOTE — PROGRESS NOTES
Assessment/Plan:     Diagnoses and all orders for this visit:    Hypercholesterolemia  -     Lipid panel; Future  -     CBC and differential; Future  -     TSH, 3rd generation with Free T4 reflex; Future  -     UA w Reflex to Microscopic w Reflex to Culture    Hyponatremia  -     Comprehensive metabolic panel; Future  -     UA w Reflex to Microscopic w Reflex to Culture    Benign hypertension  -     Comprehensive metabolic panel; Future  -     UA w Reflex to Microscopic w Reflex to Culture    Moderate persistent extrinsic asthma without complication    Other orders  -     Multiple Vitamin (multivitamin) tablet; Take 1 tablet by mouth daily             M*Modal software was used to dictate this note  It may contain errors with dictating incorrect words or incorrect spelling  Please contact the provider directly with any questions  Subjective:   Chief Complaint   Patient presents with    Follow-up     5 month, no labs due    health maintenance     annual exam, bmi f/u plan    Hypertension     high at home        Patient ID: Precious Crook is a 49 y o  female  HPI  This is a very pleasant 49 years young lady who is here today for the regular follow-up for pressure recently she was treated for upper respiratory tract infection which is good she also have a problem with hypercholesterolemia her last lipid panel was excellent  Hypertension could be better controlled I change her medications the timing I told her to take hydrochlorothiazide and losartan in the morning and metoprolol at night will follow her up in about 6 month and if there is any problem she will give us a call she can also check her blood pressure at home or in the pharmacy and of the systolic blood pressure is greater than 364 or diastolic is greater than 90 we can see her again in between but otherwise she is doing well    She recently had a left knee meniscal tear repair still having some pain she also have a severe osteoarthritis of the left knee and ultimately she will need total knee replacement  The following portions of the patient's history were reviewed and updated as appropriate: allergies, current medications, past family history, past medical history, past social history, past surgical history and problem list     Review of Systems   Constitutional: Positive for fatigue  Negative for chills  HENT: Negative for congestion, ear pain, hearing loss, postnasal drip, sinus pressure, sore throat and voice change  Eyes: Negative for pain, discharge and visual disturbance  Respiratory: Negative for cough, chest tightness and shortness of breath  Cardiovascular: Negative for chest pain, palpitations and leg swelling  Gastrointestinal: Negative for abdominal pain, blood in stool, diarrhea, nausea and rectal pain  Genitourinary: Negative for difficulty urinating, dysuria and urgency  Musculoskeletal: Negative for arthralgias and joint swelling  Knee pain she is status post arthroscopic surgery for meniscal repair   Skin: Negative for rash  Allergic/Immunologic: Negative for environmental allergies and food allergies  Neurological: Negative for dizziness, tremors, weakness, numbness and headaches  Hematological: Negative for adenopathy  Psychiatric/Behavioral: Negative for behavioral problems and hallucinations           Past Medical History:   Diagnosis Date    Acute diffuse otitis externa of right ear 8/23/2021    Anemia     Anxiety     Asthma     controlled  seasonal    Gestational diabetes mellitus     Hyperlipidemia     Hypertension     Other chest pain 1/27/2020    PONV (postoperative nausea and vomiting)     Pregnancy     Resolved: 07/29/2015    Trigger finger     Resolved: 12/02/2016    Yeast infection     Resolved: 07/29/2015         Current Outpatient Medications:     albuterol (Ventolin HFA) 90 mcg/act inhaler, Inhale 1-2 puffs as needed for wheezing, Disp: 18 g, Rfl: 2    escitalopram (LEXAPRO) 10 mg tablet, Take 1 tablet (10 mg total) by mouth daily at bedtime, Disp: 30 tablet, Rfl: 0    fluticasone (FLONASE) 50 mcg/act nasal spray, 1 spray into each nostril daily (Patient taking differently: 1 spray into each nostril as needed ), Disp: 1 Bottle, Rfl: 0    hydrochlorothiazide (HYDRODIURIL) 12 5 mg tablet, Take 1 tablet (12 5 mg total) by mouth daily (Patient taking differently: Take 12 5 mg by mouth daily at bedtime ), Disp: 90 tablet, Rfl: 1    loratadine (CLARITIN) 10 mg tablet, Take 10 mg by mouth daily at bedtime  , Disp: , Rfl:     losartan (Cozaar) 50 mg tablet, Take 1 tablet (50 mg total) by mouth daily (Patient taking differently: Take 50 mg by mouth daily at bedtime ), Disp: 90 tablet, Rfl: 1    metoprolol succinate (TOPROL-XL) 50 mg 24 hr tablet, Take 1 tablet (50 mg total) by mouth daily at bedtime, Disp: 90 tablet, Rfl: 1    Multiple Vitamin (multivitamin) tablet, Take 1 tablet by mouth daily, Disp: , Rfl:     Omega-3 Fatty Acids (FISH OIL) 1,000 mg, Take 2 capsules by mouth daily at bedtime  , Disp: , Rfl:     simvastatin (ZOCOR) 40 mg tablet, Take 1 tablet (40 mg total) by mouth daily at bedtime, Disp: 90 tablet, Rfl: 1    Allergies   Allergen Reactions    Hibiclens [Chlorhexidine Gluconate] Rash     Red with pimples    Pollen Extract Nasal Congestion       Social History   Past Surgical History:   Procedure Laterality Date    HERNIA REPAIR      INCISION TENDON SHEATH HAND Left     thumb    AK HYSTEROSCOPY,W/ENDO BX N/A 10/14/2019    Procedure: DILATATION AND CURETTAGE (D&C) WITH HYSTEROSCOPY W/ Tim Damian;  Surgeon: Pablo Joya DO;  [facility];  Service: Gynecology    AK HYSTEROSCOPY,W/ENDOMETRIAL ABLATION N/A 10/14/2019    Procedure: Fanny Barrera;  Surgeon: Pablo Joya DO;  [facility];  Service: Gynecology    AK KNEE SCOPE,MED/LAT MENISECTOMY Left 11/5/2021    Procedure: KNEE ARTHROSCOPIC MENISCECTOMY Medial;  Surgeon: Eric House MD Odin;  [facility];  Service: Orthopedics    ND REPAIR UMBILICAL DDEH,1+L/Q,VCMLM N/A 8/23/2018    Procedure: UMBILICAL HERNIA REPAIR WITH MESH;  Surgeon: Guille Goel MD;  [facility];  Service: General    WISDOM TOOTH EXTRACTION       Family History   Problem Relation Age of Onset    Endometrial cancer Mother 55    Diabetes type II Mother     Hypertension Father     Heart attack Sister     Other Maternal Grandfather         Myocardial infarction arrhythmias    Stroke Paternal Grandfather         Syndrome    No Known Problems Daughter     No Known Problems Maternal Grandmother     No Known Problems Paternal Grandmother     No Known Problems Brother     No Known Problems Son     No Known Problems Paternal Aunt        Objective:  /88 (BP Location: Left arm, Patient Position: Sitting, Cuff Size: Standard)   Pulse 83   Temp 98 3 °F (36 8 °C) (Temporal)   Resp 20   Ht 5' 4" (1 626 m)   Wt 95 1 kg (209 lb 9 6 oz)   SpO2 98%   BMI 35 98 kg/m²        Physical Exam  Constitutional:       Appearance: She is well-developed  HENT:      Right Ear: External ear normal    Eyes:      Conjunctiva/sclera: Conjunctivae normal       Pupils: Pupils are equal, round, and reactive to light  Neck:      Thyroid: No thyromegaly  Vascular: No JVD  Cardiovascular:      Rate and Rhythm: Normal rate and regular rhythm  Heart sounds: Normal heart sounds  Pulmonary:      Breath sounds: Normal breath sounds  Abdominal:      General: Bowel sounds are normal       Palpations: Abdomen is soft  Musculoskeletal:         General: Normal range of motion  Cervical back: Normal range of motion  Lymphadenopathy:      Cervical: No cervical adenopathy  Skin:     General: Skin is dry  Neurological:      Mental Status: She is alert and oriented to person, place, and time  Deep Tendon Reflexes: Reflexes are normal and symmetric     Psychiatric:         Behavior: Behavior normal

## 2022-03-29 RX ORDER — ESCITALOPRAM OXALATE 10 MG/1
10 TABLET ORAL
Qty: 10 TABLET | Refills: 0 | Status: SHIPPED | OUTPATIENT
Start: 2022-03-29 | End: 2022-04-06 | Stop reason: SDUPTHER

## 2022-04-05 ENCOUNTER — PATIENT MESSAGE (OUTPATIENT)
Dept: INTERNAL MEDICINE CLINIC | Facility: OTHER | Age: 47
End: 2022-04-05

## 2022-04-05 DIAGNOSIS — I10 BENIGN HYPERTENSION: ICD-10-CM

## 2022-04-05 DIAGNOSIS — E78.00 HYPERCHOLESTEROLEMIA: ICD-10-CM

## 2022-04-05 DIAGNOSIS — E78.1 HYPERTRIGLYCERIDEMIA: ICD-10-CM

## 2022-04-06 DIAGNOSIS — I10 BENIGN HYPERTENSION: ICD-10-CM

## 2022-04-06 DIAGNOSIS — F41.1 GENERALIZED ANXIETY DISORDER: ICD-10-CM

## 2022-04-06 RX ORDER — ESCITALOPRAM OXALATE 10 MG/1
10 TABLET ORAL
Qty: 90 TABLET | Refills: 1 | Status: SHIPPED | OUTPATIENT
Start: 2022-04-06

## 2022-04-06 RX ORDER — HYDROCHLOROTHIAZIDE 12.5 MG/1
12.5 TABLET ORAL DAILY
Qty: 90 TABLET | Refills: 1 | Status: SHIPPED | OUTPATIENT
Start: 2022-04-06

## 2022-04-21 ENCOUNTER — TELEPHONE (OUTPATIENT)
Dept: OBGYN CLINIC | Facility: OTHER | Age: 47
End: 2022-04-21

## 2022-05-01 ENCOUNTER — OFFICE VISIT (OUTPATIENT)
Dept: URGENT CARE | Facility: MEDICAL CENTER | Age: 47
End: 2022-05-01
Payer: COMMERCIAL

## 2022-05-01 VITALS
TEMPERATURE: 99.3 F | WEIGHT: 209 LBS | HEART RATE: 70 BPM | OXYGEN SATURATION: 98 % | SYSTOLIC BLOOD PRESSURE: 118 MMHG | RESPIRATION RATE: 18 BRPM | DIASTOLIC BLOOD PRESSURE: 72 MMHG | BODY MASS INDEX: 35.87 KG/M2

## 2022-05-01 DIAGNOSIS — H60.92 OTITIS EXTERNA OF LEFT EAR, UNSPECIFIED CHRONICITY, UNSPECIFIED TYPE: Primary | ICD-10-CM

## 2022-05-01 PROCEDURE — 99213 OFFICE O/P EST LOW 20 MIN: CPT | Performed by: PHYSICIAN ASSISTANT

## 2022-05-01 NOTE — PATIENT INSTRUCTIONS
Otitis Externa, Ambulatory Care   GENERAL INFORMATION:   Otitis externa , or swimmer's ear, is an infection in the outer ear canal  This canal goes from the outside of the ear to the eardrum  Common symptoms include the following:   · Ear pain    · Outer ear canal is red and swollen    · Clear fluid or pus is leaking out of your ear    · Outer ear canal is itchy and you see a rash    · Trouble hearing because your ear is plugged    · Feel a bump in your ear canal, called a polyp    · Flakes of skin fall from your ear  Seek immediate care for the following symptoms:   · Fever    · Severe ear pain    · Sudden inability to hear at all    · New swelling in your face, behind your ears, or in your neck    · Sudden inability to move part of your face    · Sudden numbness in your face  Treatment for otitis externa  may include any of the following:  · NSAIDs  help decrease swelling and pain or fever  This medicine is available with or without a doctor's order  NSAIDs can cause stomach bleeding or kidney problems in certain people  If you take blood thinner medicine, always ask your healthcare provider if NSAIDs are safe for you  Always read the medicine label and follow directions  · Ear drops  are a combination of a steroid medicine and an antibiotic  The steroid helps decrease redness, swelling, and pain  The antibiotic helps kill the germs that caused your ear infection  · Ear wicking  may remove fluid or wax from your outer ear canal  Your healthcare provider may insert a small tube, called a wick, into your ear to help drain fluid  A wick also may be used to put medicine into your ear canal if the canal is blocked  Follow the steps below to use eardrops:   · Lie down on your side with your infected ear facing up  · Carefully drip the correct number of eardrops into your ear  Have another person help you if possible      · Gently move the outside part of your ear back and forth to help the medicine reach your ear canal      · Stay lying down in the same position (with your ear facing up) for 3 to 5 minutes  Prevent otitis externa:   · Do not put cotton swabs or foreign objects in your ears  · Wrap a clean moist washcloth around your finger, and use it to clean your outer ear and remove extra ear wax  · Use ear plugs when you swim  Dry your outer ears completely after you swim or bathe  Follow up with your healthcare provider as directed:  Write down your questions so you remember to ask them during your visits  CARE AGREEMENT:   You have the right to help plan your care  Learn about your health condition and how it may be treated  Discuss treatment options with your caregivers to decide what care you want to receive  You always have the right to refuse treatment  The above information is an  only  It is not intended as medical advice for individual conditions or treatments  Talk to your doctor, nurse or pharmacist before following any medical regimen to see if it is safe and effective for you  © 2014 [address] is for End User's use only and may not be sold, redistributed or otherwise used for commercial purposes  All illustrations and images included in CareNotes® are the copyrighted property of A D A M , Inc  or David Zuniga

## 2022-05-01 NOTE — PROGRESS NOTES
[facility]        NAME: Ashley Seen is a 49 y o  female  : 1975    MRN: [id number]  DATE: May 1, 2022  TIME: 1:40 PM    Assessment and Plan   Otitis externa of left ear, unspecified chronicity, unspecified type [H60 92]  1  Otitis externa of left ear, unspecified chronicity, unspecified type  neomycin-polymyxin-hydrocortisone (CORTISPORIN) otic solution    DISCONTINUED: neomycin-polymyxin-hydrocortisone (CORTISPORIN) otic solution         Patient Instructions       Follow up with PCP in 3-5 days  Utilize drops for 1 week    Chief Complaint     Chief Complaint   Patient presents with    Earache     Patient c/o L ear pain, congestion, and runny nose x over 1 week          History of Present Illness       Earache   There is pain in the left ear  This is a new problem  The current episode started in the past 7 days  The problem occurs constantly  The problem has been gradually worsening  There has been no fever  The pain is moderate  Pertinent negatives include no abdominal pain, coughing, diarrhea, ear discharge, headaches, hearing loss, neck pain, rash, rhinorrhea, sore throat or vomiting  She has tried nothing for the symptoms  Review of Systems   Review of Systems   HENT: Positive for ear pain  Negative for ear discharge, hearing loss, rhinorrhea and sore throat  Respiratory: Negative for cough  Gastrointestinal: Negative for abdominal pain, diarrhea and vomiting  Musculoskeletal: Negative for neck pain  Skin: Negative for rash  Neurological: Negative for headaches  All other systems reviewed and are negative          Current Medications       Current Outpatient Medications:     albuterol (Ventolin HFA) 90 mcg/act inhaler, Inhale 1-2 puffs as needed for wheezing, Disp: 18 g, Rfl: 2    escitalopram (LEXAPRO) 10 mg tablet, Take 1 tablet (10 mg total) by mouth daily at bedtime, Disp: 90 tablet, Rfl: 1    fluticasone (FLONASE) 50 mcg/act nasal spray, 1 spray into each nostril daily (Patient taking differently: 1 spray into each nostril as needed ), Disp: 1 Bottle, Rfl: 0    hydrochlorothiazide (HYDRODIURIL) 12 5 mg tablet, Take 1 tablet (12 5 mg total) by mouth daily, Disp: 90 tablet, Rfl: 1    loratadine (CLARITIN) 10 mg tablet, Take 10 mg by mouth daily at bedtime  , Disp: , Rfl:     losartan (Cozaar) 50 mg tablet, Take 1 tablet (50 mg total) by mouth daily (Patient taking differently: Take 50 mg by mouth daily at bedtime ), Disp: 90 tablet, Rfl: 1    metoprolol succinate (TOPROL-XL) 50 mg 24 hr tablet, Take 1 tablet (50 mg total) by mouth daily at bedtime, Disp: 90 tablet, Rfl: 1    Multiple Vitamin (multivitamin) tablet, Take 1 tablet by mouth daily, Disp: , Rfl:     neomycin-polymyxin-hydrocortisone (CORTISPORIN) otic solution, Administer 3 drops into the left ear every 6 (six) hours for 7 days, Disp: 10 mL, Rfl: 0    Omega-3 Fatty Acids (FISH OIL) 1,000 mg, Take 2 capsules by mouth daily at bedtime  , Disp: , Rfl:     simvastatin (ZOCOR) 40 mg tablet, Take 1 tablet (40 mg total) by mouth daily at bedtime, Disp: 90 tablet, Rfl: 1    Current Allergies     Allergies as of 05/01/2022 - Reviewed 05/01/2022   Allergen Reaction Noted    Hibiclens [chlorhexidine gluconate] Rash 10/14/2019    Pollen extract Nasal Congestion 09/02/2015            The following portions of the patient's history were reviewed and updated as appropriate: allergies, current medications, past family history, past medical history, past social history, past surgical history and problem list      Past Medical History:   Diagnosis Date    Acute diffuse otitis externa of right ear 8/23/2021    Anemia     Anxiety     Asthma     controlled  seasonal    Gestational diabetes mellitus     Hyperlipidemia     Hypertension     Other chest pain 1/27/2020    PONV (postoperative nausea and vomiting)     Pregnancy     Resolved: 07/29/2015    Trigger finger     Resolved: 12/02/2016    Yeast infection Resolved: 07/29/2015       Past Surgical History:   Procedure Laterality Date    HERNIA REPAIR      INCISION TENDON SHEATH HAND Left     thumb    ID HYSTEROSCOPY,W/ENDO BX N/A 10/14/2019    Procedure: DILATATION AND CURETTAGE (D&C) WITH HYSTEROSCOPY W/ Kaitlin Loser;  Surgeon: Lovell Bence, DO;  [facility];  Service: Gynecology    ID HYSTEROSCOPY,W/ENDOMETRIAL ABLATION N/A 10/14/2019    Procedure: ABLATION ENDOMETRIAL Kaitlin Loser;  Surgeon: Lovell Bence, DO;  [facility];  Service: Gynecology    ID KNEE SCOPE,MED/LAT MENISECTOMY Left 11/5/2021    Procedure: KNEE ARTHROSCOPIC MENISCECTOMY Medial;  Surgeon: Carine Umanzor MD;  [facility];  Service: Orthopedics    ID REPAIR UMBILICAL WKYW,7+C/T,BHSVY N/A 8/23/2018    Procedure: UMBILICAL HERNIA REPAIR WITH MESH;  Surgeon: Gay Michaels MD;  [facility];  Service: General    WISDOM TOOTH EXTRACTION         Family History   Problem Relation Age of Onset    Endometrial cancer Mother 55    Diabetes type II Mother     Hypertension Father     Heart attack Sister     Other Maternal Grandfather         Myocardial infarction arrhythmias    Stroke Paternal Grandfather         Syndrome    No Known Problems Daughter     No Known Problems Maternal Grandmother     No Known Problems Paternal Grandmother     No Known Problems Brother     No Known Problems Son     No Known Problems Paternal Aunt          Medications have been verified  Objective   /72   Pulse 70   Temp 99 3 °F (37 4 °C)   Resp 18   Wt 94 8 kg (209 lb)   SpO2 98%   BMI 35 87 kg/m²   No LMP recorded  Patient has had an ablation  Physical Exam     Physical Exam  Vitals and nursing note reviewed  Constitutional:       Appearance: Normal appearance  She is normal weight     HENT:      Right Ear: Tympanic membrane, ear canal and external ear normal       Left Ear: Tympanic membrane and external ear normal       Nose: Nose normal  Mouth/Throat:      Mouth: Mucous membranes are moist    Cardiovascular:      Rate and Rhythm: Normal rate and regular rhythm  Pulses: Normal pulses  Heart sounds: Normal heart sounds  Pulmonary:      Effort: Pulmonary effort is normal       Breath sounds: Normal breath sounds  Neurological:      Mental Status: She is alert  Left ear canal 20% closed erythematous

## 2022-05-10 ENCOUNTER — APPOINTMENT (EMERGENCY)
Dept: RADIOLOGY | Facility: HOSPITAL | Age: 47
End: 2022-05-10
Payer: COMMERCIAL

## 2022-05-10 ENCOUNTER — HOSPITAL ENCOUNTER (EMERGENCY)
Facility: HOSPITAL | Age: 47
Discharge: HOME | End: 2022-05-10
Attending: EMERGENCY MEDICINE
Payer: COMMERCIAL

## 2022-05-10 VITALS
SYSTOLIC BLOOD PRESSURE: 151 MMHG | OXYGEN SATURATION: 96 % | RESPIRATION RATE: 18 BRPM | TEMPERATURE: 97.7 F | DIASTOLIC BLOOD PRESSURE: 96 MMHG | HEART RATE: 68 BPM

## 2022-05-10 DIAGNOSIS — R07.89 ATYPICAL CHEST PAIN: Primary | ICD-10-CM

## 2022-05-10 LAB
ALBUMIN SERPL BCP-MCNC: 4.1 G/DL (ref 3.5–5)
ALP SERPL-CCNC: 75 U/L (ref 46–116)
ALT SERPL W P-5'-P-CCNC: 26 U/L (ref 12–78)
ANION GAP SERPL CALCULATED.3IONS-SCNC: 5 MMOL/L (ref 4–13)
AST SERPL W P-5'-P-CCNC: 13 U/L (ref 5–45)
ATRIAL RATE: 67 BPM
BASOPHILS # BLD AUTO: 0.04 THOUSANDS/ΜL (ref 0–0.1)
BASOPHILS NFR BLD AUTO: 1 % (ref 0–1)
BILIRUB SERPL-MCNC: 0.73 MG/DL (ref 0.2–1)
BUN SERPL-MCNC: 17 MG/DL (ref 5–25)
CALCIUM SERPL-MCNC: 9.6 MG/DL (ref 8.3–10.1)
CARDIAC TROPONIN I PNL SERPL HS: <2 NG/L
CARDIAC TROPONIN I PNL SERPL HS: <2 NG/L
CHLORIDE SERPL-SCNC: 106 MMOL/L (ref 100–108)
CO2 SERPL-SCNC: 26 MMOL/L (ref 21–32)
CREAT SERPL-MCNC: 0.76 MG/DL (ref 0.6–1.3)
D DIMER PPP FEU-MCNC: 0.31 UG/ML FEU
EOSINOPHIL # BLD AUTO: 0.29 THOUSAND/ΜL (ref 0–0.61)
EOSINOPHIL NFR BLD AUTO: 4 % (ref 0–6)
ERYTHROCYTE [DISTWIDTH] IN BLOOD BY AUTOMATED COUNT: 15.7 % (ref 11.6–15.1)
GFR SERPL CREATININE-BSD FRML MDRD: 93 ML/MIN/1.73SQ M
GLUCOSE SERPL-MCNC: 119 MG/DL (ref 65–140)
HCT VFR BLD AUTO: 39.7 % (ref 34.8–46.1)
HGB BLD-MCNC: 12.7 G/DL (ref 11.5–15.4)
IMM GRANULOCYTES # BLD AUTO: 0.02 THOUSAND/UL (ref 0–0.2)
IMM GRANULOCYTES NFR BLD AUTO: 0 % (ref 0–2)
LYMPHOCYTES # BLD AUTO: 1.64 THOUSANDS/ΜL (ref 0.6–4.47)
LYMPHOCYTES NFR BLD AUTO: 25 % (ref 14–44)
MCH RBC QN AUTO: 26.5 PG (ref 26.8–34.3)
MCHC RBC AUTO-ENTMCNC: 32 G/DL (ref 31.4–37.4)
MCV RBC AUTO: 83 FL (ref 82–98)
MONOCYTES # BLD AUTO: 0.5 THOUSAND/ΜL (ref 0.17–1.22)
MONOCYTES NFR BLD AUTO: 8 % (ref 4–12)
NEUTROPHILS # BLD AUTO: 4.09 THOUSANDS/ΜL (ref 1.85–7.62)
NEUTS SEG NFR BLD AUTO: 62 % (ref 43–75)
NRBC BLD AUTO-RTO: 0 /100 WBCS
P AXIS: 53 DEGREES
PLATELET # BLD AUTO: 228 THOUSANDS/UL (ref 149–390)
PMV BLD AUTO: 10.5 FL (ref 8.9–12.7)
POTASSIUM SERPL-SCNC: 3.9 MMOL/L (ref 3.5–5.3)
PR INTERVAL: 160 MS
PROT SERPL-MCNC: 7.6 G/DL (ref 6.4–8.2)
QRS AXIS: -14 DEGREES
QRSD INTERVAL: 80 MS
QT INTERVAL: 390 MS
QTC INTERVAL: 412 MS
RBC # BLD AUTO: 4.8 MILLION/UL (ref 3.81–5.12)
SODIUM SERPL-SCNC: 137 MMOL/L (ref 136–145)
T WAVE AXIS: 4 DEGREES
VENTRICULAR RATE: 67 BPM
WBC # BLD AUTO: 6.58 THOUSAND/UL (ref 4.31–10.16)

## 2022-05-10 PROCEDURE — 36415 COLL VENOUS BLD VENIPUNCTURE: CPT

## 2022-05-10 PROCEDURE — 71045 X-RAY EXAM CHEST 1 VIEW: CPT

## 2022-05-10 PROCEDURE — 84484 ASSAY OF TROPONIN QUANT: CPT | Performed by: EMERGENCY MEDICINE

## 2022-05-10 PROCEDURE — 93010 ELECTROCARDIOGRAM REPORT: CPT | Performed by: INTERNAL MEDICINE

## 2022-05-10 PROCEDURE — 80053 COMPREHEN METABOLIC PANEL: CPT | Performed by: EMERGENCY MEDICINE

## 2022-05-10 PROCEDURE — 93005 ELECTROCARDIOGRAM TRACING: CPT | Performed by: PHYSICIAN ASSISTANT

## 2022-05-10 PROCEDURE — 85379 FIBRIN DEGRADATION QUANT: CPT | Performed by: EMERGENCY MEDICINE

## 2022-05-10 PROCEDURE — 99284 EMERGENCY DEPT VISIT MOD MDM: CPT | Performed by: EMERGENCY MEDICINE

## 2022-05-10 PROCEDURE — 99285 EMERGENCY DEPT VISIT HI MDM: CPT

## 2022-05-10 PROCEDURE — 85025 COMPLETE CBC W/AUTO DIFF WBC: CPT | Performed by: EMERGENCY MEDICINE

## 2022-05-10 NOTE — ED ATTENDING ATTESTATION
5/10/2022  IJalen MD, saw and evaluated the patient  I have discussed the patient with the resident/non-physician practitioner and agree with the resident's/non-physician practitioner's findings, Plan of Care, and MDM as documented in the resident's/non-physician practitioner's note, except where noted  All available labs and Radiology studies were reviewed  I was present for key portions of any procedure(s) performed by the resident/non-physician practitioner and I was immediately available to provide assistance  At this point I agree with the current assessment done in the Emergency Department  I have conducted an independent evaluation of this patient a history and physical is as follows:    Patient presents the emergency department with chief complaint chest pain  The patient reports he has had episodes of chest pain that was similar to this in the past they are associated with periods of anxiety  The patient this morning was at work talking about the issues and problems that are causing her anxiety when she developed a central chest discomfort that radiated to her back  The patient reports that pain in the radiation to the back was typical for the pain she gets when she is anxious  The patient also noticed pain that radiates slightly into her right jaw which was not typical for her pain that she gets when she is anxious  The pain is much better now and very minimal in severity  The patient denied any change in the discomfort exertion  The patient denies any associated shortness of breath, nausea, diaphoresis, or neurologic symptoms  The patient denies head or neck pain  There is no change in his comfort with exertion  The patient rates her chest pain as slight now  The patient reports she had a flight to [place] but no further flights recently  That flight was 2 weeks ago  The patient denies immobilization, cancer, birth control pills, or smoking    The patient denies fever, cough, or any respiratory symptoms  The patient has cardiac risk factors of hypertension, hypercholesterolemia, and a sister who had an MI at the age of 46  Physical exam demonstrates a pleasant alert nontoxic female no acute distress  HEENT exam is normal   Lungs are clear with equal breath sounds  The heart had a regular rate rhythm  The chest was nontender to palpation  The abdomen is soft and nontender  No masses were appreciated  The patient had normal mental status, cranial nerves, and strength in all extremities  Sensation was normal in all extremities  Skin had no rash        ED Course         Critical Care Time  Procedures

## 2022-05-10 NOTE — DISCHARGE INSTRUCTIONS
Please follow-up primary care provider and Cardiology  Please take medications as prescribed  Recommend Tylenol 650 mg or ibuprofen 600 mg every 6 hours as needed for pain  Please return for severely worsening chest pain, significant shortness of breath, or any other concerning signs or symptoms  Please refer to following documents for additional instructions and return precautions

## 2022-05-10 NOTE — ED PROVIDER NOTES
History  Chief Complaint   Patient presents with    Chest Pain     Pt reports being at working and starting with sudden onset of central cp radiating through to back and jaw; pt seen by RN at work and had high bp reading; pt states pain has decreased at this time     26-year-old female history hypertension hyperlipidemia presenting with acute onset chest pain  Patient reports acute onset of sternal chest discomfort beginning around 715 this morning while at rest standing upright  No exertional component  Reports associated radiation to her back and right jaw  Has significantly improved since onset with rest   Reports similar chest discomfort in the past that she attributed to anxiety but the radiation to her jaw is new  Denies any associated shortness of breath, diaphoresis, lightheadedness/orthostasis, nausea/vomiting  Reports recent airplane travel from [place] about 2 weeks ago but denies any leg pain or swelling, shortness of breath, recent surgery, or estrogen containing medications  Prior to Admission Medications   Prescriptions Last Dose Informant Patient Reported? Taking?    Multiple Vitamin (multivitamin) tablet  Self Yes No   Sig: Take 1 tablet by mouth daily   Omega-3 Fatty Acids (FISH OIL) 1,000 mg  Self Yes No   Sig: Take 2 capsules by mouth daily at bedtime     albuterol (Ventolin HFA) 90 mcg/act inhaler  Self No No   Sig: Inhale 1-2 puffs as needed for wheezing   escitalopram (LEXAPRO) 10 mg tablet   No No   Sig: Take 1 tablet (10 mg total) by mouth daily at bedtime   fluticasone (FLONASE) 50 mcg/act nasal spray  Self No No   Si spray into each nostril daily   Patient taking differently: 1 spray into each nostril as needed    hydrochlorothiazide (HYDRODIURIL) 12 5 mg tablet   No No   Sig: Take 1 tablet (12 5 mg total) by mouth daily   loratadine (CLARITIN) 10 mg tablet  Self Yes No   Sig: Take 10 mg by mouth daily at bedtime     losartan (Cozaar) 50 mg tablet  Self No No   Sig: Take 1 tablet (50 mg total) by mouth daily   Patient taking differently: Take 50 mg by mouth daily at bedtime    metoprolol succinate (TOPROL-XL) 50 mg 24 hr tablet  Self No No   Sig: Take 1 tablet (50 mg total) by mouth daily at bedtime   neomycin-polymyxin-hydrocortisone (CORTISPORIN) otic solution   No No   Sig: Administer 3 drops into the left ear every 6 (six) hours for 7 days   simvastatin (ZOCOR) 40 mg tablet  Self No No   Sig: Take 1 tablet (40 mg total) by mouth daily at bedtime      Facility-Administered Medications: None       Past Medical History:   Diagnosis Date    Acute diffuse otitis externa of right ear 8/23/2021    Anemia     Anxiety     Asthma     controlled  seasonal    Gestational diabetes mellitus     Hyperlipidemia     Hypertension     Other chest pain 1/27/2020    PONV (postoperative nausea and vomiting)     Pregnancy     Resolved: 07/29/2015    Trigger finger     Resolved: 12/02/2016    Yeast infection     Resolved: 07/29/2015       Past Surgical History:   Procedure Laterality Date    HERNIA REPAIR      INCISION TENDON SHEATH HAND Left     thumb    SD HYSTEROSCOPY,W/ENDO BX N/A 10/14/2019    Procedure: DILATATION AND CURETTAGE (D&C) WITH HYSTEROSCOPY W/ Fco Claudio;  Surgeon: Feliciano Roper DO;  [facility];  Service: Gynecology    SD HYSTEROSCOPY,W/ENDOMETRIAL ABLATION N/A 10/14/2019    Procedure: ABLATION ENDOMETRIAL Fco Bunkers;  Surgeon: Feliciano Roper DO;  [facility];  Service: Gynecology    SD KNEE SCOPE,MED/LAT MENISECTOMY Left 11/5/2021    Procedure: KNEE ARTHROSCOPIC MENISCECTOMY Medial;  Surgeon: Eneida Elizalde MD;  [facility];  Service: Orthopedics    SD REPAIR UMBILICAL DAUS,8+J/Q,BGFLI N/A 8/23/2018    Procedure: UMBILICAL HERNIA REPAIR WITH MESH;  Surgeon: Shamar Zamora MD;  [facility];  Service: General    WISDOM TOOTH EXTRACTION         Family History   Problem Relation Age of Onset    Endometrial cancer Mother 55    Diabetes type II Mother     Hypertension Father     Heart attack Sister     Other Maternal Grandfather         Myocardial infarction arrhythmias    Stroke Paternal Grandfather         Syndrome    No Known Problems Daughter     No Known Problems Maternal Grandmother     No Known Problems Paternal Grandmother     No Known Problems Brother     No Known Problems Son     No Known Problems Paternal Aunt      I have reviewed and agree with the history as documented  E-Cigarette/Vaping    E-Cigarette Use Never User      E-Cigarette/Vaping Substances    Nicotine No     THC No     CBD No     Flavoring No     Other No      Social History     Tobacco Use    Smoking status: Never Smoker    Smokeless tobacco: Never Used   Vaping Use    Vaping Use: Never used   Substance Use Topics    Alcohol use: Yes     Comment: 1-2 drinks per week on average typically consumes beer    Drug use: No        Review of Systems   Constitutional: Negative for appetite change, chills, diaphoresis, fever and unexpected weight change  HENT: Negative for congestion and rhinorrhea  Eyes: Negative for photophobia and visual disturbance  Respiratory: Negative for cough, chest tightness and shortness of breath  Cardiovascular: Positive for chest pain  Negative for palpitations and leg swelling  Gastrointestinal: Negative for abdominal distention, abdominal pain, blood in stool, constipation, diarrhea, nausea and vomiting  Genitourinary: Negative for dysuria and hematuria  Musculoskeletal: Negative for back pain, joint swelling, neck pain and neck stiffness  Skin: Negative for color change, pallor, rash and wound  Neurological: Negative for dizziness, syncope, weakness, light-headedness and headaches  Psychiatric/Behavioral: Negative for agitation  All other systems reviewed and are negative        Physical Exam  ED Triage Vitals [05/10/22 0822]   Temperature Pulse Respirations Blood Pressure SpO2 97 7 °F (36 5 °C) 71 18 (!) 167/104 96 %      Temp Source Heart Rate Source Patient Position - Orthostatic VS BP Location FiO2 (%)   Oral Monitor Sitting Right arm --      Pain Score       --             Orthostatic Vital Signs  Vitals:    05/10/22 0822 05/10/22 0845   BP: (!) 167/104 151/96   Pulse: 71 68   Patient Position - Orthostatic VS: Sitting Lying       Physical Exam  Vitals and nursing note reviewed  Constitutional:       General: She is not in acute distress  Appearance: Normal appearance  She is well-developed  She is not ill-appearing, toxic-appearing or diaphoretic  HENT:      Head: Normocephalic and atraumatic  Nose: Nose normal  No congestion or rhinorrhea  Mouth/Throat:      Mouth: Mucous membranes are moist       Pharynx: Oropharynx is clear  No oropharyngeal exudate or posterior oropharyngeal erythema  Eyes:      General: No scleral icterus  Right eye: No discharge  Left eye: No discharge  Extraocular Movements: Extraocular movements intact  Conjunctiva/sclera: Conjunctivae normal       Pupils: Pupils are equal, round, and reactive to light  Neck:      Vascular: No JVD  Trachea: No tracheal deviation  Cardiovascular:      Rate and Rhythm: Normal rate and regular rhythm  Heart sounds: Normal heart sounds  No murmur heard  No friction rub  No gallop  Comments: Normal rate and regular rhythm  Pulmonary:      Effort: Pulmonary effort is normal  No respiratory distress  Breath sounds: Normal breath sounds  No stridor  No wheezing or rales  Comments: Clear to auscultation bilaterally  Chest:      Chest wall: No tenderness  Abdominal:      General: Bowel sounds are normal  There is no distension  Palpations: Abdomen is soft  Tenderness: There is no abdominal tenderness  There is no right CVA tenderness, left CVA tenderness, guarding or rebound  Comments: Soft, nontender, nondistended    Normal bowel sounds throughout   Musculoskeletal:         General: No swelling, tenderness, deformity or signs of injury  Normal range of motion  Cervical back: Normal range of motion and neck supple  No rigidity  No muscular tenderness  Right lower leg: No tenderness  No edema  Left lower leg: No tenderness  No edema  Lymphadenopathy:      Cervical: No cervical adenopathy  Skin:     General: Skin is warm and dry  Coloration: Skin is not pale  Findings: No erythema or rash  Neurological:      General: No focal deficit present  Mental Status: She is alert  Mental status is at baseline  Sensory: No sensory deficit  Motor: No weakness or abnormal muscle tone  Coordination: Coordination normal       Gait: Gait normal       Comments: Alert  Strength and sensation grossly intact  Ambulatory without difficulty at baseline   Psychiatric:         Behavior: Behavior normal          Thought Content:  Thought content normal          ED Medications  Medications - No data to display    Diagnostic Studies  Results Reviewed     Procedure Component Value Units Date/Time    HS Troponin I 2hr [[id number]] Collected: 05/10/22 1048    Lab Status: Final result Specimen: Blood from Arm, Left Updated: 05/10/22 1126     hs TnI 2hr <2 ng/L      Delta 2hr hsTnI --    HS Troponin 0hr (reflex protocol) [[id number]]  (Normal) Collected: 05/10/22 0842    Lab Status: Final result Specimen: Blood from Arm, Left Updated: 05/10/22 0916     hs TnI 0hr <2 ng/L     Comprehensive metabolic panel [[id number]] Collected: 05/10/22 0842    Lab Status: Final result Specimen: Blood from Arm, Left Updated: 05/10/22 0914     Sodium 137 mmol/L      Potassium 3 9 mmol/L      Chloride 106 mmol/L      CO2 26 mmol/L      ANION GAP 5 mmol/L      BUN 17 mg/dL      Creatinine 0 76 mg/dL      Glucose 119 mg/dL      Calcium 9 6 mg/dL      AST 13 U/L      ALT 26 U/L      Alkaline Phosphatase 75 U/L      Total Protein 7 6 g/dL      Albumin 4 1 g/dL      Total Bilirubin 0 73 mg/dL      eGFR 93 ml/min/1 73sq m     Narrative:      National Kidney Disease Foundation guidelines for Chronic Kidney Disease (CKD):     Stage 1 with normal or high GFR (GFR > 90 mL/min/1 73 square meters)    Stage 2 Mild CKD (GFR = 60-89 mL/min/1 73 square meters)    Stage 3A Moderate CKD (GFR = 45-59 mL/min/1 73 square meters)    Stage 3B Moderate CKD (GFR = 30-44 mL/min/1 73 square meters)    Stage 4 Severe CKD (GFR = 15-29 mL/min/1 73 square meters)    Stage 5 End Stage CKD (GFR <15 mL/min/1 73 square meters)  Note: GFR calculation is accurate only with a steady state creatinine    D-Dimer [[id number]]  (Normal) Collected: 05/10/22 0842    Lab Status: Final result Specimen: Blood from Arm, Left Updated: 05/10/22 0908     D-Dimer, Quant 0 31 ug/ml FEU     CBC and differential [[id number]]  (Abnormal) Collected: 05/10/22 0842    Lab Status: Final result Specimen: Blood from Arm, Left Updated: 05/10/22 0853     WBC 6 58 Thousand/uL      RBC 4 80 Million/uL      Hemoglobin 12 7 g/dL      Hematocrit 39 7 %      MCV 83 fL      MCH 26 5 pg      MCHC 32 0 g/dL      RDW 15 7 %      MPV 10 5 fL      Platelets 510 Thousands/uL      nRBC 0 /100 WBCs      Neutrophils Relative 62 %      Immat GRANS % 0 %      Lymphocytes Relative 25 %      Monocytes Relative 8 %      Eosinophils Relative 4 %      Basophils Relative 1 %      Neutrophils Absolute 4 09 Thousands/µL      Immature Grans Absolute 0 02 Thousand/uL      Lymphocytes Absolute 1 64 Thousands/µL      Monocytes Absolute 0 50 Thousand/µL      Eosinophils Absolute 0 29 Thousand/µL      Basophils Absolute 0 04 Thousands/µL                  XR chest 1 view portable   Final Result by Ivy Borges MD (05/10 7173)      No acute cardiopulmonary disease                    Workstation performed: [id number]               Procedures  Procedures      ED Course             HEART Risk Score    Flowsheet Row Most Recent Value   Heart Score Risk Calculator    History 0 Filed at: 05/10/2022 0900   ECG 0 Filed at: 05/10/2022 0900   Age 1 Filed at: 05/10/2022 0900   Risk Factors 1 Filed at: 05/10/2022 0900   Troponin 0 Filed at: 05/10/2022 0900   HEART Score 2 Filed at: 05/10/2022 0900                      SBIRT 20yo+    Flowsheet Row Most Recent Value   SBIRT (25 yo +)    In order to provide better care to our patients, we are screening all of our patients for alcohol and drug use  Would it be okay to ask you these screening questions? No Filed at: 05/10/2022 0909                MDM  Number of Diagnoses or Management Options  Atypical chest pain  Diagnosis management comments: 54-year-old female history hypertension hyperlipidemia presenting with acute onset chest pain  Acute onset chest pain while at rest without exertional component and without associated nausea/vomiting, lightheadedness/fainting, right-sided radiation, diaphoresis  No concomitant shortness of breath or leg pain swelling with normal heart rate  Plan for cardiac evaluation and dimer  Refusing any pain medication at this time  Reassess  EKG normal sinus rhythm, no significant ST changes  Labs no acute process  Delta troponin negative  Pain almost completely resolved  Discussed results and recommendations  Advised follow-up primary care provider  Medication recommendations  Given instructions and return precautions  All questions answered  Patient acknowledged understanding of all written and verbal instructions and return precautions  Discharge         Amount and/or Complexity of Data Reviewed  Clinical lab tests: reviewed and ordered  Tests in the radiology section of CPT®: reviewed and ordered  Tests in the medicine section of CPT®: reviewed and ordered  Review and summarize past medical records: yes  Discuss the patient with other providers: yes  Independent visualization of images, tracings, or specimens: yes    Risk of Complications, Morbidity, and/or Mortality  Presenting problems: high  Diagnostic procedures: high  Management options: high    Patient Progress  Patient progress: improved      Disposition  Final diagnoses:   Atypical chest pain     Time reflects when diagnosis was documented in both MDM as applicable and the Disposition within this note     Time User Action Codes Description Comment    5/10/2022 11:29 AM Lorri Danielson Add [R07 89] Atypical chest pain       ED Disposition     ED Disposition   Discharge    Condition   Stable    Date/Time   Tue May 10, 2022 11:29 AM    Comment   Antwan Abel discharge to home/self care                 Follow-up Information     Follow up With Specialties Details Why Contact Info Additional Information    [address], [address] [facility], Internal Medicine, Nurse Practitioner Schedule an appointment as soon as possible for a visit in 1 week  [place] [address] 34 [facility] Emergency Department Emergency Medicine Go to  If symptoms worsen [address]  [address] [facility] 64 [facility] Emergency Department, [address], [facility] 108    [address] [facility] Cardiology Schedule an appointment as soon as possible for a visit in 3 days  [address] [facility] [phone]  [facility], [address] [place], [address]          Discharge Medication List as of 5/10/2022 11:31 AM      CONTINUE these medications which have NOT CHANGED    Details   albuterol (Ventolin HFA) 90 mcg/act inhaler Inhale 1-2 puffs as needed for wheezing, Starting Thu 10/7/2021, Normal      escitalopram (LEXAPRO) 10 mg tablet Take 1 tablet (10 mg total) by mouth daily at bedtime, Starting Wed 4/6/2022, Normal      fluticasone (FLONASE) 50 mcg/act nasal spray 1 spray into each nostril daily, Starting Fri 11/8/2019, Print      hydrochlorothiazide (HYDRODIURIL) 12 5 mg tablet Take 1 tablet (12 5 mg total) by mouth daily, Starting Wed 4/6/2022, Normal      loratadine (CLARITIN) 10 mg tablet Take 10 mg by mouth daily at bedtime  , Historical Med      losartan (Cozaar) 50 mg tablet Take 1 tablet (50 mg total) by mouth daily, Starting Mon 10/18/2021, Normal      metoprolol succinate (TOPROL-XL) 50 mg 24 hr tablet Take 1 tablet (50 mg total) by mouth daily at bedtime, Starting Wed 12/1/2021, Normal      Multiple Vitamin (multivitamin) tablet Take 1 tablet by mouth daily, Historical Med      neomycin-polymyxin-hydrocortisone (CORTISPORIN) otic solution Administer 3 drops into the left ear every 6 (six) hours for 7 days, Starting Sun 5/1/2022, Until Sun 5/8/2022, Normal      Omega-3 Fatty Acids (FISH OIL) 1,000 mg Take 2 capsules by mouth daily at bedtime  , Starting Thu 9/7/2017, Historical Med      simvastatin (ZOCOR) 40 mg tablet Take 1 tablet (40 mg total) by mouth daily at bedtime, Starting Thu 10/7/2021, Normal               PDMP Review       Value Time User    PDMP Reviewed  Yes 11/5/2021  8:17 AM John Go PA-C           ED Provider  Attending physically available and evaluated Maria Victoria Tapia I managed the patient along with the ED Attending      Electronically Signed by         Patricia Montero MD  05/11/22 0708

## 2022-05-25 RX ORDER — METOPROLOL SUCCINATE 50 MG/1
50 TABLET, EXTENDED RELEASE ORAL
Qty: 90 TABLET | Refills: 1 | Status: SHIPPED | OUTPATIENT
Start: 2022-05-25

## 2022-05-25 RX ORDER — SIMVASTATIN 40 MG
40 TABLET ORAL
Qty: 90 TABLET | Refills: 1 | Status: SHIPPED | OUTPATIENT
Start: 2022-05-25 | End: 2022-06-29 | Stop reason: ALTCHOICE

## 2022-06-07 ENCOUNTER — ANESTHESIA EVENT (OUTPATIENT)
Dept: PERIOP | Facility: HOSPITAL | Age: 47
End: 2022-06-07
Payer: COMMERCIAL

## 2022-06-10 NOTE — PRE-PROCEDURE INSTRUCTIONS
Pre-Surgery Instructions:   Medication Instructions    albuterol (Ventolin HFA) 90 mcg/act inhaler Uses PRN- OK to take day of surgery    escitalopram (LEXAPRO) 10 mg tablet Take night before surgery    fluticasone (FLONASE) 50 mcg/act nasal spray Uses PRN- OK to take day of surgery    hydrochlorothiazide (HYDRODIURIL) 12 5 mg tablet Take night before surgery    metoprolol succinate (TOPROL-XL) 50 mg 24 hr tablet Take night before surgery    Multiple Vitamin (multivitamin) tablet Stop taking 7 days prior to surgery   Omega-3 Fatty Acids (FISH OIL) 1,000 mg Stop taking 7 days prior to surgery   simvastatin (ZOCOR) 40 mg tablet Take night before surgery    INSTR ON MARIE CALL,  REPORT LOC , BRING PHOTO ID/MED LIST/INS  INFO ,SHOWER REV , STOP ASA/NSAID/VIT 7 DAY PREOP, PT VERBALIZES UNDERSTANDING W/ NO FURTHER QUESTIONS

## 2022-06-13 DIAGNOSIS — Z91.89 AT RISK FOR OBSTRUCTIVE SLEEP APNEA: Primary | ICD-10-CM

## 2022-06-20 ENCOUNTER — ANESTHESIA (OUTPATIENT)
Dept: PERIOP | Facility: HOSPITAL | Age: 47
End: 2022-06-20
Payer: COMMERCIAL

## 2022-06-20 ENCOUNTER — HOSPITAL ENCOUNTER (OUTPATIENT)
Facility: HOSPITAL | Age: 47
Setting detail: OUTPATIENT SURGERY
Discharge: HOME | End: 2022-06-20
Attending: OBSTETRICS & GYNECOLOGY | Admitting: OBSTETRICS & GYNECOLOGY
Payer: COMMERCIAL

## 2022-06-20 VITALS
OXYGEN SATURATION: 96 % | BODY MASS INDEX: 35.68 KG/M2 | TEMPERATURE: 97.7 F | SYSTOLIC BLOOD PRESSURE: 123 MMHG | RESPIRATION RATE: 16 BRPM | DIASTOLIC BLOOD PRESSURE: 74 MMHG | HEIGHT: 64 IN | HEART RATE: 69 BPM | WEIGHT: 209 LBS

## 2022-06-20 LAB
EXT PREGNANCY TEST URINE: NEGATIVE
EXT. CONTROL: NORMAL

## 2022-06-20 PROCEDURE — C1771 REP DEV, URINARY, W/SLING: HCPCS | Performed by: OBSTETRICS & GYNECOLOGY

## 2022-06-20 PROCEDURE — 81025 URINE PREGNANCY TEST: CPT | Performed by: OBSTETRICS & GYNECOLOGY

## 2022-06-20 DEVICE — SLING TRANSVAGINAL DESARA BLUE 1.1 X 45CM: Type: IMPLANTABLE DEVICE | Site: BLADDER | Status: FUNCTIONAL

## 2022-06-20 RX ORDER — GLYCOPYRROLATE 0.2 MG/ML
INJECTION INTRAMUSCULAR; INTRAVENOUS AS NEEDED
Status: DISCONTINUED | OUTPATIENT
Start: 2022-06-20 | End: 2022-06-20

## 2022-06-20 RX ORDER — ONDANSETRON 2 MG/ML
4 INJECTION INTRAMUSCULAR; INTRAVENOUS ONCE AS NEEDED
Status: DISCONTINUED | OUTPATIENT
Start: 2022-06-20 | End: 2022-06-20 | Stop reason: HOSPADM

## 2022-06-20 RX ORDER — ALBUTEROL SULFATE 90 UG/1
2 AEROSOL, METERED RESPIRATORY (INHALATION) EVERY 4 HOURS PRN
Status: DISCONTINUED | OUTPATIENT
Start: 2022-06-20 | End: 2022-06-20 | Stop reason: HOSPADM

## 2022-06-20 RX ORDER — MIDAZOLAM HYDROCHLORIDE 2 MG/2ML
INJECTION, SOLUTION INTRAMUSCULAR; INTRAVENOUS AS NEEDED
Status: DISCONTINUED | OUTPATIENT
Start: 2022-06-20 | End: 2022-06-20

## 2022-06-20 RX ORDER — SODIUM CHLORIDE 9 MG/ML
125 INJECTION, SOLUTION INTRAVENOUS CONTINUOUS
Status: DISCONTINUED | OUTPATIENT
Start: 2022-06-20 | End: 2022-06-20 | Stop reason: HOSPADM

## 2022-06-20 RX ORDER — PHENAZOPYRIDINE HYDROCHLORIDE 100 MG/1
100 TABLET, FILM COATED ORAL ONCE
Status: COMPLETED | OUTPATIENT
Start: 2022-06-20 | End: 2022-06-20

## 2022-06-20 RX ORDER — ACETAMINOPHEN 325 MG/1
975 TABLET ORAL EVERY 6 HOURS SCHEDULED
Status: DISCONTINUED | OUTPATIENT
Start: 2022-06-20 | End: 2022-06-20 | Stop reason: HOSPADM

## 2022-06-20 RX ORDER — CEFAZOLIN SODIUM 2 G/50ML
2000 SOLUTION INTRAVENOUS ONCE
Status: COMPLETED | OUTPATIENT
Start: 2022-06-20 | End: 2022-06-20

## 2022-06-20 RX ORDER — ONDANSETRON 2 MG/ML
4 INJECTION INTRAMUSCULAR; INTRAVENOUS EVERY 6 HOURS PRN
Status: DISCONTINUED | OUTPATIENT
Start: 2022-06-20 | End: 2022-06-20 | Stop reason: HOSPADM

## 2022-06-20 RX ORDER — DEXMEDETOMIDINE HYDROCHLORIDE 100 UG/ML
INJECTION, SOLUTION INTRAVENOUS AS NEEDED
Status: DISCONTINUED | OUTPATIENT
Start: 2022-06-20 | End: 2022-06-20

## 2022-06-20 RX ORDER — DEXAMETHASONE SODIUM PHOSPHATE 10 MG/ML
INJECTION, SOLUTION INTRAMUSCULAR; INTRAVENOUS AS NEEDED
Status: DISCONTINUED | OUTPATIENT
Start: 2022-06-20 | End: 2022-06-20

## 2022-06-20 RX ORDER — LIDOCAINE HYDROCHLORIDE AND EPINEPHRINE 10; 10 MG/ML; UG/ML
INJECTION, SOLUTION INFILTRATION; PERINEURAL AS NEEDED
Status: DISCONTINUED | OUTPATIENT
Start: 2022-06-20 | End: 2022-06-20 | Stop reason: HOSPADM

## 2022-06-20 RX ORDER — PROPOFOL 10 MG/ML
INJECTION, EMULSION INTRAVENOUS AS NEEDED
Status: DISCONTINUED | OUTPATIENT
Start: 2022-06-20 | End: 2022-06-20

## 2022-06-20 RX ORDER — ONDANSETRON 2 MG/ML
INJECTION INTRAMUSCULAR; INTRAVENOUS AS NEEDED
Status: DISCONTINUED | OUTPATIENT
Start: 2022-06-20 | End: 2022-06-20

## 2022-06-20 RX ORDER — MAGNESIUM HYDROXIDE 1200 MG/15ML
LIQUID ORAL AS NEEDED
Status: DISCONTINUED | OUTPATIENT
Start: 2022-06-20 | End: 2022-06-20 | Stop reason: HOSPADM

## 2022-06-20 RX ORDER — IBUPROFEN 600 MG/1
600 TABLET ORAL EVERY 6 HOURS SCHEDULED
Status: DISCONTINUED | OUTPATIENT
Start: 2022-06-20 | End: 2022-06-20 | Stop reason: HOSPADM

## 2022-06-20 RX ORDER — FENTANYL CITRATE 50 UG/ML
INJECTION, SOLUTION INTRAMUSCULAR; INTRAVENOUS AS NEEDED
Status: DISCONTINUED | OUTPATIENT
Start: 2022-06-20 | End: 2022-06-20

## 2022-06-20 RX ORDER — GABAPENTIN 400 MG/1
400 CAPSULE ORAL ONCE
Status: COMPLETED | OUTPATIENT
Start: 2022-06-20 | End: 2022-06-20

## 2022-06-20 RX ORDER — ALBUTEROL SULFATE 2.5 MG/3ML
2.5 SOLUTION RESPIRATORY (INHALATION) ONCE
Status: COMPLETED | OUTPATIENT
Start: 2022-06-20 | End: 2022-06-20

## 2022-06-20 RX ORDER — FENTANYL CITRATE/PF 50 MCG/ML
50 SYRINGE (ML) INJECTION
Status: DISCONTINUED | OUTPATIENT
Start: 2022-06-20 | End: 2022-06-20 | Stop reason: HOSPADM

## 2022-06-20 RX ORDER — HYDROMORPHONE HCL/PF 1 MG/ML
0.5 SYRINGE (ML) INJECTION
Status: DISCONTINUED | OUTPATIENT
Start: 2022-06-20 | End: 2022-06-20 | Stop reason: HOSPADM

## 2022-06-20 RX ORDER — PROPOFOL 10 MG/ML
INJECTION, EMULSION INTRAVENOUS CONTINUOUS PRN
Status: DISCONTINUED | OUTPATIENT
Start: 2022-06-20 | End: 2022-06-20

## 2022-06-20 RX ORDER — ACETAMINOPHEN 325 MG/1
975 TABLET ORAL ONCE
Status: COMPLETED | OUTPATIENT
Start: 2022-06-20 | End: 2022-06-20

## 2022-06-20 RX ADMIN — ONDANSETRON 4 MG: 2 INJECTION INTRAMUSCULAR; INTRAVENOUS at 14:06

## 2022-06-20 RX ADMIN — DEXAMETHASONE SODIUM PHOSPHATE 8 MG: 10 INJECTION INTRAMUSCULAR; INTRAVENOUS at 14:30

## 2022-06-20 RX ADMIN — FENTANYL CITRATE 50 MCG: 50 INJECTION INTRAMUSCULAR; INTRAVENOUS at 14:06

## 2022-06-20 RX ADMIN — PROPOFOL 100 MCG/KG/MIN: 10 INJECTION, EMULSION INTRAVENOUS at 13:27

## 2022-06-20 RX ADMIN — FENTANYL CITRATE 50 MCG: 50 INJECTION INTRAMUSCULAR; INTRAVENOUS at 13:35

## 2022-06-20 RX ADMIN — IBUPROFEN 600 MG: 600 TABLET ORAL at 17:29

## 2022-06-20 RX ADMIN — GABAPENTIN 400 MG: 400 CAPSULE ORAL at 10:52

## 2022-06-20 RX ADMIN — ACETAMINOPHEN 975 MG: 325 TABLET, FILM COATED ORAL at 10:52

## 2022-06-20 RX ADMIN — ACETAMINOPHEN 325MG 325 MG: 325 TABLET ORAL at 17:29

## 2022-06-20 RX ADMIN — CEFAZOLIN SODIUM 2000 MG: 2 SOLUTION INTRAVENOUS at 13:18

## 2022-06-20 RX ADMIN — ACETAMINOPHEN 325MG 650 MG: 325 TABLET ORAL at 17:51

## 2022-06-20 RX ADMIN — DEXMEDETOMIDINE HCL 8 MCG: 100 INJECTION INTRAVENOUS at 13:35

## 2022-06-20 RX ADMIN — MIDAZOLAM 2 MG: 1 INJECTION INTRAMUSCULAR; INTRAVENOUS at 13:25

## 2022-06-20 RX ADMIN — PROPOFOL 20 MG: 10 INJECTION, EMULSION INTRAVENOUS at 14:11

## 2022-06-20 RX ADMIN — PHENAZOPYRIDINE HYDROCHLORIDE 100 MG: 100 TABLET ORAL at 10:53

## 2022-06-20 RX ADMIN — GLYCOPYRROLATE 0.2 MCG: 0.2 INJECTION, SOLUTION INTRAMUSCULAR; INTRAVENOUS at 14:18

## 2022-06-20 RX ADMIN — SODIUM CHLORIDE 125 ML/HR: 0.9 INJECTION, SOLUTION INTRAVENOUS at 10:53

## 2022-06-20 RX ADMIN — ALBUTEROL SULFATE 2.5 MG: 2.5 SOLUTION RESPIRATORY (INHALATION) at 15:18

## 2022-06-20 RX ADMIN — SODIUM CHLORIDE 125 ML/HR: 0.9 INJECTION, SOLUTION INTRAVENOUS at 16:08

## 2022-06-20 RX ADMIN — PROPOFOL 20 MG: 10 INJECTION, EMULSION INTRAVENOUS at 13:56

## 2022-06-20 RX ADMIN — PROPOFOL 20 MG: 10 INJECTION, EMULSION INTRAVENOUS at 14:03

## 2022-06-20 RX ADMIN — FENTANYL CITRATE 100 MCG: 50 INJECTION INTRAMUSCULAR; INTRAVENOUS at 13:25

## 2022-06-20 NOTE — ANESTHESIA POSTPROCEDURE EVALUATION
Post-Op Assessment Note    CV Status:  Stable  Pain Score: 1    Pain management: adequate     Mental Status:  Alert and awake   Hydration Status:  Euvolemic   PONV Controlled:  Controlled   Airway Patency:  Patent      Post Op Vitals Reviewed: Yes      Staff: Anesthesiologist         No complications documented      /57 (06/20/22 1520)    Temp      Pulse 76 (06/20/22 1520)   Resp 17 (06/20/22 1520)    SpO2 99 % (06/20/22 1520)

## 2022-06-20 NOTE — ANESTHESIA PREPROCEDURE EVALUATION
Procedure:  PUBOVAGINAL SLING (N/A Vagina )  CYSTOSCOPY (N/A Bladder)  ANTERIOR COLPORRHAPHY (N/A Perineum)    Relevant Problems   ANESTHESIA   (+) PONV (postoperative nausea and vomiting)      CARDIO   (+) Benign hypertension   (+) Hypercholesterolemia   (+) Hypertriglyceridemia      GI/HEPATIC   (+) Dysphagia      NEURO/PSYCH   (+) Generalized anxiety disorder      PULMONARY   (+) Extrinsic asthma      Other   (+) Obesity (BMI 35 0-39 9 without comorbidity)        Physical Exam    Airway    Mallampati score: II  TM Distance: <3 FB  Neck ROM: full     Dental       Cardiovascular  Rhythm: regular, Rate: normal,     Pulmonary  Breath sounds clear to auscultation,     Other Findings        Anesthesia Plan  ASA Score- 2     Anesthesia Type- IV sedation with anesthesia with ASA Monitors  Additional Monitors:   Airway Plan:     Comment: PONV improved with zofran  Plan Factors-Exercise tolerance (METS): >4 METS  Chart reviewed  Patient is not a current smoker  Obstructive sleep apnea risk education given perioperatively  Induction- intravenous  Postoperative Plan-     Informed Consent- Anesthetic plan and risks discussed with patient

## 2022-06-20 NOTE — OP NOTE
OPERATIVE REPORT  PATIENT NAME: Grisel Zapata    :  1975  MRN: [id number]  Pt Location: [facility] OR ROOM 05    SURGERY DATE: 2022    Surgeon(s) and Role:     * Britton Keyes MD - Primary     * Soham Lu MD - Fellow Assisting    Preop Diagnosis:  Stress incontinence (female) [N39 3]  Cystocele, midline [N81 11]    Post-Op Diagnosis Codes:     * Stress incontinence (female) [N39 3]     * Cystocele, midline [N81 11]    Procedure  1 - Anterior colporrhaphy  2 - Pubovaginal sling (retropubic)  3 - Cystoscopy    Specimen(s):  * No specimens in log *    Estimated Blood Loss:   50 mL    Drains:  Urethral Catheter Non-latex 18 Fr  (Active)   Number of days: 0       Anesthesia Type:   IV Sedation with Anesthesia converted to LMA    Operative Findings:  Cystoscopy at the end of the procedure showed normal appearing urothelium with no suture, mesh, or other foreign body  Bilateral ureteral jets were seen  Complications:   None    Procedure and Technique:  The patient was taken to the operating room where IV sedation was administered  Part way through the case this was converted to general with LMA  She was positioned in lithotomy using [place] stirrups  SCDs were placed on her legs for DVT prophylaxis and antibiotics were administered for surgical site infection prophylaxis  The vagina and perineum were sterilely prepped and the patient was sterilely draped  A time out was called, identifying the correct patient and procedure  We then began the anterior colporrhaphy  A Smith catheter was inserted  0 25% Marcaine with epinephrine diluted 1:1 with injectable saline was infiltrated into the anterior vaginal wall for hydrodissection  Next, a full thickness vertical anterior vaginal wall incision was made  This incision was extended to allow for full exposure  The cystocele was then plicated in a horizontal imbricating fashion using 2-0 Vicryl  The excess vaginal epithelium was trimmed   The vaginal incision was closed with 2-0 Vicryl in a running locked fashion  We then began the retropubic midurethral sling (using [facility] retropub)  Sling exit sites were marked on at the level of the pubic bone on patient's abdomen 2cm from midline and local anesthesia was injected  The mid urethra was identified using the Smith bulb as a landmark and local anesthesia was injected  A 1 5 cm incision was made through the entire thickness of the vagina  This incision overlapped with the anterior colporrhaphy incision, so the anterior colporrhaphy incision was reopened  Next, the vagina was dissected away from the underlying periurethral tissue bilaterally, tracking towards the symphysis pubis  Next, the retropubic trocar was placed on the patient's right side in the usual fashion with care taken to hug the posterior symphysis pubis  This was then repeated on the patient's left side  Next, cystourethroscopy was performed and confirmed that there were no bladder perforations or urethral injuries, and that the ureters were patent bilaterally with brisk, spontaneous efflux of Pyridium-stained urine from both sides  The bladder was grossly normal  Next, the Smith catheter was replaced  The sling was positioned off tension and unsheathed  The mesh was trimmed below the skin at the symphysis pubis incisions, which were closed with sterile skin glue  The entire vaginal incision was closed with 2-0 Vicryl in a running stitch  All counts were correct at the end of the procedure  The patient tolerated the surgery well  She was taken to PACU in stable condition  I was present for the entire procedure  A qualified resident was not available    I spoke to the patient's family at the end of the procedure    Patient Disposition:  PACU       SIGNATURE: Anabel Mendosa MD  DATE: June 20, 2022  TIME: 2:51 PM

## 2022-06-29 ENCOUNTER — CONSULT (OUTPATIENT)
Dept: CARDIOLOGY CLINIC | Facility: CLINIC | Age: 47
End: 2022-06-29
Payer: COMMERCIAL

## 2022-06-29 VITALS
SYSTOLIC BLOOD PRESSURE: 126 MMHG | HEART RATE: 85 BPM | DIASTOLIC BLOOD PRESSURE: 78 MMHG | OXYGEN SATURATION: 96 % | WEIGHT: 210.4 LBS | BODY MASS INDEX: 35.92 KG/M2 | HEIGHT: 64 IN

## 2022-06-29 DIAGNOSIS — R07.89 ATYPICAL CHEST PAIN: ICD-10-CM

## 2022-06-29 DIAGNOSIS — I10 BENIGN HYPERTENSION: ICD-10-CM

## 2022-06-29 DIAGNOSIS — E78.00 HYPERCHOLESTEROLEMIA: Primary | ICD-10-CM

## 2022-06-29 DIAGNOSIS — Z83.3 FAMILY HISTORY OF DIABETES MELLITUS: ICD-10-CM

## 2022-06-29 DIAGNOSIS — E78.1 HYPERTRIGLYCERIDEMIA: ICD-10-CM

## 2022-06-29 PROCEDURE — 3074F SYST BP LT 130 MM HG: CPT | Performed by: INTERNAL MEDICINE

## 2022-06-29 PROCEDURE — 99204 OFFICE O/P NEW MOD 45 MIN: CPT | Performed by: INTERNAL MEDICINE

## 2022-06-29 PROCEDURE — 3078F DIAST BP <80 MM HG: CPT | Performed by: INTERNAL MEDICINE

## 2022-06-29 PROCEDURE — 3008F BODY MASS INDEX DOCD: CPT | Performed by: INTERNAL MEDICINE

## 2022-06-29 PROCEDURE — 1036F TOBACCO NON-USER: CPT | Performed by: INTERNAL MEDICINE

## 2022-06-29 RX ORDER — FERROUS SULFATE 325(65) MG
325 TABLET ORAL
COMMUNITY

## 2022-06-29 RX ORDER — NITROFURANTOIN 25; 75 MG/1; MG/1
100 CAPSULE ORAL 2 TIMES DAILY
COMMUNITY
Start: 2022-06-28

## 2022-06-29 RX ORDER — ATORVASTATIN CALCIUM 40 MG/1
40 TABLET, FILM COATED ORAL DAILY
Qty: 90 TABLET | Refills: 3 | Status: SHIPPED | OUTPATIENT
Start: 2022-06-29

## 2022-06-29 NOTE — LETTER
June 29, 2022     Nacho West MD  [address] [place] [address]    Patient: Grisel Zapata   YOB: 1975   Date of Visit: 6/29/2022       Dear Dr Brittani Pearce:    Thank you for referring Jacob Carmona to me for evaluation  Below are my notes for this consultation  If you have questions, please do not hesitate to call me  I look forward to following your patient along with you  Sincerely,        Sha Hayward MD        CC: No Recipients  Sha Hayward MD  6/29/2022  9:45 AM  Incomplete                                             Cardiology Consultation     Grisel Zapata  [id number]  1975  HEART & VASCULAR [facility] [id number]      Diagnoses and all orders for this visit:    Hypercholesterolemia  -     Lipid Panel with Direct LDL reflex; Future  -     Hemoglobin A1C; Future  -     atorvastatin (LIPITOR) 40 mg tablet; Take 1 tablet (40 mg total) by mouth daily    Atypical chest pain  -     Ambulatory Referral to Cardiology  -     Stress test only, exercise; Future    Hypertriglyceridemia  -     Hemoglobin A1C; Future  -     atorvastatin (LIPITOR) 40 mg tablet; Take 1 tablet (40 mg total) by mouth daily    Benign hypertension    Family history of diabetes mellitus  -     Hemoglobin A1C; Future    Other orders  -     nitrofurantoin (MACROBID) 100 mg capsule; Take 100 mg by mouth 2 (two) times a day For 5 days  -     ferrous sulfate 325 (65 Fe) mg tablet; Take 325 mg by mouth daily with breakfast      I had the pleasure of seeing Grisel Zapata for a consultation regarding chest pain requested by Ezra Jhaveri MD    History of the Presenting Illness, Discussion/Summary and my Plan are as follows:::    Korin Goldstein is a pleasant 26-year-old with hypertension, dyslipidemia-with high triglycerides, personal history of gestational diabetes, family history of diabetes and lifelong nonsmoker      Family history-diabetes, hypertension, dyslipidemia, both parents are in their late 76s and without any vascular disease  Her sister had a heart attack at the age of 46, is a diabetic, has elevated cholesterol and also was a former smoker  Annie Rebolledo is mainly here for an episode of chest pain on 5/10/2022-burning/aching pain in the chest radiating to the jaw and the back, occurred in the setting of a lot of emotional stress, she is not sure if this could be her good  She had pains for total of about 2 hours, negative troponins, ECG with nonspecific changes in the ED  No CT was performed  From an activity standpoint, she walks 3 miles, 3/7 days a week, last did that 2 weeks ago, since then has had surgery  She had a left knee meniscus repair in November 2021, her knee still bothers her but is still able to walk through  Cardiac exam is unremarkable  Plan:    Hypertension:  Well controlled, she takes all her medications at night, hydrochlorothiazide does keep her up, needing to empty her bladder  She will start taking it in the morning    Mixed dyslipidemia:  I did discuss with her that she is at risk for diabetes with her family history, personal history of gestational diabetes and elevated triglycerides  Lifestyle changes should continue  Will provide her printed information for triglycerides which should also decrease her risk for diabetes  Will switch her simvastatin to atorvastatin, would aim for a goal non HDL less than one hundred  Recheck lipids and A1c in 3 months    Family history of diabetes:  See above    Chest pain:  Atypical, no further episodes since then but considering her family history, will benefit from stress testing  She feels that she will be able to exercise on a treadmill despite her left knee pain  If this can be completed, could avoid a nuclear stress test and the radiation involved    Will attempt the same    Follow-up in 6 months, at which time if all is well, I will refer her back to her primary care physician  Latest Reference Range & Units 10/26/19 09:24 08/26/20 09:07 05/08/21 09:32 09/16/21 09:47   Cholesterol 50 - 200 mg/dL 213 (H) 182 178 194   Triglycerides <=150 mg/dL 203 (H) 193 (H) 170 (H) 180 (H)   HDL >=40 mg/dL 52 55 61 61   Non-HDL Cholesterol mg/dl 161 127     LDL Calculated 0 - 100 mg/dL 120 (H) 88 83 97   (H): Data is abnormally high    1  Hypercholesterolemia  Lipid Panel with Direct LDL reflex    Hemoglobin A1C    atorvastatin (LIPITOR) 40 mg tablet   2  Atypical chest pain  Ambulatory Referral to Cardiology    Stress test only, exercise   3  Hypertriglyceridemia  Hemoglobin A1C    atorvastatin (LIPITOR) 40 mg tablet   4  Benign hypertension     5   Family history of diabetes mellitus  Hemoglobin A1C     Patient Active Problem List   Diagnosis    Benign hypertension    Dysphagia    Extrinsic asthma    Generalized anxiety disorder    Hypercholesterolemia    Hypertriglyceridemia    Menorrhagia with regular cycle    Elevated bilirubin    Diarrhea    Hyponatremia    Complex tear of medial meniscus of left knee as current injury    S/P left knee arthroscopy    PONV (postoperative nausea and vomiting)    Obesity (BMI 35 0-39 9 without comorbidity)    Family history of diabetes mellitus    Atypical chest pain     Past Medical History:   Diagnosis Date    Acute diffuse otitis externa of right ear 8/23/2021    Anemia     Anxiety     Asthma     controlled  seasonal    Gestational diabetes mellitus     Hyperlipidemia     Hypertension     Other chest pain 1/27/2020    PONV (postoperative nausea and vomiting)     Pregnancy     Resolved: 07/29/2015    Trigger finger     Resolved: 12/02/2016    Yeast infection     Resolved: 07/29/2015     Social History     Socioeconomic History    Marital status: /Civil Union     Spouse name: Not on file    Number of children: Not on file    Years of education: Not on file    Highest education level: Not on file Occupational History    Not on file   Tobacco Use    Smoking status: Never Smoker    Smokeless tobacco: Never Used   Vaping Use    Vaping Use: Never used   Substance and Sexual Activity    Alcohol use: Yes     Comment: 1-2 drinks per week on average typically consumes beer    Drug use: No    Sexual activity: Yes     Partners: Male     Birth control/protection: Male Sterilization   Other Topics Concern    Not on file   Social History Narrative    Daily coffee consumption (2 cups/day)    Denied: History of exercise habits    Two children     Social Determinants of Health     Financial Resource Strain: Not on file   Food Insecurity: Not on file   Transportation Needs: Not on file   Physical Activity: Not on file   Stress: Not on file   Social Connections: Not on file   Intimate Partner Violence: Not on file   Housing Stability: Not on file      Family History   Problem Relation Age of Onset    Hypertension Mother     Hyperlipidemia Mother     Endometrial cancer Mother 55    Diabetes type II Mother     Hyperlipidemia Father     Hypertension Father     Heart attack Sister     No Known Problems Brother     No Known Problems Paternal Aunt     No Known Problems Maternal Grandmother     Heart attack Maternal Grandfather     Other Maternal Grandfather         Myocardial infarction arrhythmias    No Known Problems Paternal Grandmother     Stroke Paternal Grandfather         Syndrome    No Known Problems Daughter     No Known Problems Son      Past Surgical History:   Procedure Laterality Date    COLONOSCOPY      COLPORRHAPHY N/A 6/20/2022    Procedure: ANTERIOR COLPORRHAPHY;  Surgeon: Ansia Corona MD;  [facility];  Service: UroGynecology           CYSTOSCOPY N/A 6/20/2022    Procedure: Alejandra Velasco;  Surgeon: Anisa Corona MD;  [facility];  Service: UroGynecology           HERNIA REPAIR      INCISION TENDON SHEATH HAND Left     thumb    OR HYSTEROSCOPY,W/ENDO BX N/A 10/14/2019    Procedure: DILATATION AND CURETTAGE (D&C) WITH HYSTEROSCOPY Toy Marcello;  Surgeon: Celia Canavan, DO;  [facility];  Service: Gynecology    TN HYSTEROSCOPY,W/ENDOMETRIAL ABLATION N/A 10/14/2019    Procedure: ABLATION ENDOMETRIAL Milton Riverazer;  Surgeon: Celia Canavan, DO;  [facility];  Service: Gynecology    TN KNEE SCOPE,MED/LAT MENISECTOMY Left 11/05/2021    Procedure: KNEE ARTHROSCOPIC MENISCECTOMY Medial;  Surgeon: Koffi Rock MD;  [facility];  Service: Orthopedics    TN REPAIR UMBILICAL BTVJ,6+P/L,HOGYY N/A 08/23/2018    Procedure: UMBILICAL HERNIA REPAIR WITH MESH;  Surgeon: Jillian Carmona MD;  [facility];  Service: General    PUBOVAGINAL SLING N/A 6/20/2022    Procedure: PUBOVAGINAL SLING;  Surgeon: Tamia Burns MD;  [facility];  Service: UroGynecology           WISDOM TOOTH EXTRACTION         Current Outpatient Medications:     albuterol (Ventolin HFA) 90 mcg/act inhaler, Inhale 1-2 puffs as needed for wheezing, Disp: 18 g, Rfl: 2    atorvastatin (LIPITOR) 40 mg tablet, Take 1 tablet (40 mg total) by mouth daily, Disp: 90 tablet, Rfl: 3    escitalopram (LEXAPRO) 10 mg tablet, Take 1 tablet (10 mg total) by mouth daily at bedtime, Disp: 90 tablet, Rfl: 1    ferrous sulfate 325 (65 Fe) mg tablet, Take 325 mg by mouth daily with breakfast, Disp: , Rfl:     fluticasone (FLONASE) 50 mcg/act nasal spray, 1 spray into each nostril daily (Patient taking differently: 1 spray into each nostril as needed), Disp: 1 Bottle, Rfl: 0    hydrochlorothiazide (HYDRODIURIL) 12 5 mg tablet, Take 1 tablet (12 5 mg total) by mouth daily (Patient taking differently: Take 12 5 mg by mouth daily at bedtime), Disp: 90 tablet, Rfl: 1    loratadine (CLARITIN) 10 mg tablet, Take 10 mg by mouth daily at bedtime  , Disp: , Rfl:     losartan (Cozaar) 50 mg tablet, Take 1 tablet (50 mg total) by mouth daily (Patient taking differently: Take 50 mg by mouth daily at bedtime), Disp: 90 tablet, Rfl: 1    metoprolol succinate (TOPROL-XL) 50 mg 24 hr tablet, Take 1 tablet (50 mg total) by mouth daily at bedtime, Disp: 90 tablet, Rfl: 1    Multiple Vitamin (multivitamin) tablet, Take 1 tablet by mouth daily, Disp: , Rfl:     nitrofurantoin (MACROBID) 100 mg capsule, Take 100 mg by mouth 2 (two) times a day For 5 days, Disp: , Rfl:     Omega-3 Fatty Acids (FISH OIL) 1,000 mg, Take 2 capsules by mouth daily at bedtime  , Disp: , Rfl:   Allergies   Allergen Reactions    Hibiclens [Chlorhexidine Gluconate] Rash     Red with pimples    Pollen Extract Nasal Congestion     Vitals:    06/29/22 0859   BP: 126/78   BP Location: Right arm   Patient Position: Sitting   Cuff Size: Large   Pulse: 85   SpO2: 96%   Weight: 95 4 kg (210 lb 6 4 oz)   Height: 5' 4" (1 626 m)         Imaging: No results found  Review of Systems:  Review of Systems   Constitutional: Negative  HENT: Negative  Eyes: Negative  Respiratory: Positive for chest tightness  Negative for apnea, cough, choking, shortness of breath, wheezing and stridor  Cardiovascular: Negative for palpitations and leg swelling  Endocrine: Negative  Musculoskeletal: Negative  Physical Exam:    /78 (BP Location: Right arm, Patient Position: Sitting, Cuff Size: Large)   Pulse 85   Ht 5' 4" (1 626 m)   Wt 95 4 kg (210 lb 6 4 oz)   SpO2 96%   BMI 36 12 kg/m²   Physical Exam  Constitutional:       General: She is not in acute distress  Appearance: Normal appearance  She is not ill-appearing  HENT:      Nose: Nose normal  No congestion or rhinorrhea  Mouth/Throat:      Mouth: Mucous membranes are moist       Pharynx: Oropharynx is clear  No oropharyngeal exudate or posterior oropharyngeal erythema  Neck:      Vascular: No carotid bruit  Cardiovascular:      Rate and Rhythm: Normal rate and regular rhythm  Heart sounds: No murmur heard  No friction rub  No gallop  Pulmonary:      Effort: Pulmonary effort is normal  No respiratory distress  Breath sounds: No stridor  No wheezing or rhonchi  Musculoskeletal:         General: No swelling, deformity or signs of injury  Normal range of motion  Cervical back: Normal range of motion  No rigidity or tenderness  Lymphadenopathy:      Cervical: No cervical adenopathy  Skin:     General: Skin is warm  Coloration: Skin is not jaundiced or pale  Findings: No bruising or erythema  Neurological:      Mental Status: She is alert  This note was completed in part utilizing [employer] direct voice recognition software  Grammatical errors, random word insertion, spelling mistakes, occasional wrong word or "sound-alike" substitutions and incomplete sentences may be an occasional consequence of the system secondary to software limitations, ambient noise and hardware issues  At the time of dictation, efforts were made to edit, clarify and /or correct errors  Please read the chart carefully and recognize, using context, where substitutions have occurred  If you have any questions or concerns about the context, text or information contained within the body of this dictation, please contact myself, the provider, for further clarification

## 2022-06-29 NOTE — PROGRESS NOTES
Cardiology Consultation     Marlin Hankins  [id number]  1975  HEART & VASCULAR [facility] [place]  [address] [place] [id number]      Diagnoses and all orders for this visit:    Hypercholesterolemia  -     Lipid Panel with Direct LDL reflex; Future  -     Hemoglobin A1C; Future  -     atorvastatin (LIPITOR) 40 mg tablet; Take 1 tablet (40 mg total) by mouth daily    Atypical chest pain  -     Ambulatory Referral to Cardiology  -     Stress test only, exercise; Future    Hypertriglyceridemia  -     Hemoglobin A1C; Future  -     atorvastatin (LIPITOR) 40 mg tablet; Take 1 tablet (40 mg total) by mouth daily    Benign hypertension    Family history of diabetes mellitus  -     Hemoglobin A1C; Future    Other orders  -     nitrofurantoin (MACROBID) 100 mg capsule; Take 100 mg by mouth 2 (two) times a day For 5 days  -     ferrous sulfate 325 (65 Fe) mg tablet; Take 325 mg by mouth daily with breakfast      I had the pleasure of seeing Marlin Hankins for a consultation regarding chest pain requested by Jimena Marc MD    History of the Presenting Illness, Discussion/Summary and my Plan are as follows:::    Cole Barr is a pleasant 54-year-old with hypertension, dyslipidemia-with high triglycerides, personal history of gestational diabetes, family history of diabetes and lifelong nonsmoker  Family history-diabetes, hypertension, dyslipidemia, both parents are in their late 76s and without any vascular disease  Her sister had a heart attack at the age of 46, is a diabetic, has elevated cholesterol and also was a former smoker  Cole Barr is mainly here for an episode of chest pain on 5/10/2022-burning/aching pain in the chest radiating to the jaw and the back, occurred in the setting of a lot of emotional stress, she is not sure if this could be her good    She had pains for total of about 2 hours, negative troponins, ECG with nonspecific changes in the ED  No CT was performed  From an activity standpoint, she walks 3 miles, 3/7 days a week, last did that 2 weeks ago, since then has had surgery  She had a left knee meniscus repair in November 2021, her knee still bothers her but is still able to walk through  Cardiac exam is unremarkable  Plan:    Hypertension:  Well controlled, she takes all her medications at night, hydrochlorothiazide does keep her up, needing to empty her bladder  She will start taking it in the morning    Mixed dyslipidemia:  I did discuss with her that she is at risk for diabetes with her family history, personal history of gestational diabetes and elevated triglycerides  Lifestyle changes should continue  Will provide her printed information for triglycerides which should also decrease her risk for diabetes  Will switch her simvastatin to atorvastatin, would aim for a goal non HDL less than one hundred  Recheck lipids and A1c in 3 months    Family history of diabetes:  See above    Chest pain:  Atypical, no further episodes since then but considering her family history, will benefit from stress testing  She feels that she will be able to exercise on a treadmill despite her left knee pain  If this can be completed, could avoid a nuclear stress test and the radiation involved  Will attempt the same    Follow-up in 6 months, at which time if all is well, I will refer her back to her primary care physician  Latest Reference Range & Units 10/26/19 09:24 08/26/20 09:07 05/08/21 09:32 09/16/21 09:47   Cholesterol 50 - 200 mg/dL 213 (H) 182 178 194   Triglycerides <=150 mg/dL 203 (H) 193 (H) 170 (H) 180 (H)   HDL >=40 mg/dL 52 55 61 61   Non-HDL Cholesterol mg/dl 161 127     LDL Calculated 0 - 100 mg/dL 120 (H) 88 83 97   (H): Data is abnormally high    1  Hypercholesterolemia  Lipid Panel with Direct LDL reflex    Hemoglobin A1C    atorvastatin (LIPITOR) 40 mg tablet   2   Atypical chest pain  Ambulatory Referral to Cardiology    Stress test only, exercise   3  Hypertriglyceridemia  Hemoglobin A1C    atorvastatin (LIPITOR) 40 mg tablet   4  Benign hypertension     5   Family history of diabetes mellitus  Hemoglobin A1C     Patient Active Problem List   Diagnosis    Benign hypertension    Dysphagia    Extrinsic asthma    Generalized anxiety disorder    Hypercholesterolemia    Hypertriglyceridemia    Menorrhagia with regular cycle    Elevated bilirubin    Diarrhea    Hyponatremia    Complex tear of medial meniscus of left knee as current injury    S/P left knee arthroscopy    PONV (postoperative nausea and vomiting)    Obesity (BMI 35 0-39 9 without comorbidity)    Family history of diabetes mellitus    Atypical chest pain     Past Medical History:   Diagnosis Date    Acute diffuse otitis externa of right ear 8/23/2021    Anemia     Anxiety     Asthma     controlled  seasonal    Gestational diabetes mellitus     Hyperlipidemia     Hypertension     Other chest pain 1/27/2020    PONV (postoperative nausea and vomiting)     Pregnancy     Resolved: 07/29/2015    Trigger finger     Resolved: 12/02/2016    Yeast infection     Resolved: 07/29/2015     Social History     Socioeconomic History    Marital status: /Civil Union     Spouse name: Not on file    Number of children: Not on file    Years of education: Not on file    Highest education level: Not on file   Occupational History    Not on file   Tobacco Use    Smoking status: Never Smoker    Smokeless tobacco: Never Used   Vaping Use    Vaping Use: Never used   Substance and Sexual Activity    Alcohol use: Yes     Comment: 1-2 drinks per week on average typically consumes beer    Drug use: No    Sexual activity: Yes     Partners: Male     Birth control/protection: Male Sterilization   Other Topics Concern    Not on file   Social History Narrative    Daily coffee consumption (2 cups/day)    Denied: History of exercise habits Two children     Social Determinants of Health     Financial Resource Strain: Not on file   Food Insecurity: Not on file   Transportation Needs: Not on file   Physical Activity: Not on file   Stress: Not on file   Social Connections: Not on file   Intimate Partner Violence: Not on file   Housing Stability: Not on file      Family History   Problem Relation Age of Onset    Hypertension Mother     Hyperlipidemia Mother     Endometrial cancer Mother 55    Diabetes type II Mother     Hyperlipidemia Father     Hypertension Father     Heart attack Sister     No Known Problems Brother     No Known Problems Paternal Aunt     No Known Problems Maternal Grandmother     Heart attack Maternal Grandfather     Other Maternal Grandfather         Myocardial infarction arrhythmias    No Known Problems Paternal Grandmother     Stroke Paternal Grandfather         Syndrome    No Known Problems Daughter     No Known Problems Son      Past Surgical History:   Procedure Laterality Date    COLONOSCOPY      COLPORRHAPHY N/A 6/20/2022    Procedure: ANTERIOR COLPORRHAPHY;  Surgeon: Chen Marroquin MD;  [facility];  Service: UroGynecology           CYSTOSCOPY N/A 6/20/2022    Procedure: Satya Fraire;  Surgeon: Chen Marroquin MD;  [facility];  Service: UroGynecology           HERNIA REPAIR      INCISION TENDON SHEATH HAND Left     thumb    ME HYSTEROSCOPY,W/ENDO BX N/A 10/14/2019    Procedure: DILATATION AND CURETTAGE (D&C) WITH HYSTEROSCOPY W/ Verena Tena;  Surgeon: Kaylene Silver DO;  [facility];  Service: Gynecology    ME HYSTEROSCOPY,W/ENDOMETRIAL ABLATION N/A 10/14/2019    Procedure: ABLATION ENDOMETRIAL Hawley Tena;  Surgeon: Kaylene Silver DO;  [facility];  Service: Gynecology    ME KNEE SCOPE,MED/LAT MENISECTOMY Left 11/05/2021    Procedure: KNEE ARTHROSCOPIC MENISCECTOMY Medial;  Surgeon: Kelly Franklin MD;  [facility];  Service: Orthopedics  CO REPAIR UMBILICAL ORIW,2+O/S,HEICG N/A 08/23/2018    Procedure: UMBILICAL HERNIA REPAIR WITH MESH;  Surgeon: Saulo Arias MD;  [facility];  Service: General    PUBOVAGINAL SLING N/A 6/20/2022    Procedure: PUBOVAGINAL SLING;  Surgeon: Raad Elam MD;  [facility];  Service: UroGynecology           WISDOM TOOTH EXTRACTION         Current Outpatient Medications:     albuterol (Ventolin HFA) 90 mcg/act inhaler, Inhale 1-2 puffs as needed for wheezing, Disp: 18 g, Rfl: 2    atorvastatin (LIPITOR) 40 mg tablet, Take 1 tablet (40 mg total) by mouth daily, Disp: 90 tablet, Rfl: 3    escitalopram (LEXAPRO) 10 mg tablet, Take 1 tablet (10 mg total) by mouth daily at bedtime, Disp: 90 tablet, Rfl: 1    ferrous sulfate 325 (65 Fe) mg tablet, Take 325 mg by mouth daily with breakfast, Disp: , Rfl:     fluticasone (FLONASE) 50 mcg/act nasal spray, 1 spray into each nostril daily (Patient taking differently: 1 spray into each nostril as needed), Disp: 1 Bottle, Rfl: 0    hydrochlorothiazide (HYDRODIURIL) 12 5 mg tablet, Take 1 tablet (12 5 mg total) by mouth daily (Patient taking differently: Take 12 5 mg by mouth daily at bedtime), Disp: 90 tablet, Rfl: 1    loratadine (CLARITIN) 10 mg tablet, Take 10 mg by mouth daily at bedtime  , Disp: , Rfl:     losartan (Cozaar) 50 mg tablet, Take 1 tablet (50 mg total) by mouth daily (Patient taking differently: Take 50 mg by mouth daily at bedtime), Disp: 90 tablet, Rfl: 1    metoprolol succinate (TOPROL-XL) 50 mg 24 hr tablet, Take 1 tablet (50 mg total) by mouth daily at bedtime, Disp: 90 tablet, Rfl: 1    Multiple Vitamin (multivitamin) tablet, Take 1 tablet by mouth daily, Disp: , Rfl:     nitrofurantoin (MACROBID) 100 mg capsule, Take 100 mg by mouth 2 (two) times a day For 5 days, Disp: , Rfl:     Omega-3 Fatty Acids (FISH OIL) 1,000 mg, Take 2 capsules by mouth daily at bedtime  , Disp: , Rfl:   Allergies   Allergen Reactions    Hibiclens [Chlorhexidine Gluconate] Rash     Red with pimples    Pollen Extract Nasal Congestion     Vitals:    06/29/22 0859   BP: 126/78   BP Location: Right arm   Patient Position: Sitting   Cuff Size: Large   Pulse: 85   SpO2: 96%   Weight: 95 4 kg (210 lb 6 4 oz)   Height: 5' 4" (1 626 m)         Imaging: No results found  Review of Systems:  Review of Systems   Constitutional: Negative  HENT: Negative  Eyes: Negative  Respiratory: Positive for chest tightness  Negative for apnea, cough, choking, shortness of breath, wheezing and stridor  Cardiovascular: Negative for palpitations and leg swelling  Endocrine: Negative  Musculoskeletal: Negative  Physical Exam:    /78 (BP Location: Right arm, Patient Position: Sitting, Cuff Size: Large)   Pulse 85   Ht 5' 4" (1 626 m)   Wt 95 4 kg (210 lb 6 4 oz)   SpO2 96%   BMI 36 12 kg/m²   Physical Exam  Constitutional:       General: She is not in acute distress  Appearance: Normal appearance  She is not ill-appearing  HENT:      Nose: Nose normal  No congestion or rhinorrhea  Mouth/Throat:      Mouth: Mucous membranes are moist       Pharynx: Oropharynx is clear  No oropharyngeal exudate or posterior oropharyngeal erythema  Neck:      Vascular: No carotid bruit  Cardiovascular:      Rate and Rhythm: Normal rate and regular rhythm  Heart sounds: No murmur heard  No friction rub  No gallop  Pulmonary:      Effort: Pulmonary effort is normal  No respiratory distress  Breath sounds: No stridor  No wheezing or rhonchi  Musculoskeletal:         General: No swelling, deformity or signs of injury  Normal range of motion  Cervical back: Normal range of motion  No rigidity or tenderness  Lymphadenopathy:      Cervical: No cervical adenopathy  Skin:     General: Skin is warm  Coloration: Skin is not jaundiced or pale  Findings: No bruising or erythema     Neurological:      Mental Status: She is alert             This note was completed in part utilizing m-modal fluency direct voice recognition software  Grammatical errors, random word insertion, spelling mistakes, occasional wrong word or "sound-alike" substitutions and incomplete sentences may be an occasional consequence of the system secondary to software limitations, ambient noise and hardware issues  At the time of dictation, efforts were made to edit, clarify and /or correct errors  Please read the chart carefully and recognize, using context, where substitutions have occurred  If you have any questions or concerns about the context, text or information contained within the body of this dictation, please contact myself, the provider, for further clarification

## 2022-07-25 ENCOUNTER — TELEPHONE (OUTPATIENT)
Dept: INTERNAL MEDICINE CLINIC | Facility: CLINIC | Age: 47
End: 2022-07-25

## 2022-07-25 DIAGNOSIS — I10 BENIGN HYPERTENSION: ICD-10-CM

## 2022-07-25 RX ORDER — LOSARTAN POTASSIUM 50 MG/1
50 TABLET ORAL DAILY
Qty: 14 TABLET | Refills: 0 | Status: SHIPPED | OUTPATIENT
Start: 2022-07-25 | End: 2022-07-25 | Stop reason: SDUPTHER

## 2022-07-25 RX ORDER — LOSARTAN POTASSIUM 50 MG/1
50 TABLET ORAL DAILY
Qty: 90 TABLET | Refills: 1 | Status: SHIPPED | OUTPATIENT
Start: 2022-07-25

## 2022-07-25 NOTE — TELEPHONE ENCOUNTER
----- Message from Veronika Amaro sent at 7/22/2022 11:09 PM EDT -----  Regarding: Refill, please! Hello! While filling my pill case, tonight, I discovered that I am down to my last 5 Losartan 50mg tabs  Can you please send a few to [facility], as well as refill my 3 month supply with [facility], please      Thank you,  Eben Avalos

## 2022-09-17 ENCOUNTER — APPOINTMENT (OUTPATIENT)
Dept: LAB | Facility: IMAGING CENTER | Age: 47
End: 2022-09-17
Payer: COMMERCIAL

## 2022-09-17 DIAGNOSIS — Z83.3 FAMILY HISTORY OF DIABETES MELLITUS: ICD-10-CM

## 2022-09-17 DIAGNOSIS — E78.00 HYPERCHOLESTEROLEMIA: ICD-10-CM

## 2022-09-17 DIAGNOSIS — E78.1 HYPERTRIGLYCERIDEMIA: ICD-10-CM

## 2022-09-17 LAB
BACTERIA UR QL AUTO: NORMAL /HPF
BASOPHILS # BLD AUTO: 0.06 THOUSANDS/ΜL (ref 0–0.1)
BASOPHILS NFR BLD AUTO: 1 % (ref 0–1)
BILIRUB UR QL STRIP: NEGATIVE
CHOLEST SERPL-MCNC: 145 MG/DL
CLARITY UR: CLEAR
COLOR UR: YELLOW
EOSINOPHIL # BLD AUTO: 0.42 THOUSAND/ΜL (ref 0–0.61)
EOSINOPHIL NFR BLD AUTO: 7 % (ref 0–6)
ERYTHROCYTE [DISTWIDTH] IN BLOOD BY AUTOMATED COUNT: 14.5 % (ref 11.6–15.1)
EST. AVERAGE GLUCOSE BLD GHB EST-MCNC: 120 MG/DL
GLUCOSE UR STRIP-MCNC: NEGATIVE MG/DL
HBA1C MFR BLD: 5.8 %
HCT VFR BLD AUTO: 38.6 % (ref 34.8–46.1)
HDLC SERPL-MCNC: 51 MG/DL
HGB BLD-MCNC: 12.2 G/DL (ref 11.5–15.4)
HGB UR QL STRIP.AUTO: ABNORMAL
IMM GRANULOCYTES # BLD AUTO: 0.03 THOUSAND/UL (ref 0–0.2)
IMM GRANULOCYTES NFR BLD AUTO: 1 % (ref 0–2)
KETONES UR STRIP-MCNC: NEGATIVE MG/DL
LDLC SERPL CALC-MCNC: 56 MG/DL (ref 0–100)
LEUKOCYTE ESTERASE UR QL STRIP: NEGATIVE
LYMPHOCYTES # BLD AUTO: 1.69 THOUSANDS/ΜL (ref 0.6–4.47)
LYMPHOCYTES NFR BLD AUTO: 27 % (ref 14–44)
MCH RBC QN AUTO: 27.9 PG (ref 26.8–34.3)
MCHC RBC AUTO-ENTMCNC: 31.6 G/DL (ref 31.4–37.4)
MCV RBC AUTO: 88 FL (ref 82–98)
MONOCYTES # BLD AUTO: 0.43 THOUSAND/ΜL (ref 0.17–1.22)
MONOCYTES NFR BLD AUTO: 7 % (ref 4–12)
NEUTROPHILS # BLD AUTO: 3.53 THOUSANDS/ΜL (ref 1.85–7.62)
NEUTS SEG NFR BLD AUTO: 57 % (ref 43–75)
NITRITE UR QL STRIP: NEGATIVE
NON-SQ EPI CELLS URNS QL MICRO: NORMAL /HPF
NRBC BLD AUTO-RTO: 0 /100 WBCS
PH UR STRIP.AUTO: 6 [PH]
PLATELET # BLD AUTO: 268 THOUSANDS/UL (ref 149–390)
PMV BLD AUTO: 11.3 FL (ref 8.9–12.7)
PROT UR STRIP-MCNC: ABNORMAL MG/DL
RBC # BLD AUTO: 4.38 MILLION/UL (ref 3.81–5.12)
RBC #/AREA URNS AUTO: NORMAL /HPF
SP GR UR STRIP.AUTO: 1.02 (ref 1–1.03)
TRIGL SERPL-MCNC: 190 MG/DL
TSH SERPL DL<=0.05 MIU/L-ACNC: 1.58 UIU/ML (ref 0.45–4.5)
UROBILINOGEN UR STRIP-ACNC: <2 MG/DL
WBC # BLD AUTO: 6.16 THOUSAND/UL (ref 4.31–10.16)
WBC #/AREA URNS AUTO: NORMAL /HPF

## 2022-09-17 PROCEDURE — 85025 COMPLETE CBC W/AUTO DIFF WBC: CPT

## 2022-09-17 PROCEDURE — 84443 ASSAY THYROID STIM HORMONE: CPT

## 2022-09-17 PROCEDURE — 83036 HEMOGLOBIN GLYCOSYLATED A1C: CPT

## 2022-09-17 PROCEDURE — 80061 LIPID PANEL: CPT

## 2022-09-17 PROCEDURE — 36415 COLL VENOUS BLD VENIPUNCTURE: CPT

## 2022-09-21 ENCOUNTER — TELEPHONE (OUTPATIENT)
Dept: CARDIOLOGY CLINIC | Facility: CLINIC | Age: 47
End: 2022-09-21

## 2022-09-21 ENCOUNTER — OFFICE VISIT (OUTPATIENT)
Dept: INTERNAL MEDICINE CLINIC | Facility: OTHER | Age: 47
End: 2022-09-21
Payer: COMMERCIAL

## 2022-09-21 VITALS
DIASTOLIC BLOOD PRESSURE: 86 MMHG | HEIGHT: 64 IN | TEMPERATURE: 98.5 F | HEART RATE: 68 BPM | BODY MASS INDEX: 37.05 KG/M2 | WEIGHT: 217 LBS | SYSTOLIC BLOOD PRESSURE: 136 MMHG | OXYGEN SATURATION: 98 %

## 2022-09-21 DIAGNOSIS — I10 BENIGN HYPERTENSION: ICD-10-CM

## 2022-09-21 DIAGNOSIS — E87.1 HYPONATREMIA: ICD-10-CM

## 2022-09-21 DIAGNOSIS — E66.9 OBESITY (BMI 35.0-39.9 WITHOUT COMORBIDITY): ICD-10-CM

## 2022-09-21 DIAGNOSIS — E78.00 HYPERCHOLESTEROLEMIA: ICD-10-CM

## 2022-09-21 DIAGNOSIS — R73.03 PREDIABETES: ICD-10-CM

## 2022-09-21 DIAGNOSIS — Z12.11 ENCOUNTER FOR SCREENING FOR MALIGNANT NEOPLASM OF COLON: Primary | ICD-10-CM

## 2022-09-21 DIAGNOSIS — E78.1 HYPERTRIGLYCERIDEMIA: ICD-10-CM

## 2022-09-21 DIAGNOSIS — J45.40 MODERATE PERSISTENT EXTRINSIC ASTHMA WITHOUT COMPLICATION: ICD-10-CM

## 2022-09-21 DIAGNOSIS — F41.1 GENERALIZED ANXIETY DISORDER: ICD-10-CM

## 2022-09-21 PROBLEM — Z98.890 S/P LEFT KNEE ARTHROSCOPY: Status: RESOLVED | Noted: 2021-11-08 | Resolved: 2022-09-21

## 2022-09-21 PROBLEM — R07.89 ATYPICAL CHEST PAIN: Status: RESOLVED | Noted: 2022-06-29 | Resolved: 2022-09-21

## 2022-09-21 PROBLEM — R19.7 DIARRHEA: Status: RESOLVED | Noted: 2021-06-23 | Resolved: 2022-09-21

## 2022-09-21 PROCEDURE — 3075F SYST BP GE 130 - 139MM HG: CPT | Performed by: INTERNAL MEDICINE

## 2022-09-21 PROCEDURE — 3079F DIAST BP 80-89 MM HG: CPT | Performed by: INTERNAL MEDICINE

## 2022-09-21 PROCEDURE — 99214 OFFICE O/P EST MOD 30 MIN: CPT | Performed by: INTERNAL MEDICINE

## 2022-09-21 PROCEDURE — 3725F SCREEN DEPRESSION PERFORMED: CPT | Performed by: INTERNAL MEDICINE

## 2022-09-21 RX ORDER — TIRZEPATIDE 2.5 MG/.5ML
2.5 INJECTION, SOLUTION SUBCUTANEOUS WEEKLY
Qty: 0.5 ML | Refills: 4 | Status: SHIPPED | OUTPATIENT
Start: 2022-09-21 | End: 2022-09-21 | Stop reason: SDUPTHER

## 2022-09-21 RX ORDER — ALBUTEROL SULFATE 90 UG/1
1-2 AEROSOL, METERED RESPIRATORY (INHALATION) AS NEEDED
Qty: 18 G | Refills: 2 | Status: SHIPPED | OUTPATIENT
Start: 2022-09-21 | End: 2022-09-22 | Stop reason: SDUPTHER

## 2022-09-21 RX ORDER — METOPROLOL SUCCINATE 50 MG/1
50 TABLET, EXTENDED RELEASE ORAL
Qty: 90 TABLET | Refills: 1 | Status: SHIPPED | OUTPATIENT
Start: 2022-09-21 | End: 2022-09-22 | Stop reason: SDUPTHER

## 2022-09-21 RX ORDER — HYDROCHLOROTHIAZIDE 12.5 MG/1
12.5 TABLET ORAL
Qty: 90 TABLET | Refills: 1 | Status: SHIPPED | OUTPATIENT
Start: 2022-09-21

## 2022-09-21 RX ORDER — ESCITALOPRAM OXALATE 10 MG/1
10 TABLET ORAL
Qty: 90 TABLET | Refills: 1 | Status: SHIPPED | OUTPATIENT
Start: 2022-09-21 | End: 2022-09-22 | Stop reason: SDUPTHER

## 2022-09-21 RX ORDER — TIRZEPATIDE 2.5 MG/.5ML
2.5 INJECTION, SOLUTION SUBCUTANEOUS WEEKLY
Qty: 1.5 ML | Refills: 0 | Status: SHIPPED | COMMUNITY
Start: 2022-09-21 | End: 2022-10-17

## 2022-09-21 NOTE — PROGRESS NOTES
Assessment/Plan:     Diagnoses and all orders for this visit:    Encounter for screening for malignant neoplasm of colon  -     Ambulatory Referral to Gastroenterology; Future    Moderate persistent extrinsic asthma without complication  -     albuterol (Ventolin HFA) 90 mcg/act inhaler; Inhale 1-2 puffs as needed for wheezing    Generalized anxiety disorder  -     escitalopram (LEXAPRO) 10 mg tablet; Take 1 tablet (10 mg total) by mouth daily at bedtime    Hypercholesterolemia  -     Comprehensive metabolic panel; Future    Hypertriglyceridemia    Benign hypertension  -     metoprolol succinate (TOPROL-XL) 50 mg 24 hr tablet; Take 1 tablet (50 mg total) by mouth daily at bedtime  -     hydrochlorothiazide (HYDRODIURIL) 12 5 mg tablet; Take 1 tablet (12 5 mg total) by mouth daily at bedtime  -     Tirzepatide (Mounjaro) 2 5 MG/0 5ML SOPN; Inject 2 5 mg under the skin once a week  -     Comprehensive metabolic panel; Future    Hyponatremia    Obesity (BMI 35 0-39 9 without comorbidity)  -     Tirzepatide (Mounjaro) 2 5 MG/0 5ML SOPN; Inject 2 5 mg under the skin once a week    Prediabetes  -     Tirzepatide (Mounjaro) 2 5 MG/0 5ML SOPN; Inject 2 5 mg under the skin once a week             M*Modal software was used to dictate this note  It may contain errors with dictating incorrect words or incorrect spelling  Please contact the provider directly with any questions  Subjective:   Chief Complaint   Patient presents with    Follow-up     F/u labs 09/2022, refills needed, no new concerns    Memory Loss     Pt thinks one of her meds is making her forgetful        Patient ID: Poppy Berman is a 49 y o  female  HPI    The following portions of the patient's history were reviewed and updated as appropriate: allergies, current medications, past family history, past medical history, past social history, past surgical history and problem list     Review of Systems   Constitutional: Positive for fatigue   Negative for chills  HENT: Negative for congestion, ear pain, hearing loss, postnasal drip, sinus pressure, sore throat and voice change  Eyes: Negative for pain, discharge and visual disturbance  Respiratory: Negative for cough, chest tightness and shortness of breath  Cardiovascular: Negative for chest pain, palpitations and leg swelling  Gastrointestinal: Negative for abdominal pain, blood in stool, diarrhea, nausea and rectal pain  Genitourinary: Negative for difficulty urinating, dysuria and urgency  Musculoskeletal: Positive for arthralgias (knee pain)  Negative for joint swelling  Skin: Negative for rash  Allergic/Immunologic: Negative for environmental allergies and food allergies  Neurological: Positive for headaches  Negative for dizziness, tremors, weakness and numbness  Hematological: Negative for adenopathy  Psychiatric/Behavioral: Negative for behavioral problems and hallucinations  The patient is nervous/anxious            Past Medical History:   Diagnosis Date    Acute diffuse otitis externa of right ear 8/23/2021    Anemia     Anxiety     Asthma     controlled  seasonal    Gestational diabetes mellitus     Hyperlipidemia     Hypertension     Other chest pain 1/27/2020    PONV (postoperative nausea and vomiting)     Pregnancy     Resolved: 07/29/2015    Trigger finger     Resolved: 12/02/2016    Yeast infection     Resolved: 07/29/2015         Current Outpatient Medications:     albuterol (Ventolin HFA) 90 mcg/act inhaler, Inhale 1-2 puffs as needed for wheezing, Disp: 18 g, Rfl: 2    atorvastatin (LIPITOR) 40 mg tablet, Take 1 tablet (40 mg total) by mouth daily, Disp: 90 tablet, Rfl: 3    escitalopram (LEXAPRO) 10 mg tablet, Take 1 tablet (10 mg total) by mouth daily at bedtime, Disp: 90 tablet, Rfl: 1    ferrous sulfate 325 (65 Fe) mg tablet, Take 325 mg by mouth daily with breakfast, Disp: , Rfl:     fluticasone (FLONASE) 50 mcg/act nasal spray, 1 spray into each nostril daily (Patient taking differently: 1 spray into each nostril as needed), Disp: 1 Bottle, Rfl: 0    hydrochlorothiazide (HYDRODIURIL) 12 5 mg tablet, Take 1 tablet (12 5 mg total) by mouth daily at bedtime, Disp: 90 tablet, Rfl: 1    loratadine (CLARITIN) 10 mg tablet, Take 10 mg by mouth daily at bedtime  , Disp: , Rfl:     losartan (Cozaar) 50 mg tablet, Take 1 tablet (50 mg total) by mouth daily, Disp: 90 tablet, Rfl: 1    metoprolol succinate (TOPROL-XL) 50 mg 24 hr tablet, Take 1 tablet (50 mg total) by mouth daily at bedtime, Disp: 90 tablet, Rfl: 1    Multiple Vitamin (multivitamin) tablet, Take 1 tablet by mouth daily, Disp: , Rfl:     Omega-3 Fatty Acids (FISH OIL) 1,000 mg, Take 2 capsules by mouth daily at bedtime  , Disp: , Rfl:     Tirzepatide (Mounjaro) 2 5 MG/0 5ML SOPN, Inject 2 5 mg under the skin once a week, Disp: 0 5 mL, Rfl: 4    Allergies   Allergen Reactions    Hibiclens [Chlorhexidine Gluconate] Rash     Red with pimples    Pollen Extract Nasal Congestion       Social History   Past Surgical History:   Procedure Laterality Date    COLONOSCOPY      COLPORRHAPHY N/A 6/20/2022    Procedure: ANTERIOR COLPORRHAPHY;  Surgeon: Juan R Corona MD;  [facility];  Service: UroGynecology           CYSTOSCOPY N/A 6/20/2022    Procedure: Jefry Ham;  Surgeon: Juan R Corona MD;  [facility];  Service: UroGynecology           HERNIA REPAIR      INCISION TENDON SHEATH HAND Left     thumb    NH HYSTEROSCOPY,W/ENDO BX N/A 10/14/2019    Procedure: DILATATION AND CURETTAGE (D&C) WITH HYSTEROSCOPY W/ Doc Boroughs;  Surgeon: Annemarie Reyes DO;  [facility];  Service: Gynecology    NH HYSTEROSCOPY,W/ENDOMETRIAL ABLATION N/A 10/14/2019    Procedure: ABLATION ENDOMETRIAL Doc Boroughs;  Surgeon: Annemarie Reyes DO;  [facility];  Service: Gynecology    NH KNEE SCOPE,MED/LAT MENISECTOMY Left 11/05/2021    Procedure: KNEE ARTHROSCOPIC MENISCECTOMY Medial;  Surgeon: Manasa Wu MD;  [facility];  Service: Orthopedics    HI REPAIR UMBILICAL WLAN,1+S/H,[facility] N/A 08/23/2018    Procedure: UMBILICAL HERNIA REPAIR WITH MESH;  Surgeon: Skye Mendoza MD;  [facility];  Service: General    PUBOVAGINAL SLING N/A 6/20/2022    Procedure: PUBOVAGINAL SLING;  Surgeon: José Antonio Mirza MD;  [facility];  Service: UroGynecology           WISDOM TOOTH EXTRACTION       Family History   Problem Relation Age of Onset    Hypertension Mother     Hyperlipidemia Mother     Endometrial cancer Mother 55    Diabetes type II Mother     Hyperlipidemia Father     Hypertension Father     Heart attack Sister     No Known Problems Brother     No Known Problems Paternal Aunt     No Known Problems Maternal Grandmother     Heart attack Maternal Grandfather     Other Maternal Grandfather         Myocardial infarction arrhythmias    No Known Problems Paternal Grandmother     Stroke Paternal Grandfather         Syndrome    No Known Problems Daughter     No Known Problems Son        Objective:  /86 (BP Location: Left arm, Patient Position: Sitting, Cuff Size: Large)   Pulse 68   Temp 98 5 °F (36 9 °C) (Temporal)   Ht 5' 4" (1 626 m)   Wt 98 4 kg (217 lb)   SpO2 98% Comment: ra  BMI 37 25 kg/m²        Physical Exam  Constitutional:       Appearance: She is well-developed  She is obese  HENT:      Head: Normocephalic  Right Ear: External ear normal    Eyes:      Conjunctiva/sclera: Conjunctivae normal       Pupils: Pupils are equal, round, and reactive to light  Neck:      Thyroid: No thyromegaly  Vascular: No JVD  Cardiovascular:      Rate and Rhythm: Normal rate and regular rhythm  Heart sounds: Normal heart sounds  Pulmonary:      Breath sounds: Normal breath sounds  Abdominal:      General: Bowel sounds are normal       Palpations: Abdomen is soft     Musculoskeletal:         General: Normal range of motion  Cervical back: Normal range of motion  Lymphadenopathy:      Cervical: No cervical adenopathy  Skin:     General: Skin is dry  Capillary Refill: Capillary refill takes less than 2 seconds  Neurological:      Mental Status: She is alert and oriented to person, place, and time  Deep Tendon Reflexes: Reflexes are normal and symmetric     Psychiatric:         Behavior: Behavior normal

## 2022-09-21 NOTE — TELEPHONE ENCOUNTER
----- Message -----  From: Jessica Guardado MD  Sent: 9/20/2022   2:03 PM EDT  To: Cardiology [facility]    Overall cholesterol numbers are significantly better, please let her know,   Also, she is a borderline prediabetic, to continue dietary and lifestyle changes

## 2022-09-21 NOTE — TELEPHONE ENCOUNTER
Spoke with pt  Advised pt of blood work result  Pt states her PCP has started her on Mounjaro injection to treat prediabetes and to manage weight  Pt wants to bring this to your attention, since she started the atorvastatin she started noticing some memory loss this was discussed also with her PCP  She is going to see a sleep specialist on 10/25/22

## 2022-09-22 DIAGNOSIS — J45.40 MODERATE PERSISTENT EXTRINSIC ASTHMA WITHOUT COMPLICATION: ICD-10-CM

## 2022-09-22 DIAGNOSIS — F41.1 GENERALIZED ANXIETY DISORDER: ICD-10-CM

## 2022-09-22 DIAGNOSIS — I10 BENIGN HYPERTENSION: ICD-10-CM

## 2022-09-22 RX ORDER — METOPROLOL SUCCINATE 50 MG/1
50 TABLET, EXTENDED RELEASE ORAL
Qty: 90 TABLET | Refills: 1 | Status: SHIPPED | OUTPATIENT
Start: 2022-09-22 | End: 2023-05-21 | Stop reason: SDUPTHER

## 2022-09-22 RX ORDER — ALBUTEROL SULFATE 90 UG/1
1-2 AEROSOL, METERED RESPIRATORY (INHALATION) AS NEEDED
Qty: 54 G | Refills: 1 | Status: SHIPPED | OUTPATIENT
Start: 2022-09-22

## 2022-09-22 RX ORDER — ESCITALOPRAM OXALATE 10 MG/1
10 TABLET ORAL
Qty: 90 TABLET | Refills: 1 | Status: SHIPPED | OUTPATIENT
Start: 2022-09-22 | End: 2023-03-15 | Stop reason: SDUPTHER

## 2022-09-22 NOTE — TELEPHONE ENCOUNTER
Spoke with pt, advised pt of Dr Michela Comer message  Pt will decrease the dose of her statin to 20 mg daily  I advised pt to continue dietary changes

## 2022-10-15 ENCOUNTER — APPOINTMENT (OUTPATIENT)
Dept: LAB | Facility: IMAGING CENTER | Age: 47
End: 2022-10-15
Payer: COMMERCIAL

## 2022-10-15 DIAGNOSIS — I10 BENIGN HYPERTENSION: ICD-10-CM

## 2022-10-15 DIAGNOSIS — E78.00 HYPERCHOLESTEROLEMIA: ICD-10-CM

## 2022-10-15 LAB
ALBUMIN SERPL BCP-MCNC: 4.1 G/DL (ref 3.5–5)
ALP SERPL-CCNC: 80 U/L (ref 46–116)
ALT SERPL W P-5'-P-CCNC: 38 U/L (ref 12–78)
ANION GAP SERPL CALCULATED.3IONS-SCNC: 8 MMOL/L (ref 4–13)
AST SERPL W P-5'-P-CCNC: 19 U/L (ref 5–45)
BILIRUB SERPL-MCNC: 1.57 MG/DL (ref 0.2–1)
BUN SERPL-MCNC: 15 MG/DL (ref 5–25)
CALCIUM SERPL-MCNC: 9.3 MG/DL (ref 8.3–10.1)
CHLORIDE SERPL-SCNC: 105 MMOL/L (ref 96–108)
CO2 SERPL-SCNC: 24 MMOL/L (ref 21–32)
CREAT SERPL-MCNC: 0.76 MG/DL (ref 0.6–1.3)
GFR SERPL CREATININE-BSD FRML MDRD: 93 ML/MIN/1.73SQ M
GLUCOSE P FAST SERPL-MCNC: 100 MG/DL (ref 65–99)
POTASSIUM SERPL-SCNC: 4.1 MMOL/L (ref 3.5–5.3)
PROT SERPL-MCNC: 7.9 G/DL (ref 6.4–8.4)
SODIUM SERPL-SCNC: 137 MMOL/L (ref 135–147)

## 2022-10-15 PROCEDURE — 36415 COLL VENOUS BLD VENIPUNCTURE: CPT

## 2022-10-15 PROCEDURE — 80053 COMPREHEN METABOLIC PANEL: CPT

## 2022-10-17 ENCOUNTER — OFFICE VISIT (OUTPATIENT)
Dept: INTERNAL MEDICINE CLINIC | Age: 47
End: 2022-10-17
Payer: COMMERCIAL

## 2022-10-17 VITALS
WEIGHT: 211 LBS | DIASTOLIC BLOOD PRESSURE: 80 MMHG | HEIGHT: 64 IN | TEMPERATURE: 97.5 F | OXYGEN SATURATION: 97 % | HEART RATE: 76 BPM | BODY MASS INDEX: 36.02 KG/M2 | SYSTOLIC BLOOD PRESSURE: 124 MMHG

## 2022-10-17 DIAGNOSIS — I10 BENIGN HYPERTENSION: ICD-10-CM

## 2022-10-17 DIAGNOSIS — Z12.11 ENCOUNTER FOR SCREENING FOR MALIGNANT NEOPLASM OF COLON: Primary | ICD-10-CM

## 2022-10-17 DIAGNOSIS — E66.9 OBESITY (BMI 35.0-39.9 WITHOUT COMORBIDITY): ICD-10-CM

## 2022-10-17 DIAGNOSIS — R73.03 PREDIABETES: ICD-10-CM

## 2022-10-17 DIAGNOSIS — E78.1 HYPERTRIGLYCERIDEMIA: ICD-10-CM

## 2022-10-17 DIAGNOSIS — S51.811A LACERATION OF RIGHT FOREARM, INITIAL ENCOUNTER: ICD-10-CM

## 2022-10-17 PROCEDURE — 90471 IMMUNIZATION ADMIN: CPT

## 2022-10-17 PROCEDURE — 99214 OFFICE O/P EST MOD 30 MIN: CPT | Performed by: INTERNAL MEDICINE

## 2022-10-17 PROCEDURE — 90715 TDAP VACCINE 7 YRS/> IM: CPT

## 2022-10-17 RX ORDER — TIRZEPATIDE 5 MG/.5ML
5 INJECTION, SOLUTION SUBCUTANEOUS WEEKLY
Qty: 20 ML | Refills: 1 | Status: SHIPPED | OUTPATIENT
Start: 2022-10-17

## 2022-10-17 NOTE — PROGRESS NOTES
Assessment/Plan:     Diagnoses and all orders for this visit:    Encounter for screening for malignant neoplasm of colon  -     Cologuard    Prediabetes  -     Tirzepatide (Mounjaro) 5 MG/0 5ML SOPN; Inject 5 mg under the skin once a week    Obesity (BMI 35 0-39 9 without comorbidity)  -     Tirzepatide (Mounjaro) 5 MG/0 5ML SOPN; Inject 5 mg under the skin once a week    Hypertriglyceridemia  Hyperglycemia continue with the present  Benign hypertension    hypertension very well controlled      laceration on the right forearm by the dog bite there is no signs of any infection but we will give her Tdap/Adacel    M*Modal software was used to dictate this note  It may contain errors with dictating incorrect words or incorrect spelling  Please contact the provider directly with any questions  Subjective:   Chief Complaint   Patient presents with   • Follow-up     1 month- labs done 10/15/22-  BMI FOLLOW UP-  Pt would like to try cologuard-  Pt recently tested positive for covid on 10/10/22- Pt states she still congestion tightness in chest, stuffy nose  --- Pt unsure if she should get flu shot        Patient ID: Bimal Bowles is a 49 y o  female  HPI  This is a very pleasant 49 years young lady who was seen by me for obesity and also for the pre diabetes she is doing better she lost about 6 lb we will increase her medication and see how she does with that I doubt that insurance company will cover it we will talk about it more if they do not cover  Hypertension is very well controlled  Recently got COVID infection asthma is stable  Gastroesophageal reflux disease most likely from the use of her medication    Hypercholesterolemia continue with the 20 mg of Lipitor    The following portions of the patient's history were reviewed and updated as appropriate: allergies, current medications, past family history, past medical history, past social history, past surgical history and problem list     Review of Systems Constitutional: Negative for chills and fatigue  HENT: Negative for congestion, ear pain, hearing loss, postnasal drip, sinus pressure, sore throat and voice change  Eyes: Negative for pain, discharge and visual disturbance  Respiratory: Negative for cough, chest tightness and shortness of breath  Cardiovascular: Negative for chest pain, palpitations and leg swelling  Gastrointestinal: Negative for abdominal pain, blood in stool, diarrhea, nausea and rectal pain  Occasional heartburns   Genitourinary: Negative for difficulty urinating, dysuria and urgency  Musculoskeletal: Negative for arthralgias and joint swelling  Skin: Negative for rash  Allergic/Immunologic: Negative for environmental allergies and food allergies  Neurological: Negative for dizziness, tremors, weakness, numbness and headaches  Hematological: Negative for adenopathy  Psychiatric/Behavioral: Negative for behavioral problems and hallucinations           Past Medical History:   Diagnosis Date   • Acute diffuse otitis externa of right ear 8/23/2021   • Anemia    • Anxiety    • Asthma     controlled  seasonal   • Gestational diabetes mellitus    • Hyperlipidemia    • Hypertension    • Other chest pain 1/27/2020   • PONV (postoperative nausea and vomiting)    • Pregnancy     Resolved: 07/29/2015   • Trigger finger     Resolved: 12/02/2016   • Yeast infection     Resolved: 07/29/2015         Current Outpatient Medications:   •  albuterol (Ventolin HFA) 90 mcg/act inhaler, Inhale 1-2 puffs as needed for wheezing, Disp: 54 g, Rfl: 1  •  atorvastatin (LIPITOR) 40 mg tablet, Take 1 tablet (40 mg total) by mouth daily (Patient taking differently: Take 20 mg by mouth daily), Disp: 90 tablet, Rfl: 3  •  escitalopram (LEXAPRO) 10 mg tablet, Take 1 tablet (10 mg total) by mouth daily at bedtime, Disp: 90 tablet, Rfl: 1  •  ferrous sulfate 325 (65 Fe) mg tablet, Take 325 mg by mouth daily with breakfast, Disp: , Rfl:   • fluticasone (FLONASE) 50 mcg/act nasal spray, 1 spray into each nostril daily (Patient taking differently: 1 spray into each nostril as needed), Disp: 1 Bottle, Rfl: 0  •  hydrochlorothiazide (HYDRODIURIL) 12 5 mg tablet, Take 1 tablet (12 5 mg total) by mouth daily at bedtime, Disp: 90 tablet, Rfl: 1  •  loratadine (CLARITIN) 10 mg tablet, Take 10 mg by mouth daily at bedtime  , Disp: , Rfl:   •  losartan (Cozaar) 50 mg tablet, Take 1 tablet (50 mg total) by mouth daily, Disp: 90 tablet, Rfl: 1  •  metoprolol succinate (TOPROL-XL) 50 mg 24 hr tablet, Take 1 tablet (50 mg total) by mouth daily at bedtime, Disp: 90 tablet, Rfl: 1  •  Multiple Vitamin (multivitamin) tablet, Take 1 tablet by mouth daily, Disp: , Rfl:   •  Omega-3 Fatty Acids (FISH OIL) 1,000 mg, Take 2 capsules by mouth daily at bedtime  , Disp: , Rfl:   •  Tirzepatide (Mounjaro) 2 5 MG/0 5ML SOPN, Inject 2 5 mg under the skin once a week LOT [id number]  Exp 10/4/2023, Disp: 1 5 mL, Rfl: 0    Allergies   Allergen Reactions   • Hibiclens [Chlorhexidine Gluconate] Rash     Red with pimples   • Pollen Extract Nasal Congestion       Social History   Past Surgical History:   Procedure Laterality Date   • COLONOSCOPY     • COLPORRHAPHY N/A 6/20/2022    Procedure: ANTERIOR COLPORRHAPHY;  Surgeon: Juan R Corona MD;  Location: [facility];  Service: UroGynecology          • CYSTOSCOPY N/A 6/20/2022    Procedure: Jefry Ham;  Surgeon: Juan R Corona MD;  [facility];  Service: UroGynecology          • HERNIA REPAIR     • INCISION TENDON SHEATH HAND Left     thumb   • SC HYSTEROSCOPY,W/ENDO BX N/A 10/14/2019    Procedure: DILATATION AND CURETTAGE (D&C) WITH HYSTEROSCOPY W/ Doc Boroughs;  Surgeon: Annemarie Reyes DO;  [facility];  Service: Gynecology   • SC HYSTEROSCOPY,W/ENDOMETRIAL ABLATION N/A 10/14/2019    Procedure: ABLATION ENDOMETRIAL Doc Boroughs;  Surgeon: Annemarie Reyes DO;  [facility];  Service: Gynecology   • SC KNEE SCOPE,MED/LAT MENISECTOMY Left 11/05/2021    Procedure: KNEE ARTHROSCOPIC MENISCECTOMY Medial;  Surgeon: Torri Mccain MD;  [facility];  Service: Orthopedics   • OR REPAIR UMBILICAL NRKM,6+I/V,PJWPW N/A 08/23/2018    Procedure: UMBILICAL HERNIA REPAIR WITH MESH;  Surgeon: Clement Turner MD;  [facility];  Service: General   • PUBOVAGINAL SLING N/A 6/20/2022    Procedure: PUBOVAGINAL SLING;  Surgeon: Ligia Gale MD;  [facility];  Service: UroGynecology          • WISDOM TOOTH EXTRACTION       Family History   Problem Relation Age of Onset   • Hypertension Mother    • Hyperlipidemia Mother    • Endometrial cancer Mother 55   • Diabetes type II Mother    • Hyperlipidemia Father    • Hypertension Father    • Heart attack Sister    • No Known Problems Brother    • No Known Problems Paternal Aunt    • No Known Problems Maternal Grandmother    • Heart attack Maternal Grandfather    • Other Maternal Grandfather         Myocardial infarction arrhythmias   • No Known Problems Paternal Grandmother    • Stroke Paternal Grandfather         Syndrome   • No Known Problems Daughter    • No Known Problems Son        Objective:  /80 (BP Location: Left arm, Patient Position: Sitting, Cuff Size: Standard)   Pulse 76   Temp 97 5 °F (36 4 °C) (Temporal)   Ht 5' 4" (1 626 m)   Wt 95 7 kg (211 lb)   SpO2 97% Comment: room air  BMI 36 22 kg/m²        Physical Exam  Constitutional:       Appearance: She is well-developed  HENT:      Head: Normocephalic and atraumatic  Eyes:      Pupils: Pupils are equal, round, and reactive to light  Cardiovascular:      Rate and Rhythm: Normal rate  Pulmonary:      Effort: Pulmonary effort is normal       Breath sounds: Normal breath sounds  Musculoskeletal:      Cervical back: Normal range of motion and neck supple

## 2022-10-25 ENCOUNTER — OFFICE VISIT (OUTPATIENT)
Dept: SLEEP CENTER | Facility: CLINIC | Age: 47
End: 2022-10-25
Payer: COMMERCIAL

## 2022-10-25 VITALS
HEIGHT: 64 IN | DIASTOLIC BLOOD PRESSURE: 82 MMHG | HEART RATE: 94 BPM | BODY MASS INDEX: 36.37 KG/M2 | OXYGEN SATURATION: 93 % | SYSTOLIC BLOOD PRESSURE: 120 MMHG | WEIGHT: 213 LBS

## 2022-10-25 DIAGNOSIS — Z91.89 AT RISK FOR OBSTRUCTIVE SLEEP APNEA: ICD-10-CM

## 2022-10-25 DIAGNOSIS — G47.33 OSA (OBSTRUCTIVE SLEEP APNEA): Primary | ICD-10-CM

## 2022-10-25 PROCEDURE — 99203 OFFICE O/P NEW LOW 30 MIN: CPT | Performed by: INTERNAL MEDICINE

## 2022-10-25 NOTE — PROGRESS NOTES
Consultation - [facility] : 1975  MRN: [id number]      Assessment:  The patient has symptoms consistent with obstructive sleep apnea  She has been witnessed by medical personnel to stop breathing while asleep  She also has excessive daytime sleepiness and hypertension  Plan:  Home sleep apnea testing    Follow up: After testing    History of Present Illness:   49 y  o female with a longstanding history of loud snoring  The patient is anxious about undergoing evaluation for treatment of obstructive sleep apnea due to her severe claustrophobia  She does report excessive daytime sleepiness and has a history of hypertension  She reports morning headaches and denies awakening with a choking sensation  Of note, the patient's  has obstructive sleep apnea and uses CPAP  Review of Systems      Genitourinary hot flashes at night  Cardiology none  Gastrointestinal frequent heartburn/acid reflux  Neurology frequent headaches, awaken with headache and forgetfulness  Constitutional fatigue and excessive sweating at night  Integumentary itching  Psychiatry none  Musculoskeletal back pain  Pulmonary shortness of breath with activity and snoring  ENT throat clearing  Endocrine none  Hematological none    I have reviewed and updated the review of systems as necessary    Historical Information      Family History: The patient's parents have obstructive sleep apnea and use CPAP    Social History     Socioeconomic History   • Marital status: /Civil Union     Spouse name: Not on file   • Number of children: Not on file   • Years of education: Not on file   • Highest education level: Not on file   Occupational History   • Not on file   Tobacco Use   • Smoking status: Never Smoker   • Smokeless tobacco: Never Used   Vaping Use   • Vaping Use: Never used   Substance and Sexual Activity   • Alcohol use:  Yes     Alcohol/week: 2 0 standard drinks     Types: 2 Glasses of wine per week Comment: 1-2 drinks per week on average typically consumes beer   • Drug use: No   • Sexual activity: Yes     Partners: Male     Birth control/protection: Male Sterilization   Other Topics Concern   • Not on file   Social History Narrative    Daily coffee consumption (2 cups/day)    Denied: History of exercise habits    Two children     Social Determinants of Health     Financial Resource Strain: Not on file   Food Insecurity: Not on file   Transportation Needs: Not on file   Physical Activity: Not on file   Stress: Not on file   Social Connections: Not on file   Intimate Partner Violence: Not on file   Housing Stability: Not on file         Sleep Schedule: unremarkable    Snoring:  Yes    Witnessed Apnea:  Yes    Medications/Allergies:    Current Outpatient Medications:   •  albuterol (Ventolin HFA) 90 mcg/act inhaler, Inhale 1-2 puffs as needed for wheezing, Disp: 54 g, Rfl: 1  •  atorvastatin (LIPITOR) 40 mg tablet, Take 1 tablet (40 mg total) by mouth daily, Disp: 90 tablet, Rfl: 3  •  escitalopram (LEXAPRO) 10 mg tablet, Take 1 tablet (10 mg total) by mouth daily at bedtime, Disp: 90 tablet, Rfl: 1  •  ferrous sulfate 325 (65 Fe) mg tablet, Take 325 mg by mouth daily with breakfast, Disp: , Rfl:   •  fluticasone (FLONASE) 50 mcg/act nasal spray, 1 spray into each nostril daily (Patient taking differently: 1 spray into each nostril as needed), Disp: 1 Bottle, Rfl: 0  •  hydrochlorothiazide (HYDRODIURIL) 12 5 mg tablet, Take 1 tablet (12 5 mg total) by mouth daily at bedtime, Disp: 90 tablet, Rfl: 1  •  loratadine (CLARITIN) 10 mg tablet, Take 10 mg by mouth daily at bedtime  , Disp: , Rfl:   •  losartan (Cozaar) 50 mg tablet, Take 1 tablet (50 mg total) by mouth daily, Disp: 90 tablet, Rfl: 1  •  metoprolol succinate (TOPROL-XL) 50 mg 24 hr tablet, Take 1 tablet (50 mg total) by mouth daily at bedtime, Disp: 90 tablet, Rfl: 1  •  Multiple Vitamin (multivitamin) tablet, Take 1 tablet by mouth daily, Disp: , Rfl: •  Omega-3 Fatty Acids (FISH OIL) 1,000 mg, Take 2 capsules by mouth daily at bedtime  , Disp: , Rfl:   •  Tirzepatide (Mounjaro) 5 MG/0 5ML SOPN, Inject 5 mg under the skin once a week, Disp: 20 mL, Rfl: 1  •  Tirzepatide 5 MG/0 5ML SOPN, Inject 5 mg under the skin once a week, Disp: 20 mL, Rfl: 1        No notes on file                  Objective:    Vital Signs:   Vitals:    10/25/22 1200   BP: 120/82   Pulse: 94   SpO2: 93%   Weight: 96 6 kg (213 lb)   Height: 5' 4" (1 626 m)     Neck Circumference: 14 5      [place] Sleepiness Scale: Total score: 12    Physical Exam:    General: Alert, appropriate, cooperative, overweight    Head: NC/AT, mild retrognathia    Nose: No septal deviation, nares not obstructed, mucosa normal    Throat: Airway diminished, tongue base thickened, no tonsils visualized    Neck: Normal, no thyromegaly or lymphadenopathy, no JVD    Heart: RR, normal S1 and S2, no murmurs    Chest: Clear bilaterally    Extremity: No clubbing, cyanosis, or edema    Skin: Warm, dry    Neuro: No motor abnormalities, cranial nerves appear intact    Counseling / Coordination of Care  A description of the counseling / coordination of care: We discussed the pathophysiology of obstructive sleep apnea as well as the possible treatment options  We also discussed the rationale for positive airway pressure therapy  Board Certified Sleep Specialist    Portions of the record may have been created with voice recognition software  Occasional wrong word or "sound a like" substitutions may have occurred due to the inherent limitations of voice recognition software  Read the chart carefully and recognize, using context, where substitutions have occurred

## 2022-11-07 ENCOUNTER — ANNUAL EXAM (OUTPATIENT)
Dept: OBGYN CLINIC | Facility: CLINIC | Age: 47
End: 2022-11-07

## 2022-11-07 VITALS
BODY MASS INDEX: 35.51 KG/M2 | DIASTOLIC BLOOD PRESSURE: 82 MMHG | WEIGHT: 208 LBS | SYSTOLIC BLOOD PRESSURE: 122 MMHG | HEIGHT: 64 IN

## 2022-11-07 DIAGNOSIS — Z01.419 ENCOUNTER FOR ANNUAL ROUTINE GYNECOLOGICAL EXAMINATION: Primary | ICD-10-CM

## 2022-11-07 DIAGNOSIS — Z12.31 ENCOUNTER FOR SCREENING MAMMOGRAM FOR BREAST CANCER: ICD-10-CM

## 2022-11-07 LAB — COLOGUARD RESULT REPORTABLE: NEGATIVE

## 2022-11-07 NOTE — PROGRESS NOTES
Assessment/Plan:    Pap smear done for cytology, reflex HPV  Encouraged self-breast examination as well as calcium supplementation  Continue annual mammogram   Reviewed colon cancer screening, up-to-date with Cologuades  She will continue to follow-up with uro Gyne and primary care as scheduled  Return to office 1 year or p r n  No problem-specific Assessment & Plan notes found for this encounter  Diagnoses and all orders for this visit:    Encounter for annual routine gynecological examination    Encounter for screening mammogram for breast cancer  -     Mammo screening bilateral w 3d & cad; Future          Subjective:      Patient ID: Lesia Montano is a 49 y o  female  HPI     This is a very pleasant 43-year-old female P3 ( x3, age 24, 11) presents for gyn exam   She offers no complaints today  Her menstrual cycles are regular approximately every 4 weeks lasting 2 days described as very light since she had endometrial ablation  She denies any changes in bowel or bladder function  She has been in a monogamous relationship with her  for over 19 years  Pap smears have been normal   Method of contraception is vasectomy  In the course of the year, she underwent suburethral sling/anterior repair  She had no postop difficulties, incontinence resolved  She is scheduled for 6 month follow-up visit  Cologishaan done , normal       The following portions of the patient's history were reviewed and updated as appropriate: allergies, current medications, past family history, past medical history, past social history, past surgical history and problem list     Review of Systems   Constitutional: Negative for fatigue, fever and unexpected weight change  Respiratory: Negative for cough, chest tightness, shortness of breath and wheezing  Cardiovascular: Negative  Negative for chest pain and palpitations  Gastrointestinal: Negative    Negative for abdominal distention, abdominal pain, blood in stool, constipation, diarrhea, nausea and vomiting  Genitourinary: Negative  Negative for difficulty urinating, dyspareunia, dysuria, flank pain, frequency, genital sores, hematuria, pelvic pain, urgency, vaginal bleeding, vaginal discharge and vaginal pain  Skin: Negative for rash  Objective:      /82   Ht 5' 4" (1 626 m)   Wt 94 3 kg (208 lb)   LMP 10/30/2022   BMI 35 70 kg/m²          Physical Exam  Constitutional:       Appearance: Normal appearance  She is well-developed  Cardiovascular:      Rate and Rhythm: Normal rate and regular rhythm  Pulmonary:      Effort: Pulmonary effort is normal       Breath sounds: Normal breath sounds  Chest:   Breasts:      Right: No inverted nipple, mass, nipple discharge, skin change or tenderness  Left: No inverted nipple, mass, nipple discharge, skin change or tenderness  Abdominal:      General: Bowel sounds are normal  There is no distension  Palpations: Abdomen is soft  Tenderness: There is no abdominal tenderness  There is no guarding or rebound  Genitourinary:     Labia:         Right: No rash, tenderness or lesion  Left: No rash, tenderness or lesion  Vagina: Normal  No signs of injury  No vaginal discharge or tenderness  Cervix: No cervical motion tenderness, discharge, friability, lesion, erythema or cervical bleeding  Uterus: Not enlarged and not tender  Adnexa:         Right: No mass, tenderness or fullness  Left: No mass, tenderness or fullness  Neurological:      Mental Status: She is alert and oriented to person, place, and time     Psychiatric:         Behavior: Behavior normal

## 2022-11-12 LAB
LAB AP GYN PRIMARY INTERPRETATION: NORMAL
LAB AP LMP: NORMAL
Lab: NORMAL

## 2022-11-29 ENCOUNTER — OFFICE VISIT (OUTPATIENT)
Dept: INTERNAL MEDICINE CLINIC | Facility: OTHER | Age: 47
End: 2022-11-29

## 2022-11-29 VITALS
DIASTOLIC BLOOD PRESSURE: 84 MMHG | HEART RATE: 86 BPM | HEIGHT: 64 IN | OXYGEN SATURATION: 99 % | BODY MASS INDEX: 35.51 KG/M2 | SYSTOLIC BLOOD PRESSURE: 138 MMHG | WEIGHT: 208 LBS | TEMPERATURE: 98.3 F

## 2022-11-29 DIAGNOSIS — I10 BENIGN HYPERTENSION: Primary | ICD-10-CM

## 2022-11-29 DIAGNOSIS — R73.03 PREDIABETES: ICD-10-CM

## 2022-11-29 DIAGNOSIS — E78.00 HYPERCHOLESTEROLEMIA: ICD-10-CM

## 2022-11-29 DIAGNOSIS — E66.9 OBESITY (BMI 35.0-39.9 WITHOUT COMORBIDITY): ICD-10-CM

## 2022-11-29 RX ORDER — TIRZEPATIDE 7.5 MG/.5ML
7.5 INJECTION, SOLUTION SUBCUTANEOUS WEEKLY
Qty: 7.5 ML | Refills: 6 | Status: SHIPPED | OUTPATIENT
Start: 2022-11-29 | End: 2022-11-29 | Stop reason: SDUPTHER

## 2022-11-29 RX ORDER — TIRZEPATIDE 7.5 MG/.5ML
7.5 INJECTION, SOLUTION SUBCUTANEOUS WEEKLY
Qty: 7.5 ML | Refills: 6 | Status: SHIPPED | OUTPATIENT
Start: 2022-11-29 | End: 2022-12-06 | Stop reason: SDUPTHER

## 2022-11-29 NOTE — PROGRESS NOTES
Assessment/Plan:     Diagnoses and all orders for this visit:    Benign hypertension  -     Tirzepatide (Mounjaro) 7 5 MG/0 5ML SOPN; Inject 7 5 mg under the skin once a week  -     Comprehensive metabolic panel; Future    Prediabetes  -     Tirzepatide (Mounjaro) 7 5 MG/0 5ML SOPN; Inject 7 5 mg under the skin once a week  -     Hemoglobin A1C; Future  -     Comprehensive metabolic panel; Future    Obesity (BMI 35 0-39 9 without comorbidity)  -     Comprehensive metabolic panel; Future    Hypercholesterolemia  -     Tirzepatide (Mounjaro) 7 5 MG/0 5ML SOPN; Inject 7 5 mg under the skin once a week        BMI Counseling: Body mass index is 35 7 kg/m²  The BMI is above normal  Nutrition recommendations include decreasing portion sizes, encouraging healthy choices of fruits and vegetables and moderation in carbohydrate intake  Exercise recommendations include moderate physical activity 150 minutes/week  Rationale for BMI follow-up plan is due to patient being overweight or obese  M*Modal software was used to dictate this note  It may contain errors with dictating incorrect words or incorrect spelling  Please contact the provider directly with any questions  Subjective:   Chief Complaint   Patient presents with   • Follow-up     6 week follow up- wants to discuss medications   • hm     [facility]        Patient ID: Jillian Pryor is a 49 y o  female      HPI  Very pleasant 49 years young lady is here for the follow-up she was recently started on Mounjaro or prediabetes and also for the control of her weight she is stable on her blood sugar but her weight is not responding to the medication and I will increase the dose from 5 mg to the 7 5 mg only side effects right now is the heartburns but no other problems no other abdominal pain nausea vomiting or diarrhea she will keep an eye on her side effects of the medication she does not have any family history of the thyroid cancer or any MEN cancers   That is multiple endocrine neoplasia   Blood pressure could be better controlled I discussed with her her blood pressure is borderline 138/84 I would like it to be less than 80  and  less than 130 The following portions of the patient's history were reviewed and updated as appropriate: allergies, current medications, past family history, past medical history, past social history, past surgical history and problem list     Review of Systems   Constitutional: Positive for fatigue  Negative for chills  HENT: Negative for congestion, ear pain, hearing loss, postnasal drip, sinus pressure, sore throat and voice change  Eyes: Negative for pain, discharge and visual disturbance  Respiratory: Negative for cough, chest tightness and shortness of breath  Cardiovascular: Negative for chest pain, palpitations and leg swelling  Gastrointestinal: Negative for abdominal pain, blood in stool, diarrhea, nausea and rectal pain  Genitourinary: Negative for difficulty urinating, dysuria and urgency  Musculoskeletal: Negative for arthralgias and joint swelling  Skin: Negative for rash  Allergic/Immunologic: Negative for environmental allergies and food allergies  Neurological: Negative for dizziness, tremors, weakness, numbness and headaches  Hematological: Negative for adenopathy  Psychiatric/Behavioral: Negative for behavioral problems and hallucinations           Past Medical History:   Diagnosis Date   • Acute diffuse otitis externa of right ear 8/23/2021   • Anemia    • Anxiety    • Asthma     controlled  seasonal   • Gestational diabetes mellitus    • Hyperlipidemia    • Hypertension    • Other chest pain 1/27/2020   • PONV (postoperative nausea and vomiting)    • Pregnancy     Resolved: 07/29/2015   • Trigger finger     Resolved: 12/02/2016   • Yeast infection     Resolved: 07/29/2015         Current Outpatient Medications:   •  albuterol (Ventolin HFA) 90 mcg/act inhaler, Inhale 1-2 puffs as needed for wheezing, Disp: 54 g, Rfl: 1  •  atorvastatin (LIPITOR) 40 mg tablet, Take 1 tablet (40 mg total) by mouth daily, Disp: 90 tablet, Rfl: 3  •  escitalopram (LEXAPRO) 10 mg tablet, Take 1 tablet (10 mg total) by mouth daily at bedtime, Disp: 90 tablet, Rfl: 1  •  ferrous sulfate 325 (65 Fe) mg tablet, Take 325 mg by mouth daily with breakfast, Disp: , Rfl:   •  fluticasone (FLONASE) 50 mcg/act nasal spray, 1 spray into each nostril daily (Patient taking differently: 1 spray into each nostril as needed), Disp: 1 Bottle, Rfl: 0  •  loratadine (CLARITIN) 10 mg tablet, Take 10 mg by mouth daily at bedtime  , Disp: , Rfl:   •  losartan (Cozaar) 50 mg tablet, Take 1 tablet (50 mg total) by mouth daily, Disp: 90 tablet, Rfl: 1  •  metoprolol succinate (TOPROL-XL) 50 mg 24 hr tablet, Take 1 tablet (50 mg total) by mouth daily at bedtime, Disp: 90 tablet, Rfl: 1  •  Multiple Vitamin (multivitamin) tablet, Take 1 tablet by mouth daily, Disp: , Rfl:   •  Omega-3 Fatty Acids (FISH OIL) 1,000 mg, Take 2 capsules by mouth daily at bedtime  , Disp: , Rfl:   •  Tirzepatide (Mounjaro) 5 MG/0 5ML SOPN, Inject 5 mg under the skin once a week, Disp: 20 mL, Rfl: 1  •  Tirzepatide 5 MG/0 5ML SOPN, Inject 5 mg under the skin once a week, Disp: 20 mL, Rfl: 1  •  hydrochlorothiazide (HYDRODIURIL) 12 5 mg tablet, Take 1 tablet (12 5 mg total) by mouth daily at bedtime (Patient not taking: Reported on 11/29/2022), Disp: 90 tablet, Rfl: 1    Allergies   Allergen Reactions   • Hibiclens [Chlorhexidine Gluconate] Rash     Red with pimples   • Pollen Extract Nasal Congestion       Social History   Past Surgical History:   Procedure Laterality Date   • COLONOSCOPY     • COLPORRHAPHY N/A 6/20/2022    Procedure: ANTERIOR COLPORRHAPHY;  Surgeon: Juan Russell MD;  [facility];  Service: UroGynecology          • CYSTOSCOPY N/A 6/20/2022    Procedure: Marcey Prader;  Surgeon: Juan Russell MD;  [facility];  Service: UroGynecology          • HERNIA REPAIR     • INCISION TENDON SHEATH HAND Left     thumb   • PA HYSTEROSCOPY,W/ENDO BX N/A 10/14/2019    Procedure: DILATATION AND CURETTAGE (D&C) WITH HYSTEROSCOPY Leonardo Lyons;  Surgeon: Tracy Fairchild DO;  [facility];  Service: Gynecology   • PA HYSTEROSCOPY,W/ENDOMETRIAL ABLATION N/A 10/14/2019    Procedure: ABLATION ENDOMETRIAL Maite Messing;  Surgeon: Tracy Fairchild DO;  [facility];  Service: Gynecology   • PA KNEE SCOPE,MED/LAT MENISECTOMY Left 11/05/2021    Procedure: KNEE ARTHROSCOPIC MENISCECTOMY Medial;  Surgeon: Jolly James MD;  [facility];  Service: Orthopedics   • PA REPAIR UMBILICAL MAXJ,2+W/U,UVRDX N/A 08/23/2018    Procedure: UMBILICAL HERNIA REPAIR WITH MESH;  Surgeon: Jude Nolasco MD;  [facility];  Service: General   • PUBOVAGINAL SLING N/A 6/20/2022    Procedure: PUBOVAGINAL SLING;  Surgeon: Vicente Braden MD;  [facility];  Service: UroGynecology          • WISDOM TOOTH EXTRACTION       Family History   Problem Relation Age of Onset   • Hypertension Mother    • Hyperlipidemia Mother    • Endometrial cancer Mother 55   • Diabetes type II Mother    • Hyperlipidemia Father    • Hypertension Father    • Heart attack Sister    • No Known Problems Brother    • No Known Problems Paternal Aunt    • No Known Problems Maternal Grandmother    • Heart attack Maternal Grandfather    • Other Maternal Grandfather         Myocardial infarction arrhythmias   • No Known Problems Paternal Grandmother    • Stroke Paternal Grandfather         Syndrome   • No Known Problems Daughter    • No Known Problems Son        Objective:  /84 (BP Location: Left arm, Patient Position: Sitting, Cuff Size: Adult)   Pulse 86   Temp 98 3 °F (36 8 °C) (Temporal)   Ht 5' 4" (1 626 m)   Wt 94 3 kg (208 lb)   LMP 10/30/2022   SpO2 99%   BMI 35 70 kg/m²        Physical Exam  Constitutional:       Appearance: She is well-developed and well-nourished  HENT:      Right Ear: External ear normal       Mouth/Throat:      Mouth: Oropharynx is clear and moist    Eyes:      Extraocular Movements: EOM normal       Conjunctiva/sclera: Conjunctivae normal       Pupils: Pupils are equal, round, and reactive to light  Neck:      Thyroid: No thyromegaly  Vascular: No JVD  Cardiovascular:      Rate and Rhythm: Normal rate and regular rhythm  Pulses: Intact distal pulses  Heart sounds: Normal heart sounds  Pulmonary:      Breath sounds: Normal breath sounds  Abdominal:      General: Bowel sounds are normal       Palpations: Abdomen is soft  Musculoskeletal:         General: Normal range of motion  Cervical back: Normal range of motion  Lymphadenopathy:      Cervical: No cervical adenopathy  Skin:     General: Skin is dry  Neurological:      Mental Status: She is alert and oriented to person, place, and time  Deep Tendon Reflexes: Reflexes are normal and symmetric     Psychiatric:         Mood and Affect: Mood and affect normal          Behavior: Behavior normal

## 2022-12-06 DIAGNOSIS — R73.03 PREDIABETES: ICD-10-CM

## 2022-12-06 DIAGNOSIS — E78.00 HYPERCHOLESTEROLEMIA: ICD-10-CM

## 2022-12-06 DIAGNOSIS — I10 BENIGN HYPERTENSION: ICD-10-CM

## 2022-12-06 RX ORDER — TIRZEPATIDE 7.5 MG/.5ML
10 INJECTION, SOLUTION SUBCUTANEOUS WEEKLY
Qty: 10 ML | Refills: 6 | Status: SHIPPED | OUTPATIENT
Start: 2022-12-06 | End: 2022-12-06

## 2022-12-06 RX ORDER — TIRZEPATIDE 10 MG/.5ML
INJECTION, SOLUTION SUBCUTANEOUS
Qty: 10 ML | Refills: 1 | Status: SHIPPED | OUTPATIENT
Start: 2022-12-06

## 2022-12-06 RX ORDER — TIRZEPATIDE 7.5 MG/.5ML
10 INJECTION, SOLUTION SUBCUTANEOUS WEEKLY
Qty: 10 ML | Refills: 6 | Status: SHIPPED | OUTPATIENT
Start: 2022-12-06 | End: 2022-12-06 | Stop reason: SDUPTHER

## 2023-01-12 DIAGNOSIS — R35.0 URINE FREQUENCY: Primary | ICD-10-CM

## 2023-01-14 ENCOUNTER — APPOINTMENT (OUTPATIENT)
Dept: LAB | Facility: IMAGING CENTER | Age: 48
End: 2023-01-14

## 2023-01-14 DIAGNOSIS — E66.9 OBESITY (BMI 35.0-39.9 WITHOUT COMORBIDITY): ICD-10-CM

## 2023-01-14 DIAGNOSIS — R73.03 PREDIABETES: ICD-10-CM

## 2023-01-14 DIAGNOSIS — I10 BENIGN HYPERTENSION: ICD-10-CM

## 2023-01-14 LAB
ALBUMIN SERPL BCP-MCNC: 4.2 G/DL (ref 3.5–5)
ALP SERPL-CCNC: 64 U/L (ref 46–116)
ALT SERPL W P-5'-P-CCNC: 29 U/L (ref 12–78)
ANION GAP SERPL CALCULATED.3IONS-SCNC: 5 MMOL/L (ref 4–13)
AST SERPL W P-5'-P-CCNC: 15 U/L (ref 5–45)
BACTERIA UR QL AUTO: ABNORMAL /HPF
BILIRUB SERPL-MCNC: 1.59 MG/DL (ref 0.2–1)
BILIRUB UR QL STRIP: NEGATIVE
BUN SERPL-MCNC: 16 MG/DL (ref 5–25)
CALCIUM SERPL-MCNC: 9.3 MG/DL (ref 8.3–10.1)
CHLORIDE SERPL-SCNC: 107 MMOL/L (ref 96–108)
CLARITY UR: ABNORMAL
CO2 SERPL-SCNC: 25 MMOL/L (ref 21–32)
COLOR UR: ABNORMAL
CREAT SERPL-MCNC: 0.76 MG/DL (ref 0.6–1.3)
EST. AVERAGE GLUCOSE BLD GHB EST-MCNC: 105 MG/DL
GFR SERPL CREATININE-BSD FRML MDRD: 93 ML/MIN/1.73SQ M
GLUCOSE P FAST SERPL-MCNC: 93 MG/DL (ref 65–99)
GLUCOSE UR STRIP-MCNC: NEGATIVE MG/DL
HBA1C MFR BLD: 5.3 %
HGB UR QL STRIP.AUTO: NEGATIVE
KETONES UR STRIP-MCNC: NEGATIVE MG/DL
LEUKOCYTE ESTERASE UR QL STRIP: ABNORMAL
NITRITE UR QL STRIP: NEGATIVE
NON-SQ EPI CELLS URNS QL MICRO: ABNORMAL /HPF
PH UR STRIP.AUTO: 6 [PH]
POTASSIUM SERPL-SCNC: 4.4 MMOL/L (ref 3.5–5.3)
PROT SERPL-MCNC: 7.8 G/DL (ref 6.4–8.4)
PROT UR STRIP-MCNC: ABNORMAL MG/DL
RBC #/AREA URNS AUTO: ABNORMAL /HPF
SODIUM SERPL-SCNC: 137 MMOL/L (ref 135–147)
SP GR UR STRIP.AUTO: 1.02 (ref 1–1.03)
UROBILINOGEN UR STRIP-ACNC: <2 MG/DL
WBC #/AREA URNS AUTO: ABNORMAL /HPF

## 2023-01-14 PROCEDURE — 81001 URINALYSIS AUTO W/SCOPE: CPT | Performed by: INTERNAL MEDICINE

## 2023-01-17 ENCOUNTER — OFFICE VISIT (OUTPATIENT)
Dept: INTERNAL MEDICINE CLINIC | Facility: OTHER | Age: 48
End: 2023-01-17

## 2023-01-17 VITALS
TEMPERATURE: 98.4 F | HEIGHT: 64 IN | HEART RATE: 71 BPM | BODY MASS INDEX: 34.15 KG/M2 | DIASTOLIC BLOOD PRESSURE: 90 MMHG | WEIGHT: 200 LBS | SYSTOLIC BLOOD PRESSURE: 140 MMHG | OXYGEN SATURATION: 100 %

## 2023-01-17 DIAGNOSIS — E66.9 OBESITY (BMI 35.0-39.9 WITHOUT COMORBIDITY): ICD-10-CM

## 2023-01-17 DIAGNOSIS — R73.03 PREDIABETES: Primary | ICD-10-CM

## 2023-01-17 DIAGNOSIS — Z11.4 ENCOUNTER FOR SCREENING FOR HIV: ICD-10-CM

## 2023-01-17 DIAGNOSIS — I10 BENIGN HYPERTENSION: ICD-10-CM

## 2023-01-17 RX ORDER — HYDROCHLOROTHIAZIDE 12.5 MG/1
TABLET ORAL
COMMUNITY
End: 2023-01-17

## 2023-01-17 RX ORDER — LOSARTAN POTASSIUM 100 MG/1
100 TABLET ORAL DAILY
Qty: 90 TABLET | Refills: 1 | Status: SHIPPED | OUTPATIENT
Start: 2023-01-17

## 2023-01-17 NOTE — PROGRESS NOTES
Assessment/Plan:      Diagnoses and all orders for this visit:    Prediabetes  Hemoglobin A1c went down from 5 8-5 point  Benign hypertension  -     losartan (Cozaar) 100 MG tablet; Take 1 tablet (100 mg total) by mouth daily  -     : HIV 1/2 AB/AG w Reflex [facility] for 2 yr old and above; Future  -     Glucose, fasting; Future  Control of hypertension-     Hemoglobin A1C; Future  Increase the losartan from  follow-up the blood pressure at home  Obesity (BMI 35 0-39 9 without comorbidity)  -     Glucose, fasting; Future  -     Hemoglobin A1C; Future  Lost about 12 pounds so far  Encounter for screening for HIV  -     : HIV 1/2 AB/AG w Reflex [facility] for 2 yr old and above; Future    Other orders  -     hydrochlorothiazide (HYDRODIURIL) 12 5 mg tablet; Oral for 90 (Patient not taking: Reported on 1/17/2023)        BMI Counseling: Body mass index is 34 33 kg/m²  The BMI is above normal  Nutrition recommendations include decreasing portion sizes, encouraging healthy choices of fruits and vegetables and moderation in carbohydrate intake  Exercise recommendations include moderate physical activity 150 minutes/week  Rationale for BMI follow-up plan is due to patient being overweight or obese  M*Modal software was used to dictate this note  It may contain errors with dictating incorrect words or incorrect spelling  Please contact the provider directly with any questions  Subjective:   Chief Complaint   Patient presents with   • Follow-up     8 WEEK F/U LABS 1/14        Patient ID: Deion Betancourt is a 49 y o  female  HPI  A pleasant a 49 years young lady who is here today for the regular follow-up history of hypertension she is doing well with that but her blood pressure remains elevated also at home the blood pressure especially systolic blood pressure is high increase her losartan from  and follow-up    For prediabetes and obesity I started her on Monjaro doing well with that no episodes of hypoglycemia no side effects incidental note that patient is skin warts disappeared after starting medication  Overall this young lady is doing better since she is taking the medication with no significant side effects except burping and heartburn  The following portions of the patient's history were reviewed and updated as appropriate: allergies, current medications, past family history, past medical history, past social history, past surgical history and problem list     Review of Systems   Constitutional: Negative for chills and fatigue  HENT: Negative for congestion, ear pain, hearing loss, postnasal drip, sinus pressure, sore throat and voice change  Eyes: Negative for pain, discharge and visual disturbance  Respiratory: Negative for cough, chest tightness and shortness of breath  Cardiovascular: Negative for chest pain, palpitations and leg swelling  Gastrointestinal: Negative for abdominal pain, blood in stool, diarrhea, nausea and rectal pain  Genitourinary: Negative for difficulty urinating, dysuria and urgency  Musculoskeletal: Negative for arthralgias and joint swelling  Skin: Negative for rash  Allergic/Immunologic: Negative for environmental allergies and food allergies  Neurological: Negative for dizziness, tremors, weakness, numbness and headaches  Hematological: Negative for adenopathy  Psychiatric/Behavioral: Negative for behavioral problems and hallucinations           Past Medical History:   Diagnosis Date   • Acute diffuse otitis externa of right ear 8/23/2021   • Anemia    • Anxiety    • Asthma     controlled  seasonal   • Gestational diabetes mellitus    • Hyperlipidemia    • Hypertension    • Other chest pain 1/27/2020   • PONV (postoperative nausea and vomiting)    • Pregnancy     Resolved: 07/29/2015   • Trigger finger     Resolved: 12/02/2016   • Yeast infection     Resolved: 07/29/2015         Current Outpatient Medications:   •  albuterol (Ventolin HFA) 90 mcg/act inhaler, Inhale 1-2 puffs as needed for wheezing, Disp: 54 g, Rfl: 1  •  atorvastatin (LIPITOR) 40 mg tablet, Take 1 tablet (40 mg total) by mouth daily (Patient taking differently: Take 20 mg by mouth daily), Disp: 90 tablet, Rfl: 3  •  escitalopram (LEXAPRO) 10 mg tablet, Take 1 tablet (10 mg total) by mouth daily at bedtime, Disp: 90 tablet, Rfl: 1  •  ferrous sulfate 325 (65 Fe) mg tablet, Take 325 mg by mouth daily with breakfast, Disp: , Rfl:   •  fluticasone (FLONASE) 50 mcg/act nasal spray, 1 spray into each nostril daily (Patient taking differently: 1 spray into each nostril as needed), Disp: 1 Bottle, Rfl: 0  •  loratadine (CLARITIN) 10 mg tablet, Take 10 mg by mouth daily at bedtime  , Disp: , Rfl:   •  losartan (Cozaar) 50 mg tablet, Take 1 tablet (50 mg total) by mouth daily, Disp: 90 tablet, Rfl: 1  •  metoprolol succinate (TOPROL-XL) 50 mg 24 hr tablet, Take 1 tablet (50 mg total) by mouth daily at bedtime, Disp: 90 tablet, Rfl: 1  •  Multiple Vitamin (multivitamin) tablet, Take 1 tablet by mouth daily, Disp: , Rfl:   •  Omega-3 Fatty Acids (FISH OIL) 1,000 mg, Take 2 capsules by mouth daily at bedtime  , Disp: , Rfl:   •  Tirzepatide (Mounjaro) 10 MG/0 5ML SOPN, Inject 10 mg under the skin once a week, Disp: 10 mL, Rfl: 1  •  hydrochlorothiazide (HYDRODIURIL) 12 5 mg tablet, Oral for 90 (Patient not taking: Reported on 1/17/2023), Disp: , Rfl:     Allergies   Allergen Reactions   • Hibiclens [Chlorhexidine Gluconate] Rash     Red with pimples   • Pollen Extract Nasal Congestion       Social History   Past Surgical History:   Procedure Laterality Date   • COLONOSCOPY     • COLPORRHAPHY N/A 6/20/2022    Procedure: ANTERIOR COLPORRHAPHY;  Surgeon: Gaby May MD;  [facility];  Service: UroGynecology          • CYSTOSCOPY N/A 6/20/2022    Procedure: Marlon Brightly;  Surgeon: Gaby May MD;  [facility];  Service: UroGynecology          • HERNIA REPAIR     • INCISION TENDON SHEATH HAND Left     thumb   • MS ARTHRS KNE SURG W/MENISCECTOMY MED/LAT W/SHVG Left 11/05/2021    Procedure: KNEE ARTHROSCOPIC MENISCECTOMY Medial;  Surgeon: Kala Diego MD;  [facility];  Service: Orthopedics   • MS HYSTEROSCOPY BX ENDOMETRIUM&/POLYPC W/WO D&C N/A 10/14/2019    Procedure: DILATATION AND CURETTAGE (D&C) WITH HYSTEROSCOPY Sharon Alcantara;  Surgeon: Kervin Chavez DO;  [facility];  Service: Gynecology   • MS HYSTEROSCOPY ENDOMETRIAL ABLATION N/A 10/14/2019    Procedure: ABLATION ENDOMETRIAL Shelbie Court;  Surgeon: Kervin Chavez DO;  [facility];  Service: Gynecology   • MS RPR UMBILICAL HRNA 5 YRS/> REDUCIBLE N/A 08/23/2018    Procedure: UMBILICAL HERNIA REPAIR WITH MESH;  Surgeon: Nancy Garibay MD;  [facility];  Service: General   • PUBOVAGINAL SLING N/A 6/20/2022    Procedure: PUBOVAGINAL SLING;  Surgeon: Jacinto Rviera MD;  [facility];  Service: UroGynecology          • WISDOM TOOTH EXTRACTION       Family History   Problem Relation Age of Onset   • Hypertension Mother    • Hyperlipidemia Mother    • Endometrial cancer Mother 55   • Diabetes type II Mother    • Hyperlipidemia Father    • Hypertension Father    • Heart attack Sister    • No Known Problems Brother    • No Known Problems Paternal Aunt    • No Known Problems Maternal Grandmother    • Heart attack Maternal Grandfather    • Other Maternal Grandfather         Myocardial infarction arrhythmias   • No Known Problems Paternal Grandmother    • Stroke Paternal Grandfather         Syndrome   • No Known Problems Daughter    • No Known Problems Son        Objective:  /90 (BP Location: Left arm, Patient Position: Sitting, Cuff Size: Large)   Pulse 71   Temp 98 4 °F (36 9 °C) (Temporal)   Ht 5' 4" (1 626 m)   Wt 90 7 kg (200 lb)   SpO2 100%   BMI 34 33 kg/m²        Physical Exam  Constitutional:       Appearance: She is well-developed     HENT: Right Ear: External ear normal    Eyes:      Conjunctiva/sclera: Conjunctivae normal       Pupils: Pupils are equal, round, and reactive to light  Neck:      Thyroid: No thyromegaly  Vascular: No JVD  Cardiovascular:      Rate and Rhythm: Normal rate and regular rhythm  Heart sounds: Normal heart sounds  Pulmonary:      Breath sounds: Normal breath sounds  Abdominal:      General: Bowel sounds are normal       Palpations: Abdomen is soft  Musculoskeletal:         General: Normal range of motion  Cervical back: Normal range of motion  Lymphadenopathy:      Cervical: No cervical adenopathy  Skin:     General: Skin is dry  Neurological:      Mental Status: She is alert and oriented to person, place, and time  Deep Tendon Reflexes: Reflexes are normal and symmetric     Psychiatric:         Behavior: Behavior normal

## 2023-01-28 DIAGNOSIS — I10 BENIGN HYPERTENSION: ICD-10-CM

## 2023-01-28 DIAGNOSIS — E78.00 HYPERCHOLESTEROLEMIA: ICD-10-CM

## 2023-01-28 DIAGNOSIS — R73.03 PREDIABETES: ICD-10-CM

## 2023-01-30 ENCOUNTER — HOSPITAL ENCOUNTER (OUTPATIENT)
Dept: SLEEP CENTER | Facility: CLINIC | Age: 48
Discharge: HOME | End: 2023-01-30

## 2023-01-30 DIAGNOSIS — G47.33 OSA (OBSTRUCTIVE SLEEP APNEA): ICD-10-CM

## 2023-01-31 NOTE — PROGRESS NOTES
Home Sleep Study Documentation    HOME STUDY DEVICE: Noxturnal yes                                           Britta G3 no      Pre-Sleep Home Study:    Set-up and instructions performed by:  SAYDA Nelson    Technician performed demonstration for Patient: yes    Return demonstration performed by Patient: yes    Written instructions provided to Patient: yes    Patient signed consent form: yes        Post-Sleep Home Study:    Additional comments by Patient: none    Home Sleep Study Failed:no    Failure reason: N/A    Reported or Detected: N/A    Scored by:  SAYDA Haddad Caro

## 2023-02-07 DIAGNOSIS — R73.03 PREDIABETES: Primary | ICD-10-CM

## 2023-02-09 ENCOUNTER — TELEPHONE (OUTPATIENT)
Dept: SLEEP CENTER | Facility: CLINIC | Age: 48
End: 2023-02-09

## 2023-02-09 NOTE — TELEPHONE ENCOUNTER
----- Message from Tu Cerna MD sent at 2/7/2023  7:53 AM EST -----   Please schedule for sleep clinic   Thanks

## 2023-02-09 NOTE — TELEPHONE ENCOUNTER
Call placed to patient  Left message that home sleep study is resulted and to call the nursing staff to review the results and the recommendations  Sleep study shows mild CHACE  Patient needs to be scheduled for follow-up with Dr Ursula Kumar

## 2023-02-10 NOTE — TELEPHONE ENCOUNTER
Returned patient's call  Advised sleep study shows mild CHACE  Patient scheduled to follow-up with Dr Bereket Smith 8/8/2023  Patient added to cancellation list for sooner appointment date

## 2023-03-01 ENCOUNTER — TELEPHONE (OUTPATIENT)
Dept: INTERNAL MEDICINE CLINIC | Facility: OTHER | Age: 48
End: 2023-03-01

## 2023-03-01 NOTE — TELEPHONE ENCOUNTER
Pharmacy calling stating that patient is in need of a prior auth on her mounjaro.  The said the number to call is [phone] bin number is [id number] ID number is [id number] and Group number is [id number]

## 2023-03-03 DIAGNOSIS — I10 BENIGN HYPERTENSION: ICD-10-CM

## 2023-03-03 DIAGNOSIS — E78.00 HYPERCHOLESTEROLEMIA: ICD-10-CM

## 2023-03-03 DIAGNOSIS — R73.03 PREDIABETES: ICD-10-CM

## 2023-03-08 ENCOUNTER — HOSPITAL ENCOUNTER (OUTPATIENT)
Dept: RADIOLOGY | Facility: IMAGING CENTER | Age: 48
Discharge: HOME | End: 2023-03-08

## 2023-03-08 VITALS — BODY MASS INDEX: 33.66 KG/M2 | HEIGHT: 63 IN | WEIGHT: 190 LBS

## 2023-03-08 DIAGNOSIS — Z12.31 ENCOUNTER FOR SCREENING MAMMOGRAM FOR BREAST CANCER: ICD-10-CM

## 2023-03-15 DIAGNOSIS — F41.1 GENERALIZED ANXIETY DISORDER: ICD-10-CM

## 2023-03-15 RX ORDER — ESCITALOPRAM OXALATE 10 MG/1
10 TABLET ORAL
Qty: 90 TABLET | Refills: 1 | Status: SHIPPED | OUTPATIENT
Start: 2023-03-15

## 2023-04-03 ENCOUNTER — OFFICE VISIT (OUTPATIENT)
Dept: OBGYN CLINIC | Facility: OTHER | Age: 48
End: 2023-04-03

## 2023-04-03 ENCOUNTER — APPOINTMENT (OUTPATIENT)
Dept: RADIOLOGY | Facility: OTHER | Age: 48
End: 2023-04-03

## 2023-04-03 VITALS
WEIGHT: 191 LBS | SYSTOLIC BLOOD PRESSURE: 122 MMHG | HEIGHT: 64 IN | BODY MASS INDEX: 32.61 KG/M2 | HEART RATE: 82 BPM | DIASTOLIC BLOOD PRESSURE: 87 MMHG

## 2023-04-03 DIAGNOSIS — M25.562 CHRONIC PAIN OF LEFT KNEE: ICD-10-CM

## 2023-04-03 DIAGNOSIS — M25.562 ACUTE PAIN OF LEFT KNEE: ICD-10-CM

## 2023-04-03 DIAGNOSIS — Z01.89 ENCOUNTER FOR LOWER EXTREMITY COMPARISON IMAGING STUDY: ICD-10-CM

## 2023-04-03 DIAGNOSIS — G89.29 CHRONIC PAIN OF LEFT KNEE: ICD-10-CM

## 2023-04-03 DIAGNOSIS — M17.12 PRIMARY OSTEOARTHRITIS OF LEFT KNEE: Primary | ICD-10-CM

## 2023-04-03 PROBLEM — S83.232A COMPLEX TEAR OF MEDIAL MENISCUS OF LEFT KNEE AS CURRENT INJURY: Status: RESOLVED | Noted: 2021-10-18 | Resolved: 2023-04-03

## 2023-04-03 RX ORDER — BUPIVACAINE HYDROCHLORIDE 2.5 MG/ML
2 INJECTION, SOLUTION INFILTRATION; PERINEURAL
Status: COMPLETED | OUTPATIENT
Start: 2023-04-03 | End: 2023-04-03

## 2023-04-03 RX ORDER — BETAMETHASONE SODIUM PHOSPHATE AND BETAMETHASONE ACETATE 3; 3 MG/ML; MG/ML
6 INJECTION, SUSPENSION INTRA-ARTICULAR; INTRALESIONAL; INTRAMUSCULAR; SOFT TISSUE
Status: COMPLETED | OUTPATIENT
Start: 2023-04-03 | End: 2023-04-03

## 2023-04-03 RX ADMIN — BETAMETHASONE SODIUM PHOSPHATE AND BETAMETHASONE ACETATE 6 MG: 3; 3 INJECTION, SUSPENSION INTRA-ARTICULAR; INTRALESIONAL; INTRAMUSCULAR; SOFT TISSUE at 09:43

## 2023-04-03 RX ADMIN — BUPIVACAINE HYDROCHLORIDE 2 ML: 2.5 INJECTION, SOLUTION INFILTRATION; PERINEURAL at 09:43

## 2023-04-03 NOTE — PROGRESS NOTES
Assessment  Diagnoses and all orders for this visit:    Primary osteoarthritis of left knee (medial compartment with varus alignment)    Chronic pain of left knee        Discussion and Plan:    The patient has an examination consistent with left knee osteoarthritis isolated to the medial compartment (some patellofemoral degenerative change is also seen)  I have discussed with the patient the pathophysiology of this diagnosis and reviewed how the examination correlates with this diagnosis  Surgical vs conservative treatment options were discussed at length to included CS injection, PT, un- brace, visco supplemenation and total knee arthroplasty  After discussing these treatment options, the patient elected for a CS injection today which can be repeated in 4-6 mos if needed  Patient would like to try a medial  brace  I feel pain would benefit from the  brace as she had 8 degrees of varus deformity on x-ray and varus alignment on exam   Explained the definitive treatment would be total knee arthoplasty for which I would refer you to a colleague  Patient was educated on maintaining a healthy lifestyle with proper weight management/loss and physician recommended home exercise program/routine for regular fitness  Subjective:   Patient ID: Gearldean Severs is a 51 y o  female      HPI    Patient present today for evaluation of left knee pain  Patient is s/p left knee arthroscopic partial medial menisectomy, chondroplasty of medial femoral condyle and patellofemoral joint 11/05/21  Patient has known left knee osteoarthritis  Patient reports pain has been progressively getting worse  She reports pain is keeping her up at night  Patient localizes pain to the medial aspect of the knee, worse with WB activities       The following portions of the patient's history were reviewed and updated as appropriate: allergies, current medications, past family history, past medical history, past social "history, past surgical history and problem list       Objective:  /87   Pulse 82   Ht 5' 4\" (1 626 m)   Wt 86 6 kg (191 lb)   BMI 32 79 kg/m²       Left Knee Exam     Tenderness   The patient is experiencing tenderness in the medial joint line  Range of Motion   Extension: 0   Flexion: 120     Tests   Varus: negative Valgus: negative    Other   Erythema: absent  Sensation: normal  Pulse: present  Swelling: none  Effusion: no effusion present    Comments:    Varus alignment            Physical Exam  Vitals reviewed  Constitutional:       Appearance: She is well-developed  Eyes:      Pupils: Pupils are equal, round, and reactive to light  Pulmonary:      Effort: Pulmonary effort is normal       Breath sounds: Normal breath sounds  Abdominal:      General: Abdomen is flat  There is no distension  Musculoskeletal:      Left knee: No effusion  Skin:     General: Skin is warm and dry  Neurological:      Mental Status: She is alert and oriented to person, place, and time  Psychiatric:         Behavior: Behavior normal          Thought Content: Thought content normal          Judgment: Judgment normal          Large joint arthrocentesis: L knee  Universal Protocol:  Consent: Verbal consent obtained    Consent given by: patient  Patient understanding: patient states understanding of the procedure being performed  Site marked: the operative site was marked  Patient identity confirmed: verbally with patient    Supporting Documentation  Indications: pain   Procedure Details  Location: knee - L knee  Needle size: 22 G  Ultrasound guidance: no  Approach: superior  Medications administered: 2 mL bupivacaine 0 25 %; 6 mg betamethasone acetate-betamethasone sodium phosphate 6 (3-3) mg/mL    Patient tolerance: patient tolerated the procedure well with no immediate complications  Dressing:  Sterile dressing applied        I have personally reviewed pertinent films in PACS and my interpretation is as " follows  Left knee x-ray demonstrates tricompartmental degenerative changes greatest over the medial joint line with progression when compared to prior x-rays  8 degrees varus alignment       Scribe Attestation    I,:  Farhad Otero am acting as a scribe while in the presence of the attending physician :       I,:  Prashanth Gonzalez MD personally performed the services described in this documentation    as scribed in my presence :

## 2023-04-03 NOTE — PATIENT INSTRUCTIONS

## 2023-05-02 ENCOUNTER — RA CDI HCC (OUTPATIENT)
Dept: OTHER | Facility: HOSPITAL | Age: 48
End: 2023-05-02

## 2023-05-02 NOTE — PROGRESS NOTES
[facility] 75  coding opportunities          Chart Reviewed number of suggestions sent to Provider: 1   [id number]    Patients Insurance        Commercial Insurance: [employer]

## 2023-05-06 ENCOUNTER — APPOINTMENT (OUTPATIENT)
Dept: LAB | Facility: IMAGING CENTER | Age: 48
End: 2023-05-06

## 2023-05-06 DIAGNOSIS — Z11.4 ENCOUNTER FOR SCREENING FOR HIV: ICD-10-CM

## 2023-05-06 DIAGNOSIS — E66.9 OBESITY (BMI 35.0-39.9 WITHOUT COMORBIDITY): ICD-10-CM

## 2023-05-06 DIAGNOSIS — I10 BENIGN HYPERTENSION: ICD-10-CM

## 2023-05-06 LAB
GLUCOSE P FAST SERPL-MCNC: 103 MG/DL (ref 65–99)
HIV 1+2 AB+HIV1 P24 AG SERPL QL IA: NORMAL
HIV 2 AB SERPL QL IA: NORMAL
HIV1 AB SERPL QL IA: NORMAL
HIV1 P24 AG SERPL QL IA: NORMAL

## 2023-05-07 LAB
EST. AVERAGE GLUCOSE BLD GHB EST-MCNC: 103 MG/DL
HBA1C MFR BLD: 5.2 %

## 2023-05-09 ENCOUNTER — OFFICE VISIT (OUTPATIENT)
Dept: INTERNAL MEDICINE CLINIC | Facility: OTHER | Age: 48
End: 2023-05-09

## 2023-05-09 VITALS
OXYGEN SATURATION: 99 % | DIASTOLIC BLOOD PRESSURE: 82 MMHG | WEIGHT: 185 LBS | BODY MASS INDEX: 31.58 KG/M2 | HEART RATE: 74 BPM | TEMPERATURE: 98 F | SYSTOLIC BLOOD PRESSURE: 122 MMHG | HEIGHT: 64 IN

## 2023-05-09 DIAGNOSIS — R73.03 PREDIABETES: ICD-10-CM

## 2023-05-09 DIAGNOSIS — E78.00 HYPERCHOLESTEROLEMIA: ICD-10-CM

## 2023-05-09 DIAGNOSIS — E66.9 OBESITY (BMI 35.0-39.9 WITHOUT COMORBIDITY): ICD-10-CM

## 2023-05-09 DIAGNOSIS — I10 BENIGN HYPERTENSION: ICD-10-CM

## 2023-05-09 DIAGNOSIS — G47.33 OSA (OBSTRUCTIVE SLEEP APNEA): Primary | ICD-10-CM

## 2023-05-09 DIAGNOSIS — J45.40 MODERATE PERSISTENT EXTRINSIC ASTHMA WITHOUT COMPLICATION: ICD-10-CM

## 2023-05-09 NOTE — PROGRESS NOTES
Assessment/Plan:     Diagnoses and all orders for this visit:    CHACE (obstructive sleep apnea)    Prediabetes  -     tirzepatide 12 5 MG/0 5ML; Inject 0 5 mL (12 5 mg total) under the skin every 7 days    Benign hypertension  -     Lipid panel; Future  -     Comprehensive metabolic panel; Future  -     CBC and differential; Future    Hypercholesterolemia  Lipid panel  Obesity (BMI 35 0-39 9 without comorbidity)    Moderate persistent extrinsic asthma without complication    Asthma is very well controlled         M*Modal software was used to dictate this note  It may contain errors with dictating incorrect words or incorrect spelling  Please contact the provider directly with any questions  Subjective:   Chief Complaint   Patient presents with   • Follow-up     4 month follow up- pre diabetes  Labs done 5/6/23  Advised patient annual exam due   • Hypertension        Patient ID: Rosanna Wylie is a 51 y o  female  HPI  This is a very pleasant 51 years young lady who is here today for the regular follow-up she is following up for prediabetes and obesity she is on Mounjaro 10 mg/week she lost about altogether 23 pounds she wanted to lose some more weight we will increase the dose to 12 5 mg since she is tolerating it very well and will follow her up in about 2 months and see how she is doing she does not have any complaints of abdominal pain nausea vomiting diarrhea or constipation her appetite is pretty nicely restricted because of the medication  Her asthma is very well controlled I will follow-up with the lipid panel for hypercholesterolemia she is also taking Lexapro for anxiety and depression which is very well controlled obstructive sleep apnea    She was seen by the sleep specialist and she had follow-up appointment with a new sleep specialist she is not using any CPAP mask overall this young lady is doing very well  The following portions of the patient's history were reviewed and updated as appropriate: allergies, current medications, past family history, past medical history, past social history, past surgical history and problem list     Review of Systems   Constitutional: Negative for chills and fatigue  HENT: Negative for congestion, ear pain, hearing loss, postnasal drip, sinus pressure, sore throat and voice change  Eyes: Negative for pain, discharge and visual disturbance  Respiratory: Negative for cough, chest tightness and shortness of breath  Cardiovascular: Negative for chest pain, palpitations and leg swelling  Gastrointestinal: Negative for abdominal pain, blood in stool, diarrhea, nausea and rectal pain  Genitourinary: Negative for difficulty urinating, dysuria and urgency  Musculoskeletal: Negative for arthralgias and joint swelling  Skin: Negative for rash  Allergic/Immunologic: Negative for environmental allergies and food allergies  Neurological: Negative for dizziness, tremors, weakness, numbness and headaches  Hematological: Negative for adenopathy  Psychiatric/Behavioral: Negative for behavioral problems and hallucinations           Past Medical History:   Diagnosis Date   • Acute diffuse otitis externa of right ear 8/23/2021   • Anemia    • Anxiety    • Asthma     controlled  seasonal   • Gestational diabetes mellitus    • Hyperlipidemia    • Hypertension    • Other chest pain 1/27/2020   • PONV (postoperative nausea and vomiting)    • Pregnancy     Resolved: 07/29/2015   • Trigger finger     Resolved: 12/02/2016   • Yeast infection     Resolved: 07/29/2015         Current Outpatient Medications:   •  albuterol (Ventolin HFA) 90 mcg/act inhaler, Inhale 1-2 puffs as needed for wheezing, Disp: 54 g, Rfl: 1  •  atorvastatin (LIPITOR) 40 mg tablet, Take 1 tablet (40 mg total) by mouth daily (Patient taking differently: Take 20 mg by mouth daily), Disp: 90 tablet, Rfl: 3  •  escitalopram (LEXAPRO) 10 mg tablet, Take 1 tablet (10 mg total) by mouth daily at bedtime, Disp: 90 tablet, Rfl: 1  •  ferrous sulfate 325 (65 Fe) mg tablet, Take 325 mg by mouth daily with breakfast, Disp: , Rfl:   •  fluticasone (FLONASE) 50 mcg/act nasal spray, 1 spray into each nostril daily (Patient taking differently: 1 spray into each nostril as needed), Disp: 1 Bottle, Rfl: 0  •  loratadine (CLARITIN) 10 mg tablet, Take 10 mg by mouth daily at bedtime  , Disp: , Rfl:   •  losartan (Cozaar) 100 MG tablet, Take 1 tablet (100 mg total) by mouth daily, Disp: 90 tablet, Rfl: 1  •  metoprolol succinate (TOPROL-XL) 50 mg 24 hr tablet, Take 1 tablet (50 mg total) by mouth daily at bedtime, Disp: 90 tablet, Rfl: 1  •  Multiple Vitamin (multivitamin) tablet, Take 1 tablet by mouth daily, Disp: , Rfl:   •  Omega-3 Fatty Acids (FISH OIL) 1,000 mg, Take 2 capsules by mouth daily at bedtime  , Disp: , Rfl:   •  tirzepatide 12 5 MG/0 5ML, Inject 0 5 mL (12 5 mg total) under the skin every 7 days, Disp: 6 mL, Rfl: 0    Allergies   Allergen Reactions   • Hibiclens [Chlorhexidine Gluconate] Rash     Red with pimples   • Pollen Extract Nasal Congestion       Social History   Past Surgical History:   Procedure Laterality Date   • COLONOSCOPY     • COLPORRHAPHY N/A 6/20/2022    Procedure: ANTERIOR COLPORRHAPHY;  Surgeon: Bernardino Shelton MD;  [facility];  Service: UroGynecology          • CYSTOSCOPY N/A 6/20/2022    Procedure: Midge Patee;  Surgeon: Bernardino Shelton MD;  [facility];  Service: UroGynecology          • HERNIA REPAIR     • INCISION TENDON SHEATH HAND Left     thumb   • ME ARTHRS KNE SURG W/MENISCECTOMY MED/LAT W/SHVG Left 11/05/2021    Procedure: KNEE ARTHROSCOPIC MENISCECTOMY Medial;  Surgeon: Nelly Solomon MD;  Location[facility];  Service: Orthopedics   • ME HYSTEROSCOPY BX ENDOMETRIUM&/POLYPC W/WO D&C N/A 10/14/2019    Procedure: DILATATION AND CURETTAGE (D&C) WITH HYSTEROSCOPY W/ Lovena Room;  Surgeon: Theodore Flaherty DO;  [facility];  Service: Gynecology "  • [place] HYSTEROSCOPY ENDOMETRIAL ABLATION N/A 10/14/2019    Procedure: ABLATION ENDOMETRIAL Sidna Shane;  Surgeon: Chuck Williamson DO;  [facility];  Service: Gynecology   • WA RPR UMBILICAL HRNA 5 YRS/> REDUCIBLE N/A 08/23/2018    Procedure: UMBILICAL HERNIA REPAIR WITH MESH;  Surgeon: Cortez Gagnon MD;  [facility];  Service: General   • PUBOVAGINAL SLING N/A 6/20/2022    Procedure: PUBOVAGINAL SLING;  Surgeon: Salma Lozoya MD;  [facility];  Service: UroGynecology          • WISDOM TOOTH EXTRACTION       Family History   Problem Relation Age of Onset   • Hypertension Mother    • Hyperlipidemia Mother    • Endometrial cancer Mother 55   • Diabetes type II Mother    • Hyperlipidemia Father    • Hypertension Father    • Heart attack Sister    • No Known Problems Daughter    • No Known Problems Maternal Grandmother    • Heart attack Maternal Grandfather    • Other Maternal Grandfather         Myocardial infarction arrhythmias   • No Known Problems Paternal Grandmother    • Stroke Paternal Grandfather         Syndrome   • No Known Problems Brother    • No Known Problems Son    • No Known Problems Paternal Aunt        Objective:  /82 (BP Location: Left arm, Patient Position: Sitting, Cuff Size: Adult)   Pulse 74   Temp 98 °F (36 7 °C) (Temporal)   Ht 5' 4\" (1 626 m)   Wt 83 9 kg (185 lb)   SpO2 99%   BMI 31 76 kg/m²        Physical Exam  Constitutional:       Appearance: She is well-developed  HENT:      Right Ear: External ear normal    Eyes:      Conjunctiva/sclera: Conjunctivae normal       Pupils: Pupils are equal, round, and reactive to light  Neck:      Thyroid: No thyromegaly  Vascular: No JVD  Cardiovascular:      Rate and Rhythm: Normal rate and regular rhythm  Heart sounds: Normal heart sounds  Pulmonary:      Breath sounds: Normal breath sounds  Abdominal:      General: Bowel sounds are normal       Palpations: Abdomen is soft     Musculoskeletal: " General: Normal range of motion  Cervical back: Normal range of motion  Lymphadenopathy:      Cervical: No cervical adenopathy  Skin:     General: Skin is dry  Neurological:      Mental Status: She is alert and oriented to person, place, and time  Deep Tendon Reflexes: Reflexes are normal and symmetric     Psychiatric:         Behavior: Behavior normal

## 2023-05-21 DIAGNOSIS — I10 BENIGN HYPERTENSION: ICD-10-CM

## 2023-05-22 RX ORDER — METOPROLOL SUCCINATE 50 MG/1
50 TABLET, EXTENDED RELEASE ORAL
Qty: 90 TABLET | Refills: 1 | Status: SHIPPED | OUTPATIENT
Start: 2023-05-22

## 2023-06-20 ENCOUNTER — OFFICE VISIT (OUTPATIENT)
Dept: OBGYN CLINIC | Facility: MEDICAL CENTER | Age: 48
End: 2023-06-20
Payer: COMMERCIAL

## 2023-06-20 VITALS
BODY MASS INDEX: 31.58 KG/M2 | HEIGHT: 64 IN | WEIGHT: 185 LBS | HEART RATE: 79 BPM | SYSTOLIC BLOOD PRESSURE: 128 MMHG | DIASTOLIC BLOOD PRESSURE: 83 MMHG

## 2023-06-20 DIAGNOSIS — M17.12 PRIMARY OSTEOARTHRITIS OF LEFT KNEE: Primary | ICD-10-CM

## 2023-06-20 DIAGNOSIS — M25.562 CHRONIC PAIN OF LEFT KNEE: ICD-10-CM

## 2023-06-20 DIAGNOSIS — G89.29 CHRONIC PAIN OF LEFT KNEE: ICD-10-CM

## 2023-06-20 PROCEDURE — 99213 OFFICE O/P EST LOW 20 MIN: CPT | Performed by: STUDENT IN AN ORGANIZED HEALTH CARE EDUCATION/TRAINING PROGRAM

## 2023-06-20 NOTE — PROGRESS NOTES
Knee New Office Note    Assessment:     1  Primary osteoarthritis of left knee    2  Chronic pain of left knee        Plan:     Problem List Items Addressed This Visit        Musculoskeletal and Integument    Primary osteoarthritis of left knee - Primary       Other    Chronic pain of left knee       52 y/o female with left knee pain secondary to severe, bone on bone OA  Xrays of the left knee reviewed today showing severe arthritic changes with decreased joint space, osteophyte formation, and varus alignment  On physical exam she has pain in the medial joint line, but has maintained relatively good motion  Discussed conservative treatment options to include cortisone injections, visco injections, oral NSAIDs, Tylenol, activity modifications, staying active with low impact exercises, and weight loss  She has not done visco recently and discussed that she is a candidate for this  She would like to hold off at this time and will call if she decides to proceed with visco   Also discussed surgical treatment option of TKA  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  Subjective:     Patient ID: Chavo Marmolejo is a 51 y o  female  Chief Complaint:  HPI:  Patient is a 52 y/o female who presents today for an evaluation of her left knee  She is referred today by Dr Elisabeth Bhandari  She has been treating conservatively for left knee OA with Dr Elisabeth Bhandari  She has a history of a PMM in 2021  Since that time she has been trying visco injections, cortisone injections, HEP, and OTC NSAIDs  She continues to notice continued pain in the left knee despite conservative treatment options  Her most recent cortisone injection was performed in April  Her pain is localized to the medial compartment of the knee  She notices swelling and stiffness in the knee  She is here to establish care and discuss joint replacement surgery in the future      Allergy:  Allergies   Allergen Reactions   • Hibiclens [Chlorhexidine Gluconate] Rash     Red with pimples   • Pollen Extract Nasal Congestion     Medications:  all current active meds have been reviewed  Past Medical History:  Past Medical History:   Diagnosis Date   • Acute diffuse otitis externa of right ear 8/23/2021   • Anemia    • Anxiety    • Asthma     controlled  seasonal   • Gestational diabetes mellitus    • Hyperlipidemia    • Hypertension    • Other chest pain 1/27/2020   • PONV (postoperative nausea and vomiting)    • Pregnancy     Resolved: 07/29/2015   • Trigger finger     Resolved: 12/02/2016   • Yeast infection     Resolved: 07/29/2015     Past Surgical History:  Past Surgical History:   Procedure Laterality Date   • COLONOSCOPY     • COLPORRHAPHY N/A 6/20/2022    Procedure: ANTERIOR COLPORRHAPHY;  Surgeon: Peter Littlejohn MD;  [facility];  Service: UroGynecology          • CYSTOSCOPY N/A 6/20/2022    Procedure: Floyd Martinez;  Surgeon: Peter Littlejohn MD;  [facility];  Service: UroGynecology          • HERNIA REPAIR     • INCISION TENDON SHEATH HAND Left     thumb   • UT ARTHRS KNE SURG W/MENISCECTOMY MED/LAT W/SHVG Left 11/05/2021    Procedure: KNEE ARTHROSCOPIC MENISCECTOMY Medial;  Surgeon: Pablo Greenwood MD;  [facility];  Service: Orthopedics   • UT HYSTEROSCOPY BX ENDOMETRIUM&/POLYPC W/WO D&C N/A 10/14/2019    Procedure: DILATATION AND CURETTAGE (D&C) WITH HYSTEROSCOPY W/ Haily Church;  Surgeon: Darshan Carrero DO;  [facility];  Service: Gynecology   • UT HYSTEROSCOPY ENDOMETRIAL ABLATION N/A 10/14/2019    Procedure: ABLATION ENDOMETRIAL Haily Church;  Surgeon: Darshan Carrero DO;  [facility];  Service: Gynecology   • UT RPR UMBILICAL HRNA 5 YRS/> REDUCIBLE N/A 08/23/2018    Procedure: UMBILICAL HERNIA REPAIR WITH MESH;  Surgeon: Lily Elliott MD;  [facility];  Service: General   • PUBOVAGINAL SLING N/A 6/20/2022    Procedure: PUBOVAGINAL SLING; Surgeon: Delonte Leigh MD;  Location: [facility];  Service: UroGynecology          • WISDOM TOOTH EXTRACTION       Family History:  Family History   Problem Relation Age of Onset   • Hypertension Mother    • Hyperlipidemia Mother    • Endometrial cancer Mother 55   • Diabetes type II Mother    • Hyperlipidemia Father    • Hypertension Father    • Heart attack Sister    • No Known Problems Daughter    • No Known Problems Maternal Grandmother    • Heart attack Maternal Grandfather    • Other Maternal Grandfather         Myocardial infarction arrhythmias   • No Known Problems Paternal Grandmother    • Stroke Paternal Grandfather         Syndrome   • No Known Problems Brother    • No Known Problems Son    • No Known Problems Paternal Aunt      Social History:  Social History     Substance and Sexual Activity   Alcohol Use Yes   • Alcohol/week: 2 0 standard drinks of alcohol   • Types: 2 Glasses of wine per week    Comment: 1-2 drinks per week on average typically consumes beer     Social History     Substance and Sexual Activity   Drug Use No     Social History     Tobacco Use   Smoking Status Never   Smokeless Tobacco Never           ROS:  General: Per HPI  Skin: Negative, except if noted below  HEENT: Negative  Respiratory: Negative  Cardiovascular: Negative  Gastrointestinal: Negative  Urinary: Negative  Vascular: Negative  Musculoskeletal: Positive per HPI   Neurologic: Positive per HPI  Endocrine: Negative    Objective:  BP Readings from Last 1 Encounters:   06/20/23 128/83      Wt Readings from Last 1 Encounters:   06/20/23 83 9 kg (185 lb)        Respiratory:   non-labored respirations    Lymphatics:  no palpable lymph nodes    Gait:   antalgic    Neurologic:   Alert and oriented times 3  Patient with normal sensation except as noted below  Deep tendon reflexes 2+ except as noted in MSK exam    Bilateral Lower Extremity:  Left Knee:       Inspection:  Skin intact    Overall limb alignment varus    Effusion: "trace    ROM 3-120 with pain    Extensor Lag: none    Palpation: medial Joint line tenderness to palpation    AP Stability at 90 deg stable    M/L stability in full extension stable    M/L stability in midflexion stable    Motor: 5/5 Q/HS/TA/GS/P    Pulses: 2+ DP / 2+ PT    SILT DP/SP/S/S/TN      Imaging:  My interpretation XR AP bilateral knee/lateral/liu/sunrise left knee: severe joint space narrowing, subchondral sclerosis, subchondral cysts, osteophyte formation  No fracture or dislocation  BMI:   Estimated body mass index is 31 76 kg/m² as calculated from the following:    Height as of this encounter: 5' 4\" (1 626 m)  Weight as of this encounter: 83 9 kg (185 lb)  BSA:   Estimated body surface area is 1 89 meters squared as calculated from the following:    Height as of this encounter: 5' 4\" (1 626 m)  Weight as of this encounter: 83 9 kg (185 lb)  Scribe Attestation    I,:  Yaima Ivory PA-C am acting as a scribe while in the presence of the attending physician :       I,:  Maximiliano Perez, DO personally performed the services described in this documentation    as scribed in my presence  :             "

## 2023-07-08 ENCOUNTER — APPOINTMENT (OUTPATIENT)
Dept: LAB | Facility: IMAGING CENTER | Age: 48
End: 2023-07-08
Payer: COMMERCIAL

## 2023-07-08 DIAGNOSIS — I10 BENIGN HYPERTENSION: ICD-10-CM

## 2023-07-08 LAB
ALBUMIN SERPL BCP-MCNC: 4 G/DL (ref 3.5–5)
ALP SERPL-CCNC: 63 U/L (ref 46–116)
ALT SERPL W P-5'-P-CCNC: 30 U/L (ref 12–78)
ANION GAP SERPL CALCULATED.3IONS-SCNC: 6 MMOL/L
AST SERPL W P-5'-P-CCNC: 17 U/L (ref 5–45)
BASOPHILS # BLD AUTO: 0.05 THOUSANDS/ÂΜL (ref 0–0.1)
BASOPHILS NFR BLD AUTO: 1 % (ref 0–1)
BILIRUB SERPL-MCNC: 1.47 MG/DL (ref 0.2–1)
BUN SERPL-MCNC: 16 MG/DL (ref 5–25)
CALCIUM SERPL-MCNC: 9.1 MG/DL (ref 8.3–10.1)
CHLORIDE SERPL-SCNC: 110 MMOL/L (ref 96–108)
CHOLEST SERPL-MCNC: 151 MG/DL
CO2 SERPL-SCNC: 23 MMOL/L (ref 21–32)
CREAT SERPL-MCNC: 0.7 MG/DL (ref 0.6–1.3)
EOSINOPHIL # BLD AUTO: 0.32 THOUSAND/ÂΜL (ref 0–0.61)
EOSINOPHIL NFR BLD AUTO: 5 % (ref 0–6)
ERYTHROCYTE [DISTWIDTH] IN BLOOD BY AUTOMATED COUNT: 13.9 % (ref 11.6–15.1)
GFR SERPL CREATININE-BSD FRML MDRD: 102 ML/MIN/1.73SQ M
GLUCOSE P FAST SERPL-MCNC: 97 MG/DL (ref 65–99)
HCT VFR BLD AUTO: 41.6 % (ref 34.8–46.1)
HDLC SERPL-MCNC: 54 MG/DL
HGB BLD-MCNC: 13.6 G/DL (ref 11.5–15.4)
IMM GRANULOCYTES # BLD AUTO: 0.01 THOUSAND/UL (ref 0–0.2)
IMM GRANULOCYTES NFR BLD AUTO: 0 % (ref 0–2)
LDLC SERPL CALC-MCNC: 74 MG/DL (ref 0–100)
LYMPHOCYTES # BLD AUTO: 2.06 THOUSANDS/ÂΜL (ref 0.6–4.47)
LYMPHOCYTES NFR BLD AUTO: 31 % (ref 14–44)
MCH RBC QN AUTO: 28.5 PG (ref 26.8–34.3)
MCHC RBC AUTO-ENTMCNC: 32.7 G/DL (ref 31.4–37.4)
MCV RBC AUTO: 87 FL (ref 82–98)
MONOCYTES # BLD AUTO: 0.53 THOUSAND/ÂΜL (ref 0.17–1.22)
MONOCYTES NFR BLD AUTO: 8 % (ref 4–12)
NEUTROPHILS # BLD AUTO: 3.74 THOUSANDS/ÂΜL (ref 1.85–7.62)
NEUTS SEG NFR BLD AUTO: 55 % (ref 43–75)
NONHDLC SERPL-MCNC: 97 MG/DL
NRBC BLD AUTO-RTO: 0 /100 WBCS
PLATELET # BLD AUTO: 246 THOUSANDS/UL (ref 149–390)
PMV BLD AUTO: 11.4 FL (ref 8.9–12.7)
POTASSIUM SERPL-SCNC: 4.2 MMOL/L (ref 3.5–5.3)
PROT SERPL-MCNC: 7.4 G/DL (ref 6.4–8.4)
RBC # BLD AUTO: 4.78 MILLION/UL (ref 3.81–5.12)
SODIUM SERPL-SCNC: 139 MMOL/L (ref 135–147)
TRIGL SERPL-MCNC: 117 MG/DL
WBC # BLD AUTO: 6.71 THOUSAND/UL (ref 4.31–10.16)

## 2023-07-08 PROCEDURE — 36415 COLL VENOUS BLD VENIPUNCTURE: CPT

## 2023-07-08 PROCEDURE — 80053 COMPREHEN METABOLIC PANEL: CPT

## 2023-07-08 PROCEDURE — 85025 COMPLETE CBC W/AUTO DIFF WBC: CPT

## 2023-07-08 PROCEDURE — 80061 LIPID PANEL: CPT

## 2023-07-14 ENCOUNTER — OFFICE VISIT (OUTPATIENT)
Dept: INTERNAL MEDICINE CLINIC | Age: 48
End: 2023-07-14
Payer: COMMERCIAL

## 2023-07-14 VITALS
TEMPERATURE: 98 F | SYSTOLIC BLOOD PRESSURE: 124 MMHG | BODY MASS INDEX: 31.58 KG/M2 | WEIGHT: 185 LBS | DIASTOLIC BLOOD PRESSURE: 84 MMHG | HEIGHT: 64 IN | OXYGEN SATURATION: 99 % | HEART RATE: 74 BPM

## 2023-07-14 DIAGNOSIS — J45.40 MODERATE PERSISTENT EXTRINSIC ASTHMA WITHOUT COMPLICATION: ICD-10-CM

## 2023-07-14 DIAGNOSIS — I10 BENIGN HYPERTENSION: ICD-10-CM

## 2023-07-14 DIAGNOSIS — E66.9 OBESITY (BMI 35.0-39.9 WITHOUT COMORBIDITY): ICD-10-CM

## 2023-07-14 DIAGNOSIS — R73.03 PREDIABETES: ICD-10-CM

## 2023-07-14 DIAGNOSIS — I10 BENIGN HYPERTENSION: Primary | ICD-10-CM

## 2023-07-14 PROCEDURE — 99214 OFFICE O/P EST MOD 30 MIN: CPT | Performed by: INTERNAL MEDICINE

## 2023-07-14 RX ORDER — HYDROCHLOROTHIAZIDE 12.5 MG/1
TABLET ORAL
COMMUNITY

## 2023-07-14 RX ORDER — LOSARTAN POTASSIUM 100 MG/1
100 TABLET ORAL DAILY
Qty: 90 TABLET | Refills: 1 | Status: SHIPPED | OUTPATIENT
Start: 2023-07-14

## 2023-07-14 NOTE — PROGRESS NOTES
Assessment/Plan:     Diagnoses and all orders for this visit:    Benign hypertension  Hypertension is very well can  Obesity (BMI 35.0-39.9 without comorbidity)  Patient is using Mounjaro for obesity and prediabetes both significantly improved her last hemoglobin A1c came to 5.2 and she lost about 25+ weight  Prediabetes  Prediabetes is much improved now she is not in prediabetic numbers  Other orders  -     Discontinue: PROPOXYPHENE N-ACETAMINOPHEN PO; 100 mg - 650 mg (Patient not taking: Reported on 7/14/2023)  -     hydrochlorothiazide (HYDRODIURIL) 12.5 mg tablet             M*Modal software was used to dictate this note. It may contain errors with dictating incorrect words or incorrect spelling. Please contact the provider directly with any questions. Subjective:   Chief Complaint   Patient presents with   • Follow-up     2 month follow up     • Hypertension        Patient ID: Misael Sparks is a 51 y.o. female. HPI  This is a very pleasant 51 years young lady who was seen initially because of the prediabetes and obesity her BMI was over 35 before and now it is 32 with the use of Mounjaro she is feeling well she lost about 25+ pound she was prediabetic now her hemoglobin A1c is 5.2 her cardiovascular risk in 10 years are significantly decreased with the medication lipid panel improved significantly she does not have any significant problem with anxiety or depression right now plan is to continue with her medication and follow-up in about 2 months. I reviewed her blood work-up all the blood work-up are much better than before and is stable we will follow-up the LFTs in about 2 months and go from there  The following portions of the patient's history were reviewed and updated as appropriate: allergies, current medications, past family history, past medical history, past social history, past surgical history and problem list.    Review of Systems   Constitutional: Negative for chills and fatigue.    HENT: Negative for congestion, ear pain, hearing loss, postnasal drip, sinus pressure, sore throat and voice change. Eyes: Negative for pain, discharge and visual disturbance. Respiratory: Negative for cough, chest tightness and shortness of breath. Cardiovascular: Negative for chest pain, palpitations and leg swelling. Gastrointestinal: Negative for abdominal pain, blood in stool, diarrhea, nausea and rectal pain. Genitourinary: Negative for difficulty urinating, dysuria and urgency. Musculoskeletal: Negative for arthralgias and joint swelling. Skin: Negative for rash. Allergic/Immunologic: Negative for environmental allergies and food allergies. Neurological: Negative for dizziness, tremors, weakness, numbness and headaches. Hematological: Negative for adenopathy. Psychiatric/Behavioral: Negative for behavioral problems and hallucinations.          Past Medical History:   Diagnosis Date   • Acute diffuse otitis externa of right ear 8/23/2021   • Anemia    • Anxiety    • Asthma     controlled  seasonal   • Gestational diabetes mellitus    • Hyperlipidemia    • Hypertension    • Other chest pain 1/27/2020   • PONV (postoperative nausea and vomiting)    • Pregnancy     Resolved: 07/29/2015   • Trigger finger     Resolved: 12/02/2016   • Yeast infection     Resolved: 07/29/2015         Current Outpatient Medications:   •  albuterol (Ventolin HFA) 90 mcg/act inhaler, Inhale 1-2 puffs as needed for wheezing, Disp: 54 g, Rfl: 1  •  atorvastatin (LIPITOR) 40 mg tablet, Take 1 tablet (40 mg total) by mouth daily (Patient taking differently: Take 20 mg by mouth daily), Disp: 90 tablet, Rfl: 3  •  escitalopram (LEXAPRO) 10 mg tablet, Take 1 tablet (10 mg total) by mouth daily at bedtime, Disp: 90 tablet, Rfl: 1  •  ferrous sulfate 325 (65 Fe) mg tablet, Take 325 mg by mouth daily with breakfast, Disp: , Rfl:   •  fluticasone (FLONASE) 50 mcg/act nasal spray, 1 spray into each nostril daily (Patient taking differently: 1 spray into each nostril as needed), Disp: 1 Bottle, Rfl: 0  •  hydrochlorothiazide (HYDRODIURIL) 12.5 mg tablet, , Disp: , Rfl:   •  loratadine (CLARITIN) 10 mg tablet, Take 10 mg by mouth daily at bedtime  , Disp: , Rfl:   •  losartan (Cozaar) 100 MG tablet, Take 1 tablet (100 mg total) by mouth daily, Disp: 90 tablet, Rfl: 1  •  metoprolol succinate (TOPROL-XL) 50 mg 24 hr tablet, Take 1 tablet (50 mg total) by mouth daily at bedtime, Disp: 90 tablet, Rfl: 1  •  Multiple Vitamin (multivitamin) tablet, Take 1 tablet by mouth daily, Disp: , Rfl:   •  Omega-3 Fatty Acids (FISH OIL) 1,000 mg, Take 2 capsules by mouth daily at bedtime  , Disp: , Rfl:   •  tirzepatide 12.5 MG/0.5ML, Inject 0.5 mL (12.5 mg total) under the skin every 7 days, Disp: 6 mL, Rfl: 0    Allergies   Allergen Reactions   • Hibiclens [Chlorhexidine Gluconate] Rash     Red with pimples   • Pollen Extract Nasal Congestion       Social History   Past Surgical History:   Procedure Laterality Date   • COLONOSCOPY     • COLPORRHAPHY N/A 6/20/2022    Procedure: ANTERIOR COLPORRHAPHY;  Surgeon: Agnes Wallace MD;  [facility];  Service: UroGynecology          • CYSTOSCOPY N/A 6/20/2022    Procedure: Bhavani Galindo;  Surgeon: Agnes Wallace MD;  [facility];  Service: UroGynecology          • HERNIA REPAIR     • INCISION TENDON SHEATH HAND Left     thumb   • [place] ARTHRS KNE SURG W/MENISCECTOMY MED/LAT W/SHVG Left 11/05/2021    Procedure: KNEE ARTHROSCOPIC MENISCECTOMY Medial;  Surgeon: Patrice Garcia MD;  [facility];  Service: Orthopedics   • HI HYSTEROSCOPY BX ENDOMETRIUM&/POLYPC W/WO D&C N/A 10/14/2019    Procedure: DILATATION AND CURETTAGE (D&C) WITH HYSTEROSCOPY W/ Juliana Dove;  Surgeon: Ger Ramachandran DO;  [facility];  Service: Gynecology   • HI HYSTEROSCOPY ENDOMETRIAL ABLATION N/A 10/14/2019    Procedure: Izetta Fling;  Surgeon: Ger Ramachandran DO;  [facility];  Service: Gynecology   • SD RPR UMBILICAL HRNA 5 YRS/> REDUCIBLE N/A 08/23/2018    Procedure: UMBILICAL HERNIA REPAIR WITH MESH;  Surgeon: Prince Vyas MD;  [facility];  Service: General   • PUBOVAGINAL SLING N/A 6/20/2022    Procedure: PUBOVAGINAL SLING;  Surgeon: Amira Gonzalez MD;  [facility];  Service: UroGynecology          • WISDOM TOOTH EXTRACTION       Family History   Problem Relation Age of Onset   • Hypertension Mother    • Hyperlipidemia Mother    • Endometrial cancer Mother 55   • Diabetes type II Mother    • Hyperlipidemia Father    • Hypertension Father    • Heart attack Sister    • No Known Problems Daughter    • No Known Problems Maternal Grandmother    • Heart attack Maternal Grandfather    • Other Maternal Grandfather         Myocardial infarction arrhythmias   • No Known Problems Paternal Grandmother    • Stroke Paternal Grandfather         Syndrome   • No Known Problems Brother    • No Known Problems Son    • No Known Problems Paternal Aunt        Objective:  /84 (BP Location: Left arm, Patient Position: Sitting, Cuff Size: Adult)   Pulse 74   Temp 98 °F (36.7 °C) (Temporal)   Ht 5' 4" (1.626 m)   Wt 83.9 kg (185 lb)   SpO2 99%   BMI 31.76 kg/m²        Physical Exam  Constitutional:       Appearance: She is well-developed. HENT:      Right Ear: External ear normal.   Eyes:      Conjunctiva/sclera: Conjunctivae normal.      Pupils: Pupils are equal, round, and reactive to light. Neck:      Thyroid: No thyromegaly. Vascular: No JVD. Cardiovascular:      Rate and Rhythm: Normal rate and regular rhythm. Heart sounds: Normal heart sounds. Pulmonary:      Breath sounds: Normal breath sounds. Abdominal:      General: Bowel sounds are normal.      Palpations: Abdomen is soft. Musculoskeletal:         General: Normal range of motion. Cervical back: Normal range of motion. Lymphadenopathy:      Cervical: No cervical adenopathy. Skin:     General: Skin is dry. Neurological:      Mental Status: She is alert and oriented to person, place, and time. Deep Tendon Reflexes: Reflexes are normal and symmetric.    Psychiatric:         Behavior: Behavior normal.

## 2023-07-23 DIAGNOSIS — R73.03 PREDIABETES: ICD-10-CM

## 2023-08-01 DIAGNOSIS — R73.03 PREDIABETES: ICD-10-CM

## 2023-08-01 DIAGNOSIS — G47.33 OSA (OBSTRUCTIVE SLEEP APNEA): ICD-10-CM

## 2023-08-01 DIAGNOSIS — E66.9 OBESITY (BMI 35.0-39.9 WITHOUT COMORBIDITY): Primary | ICD-10-CM

## 2023-08-23 DIAGNOSIS — G47.33 OSA (OBSTRUCTIVE SLEEP APNEA): ICD-10-CM

## 2023-08-23 DIAGNOSIS — E66.9 OBESITY (BMI 35.0-39.9 WITHOUT COMORBIDITY): ICD-10-CM

## 2023-08-23 DIAGNOSIS — R73.03 PREDIABETES: ICD-10-CM

## 2023-08-24 NOTE — TELEPHONE ENCOUNTER
[facility] does not have tirzepatide. Patient requesting RX to be sent to [facility] in [place].     Last visit 7/14/23  Next visit 9/25/23

## 2023-09-01 ENCOUNTER — TELEPHONE (OUTPATIENT)
Age: 48
End: 2023-09-01

## 2023-09-01 NOTE — TELEPHONE ENCOUNTER
Spoke with the patient, can we please schedule her an appointment in the office. We will do cortisone injection at that time and then discuss ordering visco also.

## 2023-09-01 NOTE — TELEPHONE ENCOUNTER
Caller: Pt    Doctor: Juan Carlos Hernández    Reason for call: will like a call to discuss options between Cortizone inj vs the gel before scheduling appt.      Please advise     Call back#: [phone]

## 2023-09-07 ENCOUNTER — OFFICE VISIT (OUTPATIENT)
Dept: OBGYN CLINIC | Facility: MEDICAL CENTER | Age: 48
End: 2023-09-07
Payer: COMMERCIAL

## 2023-09-07 VITALS
HEART RATE: 67 BPM | BODY MASS INDEX: 31.58 KG/M2 | SYSTOLIC BLOOD PRESSURE: 138 MMHG | HEIGHT: 64 IN | WEIGHT: 185 LBS | DIASTOLIC BLOOD PRESSURE: 90 MMHG

## 2023-09-07 DIAGNOSIS — M17.12 PRIMARY OSTEOARTHRITIS OF LEFT KNEE: Primary | ICD-10-CM

## 2023-09-07 PROCEDURE — 20610 DRAIN/INJ JOINT/BURSA W/O US: CPT | Performed by: STUDENT IN AN ORGANIZED HEALTH CARE EDUCATION/TRAINING PROGRAM

## 2023-09-07 PROCEDURE — 99213 OFFICE O/P EST LOW 20 MIN: CPT | Performed by: STUDENT IN AN ORGANIZED HEALTH CARE EDUCATION/TRAINING PROGRAM

## 2023-09-07 RX ADMIN — TRIAMCINOLONE ACETONIDE 40 MG: 40 INJECTION, SUSPENSION INTRA-ARTICULAR; INTRAMUSCULAR at 16:30

## 2023-09-07 RX ADMIN — BUPIVACAINE HYDROCHLORIDE 4 ML: 2.5 INJECTION, SOLUTION INFILTRATION; PERINEURAL at 16:30

## 2023-09-07 NOTE — PROGRESS NOTES
Knee New Office Note    Assessment:     1. Primary osteoarthritis of left knee        Plan:    52 y/o female with left knee pain secondary to severe osteoarthritis . Xrays of the left knee reviewed showing severe arthritic changes with decreased joint space, osteophyte formation, and varus alignment. On physical exam she has pain in the medial joint line, but has maintained relatively good motion. Discussed conservative treatment options to include cortisone injections, visco injections, oral NSAIDs, Tylenol, activity modifications, staying active with low impact exercises, and weight loss. She has not done visco recently and discussed that she is a candidate for this. She was provided with a cortisone injection today into her left knee and will be pre certed for visco injections. Also discussed surgical treatment option of TKA. 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 Follow up when visco injections become pre certed. Subjective:     Patient ID: Dennis Mackenzie is a 51 y.o. female. Chief Complaint:  HPI:  The patient presents with a chief complaint of left knee pain. The pain began a few week(s) ago and is not associated with an acute injury. The patient describes the pain as aching and dull and 5 out of 10 in intensity. It is intermittent in timing, and localizes the pain to the anterior medial joint line. The pain is worse with activities, ascending stairs and descending stairs and relieved with rest, ice, avoiding painful activities, cortisone injection. She denies mechanical symptoms such as locking and catching. She denies instability of the knee. Patient has had prior treatment in the form of visco injection in 2013 and cortisone injection on 6/20/23 with benefit.       Allergy:  Allergies   Allergen Reactions   • Hibiclens [Chlorhexidine Gluconate] Rash     Red with pimples   • Pollen Extract Nasal Congestion     Medications:  all current active meds have been reviewed  Past Medical History:  Past Medical History:   Diagnosis Date   • Acute diffuse otitis externa of right ear 8/23/2021   • Anemia    • Anxiety    • Asthma     controlled  seasonal   • Gestational diabetes mellitus    • Hyperlipidemia    • Hypertension    • Other chest pain 1/27/2020   • PONV (postoperative nausea and vomiting)    • Pregnancy     Resolved: 07/29/2015   • Trigger finger     Resolved: 12/02/2016   • Yeast infection     Resolved: 07/29/2015     Past Surgical History:  Past Surgical History:   Procedure Laterality Date   • COLONOSCOPY     • COLPORRHAPHY N/A 6/20/2022    Procedure: ANTERIOR COLPORRHAPHY;  Surgeon: Leesa Dawkins MD;  [facility];  Service: UroGynecology          • CYSTOSCOPY N/A 6/20/2022    Procedure: Anson Sharmae;  Surgeon: Leesa Dawkins MD;  [facility];  Service: UroGynecology          • HERNIA REPAIR     • INCISION TENDON SHEATH HAND Left     thumb   • KS ARTHRS KNE SURG W/MENISCECTOMY MED/LAT W/SHVG Left 11/05/2021    Procedure: KNEE ARTHROSCOPIC MENISCECTOMY Medial;  Surgeon: Cisco Chin MD;  [facility];  Service: Orthopedics   • KS HYSTEROSCOPY BX ENDOMETRIUM&/POLYPC W/WO D&C N/A 10/14/2019    Procedure: DILATATION AND CURETTAGE (D&C) WITH HYSTEROSCOPY W/ Coleen Greenberg;  Surgeon: Shankar Contreras DO;  [facility];  Service: Gynecology   • KS HYSTEROSCOPY ENDOMETRIAL ABLATION N/A 10/14/2019    Procedure: ABLATION ENDOMETRIAL Coleen Dionicio;  Surgeon: Shankar Contreras DO;  [facility];  Service: Gynecology   • KS RPR UMBILICAL HRNA 5 YRS/> REDUCIBLE N/A 08/23/2018    Procedure: UMBILICAL HERNIA REPAIR WITH MESH;  Surgeon: Melanie Vasquez MD;  [facility];  Service: General   • PUBOVAGINAL SLING N/A 6/20/2022    Procedure: PUBOVAGINAL SLING;  Surgeon: Leesa Dawkins MD;  [facility];  Service: UroGynecology          • WISDOM TOOTH EXTRACTION       Family History:  Family History   Problem Relation Age of Onset   • Hypertension Mother    • Hyperlipidemia Mother    • Endometrial cancer Mother 55   • Diabetes type II Mother    • Hyperlipidemia Father    • Hypertension Father    • Heart attack Sister    • No Known Problems Daughter    • No Known Problems Maternal Grandmother    • Heart attack Maternal Grandfather    • Other Maternal Grandfather         Myocardial infarction arrhythmias   • No Known Problems Paternal Grandmother    • Stroke Paternal Grandfather         Syndrome   • No Known Problems Brother    • No Known Problems Son    • No Known Problems Paternal Aunt      Social History:  Social History     Substance and Sexual Activity   Alcohol Use Yes   • Alcohol/week: 2.0 standard drinks of alcohol   • Types: 2 Glasses of wine per week    Comment: 1-2 drinks per week on average typically consumes beer     Social History     Substance and Sexual Activity   Drug Use No     Social History     Tobacco Use   Smoking Status Never   Smokeless Tobacco Never           ROS:  General: Per HPI  Skin: Negative, except if noted below  HEENT: Negative  Respiratory: Negative  Cardiovascular: Negative  Gastrointestinal: Negative  Urinary: Negative  Vascular: Negative  Musculoskeletal: Positive per HPI   Neurologic: Positive per HPI  Endocrine: Negative    Objective:  BP Readings from Last 1 Encounters:   09/07/23 138/90      Wt Readings from Last 1 Encounters:   09/07/23 83.9 kg (185 lb)        Respiratory:   non-labored respirations    Lymphatics:  no palpable lymph nodes    Gait:   Antalgic    Neurologic:   Alert and oriented times  Patient with normal sensation except as noted below  Deep tendon reflexes 2+ except as noted in MSK exam    Bilateral Lower Extremity:  Left Knee:       Inspection:  Skin is intact without erythema or ecchymosis    Overall limb alignment Varus    Effusion: Trace    ROM 3-120 with pain    Extensor Lag: None    Palpation: Medial Joint line tenderness to palpation    AP Stability at 90 deg Stable    M/L stability in full extension Stable    M/L stability in midflexion Stable    Motor: 5/5 Q/HS/TA/GS/P    Pulses: 2+ DP / 2+ PT    SILT DP/SP/S/S/TN    Imaging:  My interpretation XR AP scanogram/AP bilateral knee/lateral/liu/sunrise left knee: severe joint space narrowing, subchondral sclerosis, subchondral cysts, osteophyte formation. No fracture or dislocation. Large joint arthrocentesis: L knee  Universal Protocol:  Consent: Verbal consent obtained. Risks and benefits: risks, benefits and alternatives were discussed  Consent given by: patient  Site marked: the operative site was marked  Radiology Images displayed and confirmed. If images not available, report reviewed: imaging studies available  Patient identity confirmed: verbally with patient    Supporting Documentation  Indications: pain and diagnostic evaluation   Procedure Details  Location: knee - L knee  Preparation: Patient was prepped and draped in the usual sterile fashion  Needle size: 22 G  Approach: lateral  Medications administered: 4 mL bupivacaine 0.25 %; 40 mg triamcinolone acetonide 40 mg/mL    Patient tolerance: patient tolerated the procedure well with no immediate complications  Dressing:  Sterile dressing applied            BMI:   Estimated body mass index is 31.76 kg/m² as calculated from the following:    Height as of this encounter: 5' 4" (1.626 m). Weight as of this encounter: 83.9 kg (185 lb). BSA:   Estimated body surface area is 1.89 meters squared as calculated from the following:    Height as of this encounter: 5' 4" (1.626 m). Weight as of this encounter: 83.9 kg (185 lb).            Scribe Attestation    I,:  Alondra Paniagua am acting as a scribe while in the presence of the attending physician.:       I,:  Luke Maravilla, DO personally performed the services described in this documentation    as scribed in my presence.:

## 2023-09-08 RX ORDER — BUPIVACAINE HYDROCHLORIDE 2.5 MG/ML
4 INJECTION, SOLUTION INFILTRATION; PERINEURAL
Status: COMPLETED | OUTPATIENT
Start: 2023-09-07 | End: 2023-09-07

## 2023-09-08 RX ORDER — TRIAMCINOLONE ACETONIDE 40 MG/ML
40 INJECTION, SUSPENSION INTRA-ARTICULAR; INTRAMUSCULAR
Status: COMPLETED | OUTPATIENT
Start: 2023-09-07 | End: 2023-09-07

## 2023-09-11 DIAGNOSIS — F41.1 GENERALIZED ANXIETY DISORDER: ICD-10-CM

## 2023-09-11 RX ORDER — ESCITALOPRAM OXALATE 10 MG/1
10 TABLET ORAL
Qty: 90 TABLET | Refills: 3 | OUTPATIENT
Start: 2023-09-11

## 2023-09-13 ENCOUNTER — CLINICAL SUPPORT (OUTPATIENT)
Dept: NUTRITION | Facility: HOSPITAL | Age: 48
End: 2023-09-13
Attending: INTERNAL MEDICINE
Payer: COMMERCIAL

## 2023-09-13 VITALS — HEIGHT: 63 IN | BODY MASS INDEX: 30.08 KG/M2 | WEIGHT: 169.75 LBS

## 2023-09-13 DIAGNOSIS — E66.9 OBESITY (BMI 35.0-39.9 WITHOUT COMORBIDITY): ICD-10-CM

## 2023-09-13 PROCEDURE — 97802 MEDICAL NUTRITION INDIV IN: CPT

## 2023-09-13 NOTE — PROGRESS NOTES
Nutrition Assessment Form    Patient Name: Kristen Munguia    YOB: 1975    Sex: Female     Assessment Date: 9/13/2023  Start Time: 9:30am  Stop Time: 10:30am Total Minutes: 60 mins     Data:  Present at session: self   Parent/Patient Concerns/reason for visit: A year ago struggling with weight loss and started mounjaro/tirzepatide and has been losing weight, suppressing appetite, lessoning food chatter in mind and gets full quicker.     Medical Dx/Reason for Referral: E66.9 obesity   Past Medical History:   Diagnosis Date   • Acute diffuse otitis externa of right ear 8/23/2021   • Anemia    • Anxiety    • Asthma     controlled  seasonal   • Gestational diabetes mellitus    • Hyperlipidemia    • Hypertension    • Other chest pain 1/27/2020   • PONV (postoperative nausea and vomiting)    • Pregnancy     Resolved: 07/29/2015   • Trigger finger     Resolved: 12/02/2016   • Yeast infection     Resolved: 07/29/2015       Current Outpatient Medications   Medication Sig Dispense Refill   • albuterol (Ventolin HFA) 90 mcg/act inhaler Inhale 1-2 puffs as needed for wheezing 54 g 1   • atorvastatin (LIPITOR) 40 mg tablet Take 1 tablet (40 mg total) by mouth daily (Patient taking differently: Take 20 mg by mouth daily) 90 tablet 3   • escitalopram (LEXAPRO) 10 mg tablet Take 1 tablet (10 mg total) by mouth daily at bedtime 90 tablet 1   • ferrous sulfate 325 (65 Fe) mg tablet Take 325 mg by mouth daily with breakfast     • fluticasone (FLONASE) 50 mcg/act nasal spray 1 spray into each nostril daily (Patient taking differently: 1 spray into each nostril as needed) 1 Bottle 0   • hydrochlorothiazide (HYDRODIURIL) 12.5 mg tablet      • loratadine (CLARITIN) 10 mg tablet Take 10 mg by mouth daily at bedtime       • losartan (Cozaar) 100 MG tablet Take 1 tablet (100 mg total) by mouth daily 90 tablet 1   • metoprolol succinate (TOPROL-XL) 50 mg 24 hr tablet Take 1 tablet (50 mg total) by mouth daily at bedtime 90 tablet 1   • Multiple Vitamin (multivitamin) tablet Take 1 tablet by mouth daily     • Omega-3 Fatty Acids (FISH OIL) 1,000 mg Take 2 capsules by mouth daily at bedtime       • tirzepatide 15 MG/0.5ML Inject 0.5 mL (15 mg total) under the skin every 7 days 6 mL 1     No current facility-administered medications for this visit. Additional Meds/Supplements: Zinc, B12, vit D, fish oil   Special Learning Needs/barriers to learning/any new barriers    Height: HC Readings from Last 5 Encounters:   No data found for [facility].      Weight: Wt Readings from Last 10 Encounters:   09/13/23 77 kg (169 lb 12.1 oz)   09/07/23 83.9 kg (185 lb)   07/14/23 83.9 kg (185 lb)   06/20/23 83.9 kg (185 lb)   05/09/23 83.9 kg (185 lb)   04/03/23 86.6 kg (191 lb)   03/08/23 86.2 kg (190 lb)   01/17/23 90.7 kg (200 lb)   11/29/22 94.3 kg (208 lb)   11/07/22 94.3 kg (208 lb)     Estimated body mass index is 30.07 kg/m² as calculated from the following:    Height as of this encounter: 5' 3" (1.6 m). Weight as of this encounter: 77 kg (169 lb 12.1 oz). Recent Weight Change: [x]Yes     []No  Amount: Tyree Yulisa reported last weeks weight for knee appt, wasn't weighed and in July it was 173lbs not 185lbs, overall 39lbs/18.7% wt. Loss x 10 months      Energy Needs: [address] Equation:  x1.4 minus 500-750 calories =5129-9452   Allergies   Allergen Reactions   • Hibiclens [Chlorhexidine Gluconate] Rash     Red with pimples   • Pollen Extract Nasal Congestion    or intolerances    Social History     Substance and Sexual Activity   Alcohol Use Yes   • Alcohol/week: 2.0 standard drinks of alcohol   • Types: 2 Glasses of wine per week    Comment: 1-2 drinks per week on average typically consumes beer    _______x/wk or month  1 or 2 or 3 or 4 or____ drinks/session   Mixed drinks/ wine/ beer    Very rare   Social History     Tobacco Use   Smoking Status Never   Smokeless Tobacco Never       Who shops?  patient   Who cooks/cooking methods/Eating out/take out habits   patient  Cooking methods: bakes, uses air fryer, sautee, and grills_    Take out: __1_ x/wk    Dining out _3___ x/wk   Exercise: Walks 3-4 miles 2-3 x week outside, lessons in winter    Other: ie: Sleep habits/ stress level/ work habits household-lives with ?/ food security 6-7 hrs of sleep, interrupted with bathroom use 2 x night, but can fall back asleep easy   Prior Nutritional Counseling? []Yes     [x]No  When:      Why:         Diet Hx:  Breakfast: 8:30am-9am Coffee with light cream, tablespoon of sugar    9am Drinks priemere protein shake or dannon yogurt drink       Lunch:   11am: 3 oz of rotisserie chicken, dannon yogurt, water  Or deli bufflo chicken slices, cheese, turkey pepperoni, yogurt, or left overs     Dinner:   [employer], couscous, or sliced pork with broccoli, potato or goes out for pizza, [employer] (penne a la vodka), or chicken with peppers and onions, at [employer] had cup of broccoli soup and  salad  Typically drinks water and once in a while a soda     Snacks: AM -   PM - dry  Shredded mini wheats about 18-23  HS - sometimes donut or ice cream or handful of pretzels   Other Notes/ Initial Assessment:  Clinton Valencia present today for nutrition counseling. Started using Mounjaro which has helped with weight loss, now continues with BMI elevated and looking to continue with weight loss. Hoping to lose weight for knee replacement in order to be able to snowboard and participate in more physical activity. Discussed the plate method of portioning foods, including half a plate fruits and vegetables or a half plate all vegetables, 1/4 of the plate a lean protein source or meat, and a 1/4 of the plate being a whole grain carb- usually 1/2-1c. This should be followed for at least 2 meals of the day, but could also be followed for all 3. Encouraged varying food choices, increasing fruit and vegetable intake. Provided samples for 1200-1400calorie diet.  Discussed the importance of adequate sleep, and ideal sleeping conditions, including a cool temperature 64-68 degrees F, and a dark and quiet room. Also discussed the importance of a regular and consistent sleep routine, including minimizing blue light exposure an hour before sleeping. Weekend habits should include staying fairly consistent with weekday sleep habits to minimize disruption during the week. A connection was made between getting adequate and good quality sleep and the ability to handle stress the next day, make healthy food choices, and be active. Encouraged 150 mins of moderate activity weekly, discussed importance of variety of activity, including wt bearing activities 2-3x/wk x 10-15mins.  Discussed importance of activity throughout the lifecycle and its impact on overall health/stress management, etc.     Updated assessment (Follow up note only):           Nutrition Diagnosis:   Food and nutrition related knowledge deficit  related to Lack of prior exposure to accurate nutrition related information as evidenced by Demonstrates inability to apply food and nutrition related information       Any change or new dx since previous visit:     Nutrition Diagnosis:         Medical Nutrition Therapy Intervention:  [x]Individualized Meal Plan -1200-1400calorie diet [x]Understanding Lab Values-reviewed HbA1c, lipid profile, currently wnl   []Basic Pathophysiology of Disease []Food/Medication Interactions   []Food Diary [x]Exercise -150mins moderate activity weekly   []Lifestyle/Behavior Modification Techniques []Medication, Mechanism of Action   []Label Reading: CHO/ Na/ Fat/ other_________ []Self Blood Glucose Monitoring   []Weight/BMI Goals: gain/lose/maintain []Other -           Comprehension: []Excellent  []Very Good  [x]Good  []Fair   []Poor    Receptivity: []Excellent  []Very Good  [x]Good  []Fair   []Poor    Expected Compliance: []Excellent  []Very Good  [x]Good  []Fair   []Poor        Goals (initial)/ Progress made on previous goals/new goals:  1. Aim for 7-9hrs of sleep daily, avoid blue light 1hr before bedtime by f/u   2. Maintain within 1200-1400calories using MyPlate method daily and calorie tracker mikey by f/u   3. Add vegetable at L and D and fruit at snacks or meals by f/u  4. Participate in physical activity, such as speed walking or using a hill to break a sweet for 30mins 5 x week, incorporating weight bearing exercises 2x week for 10-15 mins by f/u  5. Lose 2-4lbs a month by f/u       No follow-ups on file.   Labs:  CMP  Lab Results   Component Value Date     08/24/2015    K 4.2 07/08/2023     (H) 07/08/2023    CO2 23 07/08/2023    ANIONGAP 8 08/24/2015    BUN 16 07/08/2023    CREATININE 0.70 07/08/2023    GLUCOSE 91 08/24/2015    GLUF 97 07/08/2023    CALCIUM 9.1 07/08/2023    AST 17 07/08/2023    ALT 30 07/08/2023    ALKPHOS 63 07/08/2023    PROT 7.4 08/24/2015    BILITOT 0.69 08/24/2015    EGFR 102 07/08/2023       BMP  Lab Results   Component Value Date    GLUCOSE 91 08/24/2015    CALCIUM 9.1 07/08/2023     08/24/2015    K 4.2 07/08/2023    CO2 23 07/08/2023     (H) 07/08/2023    BUN 16 07/08/2023    CREATININE 0.70 07/08/2023       Lipids  Lab Results   Component Value Date    CHOL 250 08/24/2015     Lab Results   Component Value Date    HDL 54 07/08/2023    HDL 51 09/17/2022    HDL 61 09/16/2021     Lab Results   Component Value Date    LDLCALC 74 07/08/2023    LDLCALC 56 09/17/2022    LDLCALC 97 09/16/2021     Lab Results   Component Value Date    TRIG 117 07/08/2023    TRIG 190 (H) 09/17/2022    TRIG 180 (H) 09/16/2021     No results found for: "CHOLHDL"    Hemoglobin A1C  Lab Results   Component Value Date    HGBA1C 5.2 05/06/2023       Fasting Glucose  Lab Results   Component Value Date    GLUF 97 07/08/2023       Insulin     Thyroid  No results found for: "TSH", "E3JQVPH", "H5DSOHA", "THYROIDAB"    Hepatic Function Panel  Lab Results   Component Value Date    ALT 30 07/08/2023    AST 17 07/08/2023    ALKPHOS 63 07/08/2023    BILITOT 0.69 08/24/2015       Celiac Disease Antibody Panel  No results found for: "ENDOMYSIAL IGA", "GLIADIN IGA", "GLIADIN IGG", "IGA", "TISSUE TRANSGLUT AB", "TTG IGA"   Iron  Lab Results   Component Value Date    IRON 58 08/24/2015    FERRITIN 14.4 08/24/2015            Kimberly Werner, RD  [address] [id number]

## 2023-09-16 ENCOUNTER — APPOINTMENT (OUTPATIENT)
Dept: LAB | Facility: IMAGING CENTER | Age: 48
End: 2023-09-16
Payer: COMMERCIAL

## 2023-09-16 DIAGNOSIS — E66.9 OBESITY (BMI 35.0-39.9 WITHOUT COMORBIDITY): ICD-10-CM

## 2023-09-16 LAB
ALBUMIN SERPL BCP-MCNC: 4.4 G/DL (ref 3.5–5)
ALP SERPL-CCNC: 57 U/L (ref 34–104)
ALT SERPL W P-5'-P-CCNC: 24 U/L (ref 7–52)
AST SERPL W P-5'-P-CCNC: 19 U/L (ref 13–39)
BILIRUB DIRECT SERPL-MCNC: 0.25 MG/DL (ref 0–0.2)
BILIRUB SERPL-MCNC: 1.65 MG/DL (ref 0.2–1)
PROT SERPL-MCNC: 7.2 G/DL (ref 6.4–8.4)

## 2023-09-16 PROCEDURE — 80076 HEPATIC FUNCTION PANEL: CPT

## 2023-09-16 PROCEDURE — 36415 COLL VENOUS BLD VENIPUNCTURE: CPT

## 2023-09-18 ENCOUNTER — OFFICE VISIT (OUTPATIENT)
Dept: INTERNAL MEDICINE CLINIC | Age: 48
End: 2023-09-18
Payer: COMMERCIAL

## 2023-09-18 VITALS
SYSTOLIC BLOOD PRESSURE: 114 MMHG | WEIGHT: 171 LBS | TEMPERATURE: 97.2 F | BODY MASS INDEX: 30.3 KG/M2 | HEIGHT: 63 IN | DIASTOLIC BLOOD PRESSURE: 80 MMHG | OXYGEN SATURATION: 97 % | HEART RATE: 76 BPM

## 2023-09-18 DIAGNOSIS — E78.00 HYPERCHOLESTEROLEMIA: ICD-10-CM

## 2023-09-18 DIAGNOSIS — H54.7 VISUAL IMPAIRMENT: Primary | ICD-10-CM

## 2023-09-18 DIAGNOSIS — F41.1 GENERALIZED ANXIETY DISORDER: ICD-10-CM

## 2023-09-18 DIAGNOSIS — R17 ELEVATED BILIRUBIN: ICD-10-CM

## 2023-09-18 DIAGNOSIS — I10 BENIGN HYPERTENSION: ICD-10-CM

## 2023-09-18 DIAGNOSIS — H53.451 PERIPHERAL VISUAL FIELD DEFECT OF RIGHT EYE: ICD-10-CM

## 2023-09-18 DIAGNOSIS — Z82.3 FAMILY HISTORY OF CVA: ICD-10-CM

## 2023-09-18 PROCEDURE — 99214 OFFICE O/P EST MOD 30 MIN: CPT | Performed by: INTERNAL MEDICINE

## 2023-09-18 RX ORDER — ZINC GLUCONATE 50 MG
50 TABLET ORAL DAILY
COMMUNITY

## 2023-09-18 RX ORDER — ESCITALOPRAM OXALATE 10 MG/1
10 TABLET ORAL
Qty: 90 TABLET | Refills: 1 | Status: SHIPPED | OUTPATIENT
Start: 2023-09-18

## 2023-09-18 RX ORDER — UBIDECARENONE 100 MG
1000 CAPSULE ORAL DAILY
COMMUNITY

## 2023-09-18 RX ORDER — METOPROLOL SUCCINATE 50 MG/1
50 TABLET, EXTENDED RELEASE ORAL
Qty: 90 TABLET | Refills: 1 | Status: SHIPPED | OUTPATIENT
Start: 2023-09-18

## 2023-09-18 NOTE — PROGRESS NOTES
Assessment/Plan:    Generalized anxiety disorder  -Stable  -Continue with escitalopram which we will refill today as requested  -Follow-up with PCP    Essential hypertension  -Well-controlled  -Continue with metoprolol and losartan  -We will refill metoprolol as requested    Peripheral visual field defect, intermittent  -Symptoms are concerning for possible ophthalmological pathology versus migraine headaches versus TIA  -We will refer her to ophthalmology and if everything is normal, we will refer her to neurology  -She was counseled to call the office or go to the emergency department if she develops any neurological deficits  -Continue with atorvastatin and a heart healthy diet    Elevated bilirubin  -LFT done on 9/16/2023 showed elevated bilirubin at 1.65 and a review of her labs shows that she has had intermittent elevation in total bilirubin dating back to 2018. Fortunately, her transaminases have been normal dating back to 2015.  -It is possible that she has Gilbert's syndrome and she was reassured and counseled not to worry.  -We will keep an eye on her LFTs     Diagnoses and all orders for this visit:    Visual impairment  -     Cancel: Ambulatory Referral to Ophthalmology; Future  -     Ambulatory Referral to Ophthalmology; Future    Generalized anxiety disorder  -     escitalopram (LEXAPRO) 10 mg tablet; Take 1 tablet (10 mg total) by mouth daily at bedtime    Benign hypertension  -     metoprolol succinate (TOPROL-XL) 50 mg 24 hr tablet; Take 1 tablet (50 mg total) by mouth daily at bedtime  -     Ambulatory Referral to Ophthalmology; Future    Peripheral visual field defect of right eye  -     Cancel: Ambulatory Referral to Ophthalmology; Future  -     Ambulatory Referral to Ophthalmology; Future    Hypercholesterolemia    Family history of CVA  -     Ambulatory Referral to Ophthalmology; Future    Elevated bilirubin    Other orders  -     Cyanocobalamin (Vitamin B12) 1000 MCG TBCR;  Take by mouth in the morning  -     zinc gluconate 50 mg tablet; Take 50 mg by mouth daily  -     Lactobacillus (PROBIOTIC ACIDOPHILUS PO); Take by mouth in the morning  -     Cholecalciferol (D3-1000) 25 MCG (1000 UT) capsule; Take 1,000 Units by mouth daily             Subjective:      Patient ID: Lazaro Cardoso is a 51 y.o. female. HPI  Pt presents for a follow up visit regarding her essential hypertension and generalized anxiety disorder to get a refill of her meds. She was seen by her pcp recently 2 months ago   She recently had blood work done and wants to review the results. She is concerned about her bilirubin which was mildly elevated with normal transaminases. She also complains that she had an episode today which scared her. 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 She denies any history of migraine headaches but states that once she had vertigo and was told that it was a migraine. She denies any muscle weakness, numbness, difficulty swallowing, change in speech or dizziness. She has a hx of htn and hld but never smoked  + family hx of CVA in PGF  Has a hx of prediabetes and also had gestational dm  +  Hx of myopia and is supposed to wearing glasses but does not wear them all the time. She denies any fever, chills, night sweats,  dizziness, nasal congestion, runny nose, pnd, sore throat, ear ache, cough, chest pain, sob, palpitations, nausea, vomiting, diarrhea, constipation, hematochezia, hematuria, melena stools, feelings of anxiety,depression or insomnia.         The following portions of the patient's history were reviewed and updated as appropriate:   She  has a past medical history of Acute diffuse otitis externa of right ear (8/23/2021), Anemia, Anxiety, Asthma, Gestational diabetes mellitus, Hyperlipidemia, Hypertension, Other chest pain (1/27/2020), PONV (postoperative nausea and vomiting), Pregnancy, Trigger finger, and Yeast infection. She   Patient Active Problem List    Diagnosis Date Noted   • Primary osteoarthritis of left knee 04/03/2023   • Chronic pain of left knee 04/03/2023   • CHACE (obstructive sleep apnea)    • Prediabetes 09/21/2022   • Family history of diabetes mellitus 06/29/2022   • Obesity (BMI 35.0-39.9 without comorbidity)    • Elevated bilirubin 10/05/2020   • Menorrhagia with regular cycle 09/23/2019   • Hypertriglyceridemia 09/07/2017   • Benign hypertension 03/08/2016   • Dysphagia 09/02/2015   • Extrinsic asthma 11/13/2013   • Generalized anxiety disorder 11/13/2013   • Hypercholesterolemia 11/13/2013     She  has a past surgical history that includes [place] tooth extraction; pr rpr umbilical hrna 5 yrs/> reducible (N/A, 08/23/2018); Hernia repair; pr hysteroscopy bx endometrium&/polypc w/wo d&c (N/A, 10/14/2019); pr hysteroscopy endometrial ablation (N/A, 10/14/2019); Incision tendon sheath hand (Left); pr arthrs kne surg w/meniscectomy med/lat w/shvg (Left, 11/05/2021); Colonoscopy; Pubovaginal sling (N/A, 6/20/2022); CYSTOSCOPY (N/A, 6/20/2022); and Colporrhaphy (N/A, 6/20/2022). Her family history includes Diabetes type II in her mother; Endometrial cancer (age of onset: 55) in her mother; Heart attack in her maternal grandfather and sister; Hyperlipidemia in her father and mother; Hypertension in her father and mother; No Known Problems in her brother, daughter, maternal grandmother, paternal aunt, paternal grandmother, and son; Other in her maternal grandfather; Stroke in her paternal grandfather. She  reports that she has never smoked. She has never used smokeless tobacco. She reports current alcohol use of about 2.0 standard drinks of alcohol per week. She reports that she does not use drugs.   Current Outpatient Medications   Medication Sig Dispense Refill   • albuterol (Ventolin HFA) 90 mcg/act inhaler Inhale 1-2 puffs as needed for wheezing 54 g 1   • atorvastatin (LIPITOR) 40 mg tablet Take 1 tablet (40 mg total) by mouth daily (Patient taking differently: Take 20 mg by mouth daily) 90 tablet 3   • Cholecalciferol (D3-1000) 25 MCG (1000 UT) capsule Take 1,000 Units by mouth daily     • Cyanocobalamin (Vitamin B12) 1000 MCG TBCR Take by mouth in the morning     • escitalopram (LEXAPRO) 10 mg tablet Take 1 tablet (10 mg total) by mouth daily at bedtime 90 tablet 1   • ferrous sulfate 325 (65 Fe) mg tablet Take 325 mg by mouth daily with breakfast     • fluticasone (FLONASE) 50 mcg/act nasal spray 1 spray into each nostril daily (Patient taking differently: 1 spray into each nostril as needed) 1 Bottle 0   • Lactobacillus (PROBIOTIC ACIDOPHILUS PO) Take by mouth in the morning     • loratadine (CLARITIN) 10 mg tablet Take 10 mg by mouth daily at bedtime       • losartan (Cozaar) 100 MG tablet Take 1 tablet (100 mg total) by mouth daily 90 tablet 1   • metoprolol succinate (TOPROL-XL) 50 mg 24 hr tablet Take 1 tablet (50 mg total) by mouth daily at bedtime 90 tablet 1   • Multiple Vitamin (multivitamin) tablet Take 1 tablet by mouth daily     • Omega-3 Fatty Acids (FISH OIL) 1,000 mg Take 2 capsules by mouth daily at bedtime       • tirzepatide 15 MG/0.5ML Inject 0.5 mL (15 mg total) under the skin every 7 days 6 mL 1   • zinc gluconate 50 mg tablet Take 50 mg by mouth daily       No current facility-administered medications for this visit.      Current Outpatient Medications on File Prior to Visit   Medication Sig   • albuterol (Ventolin HFA) 90 mcg/act inhaler Inhale 1-2 puffs as needed for wheezing   • atorvastatin (LIPITOR) 40 mg tablet Take 1 tablet (40 mg total) by mouth daily (Patient taking differently: Take 20 mg by mouth daily)   • Cholecalciferol (D3-1000) 25 MCG (1000 UT) capsule Take 1,000 Units by mouth daily   • Cyanocobalamin (Vitamin B12) 1000 MCG TBCR Take by mouth in the morning   • ferrous sulfate 325 (65 Fe) mg tablet Take 325 mg by mouth daily with breakfast   • fluticasone (FLONASE) 50 mcg/act nasal spray 1 spray into each nostril daily (Patient taking differently: 1 spray into each nostril as needed)   • Lactobacillus (PROBIOTIC ACIDOPHILUS PO) Take by mouth in the morning   • loratadine (CLARITIN) 10 mg tablet Take 10 mg by mouth daily at bedtime     • losartan (Cozaar) 100 MG tablet Take 1 tablet (100 mg total) by mouth daily   • Multiple Vitamin (multivitamin) tablet Take 1 tablet by mouth daily   • Omega-3 Fatty Acids (FISH OIL) 1,000 mg Take 2 capsules by mouth daily at bedtime     • tirzepatide 15 MG/0.5ML Inject 0.5 mL (15 mg total) under the skin every 7 days   • zinc gluconate 50 mg tablet Take 50 mg by mouth daily   • [DISCONTINUED] escitalopram (LEXAPRO) 10 mg tablet Take 1 tablet (10 mg total) by mouth daily at bedtime   • [DISCONTINUED] metoprolol succinate (TOPROL-XL) 50 mg 24 hr tablet Take 1 tablet (50 mg total) by mouth daily at bedtime   • [DISCONTINUED] hydrochlorothiazide (HYDRODIURIL) 12.5 mg tablet      No current facility-administered medications on file prior to visit. She is allergic to hibiclens [chlorhexidine gluconate] and pollen extract. .    Review of Systems   Constitutional: Negative for activity change, chills, fatigue, fever and unexpected weight change. HENT: Negative for ear pain, postnasal drip, rhinorrhea, sinus pressure and sore throat. Eyes: Positive for visual disturbance (30 min partial perpheral vision loss R eye). Negative for pain. Respiratory: Negative for cough, choking, chest tightness, shortness of breath and wheezing. Cardiovascular: Negative for chest pain, palpitations and leg swelling. Gastrointestinal: Negative for abdominal pain, constipation, diarrhea, nausea and vomiting. Genitourinary: Negative for dysuria and hematuria.    Musculoskeletal: Negative for arthralgias, back pain, gait problem, joint swelling, myalgias and neck stiffness. Skin: Negative for pallor and rash. Neurological: Positive for headaches (mild ). Negative for dizziness, tremors, seizures, syncope and light-headedness. Hematological: Negative for adenopathy. Psychiatric/Behavioral: Negative for behavioral problems. Objective:      /80 (BP Location: Left arm, Patient Position: Sitting, Cuff Size: Large)   Pulse 76   Temp (!) 97.2 °F (36.2 °C) (Temporal)   Ht 5' 3" (1.6 m)   Wt 77.6 kg (171 lb)   SpO2 97% Comment: room air  BMI 30.29 kg/m²          Physical Exam  Constitutional:       General: She is not in acute distress. Appearance: She is well-developed. She is not diaphoretic. HENT:      Head: Normocephalic and atraumatic. Right Ear: External ear normal.      Left Ear: External ear normal.      Nose: Nose normal.      Mouth/Throat:      Mouth: Mucous membranes are dry. Pharynx: Posterior oropharyngeal erythema present. No oropharyngeal exudate. Eyes:      General: No visual field deficit or scleral icterus. Right eye: No discharge. Left eye: No discharge. Conjunctiva/sclera: Conjunctivae normal.      Pupils: Pupils are equal, round, and reactive to light. Neck:      Thyroid: No thyromegaly. Vascular: No JVD. Trachea: No tracheal deviation. Cardiovascular:      Rate and Rhythm: Normal rate and regular rhythm. Heart sounds: Normal heart sounds. No murmur heard. No friction rub. No gallop. Pulmonary:      Effort: Pulmonary effort is normal. No respiratory distress. Breath sounds: Normal breath sounds. No wheezing or rales. Chest:      Chest wall: No tenderness. Abdominal:      General: Bowel sounds are normal. There is no distension. Palpations: Abdomen is soft. There is no mass. Tenderness: There is no abdominal tenderness. There is no guarding or rebound.    Musculoskeletal:         General: No tenderness or deformity. Normal range of motion. Cervical back: Normal range of motion and neck supple. Lymphadenopathy:      Cervical: No cervical adenopathy. Skin:     General: Skin is warm and dry. Coloration: Skin is not pale. Findings: No erythema or rash. Neurological:      Mental Status: She is alert and oriented to person, place, and time. Cranial Nerves: No cranial nerve deficit, dysarthria or facial asymmetry. Motor: No weakness or abnormal muscle tone. Coordination: Coordination normal.      Deep Tendon Reflexes: Reflexes are normal and symmetric. Comments: Cranial nerves II to XII intact bilaterally and there is no visual field defect noticed. Muscle strength is 5/5 lower extremity   Psychiatric:         Behavior: Behavior normal.           Appointment on 09/16/2023   Component Date Value Ref Range Status   • Total Bilirubin 09/16/2023 1.65 (H)  0.20 - 1.00 mg/dL Final    Use of this assay is not recommended for patients undergoing treatment with eltrombopag due to the potential for falsely elevated results. N-acetyl-p-benzoquinone imine (metabolite of Acetaminophen) will generate erroneously low results in samples for patients that have taken an overdose of Acetaminophen. • Bilirubin, Direct 09/16/2023 0.25 (H)  0.00 - 0.20 mg/dL Final   • Alkaline Phosphatase 09/16/2023 57  34 - 104 U/L Final   • AST 09/16/2023 19  13 - 39 U/L Final   • ALT 09/16/2023 24  7 - 52 U/L Final    Specimen collection should occur prior to Sulfasalazine administration due to the potential for falsely depressed results.     • Total Protein 09/16/2023 7.2  6.4 - 8.4 g/dL Final   • Albumin 09/16/2023 4.4  3.5 - 5.0 g/dL Final   Appointment on 07/08/2023   Component Date Value Ref Range Status   • Cholesterol 07/08/2023 151  See Comment mg/dL Final    Cholesterol:         Pediatric <18 Years        Desirable          <170 mg/dL      Borderline High    170-199 mg/dL      High >=200 mg/dL        Adult >=18 Years            Desirable         <200 mg/dL      Borderline High   200-239 mg/dL      High              >239 mg/dL     • Triglycerides 07/08/2023 117  See Comment mg/dL Final    Triglyceride:     0-9Y            <75mg/dL     10Y-17Y         <90 mg/dL       >=18Y     Normal          <150 mg/dL     Borderline High 150-199 mg/dL     High            200-499 mg/dL        Very High       >499 mg/dL    Specimen collection should occur prior to N-Acetylcysteine or Metamizole administration due to the potential for falsely depressed results. • HDL, Direct 07/08/2023 54  >=50 mg/dL Final    Specimen collection should occur prior to Metamizole administration due to the potential for falsley depressed results. • LDL Calculated 07/08/2023 74  0 - 100 mg/dL Final    Unable to calculate  LDL Cholesterol:     Optimal           <100 mg/dl     Near Optimal      100-129 mg/dl     Above Optimal       Borderline High 130-159 mg/dl       High            160-189 mg/dl       Very High       >189 mg/dl         This screening LDL is a calculated result. It does not have the accuracy of the Direct Measured LDL in the monitoring of patients with hyperlipidemia and/or statin therapy. Direct Measure LDL (LLS016) must be ordered separately in these patients. • Non-HDL-Chol (CHOL-HDL) 07/08/2023 97  mg/dl Final   • Sodium 07/08/2023 139  135 - 147 mmol/L Final   • Potassium 07/08/2023 4.2  3.5 - 5.3 mmol/L Final   • Chloride 07/08/2023 110 (H)  96 - 108 mmol/L Final   • CO2 07/08/2023 23  21 - 32 mmol/L Final   • ANION GAP 07/08/2023 6  mmol/L Final   • BUN 07/08/2023 16  5 - 25 mg/dL Final   • Creatinine 07/08/2023 0.70  0.60 - 1.30 mg/dL Final    Standardized to IDMS reference method   • Glucose, Fasting 07/08/2023 97  65 - 99 mg/dL Final    Specimen collection should occur prior to Sulfasalazine administration due to the potential for falsely depressed results.  Specimen collection should occur prior to Sulfapyridine administration due to the potential for falsely elevated results. • Calcium 07/08/2023 9.1  8.3 - 10.1 mg/dL Final   • AST 07/08/2023 17  5 - 45 U/L Final    Specimen collection should occur prior to Sulfasalazine administration due to the potential for falsely depressed results. • ALT 07/08/2023 30  12 - 78 U/L Final    Specimen collection should occur prior to Sulfasalazine and/or Sulfapyridine administration due to the potential for falsely depressed results. • Alkaline Phosphatase 07/08/2023 63  46 - 116 U/L Final   • Total Protein 07/08/2023 7.4  6.4 - 8.4 g/dL Final   • Albumin 07/08/2023 4.0  3.5 - 5.0 g/dL Final   • Total Bilirubin 07/08/2023 1.47 (H)  0.20 - 1.00 mg/dL Final    Use of this assay is not recommended for patients undergoing treatment with eltrombopag due to the potential for falsely elevated results.    • eGFR 07/08/2023 102  ml/min/1.73sq m Final   • WBC 07/08/2023 6.71  4.31 - 10.16 Thousand/uL Final   • RBC 07/08/2023 4.78  3.81 - 5.12 Million/uL Final   • Hemoglobin 07/08/2023 13.6  11.5 - 15.4 g/dL Final   • Hematocrit 07/08/2023 41.6  34.8 - 46.1 % Final   • MCV 07/08/2023 87  82 - 98 fL Final   • MCH 07/08/2023 28.5  26.8 - 34.3 pg Final   • MCHC 07/08/2023 32.7  31.4 - 37.4 g/dL Final   • RDW 07/08/2023 13.9  11.6 - 15.1 % Final   • MPV 07/08/2023 11.4  8.9 - 12.7 fL Final   • Platelets 03/82/1762 246  149 - 390 Thousands/uL Final   • nRBC 07/08/2023 0  /100 WBCs Final   • Neutrophils Relative 07/08/2023 55  43 - 75 % Final   • Immat GRANS % 07/08/2023 0  0 - 2 % Final   • Lymphocytes Relative 07/08/2023 31  14 - 44 % Final   • Monocytes Relative 07/08/2023 8  4 - 12 % Final   • Eosinophils Relative 07/08/2023 5  0 - 6 % Final   • Basophils Relative 07/08/2023 1  0 - 1 % Final   • Neutrophils Absolute 07/08/2023 3.74  1.85 - 7.62 Thousands/µL Final   • Immature Grans Absolute 07/08/2023 0.01  0.00 - 0.20 Thousand/uL Final   • Lymphocytes Absolute 07/08/2023 2.06 0.60 - 4.47 Thousands/µL Final   • Monocytes Absolute 07/08/2023 0.53  0.17 - 1.22 Thousand/µL Final   • Eosinophils Absolute 07/08/2023 0.32  0.00 - 0.61 Thousand/µL Final   • Basophils Absolute 07/08/2023 0.05  0.00 - 0.10 Thousands/µL Final   Appointment on 05/06/2023   Component Date Value Ref Range Status   • HIV-1 p24 Antigen 05/06/2023 Non-Reactive  Non-Reactive Final   • HIV-1 Antibody 05/06/2023 Non-Reactive  Non-Reactive Final   • HIV-2 Antibody 05/06/2023 Non-Reactive  Non-Reactive Final   • HIV Ag-Ab 5th Gen 05/06/2023 Non-Reactive  Non-Reactive Final    A Non-Reactive test result does not preclude the possibility of exposure or infection with HIV-1 and/or HIV-2. Non-Reactive results can occur if the quantity of marker present is below the detection limits or is not present during the stage of disease in which a sample is collected. Repeat testing should be considered where there is clinical suspicion of infection. • Glucose, Fasting 05/06/2023 103 (H)  65 - 99 mg/dL Final    Specimen collection should occur prior to Sulfasalazine administration due to the potential for falsely depressed results. Specimen collection should occur prior to Sulfapyridine administration due to the potential for falsely elevated results. • Hemoglobin A1C 05/06/2023 5.2  Normal 3.8-5.6%; PreDiabetic 5.7-6.4%; Diabetic >=6.5%; Glycemic control for adults with diabetes <7.0% % Final   • EAG 05/06/2023 103  mg/dl Final   Appointment on 01/14/2023   Component Date Value Ref Range Status   • Hemoglobin A1C 01/14/2023 5.3  Normal 3.8-5.6%; PreDiabetic 5.7-6.4%;  Diabetic >=6.5%; Glycemic control for adults with diabetes <7.0% % Final   • EAG 01/14/2023 105  mg/dl Final   • Sodium 01/14/2023 137  135 - 147 mmol/L Final   • Potassium 01/14/2023 4.4  3.5 - 5.3 mmol/L Final   • Chloride 01/14/2023 107  96 - 108 mmol/L Final   • CO2 01/14/2023 25  21 - 32 mmol/L Final   • ANION GAP 01/14/2023 5  4 - 13 mmol/L Final   • BUN 01/14/2023 16  5 - 25 mg/dL Final   • Creatinine 01/14/2023 0.76  0.60 - 1.30 mg/dL Final    Standardized to IDMS reference method   • Glucose, Fasting 01/14/2023 93  65 - 99 mg/dL Final    Specimen collection should occur prior to Sulfasalazine administration due to the potential for falsely depressed results. Specimen collection should occur prior to Sulfapyridine administration due to the potential for falsely elevated results. • Calcium 01/14/2023 9.3  8.3 - 10.1 mg/dL Final   • AST 01/14/2023 15  5 - 45 U/L Final    Specimen collection should occur prior to Sulfasalazine administration due to the potential for falsely depressed results. • ALT 01/14/2023 29  12 - 78 U/L Final    Specimen collection should occur prior to Sulfasalazine and/or Sulfapyridine administration due to the potential for falsely depressed results. • Alkaline Phosphatase 01/14/2023 64  46 - 116 U/L Final   • Total Protein 01/14/2023 7.8  6.4 - 8.4 g/dL Final   • Albumin 01/14/2023 4.2  3.5 - 5.0 g/dL Final   • Total Bilirubin 01/14/2023 1.59 (H)  0.20 - 1.00 mg/dL Final    Use of this assay is not recommended for patients undergoing treatment with eltrombopag due to the potential for falsely elevated results.    • eGFR 01/14/2023 93  ml/min/1.73sq m Final   Orders Only on 01/12/2023   Component Date Value Ref Range Status   • Color, UA 01/14/2023 Light Orange   Final   • Clarity, UA 01/14/2023 Extra Turbid   Final   • Specific Gravity, UA 01/14/2023 1.021  1.003 - 1.030 Final   • pH, UA 01/14/2023 6.0  4.5, 5.0, 5.5, 6.0, 6.5, 7.0, 7.5, 8.0 Final   • Leukocytes, UA 01/14/2023 Small (A)  Negative Final   • Nitrite, UA 01/14/2023 Negative  Negative Final   • Protein, UA 01/14/2023 Trace (A)  Negative mg/dl Final   • Glucose, UA 01/14/2023 Negative  Negative mg/dl Final   • Ketones, UA 01/14/2023 Negative  Negative mg/dl Final   • Urobilinogen, UA 01/14/2023 <2.0  <2.0 mg/dl mg/dl Final   • Bilirubin, UA 01/14/2023 Negative  Negative Final   • Occult Blood, UA 01/14/2023 Negative  Negative Final   • RBC, UA 01/14/2023 2-4 (A)  None Seen, 1-2 /hpf Final   • WBC, UA 01/14/2023 1-2  None Seen, 1-2 /hpf Final   • Epithelial Cells 01/14/2023 Occasional  None Seen, Occasional /hpf Final   • Bacteria, UA 01/14/2023 Occasional  None Seen, Occasional /hpf Final   Annual Exam on 11/07/2022   Component Date Value Ref Range Status   • Case Report 11/07/2022    Final                    Value:Gynecologic Cytology Report                       Case: [id number] Dean Urrutia Provider:  Quique Louis DO       Collected:           11/07/2022 0515              Ordering Location:     [facility]      Received:            11/07/2022 2677              First Screen:          Karen Edwards, CT                                                         Specimen:    LIQUID-BASED PAP, SCREENING, Cervix                                                       • Primary Interpretation 11/07/2022 Negative for intraepithelial lesion or malignancy   Final   • Specimen Adequacy 11/07/2022 Satisfactory for evaluation. Endocervical/transformation zone component present. Final   • Additional Information 11/07/2022    Final                    Value: This result contains rich text formatting which cannot be displayed here. • LMP 11/07/2022 10/30/2022   Final   Office Visit on 10/17/2022   Component Date Value Ref Range Status   • Cologuard Result 10/30/2022 Negative  Negative Final    Comment: NEGATIVE TEST RESULT. A negative Cologuard result indicates a low likelihood that a colorectal cancer (CRC) or advanced adenoma (adenomatous polyps with more advanced pre-malignant features)  is present. The chance that a person with a negative Cologuard   test has a colorectal cancer is less than 1 in 1500 (negative predictive value >99.9%) or has an  advanced adenoma is less than  5.3% (negative predictive value 94.7%).  These data are based on a prospective cross-sectional study of 10,000 individuals at   average risk for colorectal cancer who were screened with both Cologuard and colonoscopy. (Alisha Ryder al, N Engl J Med 2014;370(14):7083-0906) The normal value (reference range) for this assay is negative. COLOGUARD RE-SCREENING RECOMMENDATION: Periodic colorectal cancer screening is an important part of preventive healthcare for asymptomatic individuals at average risk for colorectal cancer. Following a negative Cologuard result, the   [facility] Task Force screening guidelines recommend a Cologuard re-screening interval of 3 years. References: American Cancer Society Guideline for Colorectal Cancer Screening: https://www.osborne.com/. org/cancer/colon-rectal-cancer/mjapqlxai-nbryereug-zxpwfxn/acs-recommendations.html.; Ovi GARZON, Brigid BRAND, Edward DALY, Colorectal Cancer Screening:   Recommendations for Physicians and Patients from the [facility] Task Force on Colorectal Cancer Screening , Am J Gastroenterology 2017; 817:6498-9940. TEST DESCRIPTION: Composite algorithmic analysis of stool DNA-biomarkers with hemoglobin immunoassay. Quantitative values of individual biomarkers are not reportable and are not associated with individual biomarker result reference ranges. Cologuard is   intended for colorectal cancer screening of adults of either sex, 39 years or older, who are at average-risk for colorectal cancer (CRC). Cologuard has been approved for use by the U.S. FDA. The performance of Cologuard was established in                            a cross   sectional study of average-risk adults aged 51-80. Cologuard performance in patients ages 39 to 52 years was estimated by sub-group analysis of near-age groups.  Colonoscopies performed for a positive result may find as the most clinically significant   lesion: colorectal cancer [4.0%], advanced adenoma (including sessile serrated polyps greater than or equal to 1cm diameter) [20%] or non- advanced adenoma [31%]; or no colorectal neoplasia [45%]. These estimates are derived from a prospective   cross-sectional screening study of 10,000 individuals at average risk for colorectal cancer who were screened with both Cologuard and colonoscopy. (Alisha Ryder al, [place] J Med 2014;370(14):0231-5636.) Cologuard may produce a false negative or false   positive result (no colorectal cancer or precancerous polyp present at colonoscopy follow up). A negative Cologuard test result does not guarantee the absence of CRC or advanced adenoma (pre-cancer). The current Cologuard screening in                           [facility] is every 3   years. ([facility] Task Force). Cologuard performance data in a 10,000 patient pivotal study using colonoscopy as the reference method can be accessed at the following location: www.Five-Thirty/results. Additional   description of the Cologuard test process, warnings and precautions can be found at [email or web address]. Appointment on 10/15/2022   Component Date Value Ref Range Status   • Sodium 10/15/2022 137  135 - 147 mmol/L Final   • Potassium 10/15/2022 4.1  3.5 - 5.3 mmol/L Final   • Chloride 10/15/2022 105  96 - 108 mmol/L Final   • CO2 10/15/2022 24  21 - 32 mmol/L Final   • ANION GAP 10/15/2022 8  4 - 13 mmol/L Final   • BUN 10/15/2022 15  5 - 25 mg/dL Final   • Creatinine 10/15/2022 0.76  0.60 - 1.30 mg/dL Final    Standardized to IDMS reference method   • Glucose, Fasting 10/15/2022 100 (H)  65 - 99 mg/dL Final    Specimen collection should occur prior to Sulfasalazine administration due to the potential for falsely depressed results. Specimen collection should occur prior to Sulfapyridine administration due to the potential for falsely elevated results.    • Calcium 10/15/2022 9.3  8.3 - 10.1 mg/dL Final   • AST 10/15/2022 19  5 - 45 U/L Final    Specimen collection should occur prior to Sulfasalazine administration due to the potential for falsely depressed results. • ALT 10/15/2022 38  12 - 78 U/L Final    Specimen collection should occur prior to Sulfasalazine and/or Sulfapyridine administration due to the potential for falsely depressed results. • Alkaline Phosphatase 10/15/2022 80  46 - 116 U/L Final   • Total Protein 10/15/2022 7.9  6.4 - 8.4 g/dL Final   • Albumin 10/15/2022 4.1  3.5 - 5.0 g/dL Final   • Total Bilirubin 10/15/2022 1.57 (H)  0.20 - 1.00 mg/dL Final    Use of this assay is not recommended for patients undergoing treatment with eltrombopag due to the potential for falsely elevated results.    • eGFR 10/15/2022 93  ml/min/1.73sq m Final

## 2023-09-19 ENCOUNTER — PATIENT MESSAGE (OUTPATIENT)
Dept: INTERNAL MEDICINE CLINIC | Age: 48
End: 2023-09-19

## 2023-09-26 DIAGNOSIS — E78.00 HYPERCHOLESTEROLEMIA: ICD-10-CM

## 2023-09-26 DIAGNOSIS — E78.1 HYPERTRIGLYCERIDEMIA: ICD-10-CM

## 2023-09-26 RX ORDER — ATORVASTATIN CALCIUM 40 MG/1
40 TABLET, FILM COATED ORAL DAILY
Qty: 90 TABLET | Refills: 0 | Status: SHIPPED | OUTPATIENT
Start: 2023-09-26

## 2023-09-27 DIAGNOSIS — E66.9 OBESITY (BMI 35.0-39.9 WITHOUT COMORBIDITY): ICD-10-CM

## 2023-09-27 DIAGNOSIS — R73.03 PREDIABETES: ICD-10-CM

## 2023-09-27 DIAGNOSIS — G47.33 OSA (OBSTRUCTIVE SLEEP APNEA): ICD-10-CM

## 2023-10-04 ENCOUNTER — OFFICE VISIT (OUTPATIENT)
Dept: CARDIOLOGY CLINIC | Facility: CLINIC | Age: 48
End: 2023-10-04
Payer: COMMERCIAL

## 2023-10-04 VITALS
HEIGHT: 63 IN | WEIGHT: 169 LBS | DIASTOLIC BLOOD PRESSURE: 86 MMHG | OXYGEN SATURATION: 95 % | BODY MASS INDEX: 29.95 KG/M2 | SYSTOLIC BLOOD PRESSURE: 114 MMHG | HEART RATE: 65 BPM

## 2023-10-04 DIAGNOSIS — E78.2 MIXED DYSLIPIDEMIA: Primary | ICD-10-CM

## 2023-10-04 DIAGNOSIS — Z82.49 FAMILY HISTORY OF PREMATURE CAD: ICD-10-CM

## 2023-10-04 DIAGNOSIS — E78.00 HYPERCHOLESTEROLEMIA: ICD-10-CM

## 2023-10-04 DIAGNOSIS — I10 BENIGN HYPERTENSION: ICD-10-CM

## 2023-10-04 PROCEDURE — 93000 ELECTROCARDIOGRAM COMPLETE: CPT | Performed by: INTERNAL MEDICINE

## 2023-10-04 PROCEDURE — 99214 OFFICE O/P EST MOD 30 MIN: CPT | Performed by: INTERNAL MEDICINE

## 2023-10-04 NOTE — PROGRESS NOTES
Cardiology Follow Up    Bhanu Stark  1975  [id number]  [address]  [phone]    1. Mixed dyslipidemia        2. Benign hypertension        3. Hypercholesterolemia            Diagnoses and all orders for this visit:    Mixed dyslipidemia    Benign hypertension    Hypercholesterolemia      I had the pleasure of seeing Bhanu Stark for a follow up visit. INTERVAL HISTORY: none    History of the presenting illness, Discussion/Summary and My Plan are as follows:::    Blair Chase is a pleasant 41-year-old with hypertension, dyslipidemia-with high triglycerides, personal history of gestational diabetes, family history of diabetes and lifelong nonsmoker.     Family history-diabetes, hypertension, dyslipidemia, both parents are in their late 76s and without any vascular disease. Her sister had a heart attack at the age of 46, is a diabetic, has elevated cholesterol and also was a former smoker.     She has recently sought attention for an episode of chest pain on 5/10/2022-burning/aching pain in the chest radiating to the jaw and the back, occurred in the setting of a lot of emotional stress, she is not sure if this could be her good. She had pains for total of about 2 hours, negative troponins, ECG with nonspecific changes in the ED. No CT was performed. After that, recommended a stress test, was not able to complete but hoping to completely resolved.     From an activity standpoint, she walks 3 miles, 3/7 days a week without any symptoms. She had a left knee meniscus repair in November 2021 and periodic cortisone injections.    Has had 50 lb weight loss on Tirzepatide and feels well    Transient floater for 1 to 2 hours about 2 weeks ago, while at school, is due to see ophthalmology, no palpitations, no episodes since, followed by headaches as well and she wonders if it could be migraines     Cardiac exam is unremarkable.       Plan:     Hypertension:   Much better controlled, mildly elevated diastolic     Mixed dyslipidemia: losing weight very well on Tirzepatide this is ideal- she is at risk for diabetes with her family history, personal history of gestational diabetes and elevated triglycerides. Lifestyle changes should continue. At her last visit-switched her simvastatin to atorvastatin 40 mg but she had some mental fogginess, now on 20 mg-with very good control. Recheck lipids and A1c in 3 months     Family history of diabetes:  See above     History of chest pain:  Atypical, no further episodes since then but considering her family history, will benefit from stress testing will complete. Transient loss of-right field of vision, due to see ophthalmology, await work-up, no palpitations, ECG with sinus rhythm now.   She also had headaches following that     Follow-up in 6 months     Latest Reference Range & Units 09/17/22 10:46 07/08/23 09:22   Cholesterol See Comment mg/dL 145 151   Triglycerides See Comment mg/dL 190 (H) 117   HDL >=50 mg/dL 51 54   Non-HDL Cholesterol mg/dl  97   LDL Calculated 0 - 100 mg/dL 56 74   (H): Data is abnormally high       Latest Reference Range & Units Most Recent 08/31/17 12:48 03/30/18 09:56 11/06/18 09:56 10/26/19 09:24 08/26/20 09:07 05/08/21 09:32 09/16/21 09:47   Cholesterol See Comment mg/dL 151  7/8/23 09:22 204 (H) 221 (H) 187 213 (H) 182 178 194   Triglycerides See Comment mg/dL 117  7/8/23 09:22 240 (H) 213 (H) 209 (H) 203 (H) 193 (H) 170 (H) 180 (H)   HDL >=50 mg/dL 54  7/8/23 09:22 53 58 49 52 55 61 61   Non-HDL Cholesterol mg/dl 97  7/8/23 09:22   138 161 127  133   LDL Calculated 0 - 100 mg/dL 74  7/8/23 09:22 103 (H) 120 (H) 96 120 (H) 88 83 97   (H): Data is abnormally high  Patient Active Problem List   Diagnosis   • Benign hypertension   • Dysphagia   • Extrinsic asthma   • Generalized anxiety disorder   • Hypercholesterolemia • Hypertriglyceridemia   • Menorrhagia with regular cycle   • Elevated bilirubin   • Obesity (BMI 35.0-39.9 without comorbidity)   • Family history of diabetes mellitus   • Prediabetes   • CHACE (obstructive sleep apnea)   • Primary osteoarthritis of left knee   • Chronic pain of left knee     Past Medical History:   Diagnosis Date   • Acute diffuse otitis externa of right ear 8/23/2021   • Anemia    • Anxiety    • Asthma     controlled  seasonal   • Gestational diabetes mellitus    • Hyperlipidemia    • Hypertension    • Other chest pain 1/27/2020   • PONV (postoperative nausea and vomiting)    • Pregnancy     Resolved: 07/29/2015   • Trigger finger     Resolved: 12/02/2016   • Yeast infection     Resolved: 07/29/2015     Social History     Socioeconomic History   • Marital status: /Civil Union     Spouse name: Not on file   • Number of children: Not on file   • Years of education: Not on file   • Highest education level: Not on file   Occupational History   • Not on file   Tobacco Use   • Smoking status: Never   • Smokeless tobacco: Never   Vaping Use   • Vaping Use: Never used   Substance and Sexual Activity   • Alcohol use:  Yes     Alcohol/week: 2.0 standard drinks of alcohol     Types: 2 Glasses of wine per week     Comment: 1-2 drinks per week on average typically consumes beer   • Drug use: No   • Sexual activity: Yes     Partners: Male     Birth control/protection: Male Sterilization   Other Topics Concern   • Not on file   Social History Narrative    Daily coffee consumption (2 cups/day)    Denied: History of exercise habits    Two children     Social Determinants of Health     Financial Resource Strain: Not on file   Food Insecurity: Not on file   Transportation Needs: Not on file   Physical Activity: Not on file   Stress: Not on file   Social Connections: Not on file   Intimate Partner Violence: Not on file   Housing Stability: Not on file      Family History   Problem Relation Age of Onset   • Hypertension Mother    • Hyperlipidemia Mother    • Endometrial cancer Mother 55   • Diabetes type II Mother    • Hyperlipidemia Father    • Hypertension Father    • Heart attack Sister    • No Known Problems Daughter    • No Known Problems Maternal Grandmother    • Heart attack Maternal Grandfather    • Other Maternal Grandfather         Myocardial infarction arrhythmias   • No Known Problems Paternal Grandmother    • Stroke Paternal Grandfather         Syndrome   • No Known Problems Brother    • No Known Problems Son    • No Known Problems Paternal Aunt      Past Surgical History:   Procedure Laterality Date   • COLONOSCOPY     • COLPORRHAPHY N/A 6/20/2022    Procedure: ANTERIOR COLPORRHAPHY;  Surgeon: Michael Rodriguez MD;  [facility];  Service: UroGynecology          • CYSTOSCOPY N/A 6/20/2022    Procedure: Huel Drilling;  Surgeon: Michael Rodriguez MD;  [facility];  Service: UroGynecology          • HERNIA REPAIR     • INCISION TENDON SHEATH HAND Left     thumb   • MD ARTHRS KNE SURG W/MENISCECTOMY MED/LAT W/SHVG Left 11/05/2021    Procedure: KNEE ARTHROSCOPIC MENISCECTOMY Medial;  Surgeon: Alia Morel MD;  [facility];  Service: Orthopedics   • MD HYSTEROSCOPY BX ENDOMETRIUM&/POLYPC W/WO D&C N/A 10/14/2019    Procedure: DILATATION AND CURETTAGE (D&C) WITH HYSTEROSCOPY W/ Gianna Fillers;  Surgeon: Gwyn Olivia DO;  [facility];  Service: Gynecology   • MD HYSTEROSCOPY ENDOMETRIAL ABLATION N/A 10/14/2019    Procedure: ABLATION ENDOMETRIAL Gianna Fillers;  Surgeon: Gwyn Olivia DO;  [facility];  Service: Gynecology   • MD RPR UMBILICAL HRNA 5 YRS/> REDUCIBLE N/A 08/23/2018    Procedure: UMBILICAL HERNIA REPAIR WITH MESH;  Surgeon: Lala Baptiste MD;  [facility];  Service: General   • PUBOVAGINAL SLING N/A 6/20/2022    Procedure: PUBOVAGINAL SLING;  Surgeon: Michael Rodriguez MD;  [facility];  Service: UroGynecology          • WISDOM TOOTH EXTRACTION         Current Outpatient Medications:   •  albuterol (Ventolin HFA) 90 mcg/act inhaler, Inhale 1-2 puffs as needed for wheezing, Disp: 54 g, Rfl: 1  •  atorvastatin (LIPITOR) 40 mg tablet, Take 1 tablet (40 mg total) by mouth daily (Patient taking differently: Take 20 mg by mouth daily), Disp: 90 tablet, Rfl: 0  •  Cholecalciferol (D3-1000) 25 MCG (1000 UT) capsule, Take 1,000 Units by mouth daily, Disp: , Rfl:   •  Cyanocobalamin (Vitamin B12) 1000 MCG TBCR, Take by mouth in the morning, Disp: , Rfl:   •  escitalopram (LEXAPRO) 10 mg tablet, Take 1 tablet (10 mg total) by mouth daily at bedtime, Disp: 90 tablet, Rfl: 1  •  ferrous sulfate 325 (65 Fe) mg tablet, Take 325 mg by mouth daily with breakfast, Disp: , Rfl:   •  fluticasone (FLONASE) 50 mcg/act nasal spray, 1 spray into each nostril daily (Patient taking differently: 1 spray into each nostril as needed), Disp: 1 Bottle, Rfl: 0  •  Lactobacillus (PROBIOTIC ACIDOPHILUS PO), Take by mouth in the morning, Disp: , Rfl:   •  loratadine (CLARITIN) 10 mg tablet, Take 10 mg by mouth daily at bedtime  , Disp: , Rfl:   •  losartan (Cozaar) 100 MG tablet, Take 1 tablet (100 mg total) by mouth daily, Disp: 90 tablet, Rfl: 1  •  metoprolol succinate (TOPROL-XL) 50 mg 24 hr tablet, Take 1 tablet (50 mg total) by mouth daily at bedtime, Disp: 90 tablet, Rfl: 1  •  Multiple Vitamin (multivitamin) tablet, Take 1 tablet by mouth daily, Disp: , Rfl:   •  Omega-3 Fatty Acids (FISH OIL) 1,000 mg, Take 2 capsules by mouth daily at bedtime  , Disp: , Rfl:   •  tirzepatide 15 MG/0.5ML, Inject 0.5 mL (15 mg total) under the skin every 7 days, Disp: 6 mL, Rfl: 1  •  zinc gluconate 50 mg tablet, Take 50 mg by mouth daily, Disp: , Rfl:   Allergies   Allergen Reactions   • Hibiclens [Chlorhexidine Gluconate] Rash     Red with pimples   • Pollen Extract Nasal Congestion       Imaging: No results found.     Review of Systems:  Review of Systems    Physical Exam:  /86 (BP Location: Right arm, Patient Position: Sitting, Cuff Size: Standard)   Pulse 65   Ht 5' 3" (1.6 m)   Wt 76.7 kg (169 lb)   SpO2 95%   BMI 29.94 kg/m²   Physical Exam    This note was completed in part utilizing [employer] direct voice recognition software. Grammatical errors, random word insertion, spelling mistakes, occasional wrong word or "sound-alike" substitutions and incomplete sentences may be an occasional consequence of the system secondary to software limitations, ambient noise and hardware issues. At the time of dictation, efforts were made to edit, clarify and /or correct errors. Please read the chart carefully and recognize, using context, where substitutions have occurred. If you have any questions or concerns about the context, text or information contained within the body of this dictation, please contact myself, the provider, for further clarification.

## 2023-10-04 NOTE — PATIENT INSTRUCTIONS
Dietary changes were discussed in detail to lower triglycerides and bad cholesterol including LDL, printed information was also provided. Decrease intake of fat, free/refined carbohydrates and sugar  Increase intake of whole wheat/brown carbohydrates with high fiber content - substitute  whole wheat products (with more fiber) instead of refined carbohydrate such as white rice, white pasta, sugar, white bread etc.   Increase overall intake of fiber/salads. If you are a diabetic, better control of his diabetes will also help-the above dietary changes should also help improve Diabetes control also  Limit intake of Alcohol to a minimum - no more than 2 drinks per week. Increase intake of fish such as [place] and [place] - which have omega-3 fatty acids  Increase aerobic physical activity - can start at 10 min a day and increase to 30 min a day -5 times a week. Ultimate goal of 150 min per week. Start low and increase level and duration of activity slowly  Weight loss from the about dietary changes and exercise will further help to lower your triglycerides. This should also lower your risk of developing diabetes and if you are a diabetic, will also help your glucose control. Weight loss of even 5-10 pounds will also help to lower cholesterol and triglyceride levels.   Decreasing caloric intake by about 100 calvin a day-should lead to a weight loss of 1-2 pounds over one month  Decrease saturated fat intake to less than 13 g per day-decrease intake of cheese, butter, cream, animal meat  Increase soluble fiber in the diet-beans, pears, oatmeal  Google 'TLC Cholesterol" = Your guide to lowering your cholesterol with TLC is probably the best online resource to lower your LDL cholesterol

## 2023-10-19 ENCOUNTER — NEW PATIENT (OUTPATIENT)
Dept: URBAN - METROPOLITAN AREA CLINIC 6 | Facility: CLINIC | Age: 48
End: 2023-10-19

## 2023-10-19 DIAGNOSIS — G43.B0: ICD-10-CM

## 2023-10-19 DIAGNOSIS — E11.9: ICD-10-CM

## 2023-10-19 LAB
LEFT EYE DIABETIC RETINOPATHY: NORMAL
RIGHT EYE DIABETIC RETINOPATHY: NORMAL

## 2023-10-19 PROCEDURE — 92004 COMPRE OPH EXAM NEW PT 1/>: CPT

## 2023-10-19 ASSESSMENT — TONOMETRY
OS_IOP_MMHG: 16
OD_IOP_MMHG: 16

## 2023-10-19 ASSESSMENT — VISUAL ACUITY
OS_CC: 20/20
OU_CC: J1+
OD_CC: 20/25

## 2023-10-23 ENCOUNTER — HOSPITAL ENCOUNTER (OUTPATIENT)
Dept: NON INVASIVE DIAGNOSTICS | Facility: CLINIC | Age: 48
Discharge: HOME | End: 2023-10-23
Payer: COMMERCIAL

## 2023-10-23 VITALS
HEART RATE: 84 BPM | SYSTOLIC BLOOD PRESSURE: 126 MMHG | HEIGHT: 63 IN | OXYGEN SATURATION: 98 % | DIASTOLIC BLOOD PRESSURE: 84 MMHG | BODY MASS INDEX: 29.95 KG/M2 | WEIGHT: 169 LBS

## 2023-10-23 DIAGNOSIS — I10 BENIGN HYPERTENSION: ICD-10-CM

## 2023-10-23 DIAGNOSIS — E78.00 HYPERCHOLESTEROLEMIA: ICD-10-CM

## 2023-10-23 DIAGNOSIS — E78.2 MIXED DYSLIPIDEMIA: ICD-10-CM

## 2023-10-23 LAB
MAX HR PERCENT: 94 %
MAX HR: 162 BPM
RATE PRESSURE PRODUCT: NORMAL
SL CV STRESS RECOVERY BP: NORMAL MMHG
SL CV STRESS RECOVERY HR: 102 BPM
SL CV STRESS RECOVERY O2 SAT: 98 %
SL CV STRESS STAGE REACHED: 4
STRESS ANGINA INDEX: 0
STRESS BASELINE BP: NORMAL MMHG
STRESS BASELINE HR: 84 BPM
STRESS O2 SAT REST: 98 %
STRESS PEAK HR: 145 BPM
STRESS POST ESTIMATED WORKLOAD: 13.4 METS
STRESS POST EXERCISE DUR MIN: 10 MIN
STRESS POST EXERCISE DUR SEC: 15 SEC
STRESS POST O2 SAT PEAK: 98 %
STRESS POST PEAK BP: 158 MMHG

## 2023-10-23 PROCEDURE — 93018 CV STRESS TEST I&R ONLY: CPT | Performed by: INTERNAL MEDICINE

## 2023-10-23 PROCEDURE — 93016 CV STRESS TEST SUPVJ ONLY: CPT | Performed by: INTERNAL MEDICINE

## 2023-10-23 PROCEDURE — 93017 CV STRESS TEST TRACING ONLY: CPT

## 2023-10-24 LAB
CHEST PAIN STATEMENT: NORMAL
MAX DIASTOLIC BP: 72 MMHG
MAX HEART RATE: 162 BPM
MAX PREDICTED HEART RATE: 171 BPM
MAX. SYSTOLIC BP: 158 MMHG
PROTOCOL NAME: NORMAL
REASON FOR TERMINATION: NORMAL
TARGET HR FORMULA: NORMAL
TEST INDICATION: NORMAL
TIME IN EXERCISE PHASE: NORMAL

## 2023-10-27 ENCOUNTER — TELEPHONE (OUTPATIENT)
Dept: CARDIOLOGY CLINIC | Facility: CLINIC | Age: 48
End: 2023-10-27

## 2023-10-27 ENCOUNTER — OFFICE VISIT (OUTPATIENT)
Dept: URGENT CARE | Age: 48
End: 2023-10-27
Payer: COMMERCIAL

## 2023-10-27 VITALS
HEIGHT: 63 IN | TEMPERATURE: 98.2 F | WEIGHT: 165 LBS | RESPIRATION RATE: 18 BRPM | HEART RATE: 77 BPM | OXYGEN SATURATION: 99 % | BODY MASS INDEX: 29.23 KG/M2

## 2023-10-27 DIAGNOSIS — J02.9 SORE THROAT: Primary | ICD-10-CM

## 2023-10-27 LAB — S PYO AG THROAT QL: NEGATIVE

## 2023-10-27 PROCEDURE — 99213 OFFICE O/P EST LOW 20 MIN: CPT

## 2023-10-27 PROCEDURE — 87880 STREP A ASSAY W/OPTIC: CPT

## 2023-10-27 NOTE — TELEPHONE ENCOUNTER
----- Message -----  From: Chi Carver MD  Sent: 10/24/2023  12:50 PM EDT  To: Card[facility] [place] [facility]    Results are normal. Please notify pt.

## 2023-10-30 NOTE — PROGRESS NOTES
[facility]        NAME: Jag Posey is a 51 y.o. female  : 1975    MRN: [id number]  DATE: 2023  TIME: 8:37 AM    Assessment and Plan   Sore throat [J02.9]  1. Sore throat  POCT rapid strepA        PND, congestion, sore throat and intermittent yellow drainage for 10 days. Denies fevers. Hx of allergies. No meds taken. BS clear. No pain/tenderness    Patient Instructions       Follow up with PCP as needed    Chief Complaint     Chief Complaint   Patient presents with    Cold Like Symptoms     Having nasal congestion,PND and sore throat for 10 days. Yellow nasal drainage. Noticed spot on back throat. Home Covid (-) Tues. History of Present Illness       PND, congestion, sore throat and intermittent yellow drainage for 10 days. Denies fevers. Hx of allergies. No meds taken. BS clear. No pain/tenderness        Review of Systems   Review of Systems   Constitutional:  Negative for activity change, appetite change and fever. HENT:  Positive for congestion, postnasal drip and sore throat. Respiratory:  Negative for cough, shortness of breath and wheezing. Allergic/Immunologic: Positive for environmental allergies. All other systems reviewed and are negative.         Current Medications       Current Outpatient Medications:     albuterol (Ventolin HFA) 90 mcg/act inhaler, Inhale 1-2 puffs as needed for wheezing, Disp: 54 g, Rfl: 1    Cholecalciferol (D3-1000) 25 MCG (1000 UT) capsule, Take 1,000 Units by mouth daily, Disp: , Rfl:     Cyanocobalamin (Vitamin B12) 1000 MCG TBCR, Take by mouth in the morning, Disp: , Rfl:     escitalopram (LEXAPRO) 10 mg tablet, Take 1 tablet (10 mg total) by mouth daily at bedtime, Disp: 90 tablet, Rfl: 1    ferrous sulfate 325 (65 Fe) mg tablet, Take 325 mg by mouth daily with breakfast, Disp: , Rfl:     Lactobacillus (PROBIOTIC ACIDOPHILUS PO), Take by mouth in the morning, Disp: , Rfl:     loratadine (CLARITIN) 10 mg tablet, Take 10 mg by mouth daily at bedtime  , Disp: , Rfl:     losartan (Cozaar) 100 MG tablet, Take 1 tablet (100 mg total) by mouth daily, Disp: 90 tablet, Rfl: 1    metoprolol succinate (TOPROL-XL) 50 mg 24 hr tablet, Take 1 tablet (50 mg total) by mouth daily at bedtime, Disp: 90 tablet, Rfl: 1    Multiple Vitamin (multivitamin) tablet, Take 1 tablet by mouth daily, Disp: , Rfl:     Omega-3 Fatty Acids (FISH OIL) 1,000 mg, Take 2 capsules by mouth daily at bedtime  , Disp: , Rfl:     tirzepatide 15 MG/0.5ML, Inject 0.5 mL (15 mg total) under the skin every 7 days, Disp: 6 mL, Rfl: 1    zinc gluconate 50 mg tablet, Take 50 mg by mouth daily, Disp: , Rfl:     atorvastatin (LIPITOR) 40 mg tablet, Take 1 tablet (40 mg total) by mouth daily (Patient not taking: Reported on 10/27/2023), Disp: 90 tablet, Rfl: 0    fluticasone (FLONASE) 50 mcg/act nasal spray, 1 spray into each nostril daily (Patient not taking: Reported on 10/27/2023), Disp: 1 Bottle, Rfl: 0    Current Allergies     Allergies as of 10/27/2023 - Reviewed 10/27/2023   Allergen Reaction Noted    Hibiclens [chlorhexidine gluconate] Rash 10/14/2019    Pollen extract Nasal Congestion 09/02/2015            The following portions of the patient's history were reviewed and updated as appropriate: allergies, current medications, past family history, past medical history, past social history, past surgical history and problem list.     Past Medical History:   Diagnosis Date    Acute diffuse otitis externa of right ear 8/23/2021    Anemia     Anxiety     Asthma     controlled  seasonal    Gestational diabetes mellitus     Hyperlipidemia     Hypertension     Other chest pain 1/27/2020    PONV (postoperative nausea and vomiting)     Pregnancy     Resolved: 07/29/2015    Trigger finger     Resolved: 12/02/2016    Yeast infection     Resolved: 07/29/2015       Past Surgical History:   Procedure Laterality Date    COLONOSCOPY      COLPORRHAPHY N/A 6/20/2022    Procedure: ANTERIOR COLPORRHAPHY;  Surgeon: Sandrita Spangler MD;  [facility];  Service: UroGynecology           CYSTOSCOPY N/A 6/20/2022    Procedure: Chandler Tavares;  Surgeon: Sandrita Spangler MD;  [facility];  Service: UroGynecology           HERNIA REPAIR      INCISION TENDON SHEATH HAND Left     thumb    [place] ARTHRS KNE SURG W/MENISCECTOMY MED/LAT W/SHVG Left 11/05/2021    Procedure: KNEE ARTHROSCOPIC MENISCECTOMY Medial;  Surgeon: Bakari Shrestha MD;  [facility];  Service: Orthopedics    W[place] HYSTEROSCOPY BX ENDOMETRIUM&/POLYPC W/WO D&C N/A 10/14/2019    Procedure: DILATATION AND CURETTAGE (D&C) WITH HYSTEROSCOPY W/ Leatha Sas;  Surgeon: Brunilda Bauer DO;  [facility];  Service: Gynecology    WY HYSTEROSCOPY ENDOMETRIAL ABLATION N/A 10/14/2019    Procedure: ABLATION ENDOMETRIAL Leatha Sas;  Surgeon: Brunilda Bauer DO;  [facility];  Service: Gynecology    WY RPR UMBILICAL HRNA 5 YRS/> REDUCIBLE N/A 08/23/2018    Procedure: UMBILICAL HERNIA REPAIR WITH MESH;  Surgeon: Birgit Brown MD;  [facility];  Service: General    PUBOVAGINAL SLING N/A 6/20/2022    Procedure: Rob Davila;  Surgeon: Sandrita Spangler MD;  [facility];  Service: UroGynecology           WISDOM TOOTH EXTRACTION         Family History   Problem Relation Age of Onset    Hypertension Mother     Hyperlipidemia Mother     Endometrial cancer Mother 55    Diabetes type II Mother     Hyperlipidemia Father     Hypertension Father     Heart attack Sister     No Known Problems Daughter     No Known Problems Maternal Grandmother     Heart attack Maternal Grandfather     Other Maternal Grandfather         Myocardial infarction arrhythmias    No Known Problems Paternal Grandmother     Stroke Paternal Grandfather         Syndrome    No Known Problems Brother     No Known Problems Son     No Known Problems Paternal Aunt          Medications have been verified.         Objective   Pulse 77 Temp 98.2 °F (36.8 °C)   Resp 18   Ht 5' 3" (1.6 m)   Wt 74.8 kg (165 lb)   SpO2 99%   BMI 29.23 kg/m²   No LMP recorded. Patient has had an ablation. Physical Exam     Physical Exam  Vitals reviewed. Constitutional:       Appearance: Normal appearance. HENT:      Nose: Congestion present. Mouth/Throat:      Pharynx: No posterior oropharyngeal erythema. Cardiovascular:      Rate and Rhythm: Normal rate and regular rhythm. Pulses: Normal pulses. Heart sounds: Normal heart sounds. Pulmonary:      Effort: Pulmonary effort is normal.      Breath sounds: Normal breath sounds. Lymphadenopathy:      Cervical: No cervical adenopathy. Skin:     General: Skin is warm and dry. Neurological:      Mental Status: She is alert.

## 2023-11-03 DIAGNOSIS — I10 BENIGN HYPERTENSION: Primary | ICD-10-CM

## 2023-11-03 DIAGNOSIS — E78.00 HYPERCHOLESTEROLEMIA: ICD-10-CM

## 2023-11-03 DIAGNOSIS — R07.89 ATYPICAL CHEST PAIN: ICD-10-CM

## 2023-11-07 ENCOUNTER — ANNUAL EXAM (OUTPATIENT)
Dept: OBGYN CLINIC | Facility: CLINIC | Age: 48
End: 2023-11-07
Payer: COMMERCIAL

## 2023-11-07 VITALS
WEIGHT: 166.6 LBS | SYSTOLIC BLOOD PRESSURE: 118 MMHG | BODY MASS INDEX: 29.52 KG/M2 | HEIGHT: 63 IN | DIASTOLIC BLOOD PRESSURE: 76 MMHG

## 2023-11-07 DIAGNOSIS — Z12.31 ENCOUNTER FOR SCREENING MAMMOGRAM FOR BREAST CANCER: ICD-10-CM

## 2023-11-07 DIAGNOSIS — N92.6 IRREGULAR MENSES: ICD-10-CM

## 2023-11-07 DIAGNOSIS — Z01.419 ENCOUNTER FOR ANNUAL ROUTINE GYNECOLOGICAL EXAMINATION: Primary | ICD-10-CM

## 2023-11-07 PROCEDURE — S0612 ANNUAL GYNECOLOGICAL EXAMINA: HCPCS | Performed by: OBSTETRICS & GYNECOLOGY

## 2023-11-07 NOTE — PROGRESS NOTES
Assessment/Plan:  Pap smear deferred due to low risk status. Encourage self breast examination as well as calcium supplementation. Continue annual mammogram.  Reviewed colon cancer screening, up-to-date. Reviewed signs and symptoms of perimenopause. She will keep a menstrual diary. Will obtain FSH/estradiol level. She will try over-the-counter agents, question new onset eczema/psoriasis. If no improvement, recommend further evaluation with dermatology. She will continue to follow-up with cardiology and primary care as scheduled. Return to office in 1 year or as needed  No problem-specific Assessment & Plan notes found for this encounter. Diagnoses and all orders for this visit:    Encounter for annual routine gynecological examination    Encounter for screening mammogram for breast cancer  -     Mammo screening bilateral w 3d & cad; Future    Irregular menses  -     Follicle stimulating hormone  -     Estradiol; Future          Subjective:      Patient ID: Dorina Manuel is a 51 y.o. female. HPI    This is a very pleasant 69-year-old female P3 ( x3, age 22, 15) presents for GYN exam.  She underwent endometrial ablation several years prior. Her menstrual cycles markedly improved, every 4 weeks lasting 2 days described as light with last menstrual cycle 2023. She has been in a monogamous relationship for over 20 years. Method of contraception is vasectomy. Pap smears have been normal.    She continues to follow-up with cardiology and primary care. She is lost 40 pounds over the course of the year on Mounjaro, well-tolerated. Cologuard  normal    History of suburethral, anterior repair     She has had more skin irritation abdominally with itching. Questions onset of psoriasis. She is also noticed external vaginal and vulvar itching. She denies any discharge.     The following portions of the patient's history were reviewed and updated as appropriate: allergies, current medications, past family history, past medical history, past social history, past surgical history, and problem list.    Review of Systems   Constitutional:  Negative for fatigue, fever and unexpected weight change. Respiratory:  Negative for cough, chest tightness, shortness of breath and wheezing. Cardiovascular: Negative. Negative for chest pain and palpitations. Gastrointestinal: Negative. Negative for abdominal distention, abdominal pain, blood in stool, constipation, diarrhea, nausea and vomiting. Genitourinary: Negative. Negative for difficulty urinating, dyspareunia, dysuria, flank pain, frequency, genital sores, hematuria, pelvic pain, urgency, vaginal bleeding, vaginal discharge and vaginal pain. Skin:  Negative for rash. Objective:      /76   Ht 5' 3" (1.6 m)   Wt 75.6 kg (166 lb 9.6 oz)   LMP 08/25/2023 (Approximate)   BMI 29.51 kg/m²          Physical Exam  Constitutional:       Appearance: Normal appearance. She is well-developed. HENT:      Head: Normocephalic and atraumatic. Cardiovascular:      Rate and Rhythm: Normal rate and regular rhythm. Pulmonary:      Effort: Pulmonary effort is normal.      Breath sounds: Normal breath sounds. Chest:   Breasts:     Right: No inverted nipple, mass, nipple discharge, skin change or tenderness. Left: No inverted nipple, mass, nipple discharge, skin change or tenderness. Abdominal:      General: Bowel sounds are normal. There is no distension. Palpations: Abdomen is soft. Tenderness: There is no abdominal tenderness. There is no guarding or rebound. Genitourinary:     Labia:         Right: No rash, tenderness or lesion. Left: No rash, tenderness or lesion. Vagina: Normal. No signs of injury. No vaginal discharge or tenderness. Cervix: No cervical motion tenderness, discharge, friability, lesion or cervical bleeding. Uterus: Not enlarged and not tender.        Adnexa:         Right: No mass, tenderness or fullness. Left: No mass, tenderness or fullness. Neurological:      Mental Status: She is alert.    Psychiatric:         Behavior: Behavior normal.

## 2023-11-14 ENCOUNTER — PROCEDURE VISIT (OUTPATIENT)
Dept: OBGYN CLINIC | Facility: MEDICAL CENTER | Age: 48
End: 2023-11-14
Payer: COMMERCIAL

## 2023-11-14 VITALS
SYSTOLIC BLOOD PRESSURE: 119 MMHG | WEIGHT: 166.67 LBS | DIASTOLIC BLOOD PRESSURE: 80 MMHG | BODY MASS INDEX: 29.53 KG/M2 | HEIGHT: 63 IN | HEART RATE: 70 BPM

## 2023-11-14 DIAGNOSIS — M25.562 CHRONIC PAIN OF LEFT KNEE: ICD-10-CM

## 2023-11-14 DIAGNOSIS — G89.29 CHRONIC PAIN OF LEFT KNEE: ICD-10-CM

## 2023-11-14 DIAGNOSIS — M17.12 PRIMARY OSTEOARTHRITIS OF LEFT KNEE: Primary | ICD-10-CM

## 2023-11-14 PROCEDURE — 20610 DRAIN/INJ JOINT/BURSA W/O US: CPT | Performed by: PHYSICIAN ASSISTANT

## 2023-11-14 RX ORDER — GABAPENTIN 300 MG/1
300 CAPSULE ORAL ONCE
OUTPATIENT
Start: 2023-11-14 | End: 2023-11-14

## 2023-11-14 RX ORDER — TRANEXAMIC ACID 10 MG/ML
1000 INJECTION, SOLUTION INTRAVENOUS ONCE
OUTPATIENT
Start: 2023-11-14 | End: 2023-11-14

## 2023-11-14 RX ORDER — ASCORBIC ACID 500 MG
500 TABLET ORAL 2 TIMES DAILY
Qty: 60 TABLET | Refills: 1 | Status: SHIPPED | OUTPATIENT
Start: 2023-11-14

## 2023-11-14 RX ORDER — ACETAMINOPHEN 325 MG/1
975 TABLET ORAL ONCE
OUTPATIENT
Start: 2023-11-14 | End: 2023-11-14

## 2023-11-14 RX ORDER — CEFAZOLIN SODIUM 2 G/50ML
2000 SOLUTION INTRAVENOUS ONCE
OUTPATIENT
Start: 2023-11-14 | End: 2023-11-14

## 2023-11-14 RX ORDER — SODIUM CHLORIDE, SODIUM LACTATE, POTASSIUM CHLORIDE, CALCIUM CHLORIDE 600; 310; 30; 20 MG/100ML; MG/100ML; MG/100ML; MG/100ML
125 INJECTION, SOLUTION INTRAVENOUS CONTINUOUS
OUTPATIENT
Start: 2023-11-14

## 2023-11-14 RX ORDER — MULTIVIT-MIN/IRON FUM/FOLIC AC 7.5 MG-4
1 TABLET ORAL DAILY
Qty: 30 TABLET | Refills: 1 | Status: SHIPPED | OUTPATIENT
Start: 2023-11-14

## 2023-11-14 RX ORDER — FOLIC ACID 1 MG/1
1 TABLET ORAL DAILY
Qty: 30 TABLET | Refills: 1 | Status: SHIPPED | OUTPATIENT
Start: 2023-11-14 | End: 2023-11-21 | Stop reason: SDUPTHER

## 2023-11-14 NOTE — PROGRESS NOTES
Knee Follow up Office Note    Assessment:     1. Primary osteoarthritis of left knee    2. Chronic pain of left knee          Plan:    52 y/o female with left knee pain secondary to severe osteoarthritis . Xrays of the left knee again reviewed showing severe arthritic changes with decreased joint space, osteophyte formation, and varus alignment. On physical exam she has pain in the medial joint line, but has maintained relatively good motion. Discussed conservative treatment options to include cortisone injections, visco injections, oral NSAIDs, Tylenol, activity modifications, staying active with low impact exercises, and weight loss. She had cortisone at her last visit, which did well for a short period. She had a visco injection in the office today. She states that the pain in her left knee is affecting her ADLs and her quality of life. Surgical treatment option of left TKA reviewed today at length with the patient. 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 Risks and benefits of surgery to include but not limited to bleeding, infection, damage to surrounding structures, hardware failure, instability, fracture, dislocation, need for further surgery, continued pain, stiffness, blood clots, stroke, and heart attack was discussed with the patient. Informed consent was signed today in the office. The patient has met with our surgical schedulers and our preoperative joint replacement pathway has been initiated. All questions were answered. Patient will follow-up 2 weeks post operatively. BMI is appropriate at 29.52 and is a nonsmoker. Subjective:     Patient ID: Brandon Valdes is a 51 y.o. female. Chief Complaint:  HPI:  Patient is a 52 y/o female who presents today for a follow up evaluation of her left knee. She has known severe medial compartment OA of the left knee.   She has been treating conservatively for left knee OA with intermittent injections, activity modifications, NSAIDs, and HEP. He had a cortisone injection at her last visit. She is here today for visco injection into the left knee. Pain is a 7/10 today. She previously did well with visco in the past.  She is also interested in discussing left TKA for the May today. Her pain is intermittent in timing, and localizes the pain to the anterior medial joint line.     Allergy:  Allergies   Allergen Reactions    Hibiclens [Chlorhexidine Gluconate] Rash     Red with pimples    Pollen Extract Nasal Congestion     Medications:  all current active meds have been reviewed  Past Medical History:  Past Medical History:   Diagnosis Date    Acute diffuse otitis externa of right ear 8/23/2021    Anemia     Anxiety     Asthma     controlled  seasonal    Gestational diabetes mellitus     Hyperlipidemia     Hypertension     Other chest pain 1/27/2020    PONV (postoperative nausea and vomiting)     Pregnancy     Resolved: 07/29/2015    Trigger finger     Resolved: 12/02/2016    Yeast infection     Resolved: 07/29/2015     Past Surgical History:  Past Surgical History:   Procedure Laterality Date    COLONOSCOPY      COLPORRHAPHY N/A 6/20/2022    Procedure: ANTERIOR COLPORRHAPHY;  Surgeon: Mily Morales MD;  [facility];  Service: UroGynecology           CYSTOSCOPY N/A 6/20/2022    Procedure: Susan Hidalgo;  Surgeon: Mily Morales MD;  [facility];  Service: UroGynecology           HERNIA REPAIR      INCISION TENDON SHEATH HAND Left     thumb    HI ARTHRS KNE SURG W/MENISCECTOMY MED/LAT W/SHVG Left 11/05/2021    Procedure: KNEE ARTHROSCOPIC MENISCECTOMY Medial;  Surgeon: Mark Zapata MD;  [facility];  Service: Orthopedics    HI HYSTEROSCOPY BX ENDOMETRIUM&/POLYPC W/WO D&C N/A 10/14/2019    Procedure: DILATATION AND CURETTAGE (D&C) WITH HYSTEROSCOPY W/ Daisy Dhaliwal;  Surgeon: Everton Christie DO;  [facility];  Service: Gynecology    LA HYSTEROSCOPY ENDOMETRIAL ABLATION N/A 10/14/2019    Procedure: ABLATION ENDOMETRIAL Pattricia Rathke;  Surgeon: Stephanie Husain DO;  [facility];  Service: Gynecology    LA RPR UMBILICAL HRNA 5 YRS/> REDUCIBLE N/A 08/23/2018    Procedure: UMBILICAL HERNIA REPAIR WITH MESH;  Surgeon: Charity Dhaliwal MD;  [facility];  Service: General    PUBOVAGINAL SLING N/A 6/20/2022    Procedure: PUBOVAGINAL SLING;  Surgeon: Ilan Gallardo MD;  [facility];  Service: UroGynecology           WISDOM TOOTH EXTRACTION       Family History:  Family History   Problem Relation Age of Onset    Hypertension Mother     Hyperlipidemia Mother     Endometrial cancer Mother 55    Diabetes type II Mother     Hyperlipidemia Father     Hypertension Father     Heart attack Sister     No Known Problems Daughter     No Known Problems Maternal Grandmother     Heart attack Maternal Grandfather     Other Maternal Grandfather         Myocardial infarction arrhythmias    No Known Problems Paternal Grandmother     Stroke Paternal Grandfather         Syndrome    No Known Problems Brother     No Known Problems Son     No Known Problems Paternal Aunt      Social History:  Social History     Substance and Sexual Activity   Alcohol Use Yes    Alcohol/week: 2.0 standard drinks of alcohol    Types: 2 Glasses of wine per week    Comment: 1-2 drinks per week on average typically consumes beer     Social History     Substance and Sexual Activity   Drug Use No     Social History     Tobacco Use   Smoking Status Never   Smokeless Tobacco Never           ROS:  General: Per HPI  Skin: Negative, except if noted below  HEENT: Negative  Respiratory: Negative  Cardiovascular: Negative  Gastrointestinal: Negative  Urinary: Negative  Vascular: Negative  Musculoskeletal: Positive per HPI   Neurologic: Positive per HPI  Endocrine: Negative    Objective:  BP Readings from Last 1 Encounters:   11/14/23 119/80      Wt Readings from Last 1 Encounters:   11/14/23 75.6 kg (166 lb 10.7 oz)        Respiratory:   non-labored respirations    Lymphatics:  no palpable lymph nodes    Gait:   Antalgic    Neurologic:   Alert and oriented times  Patient with normal sensation except as noted below  Deep tendon reflexes 2+ except as noted in MSK exam    Bilateral Lower Extremity:  Left Knee: Inspection:  Skin is intact without erythema or ecchymosis    Overall limb alignment Varus    Effusion: Trace, +soft tissue swelling    ROM 3-120 with pain    Extensor Lag: None    Palpation: Medial Joint line tenderness to palpation    AP Stability at 90 deg Stable    M/L stability in full extension Stable    M/L stability in midflexion Stable    Motor: 5/5 Q/HS/TA/GS/P    Pulses: 2+ DP / 2+ PT    SILT DP/SP/S/S/TN    Again reviewed previous imaging:  My interpretation XR AP scanogram/AP bilateral knee/lateral/liu/sunrise left knee: severe joint space narrowing, subchondral sclerosis, subchondral cysts, osteophyte formation. No fracture or dislocation. Large joint arthrocentesis: L knee  Universal Protocol:  Consent: Verbal consent obtained. Risks and benefits: risks, benefits and alternatives were discussed  Consent given by: patient  Patient understanding: patient states understanding of the procedure being performed  Supporting Documentation  Indications: pain and joint swelling   Procedure Details  Location: knee - L knee  Preparation: Patient was prepped and draped in the usual sterile fashion  Needle size: 18 G  Ultrasound guidance: no  Approach: lateral  Medications administered: 3 mL sodium hyaluronate 60 MG/3ML    Patient tolerance: patient tolerated the procedure well with no immediate complications  Dressing:  Sterile dressing applied          BMI:   Estimated body mass index is 29.52 kg/m² as calculated from the following:    Height as of this encounter: 5' 3" (1.6 m).     Weight as of this encounter: 75.6 kg (166 lb 10.7 oz).  BSA:   Estimated body surface area is 1.79 meters squared as calculated from the following:    Height as of this encounter: 5' 3" (1.6 m). Weight as of this encounter: 75.6 kg (166 lb 10.7 oz).            Scribe Attestation      I,:  Edis Son PA-C am acting as a scribe while in the presence of the attending physician.:       I,:  Letty Fernández DO personally performed the services described in this documentation    as scribed in my presence.:

## 2023-11-21 ENCOUNTER — TELEPHONE (OUTPATIENT)
Dept: OBGYN CLINIC | Facility: HOSPITAL | Age: 48
End: 2023-11-21

## 2023-11-21 DIAGNOSIS — M17.12 PRIMARY OSTEOARTHRITIS OF LEFT KNEE: ICD-10-CM

## 2023-11-21 DIAGNOSIS — G89.29 CHRONIC PAIN OF LEFT KNEE: ICD-10-CM

## 2023-11-21 DIAGNOSIS — M25.562 CHRONIC PAIN OF LEFT KNEE: ICD-10-CM

## 2023-11-21 RX ORDER — FOLIC ACID 1 MG/1
1 TABLET ORAL DAILY
Qty: 90 TABLET | Refills: 0 | Status: SHIPPED | OUTPATIENT
Start: 2023-11-21 | End: 2023-11-22 | Stop reason: SDUPTHER

## 2023-11-21 NOTE — TELEPHONE ENCOUNTER
Caller: [facility]    Doctor: Fatoumata Zamorano    Reason for call: Calling to see if patient can be prescribed a 90 day supply of the folic Acid 1 mg?      You can E-Scribe, or Fax# [phone], or call a verbal [phone]  (ref Number- [id number]    Call back#: [phone]

## 2023-11-22 ENCOUNTER — TELEPHONE (OUTPATIENT)
Age: 48
End: 2023-11-22

## 2023-11-22 DIAGNOSIS — M25.562 CHRONIC PAIN OF LEFT KNEE: ICD-10-CM

## 2023-11-22 DIAGNOSIS — M17.12 PRIMARY OSTEOARTHRITIS OF LEFT KNEE: ICD-10-CM

## 2023-11-22 DIAGNOSIS — G89.29 CHRONIC PAIN OF LEFT KNEE: ICD-10-CM

## 2023-11-22 RX ORDER — FOLIC ACID 1 MG/1
1 TABLET ORAL DAILY
Qty: 90 TABLET | Refills: 0 | Status: SHIPPED | OUTPATIENT
Start: 2023-11-22

## 2023-11-22 NOTE — TELEPHONE ENCOUNTER
Caller: Ryan Isabel from [address] [place] [address]    Doctor: Dr. Dougie Wilks    Reason for call: Ryan Isabel asking if the Folic acid prescription can be changed to a 90 day supply for insurance purposes. Insurance won't pay for smaller quantity.   Reference #: [id number]    Call back#: [phone]

## 2023-12-21 ENCOUNTER — TELEMEDICINE (OUTPATIENT)
Dept: INTERNAL MEDICINE CLINIC | Age: 48
End: 2023-12-21
Payer: COMMERCIAL

## 2023-12-21 VITALS
BODY MASS INDEX: 27.81 KG/M2 | TEMPERATURE: 98 F | HEART RATE: 83 BPM | WEIGHT: 157 LBS | DIASTOLIC BLOOD PRESSURE: 90 MMHG | SYSTOLIC BLOOD PRESSURE: 117 MMHG

## 2023-12-21 DIAGNOSIS — U07.1 POSITIVE SELF-ADMINISTERED ANTIGEN TEST FOR COVID-19: ICD-10-CM

## 2023-12-21 DIAGNOSIS — J06.9 UPPER RESPIRATORY TRACT INFECTION, UNSPECIFIED TYPE: Primary | ICD-10-CM

## 2023-12-21 PROCEDURE — 99213 OFFICE O/P EST LOW 20 MIN: CPT

## 2023-12-21 RX ORDER — NIRMATRELVIR AND RITONAVIR 300-100 MG
3 KIT ORAL 2 TIMES DAILY
Qty: 30 TABLET | Refills: 0 | Status: SHIPPED | OUTPATIENT
Start: 2023-12-21 | End: 2023-12-26

## 2023-12-21 NOTE — PATIENT INSTRUCTIONS
I have prescribed a new medication, Paxlovid. Please take as instructed for 5 days.  Please hold your Atorvastatin for 10 days while taking the Paxlovid.  You have a follow up scheduled in January.

## 2023-12-21 NOTE — LETTER
December 21, 2023     Patient: Manuela Camacho  YOB: 1975  Date of Visit: 12/21/2023      To Whom it May Concern:    Manuela Camacho is under my professional care. Manuela was seen in my office on 12/21/2023. Manuela may return to work on 12/26/2023 .    If you have any questions or concerns, please don't hesitate to call.         Sincerely,          Luis Puente MD        CC: No Recipients

## 2023-12-21 NOTE — PROGRESS NOTES
Virtual Regular Visit    Verification of patient location:    Patient is located at Home in the following state in which I hold an active license [place]      Assessment/Plan:    Problem List Items Addressed This Visit    None  Visit Diagnoses       Upper respiratory tract infection, unspecified type    -  Primary    Relevant Medications    nirmatrelvir & ritonavir (Paxlovid, 300/100,) tablet therapy pack    Positive self-administered antigen test for COVID-19        Relevant Medications    nirmatrelvir & ritonavir (Paxlovid, 300/100,) tablet therapy pack          Upper Respiratory Tract Infection  -Patient presents with 1 day history of URI symptoms, cough, body aches, headache, stiff neck, burning sinuses.  -Noted worsening of her asthma requiring increased inhaler use - at baseline her asthma is well controlled and she uses it rarely.  -Home covid test last night was positive.  -Vaccinated x4 for covid, vaccinated for influenza.  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    Plan:  -Upper respiratory symptoms consistent with covid vs other viral URI. Given positive home Covid test and patient's history of asthma, patient may benefit from Paxlovid. Patient is agreeable and interested in trying the medication.  -Will prescribe Paxlovid therapy pack for 5 days.  -Instructed patient to hold her atorvastatin for 10 days while taking Paxlovid and resume after.  -Recommend self isolation for 5 days and masking in public.  -Work note written.  -Patient has follow up appointment scheduled in January.       Reason for visit is   Chief Complaint   Patient presents with    COVID-19     Covid + 12/20- sx started 12/20- sore throat, dry cough, body aches, sinus burning, swollen glands, headache- Needs work note    Virtual Regular Visit          Encounter provider Luis Puente MD    Provider located at [facility] [place]  [address]  [address]  [facility] [address]      Recent Visits  No visits were found meeting these conditions.  Showing recent visits within past 7 days and meeting all other requirements  Today's Visits  Date Type Provider Dept   12/21/23 Telemedicine Luis Puente MD [facility]   Showing today's visits and meeting all other requirements  Future Appointments  No visits were found meeting these conditions.  Showing future appointments within next 150 days and meeting all other requirements       The patient was identified by name and date of birth. Manuela Camacho was informed that this is a telemedicine visit and that the visit is being conducted through the Epic Embedded platform. She agrees to proceed..  My office door was closed. No one else was in the room.  She acknowledged consent and understanding of privacy and security of the video platform. The patient has agreed to participate and understands they can discontinue the visit at any time.    Patient is aware this is a billable service.     Subjective  Manuela Camacho is a 48 y.o. female     Patient is a 47 y/o female with PMH including CHACE, asthma, HTN, anxiety, HLD who presents today with a 1 day history of upper respiratory symptoms. Patient notes that her symptoms started yesterday as she felt a need to use her inhaler more. In the evening, she notes worsening respiratory symptoms including body aches, worsening cough, headache, sore throat, and neck stiffness. She also notes a feeling of burning in her sinuses. She took a home covid test that was positive. Normally, her asthma is well controlled and she only has to use her inhaler when sick. She has been taking over the counter cough medication. She has been vaccinated for covid x4 and received the influenza vaccine this year. 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         Past Medical History:    Diagnosis Date    Acute diffuse otitis externa of right ear 8/23/2021    Anemia     Anxiety     Asthma     controlled  seasonal    Gestational diabetes mellitus     Hyperlipidemia     Hypertension     Other chest pain 1/27/2020    PONV (postoperative nausea and vomiting)     Pregnancy     Resolved: 07/29/2015    Trigger finger     Resolved: 12/02/2016    Yeast infection     Resolved: 07/29/2015       Past Surgical History:   Procedure Laterality Date    COLONOSCOPY      COLPORRHAPHY N/A 6/20/2022    Procedure: ANTERIOR COLPORRHAPHY;  Surgeon: Ovi Nice MD;  [facility];  Service: UroGynecology           CYSTOSCOPY N/A 6/20/2022    Procedure: CYSTOSCOPY;  Surgeon: Ovi Nice MD;  [facility];  Service: UroGynecology           HERNIA REPAIR      INCISION TENDON SHEATH HAND Left     thumb    NV ARTHRS KNE SURG W/MENISCECTOMY MED/LAT W/SHVG Left 11/05/2021    Procedure: KNEE ARTHROSCOPIC MENISCECTOMY Medial;  Surgeon: De Newby MD;  [facility];  Service: Orthopedics    NV HYSTEROSCOPY BX ENDOMETRIUM&/POLYPC W/WO D&C N/A 10/14/2019    Procedure: DILATATION AND CURETTAGE (D&C) WITH HYSTEROSCOPY W/ NOVASURE;  Surgeon: Abril Dean DO;  [facility];  Service: Gynecology    NV HYSTEROSCOPY ENDOMETRIAL ABLATION N/A 10/14/2019    Procedure: ABLATION ENDOMETRIAL NOVASURE;  Surgeon: Abril Dean DO;  [facility];  Service: Gynecology    NV RPR UMBILICAL HRNA 5 YRS/> REDUCIBLE N/A 08/23/2018    Procedure: UMBILICAL HERNIA REPAIR WITH MESH;  Surgeon: Gonzalo Chambers MD;  [facility];  Service: General    PUBOVAGINAL SLING N/A 6/20/2022    Procedure: PUBOVAGINAL SLING;  Surgeon: Ovi Nice MD;  [facility];  Service: UroGynecology           WISDOM TOOTH EXTRACTION         Current Outpatient Medications   Medication Sig Dispense Refill    albuterol (Ventolin HFA) 90 mcg/act inhaler Inhale 1-2 puffs as needed for  wheezing 54 g 1    ascorbic acid (VITAMIN C) 500 MG tablet Take 1 tablet (500 mg total) by mouth 2 (two) times a day 60 tablet 1    atorvastatin (LIPITOR) 40 mg tablet Take 1 tablet (40 mg total) by mouth daily 90 tablet 0    Cholecalciferol (D3-1000) 25 MCG (1000 UT) capsule Take 1,000 Units by mouth daily      Cyanocobalamin (Vitamin B12) 1000 MCG TBCR Take by mouth in the morning      escitalopram (LEXAPRO) 10 mg tablet Take 1 tablet (10 mg total) by mouth daily at bedtime 90 tablet 1    ferrous sulfate 325 (65 Fe) mg tablet Take 325 mg by mouth daily with breakfast      fluticasone (FLONASE) 50 mcg/act nasal spray 1 spray into each nostril daily 1 Bottle 0    folic acid (FOLVITE) 1 mg tablet Take 1 tablet (1 mg total) by mouth daily 90 tablet 0    Lactobacillus (PROBIOTIC ACIDOPHILUS PO) Take by mouth in the morning      loratadine (CLARITIN) 10 mg tablet Take 10 mg by mouth daily at bedtime        losartan (Cozaar) 100 MG tablet Take 1 tablet (100 mg total) by mouth daily 90 tablet 1    metoprolol succinate (TOPROL-XL) 50 mg 24 hr tablet Take 1 tablet (50 mg total) by mouth daily at bedtime 90 tablet 1    Multiple Vitamin (multivitamin) tablet Take 1 tablet by mouth daily      nirmatrelvir & ritonavir (Paxlovid, 300/100,) tablet therapy pack Take 3 tablets by mouth 2 (two) times a day for 5 days Take 2 nirmatrelvir tablets + 1 ritonavir tablet together per dose 30 tablet 0    Omega-3 Fatty Acids (FISH OIL) 1,000 mg Take 2 capsules by mouth daily at bedtime        tirzepatide 15 MG/0.5ML Inject 0.5 mL (15 mg total) under the skin every 7 days 6 mL 1    zinc gluconate 50 mg tablet Take 50 mg by mouth daily      Multiple Vitamins-Minerals (multivitamin with minerals) tablet Take 1 tablet by mouth daily 30 tablet 1     No current facility-administered medications for this visit.        Allergies   Allergen Reactions    Hibiclens [Chlorhexidine Gluconate] Rash     Red with pimples    Pollen Extract Nasal Congestion        Review of Systems   Constitutional:  Positive for chills. Negative for fatigue and fever.   HENT:  Positive for sore throat. Negative for hearing loss.    Eyes:  Negative for visual disturbance.   Respiratory:  Positive for cough. Negative for shortness of breath.    Cardiovascular:  Negative for chest pain, palpitations and leg swelling.   Gastrointestinal:  Negative for abdominal distention, abdominal pain, constipation, diarrhea, nausea and vomiting.   Endocrine: Negative for polyuria.   Genitourinary:  Negative for hematuria.   Musculoskeletal:  Positive for myalgias. Negative for arthralgias.   Skin:  Negative for rash and wound.   Neurological:  Positive for headaches. Negative for dizziness and light-headedness.   Psychiatric/Behavioral:  The patient is not nervous/anxious.        Video Exam    Vitals:    12/21/23 0817   BP: 117/90   Pulse: 83   Temp: 98 °F (36.7 °C)   TempSrc: Temporal   Weight: 71.2 kg (157 lb)       Physical Exam  Constitutional:       Comments: Limited by: virtual visit. Patient appears comfortable not in acute distress.          Visit Time  Total Visit Duration: 27 minutes

## 2024-01-10 DIAGNOSIS — I10 BENIGN HYPERTENSION: ICD-10-CM

## 2024-01-11 RX ORDER — LOSARTAN POTASSIUM 100 MG/1
100 TABLET ORAL DAILY
Qty: 90 TABLET | Refills: 1 | Status: SHIPPED | OUTPATIENT
Start: 2024-01-11

## 2024-01-17 DIAGNOSIS — Z00.6 ENCOUNTER FOR EXAMINATION FOR NORMAL COMPARISON OR CONTROL IN CLINICAL RESEARCH PROGRAM: ICD-10-CM

## 2024-01-27 ENCOUNTER — APPOINTMENT (OUTPATIENT)
Dept: LAB | Facility: IMAGING CENTER | Age: 49
End: 2024-01-27
Payer: COMMERCIAL

## 2024-01-27 DIAGNOSIS — N92.6 IRREGULAR MENSES: ICD-10-CM

## 2024-01-27 DIAGNOSIS — Z00.6 ENCOUNTER FOR EXAMINATION FOR NORMAL COMPARISON OR CONTROL IN CLINICAL RESEARCH PROGRAM: ICD-10-CM

## 2024-01-27 LAB — ESTRADIOL SERPL-MCNC: 237.3 PG/ML

## 2024-01-27 PROCEDURE — 36415 COLL VENOUS BLD VENIPUNCTURE: CPT

## 2024-01-27 PROCEDURE — 82670 ASSAY OF TOTAL ESTRADIOL: CPT

## 2024-01-28 DIAGNOSIS — E78.1 HYPERTRIGLYCERIDEMIA: ICD-10-CM

## 2024-01-28 DIAGNOSIS — E78.00 HYPERCHOLESTEROLEMIA: ICD-10-CM

## 2024-01-29 ENCOUNTER — OFFICE VISIT (OUTPATIENT)
Dept: INTERNAL MEDICINE CLINIC | Age: 49
End: 2024-01-29
Payer: COMMERCIAL

## 2024-01-29 VITALS
SYSTOLIC BLOOD PRESSURE: 110 MMHG | HEIGHT: 63 IN | TEMPERATURE: 97.4 F | BODY MASS INDEX: 27.82 KG/M2 | OXYGEN SATURATION: 99 % | WEIGHT: 157 LBS | DIASTOLIC BLOOD PRESSURE: 72 MMHG | HEART RATE: 79 BPM

## 2024-01-29 DIAGNOSIS — G43.109 OCULAR MIGRAINE: ICD-10-CM

## 2024-01-29 DIAGNOSIS — E78.1 HYPERTRIGLYCERIDEMIA: ICD-10-CM

## 2024-01-29 DIAGNOSIS — I10 BENIGN HYPERTENSION: Primary | ICD-10-CM

## 2024-01-29 DIAGNOSIS — F41.1 GENERALIZED ANXIETY DISORDER: ICD-10-CM

## 2024-01-29 DIAGNOSIS — J45.40 MODERATE PERSISTENT EXTRINSIC ASTHMA WITHOUT COMPLICATION: ICD-10-CM

## 2024-01-29 DIAGNOSIS — G44.229 CHRONIC TENSION-TYPE HEADACHE, NOT INTRACTABLE: ICD-10-CM

## 2024-01-29 DIAGNOSIS — E78.2 MIXED DYSLIPIDEMIA: ICD-10-CM

## 2024-01-29 DIAGNOSIS — E78.00 HYPERCHOLESTEROLEMIA: ICD-10-CM

## 2024-01-29 DIAGNOSIS — R17 ELEVATED BILIRUBIN: ICD-10-CM

## 2024-01-29 PROCEDURE — 3074F SYST BP LT 130 MM HG: CPT | Performed by: INTERNAL MEDICINE

## 2024-01-29 PROCEDURE — 99214 OFFICE O/P EST MOD 30 MIN: CPT | Performed by: INTERNAL MEDICINE

## 2024-01-29 PROCEDURE — 3078F DIAST BP <80 MM HG: CPT | Performed by: INTERNAL MEDICINE

## 2024-01-29 RX ORDER — ESCITALOPRAM OXALATE 10 MG/1
10 TABLET ORAL
Qty: 90 TABLET | Refills: 1 | Status: SHIPPED | OUTPATIENT
Start: 2024-01-29

## 2024-01-29 RX ORDER — METOPROLOL SUCCINATE 50 MG/1
50 TABLET, EXTENDED RELEASE ORAL
Qty: 90 TABLET | Refills: 1 | Status: SHIPPED | OUTPATIENT
Start: 2024-01-29

## 2024-01-29 RX ORDER — ATORVASTATIN CALCIUM 40 MG/1
40 TABLET, FILM COATED ORAL DAILY
Qty: 90 TABLET | Refills: 0 | Status: SHIPPED | OUTPATIENT
Start: 2024-01-29

## 2024-01-29 RX ORDER — ALBUTEROL SULFATE 90 UG/1
1-2 AEROSOL, METERED RESPIRATORY (INHALATION) AS NEEDED
Qty: 54 G | Refills: 1 | Status: SHIPPED | OUTPATIENT
Start: 2024-01-29

## 2024-01-29 NOTE — PROGRESS NOTES
Assessment/Plan:    Essential hypertension  -Stable  -Losartan and metoprolol as well as a low-salt diet with exercise    Asthma  -Stable without acute exacerbation  -Continue with albuterol inhaler as needed    Hyperlipidemia  -Stable  -Continue atorvastatin and heart healthy diet with exercise    Generalized anxiety disorder  -Well-controlled  -Continue with escitalopram which we will refill today as requested    Elevated bilirubin  -Stable and likely secondary to Gilbert's syndrome  -Will continue to monitor patient    Recurrent headaches with ocular migraines  -Currently resolved  -Patient was counseled to drink at least 64 ounces of water daily and take a daily multivitamin  -Will refer patient to neurology     Diagnoses and all orders for this visit:    Benign hypertension  -     metoprolol succinate (TOPROL-XL) 50 mg 24 hr tablet; Take 1 tablet (50 mg total) by mouth daily at bedtime    Mixed dyslipidemia    Elevated bilirubin    Generalized anxiety disorder  -     escitalopram (LEXAPRO) 10 mg tablet; Take 1 tablet (10 mg total) by mouth daily at bedtime    Hypercholesterolemia    Moderate persistent extrinsic asthma without complication  -     albuterol (Ventolin HFA) 90 mcg/act inhaler; Inhale 1-2 puffs as needed for wheezing    Hypertriglyceridemia    Ocular migraine  -     Ambulatory Referral to Neurology; Future    Chronic tension-type headache, not intractable  -     Ambulatory Referral to Neurology; Future          Subjective:      Patient ID: Manuela Camacho is a 48 y.o. female.      Patient presents for a follow-up visit regarding her essential hypertension, dyslipidemia, generalized anxiety disorder, asthma.  She states that she has been taking her medications as prescribed.  Today pt states that she checks her bp at home sometimes and it is usually good   Mood and anxiety are good  Needs refillls of her medications  Asthma is very well-controlled.  Usually acts up in spring with her allergies or is  triggered if she gets sick   Can go for weeks and months wtihout needing to use her rescue inhaler   She complains that she had an ocular migraine   Looked like a prism on the left lateral  visiual field that lasted for about 30 minutes and then resolved.  The second time it first looked like she had looked into a bright light and then looked like a prism with a border around it   The second time, it also lasted about half an hour and then resolved on its own   The first time she had a headache afterward and the second time she did not have any headache.  The last time this occurred was about 2 months ago   Saw ophthalmololgy and they told her it was an ocular migraine but they did not suggest any treatment.  Drinking about 50 oz of water daily and drinks about 20 of coffee daily  She admits that she has recurrent headaches but does not really think she has ever had a migraine headache.  Headaches are usually in the perimenstrual period.    Hypertension  This is a chronic problem. The current episode started more than 1 year ago. The problem has been gradually improving since onset. The problem is controlled. Associated symptoms include anxiety and headaches (Occasional headaches). Pertinent negatives include no chest pain, palpitations or shortness of breath. Risk factors for coronary artery disease include dyslipidemia, family history and obesity.           The following portions of the patient's history were reviewed and updated as appropriate: She  has a past medical history of Acute diffuse otitis externa of right ear (8/23/2021), Anemia, Anxiety, Asthma, Gestational diabetes mellitus, Hyperlipidemia, Hypertension, Other chest pain (1/27/2020), PONV (postoperative nausea and vomiting), Pregnancy, Trigger finger, and Yeast infection.  She   Patient Active Problem List    Diagnosis Date Noted   • Mixed dyslipidemia 10/04/2023   • Family history of premature CAD 10/04/2023   • Primary osteoarthritis of left knee  04/03/2023   • Chronic pain of left knee 04/03/2023   • CHACE (obstructive sleep apnea)    • Prediabetes 09/21/2022   • Family history of diabetes mellitus 06/29/2022   • Obesity (BMI 35.0-39.9 without comorbidity)    • Elevated bilirubin 10/05/2020   • Menorrhagia with regular cycle 09/23/2019   • Hypertriglyceridemia 09/07/2017   • Benign hypertension 03/08/2016   • Dysphagia 09/02/2015   • Extrinsic asthma 11/13/2013   • Generalized anxiety disorder 11/13/2013   • Hypercholesterolemia 11/13/2013     She  has a past surgical history that includes [place] tooth extraction; pr rpr umbilical hrna 5 yrs/> reducible (N/A, 08/23/2018); Hernia repair; pr hysteroscopy bx endometrium&/polypc w/wo d&c (N/A, 10/14/2019); pr hysteroscopy endometrial ablation (N/A, 10/14/2019); Incision tendon sheath hand (Left); pr arthrs kne surg w/meniscectomy med/lat w/shvg (Left, 11/05/2021); Colonoscopy; Pubovaginal sling (N/A, 6/20/2022); CYSTOSCOPY (N/A, 6/20/2022); and Colporrhaphy (N/A, 6/20/2022).  Her family history includes Diabetes type II in her mother; Endometrial cancer (age of onset: 46) in her mother; Heart attack in her maternal grandfather and sister; Hyperlipidemia in her father and mother; Hypertension in her father and mother; No Known Problems in her brother, daughter, maternal grandmother, paternal aunt, paternal grandmother, and son; Other in her maternal grandfather; Stroke in her paternal grandfather.  She  reports that she has never smoked. She has never used smokeless tobacco. She reports current alcohol use of about 2.0 standard drinks of alcohol per week. She reports that she does not use drugs.  Current Outpatient Medications   Medication Sig Dispense Refill   • albuterol (Ventolin HFA) 90 mcg/act inhaler Inhale 1-2 puffs as needed for wheezing 54 g 1   • ascorbic acid (VITAMIN C) 500 MG tablet Take 1 tablet (500 mg total) by mouth 2 (two) times a day 60 tablet 1   • atorvastatin (LIPITOR) 40 mg tablet Take 1  tablet (40 mg total) by mouth daily 90 tablet 0   • Cholecalciferol (D3-1000) 25 MCG (1000 UT) capsule Take 1,000 Units by mouth daily     • Cyanocobalamin (Vitamin B12) 1000 MCG TBCR Take by mouth in the morning     • escitalopram (LEXAPRO) 10 mg tablet Take 1 tablet (10 mg total) by mouth daily at bedtime 90 tablet 1   • ferrous sulfate 325 (65 Fe) mg tablet Take 325 mg by mouth daily with breakfast     • fluticasone (FLONASE) 50 mcg/act nasal spray 1 spray into each nostril daily 1 Bottle 0   • folic acid (FOLVITE) 1 mg tablet Take 1 tablet (1 mg total) by mouth daily 90 tablet 0   • Lactobacillus (PROBIOTIC ACIDOPHILUS PO) Take by mouth in the morning     • loratadine (CLARITIN) 10 mg tablet Take 10 mg by mouth daily at bedtime       • losartan (Cozaar) 100 MG tablet Take 1 tablet (100 mg total) by mouth daily 90 tablet 1   • metoprolol succinate (TOPROL-XL) 50 mg 24 hr tablet Take 1 tablet (50 mg total) by mouth daily at bedtime 90 tablet 1   • Multiple Vitamin (multivitamin) tablet Take 1 tablet by mouth daily     • Omega-3 Fatty Acids (FISH OIL) 1,000 mg Take 2 capsules by mouth daily at bedtime       • tirzepatide 15 MG/0.5ML Inject 0.5 mL (15 mg total) under the skin every 7 days 6 mL 1   • zinc gluconate 50 mg tablet Take 50 mg by mouth daily       No current facility-administered medications for this visit.     Current Outpatient Medications on File Prior to Visit   Medication Sig   • ascorbic acid (VITAMIN C) 500 MG tablet Take 1 tablet (500 mg total) by mouth 2 (two) times a day   • atorvastatin (LIPITOR) 40 mg tablet Take 1 tablet (40 mg total) by mouth daily   • Cholecalciferol (D3-1000) 25 MCG (1000 UT) capsule Take 1,000 Units by mouth daily   • Cyanocobalamin (Vitamin B12) 1000 MCG TBCR Take by mouth in the morning   • ferrous sulfate 325 (65 Fe) mg tablet Take 325 mg by mouth daily with breakfast   • fluticasone (FLONASE) 50 mcg/act nasal spray 1 spray into each nostril daily   • folic acid  (FOLVITE) 1 mg tablet Take 1 tablet (1 mg total) by mouth daily   • Lactobacillus (PROBIOTIC ACIDOPHILUS PO) Take by mouth in the morning   • loratadine (CLARITIN) 10 mg tablet Take 10 mg by mouth daily at bedtime     • losartan (Cozaar) 100 MG tablet Take 1 tablet (100 mg total) by mouth daily   • Multiple Vitamin (multivitamin) tablet Take 1 tablet by mouth daily   • Omega-3 Fatty Acids (FISH OIL) 1,000 mg Take 2 capsules by mouth daily at bedtime     • tirzepatide 15 MG/0.5ML Inject 0.5 mL (15 mg total) under the skin every 7 days   • zinc gluconate 50 mg tablet Take 50 mg by mouth daily   • [DISCONTINUED] albuterol (Ventolin HFA) 90 mcg/act inhaler Inhale 1-2 puffs as needed for wheezing   • [DISCONTINUED] escitalopram (LEXAPRO) 10 mg tablet Take 1 tablet (10 mg total) by mouth daily at bedtime   • [DISCONTINUED] metoprolol succinate (TOPROL-XL) 50 mg 24 hr tablet Take 1 tablet (50 mg total) by mouth daily at bedtime   • [DISCONTINUED] Multiple Vitamins-Minerals (multivitamin with minerals) tablet Take 1 tablet by mouth daily     No current facility-administered medications on file prior to visit.     She is allergic to hibiclens [chlorhexidine gluconate] and pollen extract..    Review of Systems   Constitutional:  Negative for activity change, chills, fatigue, fever and unexpected weight change.   HENT:  Negative for ear pain, postnasal drip, rhinorrhea, sinus pressure and sore throat.    Eyes:  Positive for visual disturbance (Ocular migraine, last episode was 2-months ago). Negative for pain.   Respiratory:  Negative for cough, choking, chest tightness, shortness of breath and wheezing.    Cardiovascular:  Negative for chest pain, palpitations and leg swelling.   Gastrointestinal:  Negative for abdominal pain, constipation, diarrhea, nausea and vomiting.   Genitourinary:  Negative for dysuria and hematuria.   Musculoskeletal:  Negative for arthralgias, back pain, gait problem, joint swelling, myalgias and  "neck stiffness.   Skin:  Negative for pallor and rash.   Neurological:  Positive for headaches (Occasional headaches). Negative for dizziness, tremors, seizures, syncope and light-headedness.   Hematological:  Negative for adenopathy.   Psychiatric/Behavioral:  Negative for behavioral problems, dysphoric mood and sleep disturbance. The patient is not nervous/anxious.          Objective:      /72 (BP Location: Left arm, Patient Position: Sitting, Cuff Size: Standard)   Pulse 79   Temp (!) 97.4 °F (36.3 °C) (Temporal)   Ht 5' 3\" (1.6 m)   Wt 71.2 kg (157 lb)   SpO2 99% Comment: ROOM AIR  BMI 27.81 kg/m²          Physical Exam  Constitutional:       General: She is not in acute distress.     Appearance: She is well-developed. She is not diaphoretic.   HENT:      Head: Normocephalic and atraumatic.      Right Ear: External ear normal.      Left Ear: External ear normal.      Nose: Nose normal.      Mouth/Throat:      Pharynx: Posterior oropharyngeal erythema (mild) present. No oropharyngeal exudate.   Eyes:      General: No scleral icterus.        Right eye: No discharge.         Left eye: No discharge.      Conjunctiva/sclera: Conjunctivae normal.      Pupils: Pupils are equal, round, and reactive to light.   Neck:      Thyroid: No thyromegaly.      Vascular: No JVD.      Trachea: No tracheal deviation.   Cardiovascular:      Rate and Rhythm: Normal rate and regular rhythm.      Heart sounds: Normal heart sounds. No murmur heard.     No friction rub. No gallop.   Pulmonary:      Effort: Pulmonary effort is normal. No respiratory distress.      Breath sounds: Normal breath sounds. No wheezing or rales.   Chest:      Chest wall: No tenderness.   Abdominal:      General: Bowel sounds are normal. There is no distension.      Palpations: Abdomen is soft. There is no mass.      Tenderness: There is no abdominal tenderness. There is no guarding or rebound.   Musculoskeletal:         General: No tenderness or " deformity. Normal range of motion.      Cervical back: Normal range of motion and neck supple.   Lymphadenopathy:      Cervical: No cervical adenopathy.   Skin:     General: Skin is warm and dry.      Coloration: Skin is not pale.      Findings: No erythema or rash.   Neurological:      Mental Status: She is alert and oriented to person, place, and time.      Cranial Nerves: No cranial nerve deficit.      Motor: No abnormal muscle tone.      Coordination: Coordination normal.      Deep Tendon Reflexes: Reflexes are normal and symmetric.   Psychiatric:         Behavior: Behavior normal.           Appointment on 01/27/2024   Component Date Value Ref Range Status   • Estradiol 01/27/2024 237.3  See Comment pg/mL Final    ESTRADIOL:    Non preg Female:      Early Foll. (-10 to-14d from LH Peak)    20.0-157.0 pg/mL      Mid Foll (-9 to-4d from LH Peak)         20.7-139.0 pg/mL      Ovulatory Peak (LH Peak)                 19.3-622.0 pg/mL       Mid Luteal (+4 to +11 from LH Peak)      25.8-279.0 pg/mL      Post Jamila(NHT)                           0-30.0     pg/mL      Note: Normal ranges may not apply to patients who are transgender, non-binary, or whose legal sex, sex at birth, and gender identity differ.          Office Visit on 10/27/2023   Component Date Value Ref Range Status   •  RAPID STREP A 10/27/2023 Negative  Negative Final   Hospital Outpatient Visit on 10/23/2023   Component Date Value Ref Range Status   • Baseline HR 10/23/2023 84  bpm Final   • Baseline BP 10/23/2023 126/84  mmHg Final   • O2 sat rest 10/23/2023 98  % Final   • Stress peak HR 10/23/2023 145  bpm Final   • Post peak BP 10/23/2023 158  mmHg Final   • O2 sat peak 10/23/2023 98  % Final   • Recovery HR 10/23/2023 102  bpm Final   • Recovery BP 10/23/2023 124/80  mmHg Final   • O2 sat recovery 10/23/2023 98  % Final   • Max HR 10/23/2023 162  bpm Final   • Max HR Percent 10/23/2023 94  % Final   • Exercise duration (min) 10/23/2023 10  min  Final   • Exercise duration (sec) 10/23/2023 15  sec Final   • Estimated workload 10/23/2023 13.4  METS Final   • Rate Pressure Product 10/23/2023 22,910.0   Final   • Angina Index 10/23/2023 0   Final   • Stress Stage Reached 10/23/2023 4.0   Final   • Protocol Name 10/23/2023 ROSA   Final   • Time In Exercise Phase 10/23/2023 00:10:16   Final   • MAX. SYSTOLIC BP 10/23/2023 158  mmHg Final   • Max Diastolic Bp 10/23/2023 72  mmHg Final   • Max Heart Rate 10/23/2023 162  BPM Final   • Max Predicted Heart Rate 10/23/2023 171  BPM Final   • Reason for Termination 10/23/2023 Target Heart Rate Achieved   Final   • Test Indication 10/23/2023 Screening for CAD   Final   • Target Hr Formular 10/23/2023 (220 - Age)*100%   Final   • Chest Pain Statement 10/23/2023 none   Final   Orders Only on 10/19/2023   Component Date Value Ref Range Status   • Right Eye Diabetic Retinopathy 10/19/2023 None   Final   • Left Eye Diabetic Retinopathy 10/19/2023 None   Final   Appointment on 09/16/2023   Component Date Value Ref Range Status   • Total Bilirubin 09/16/2023 1.65 (H)  0.20 - 1.00 mg/dL Final    Use of this assay is not recommended for patients undergoing treatment with eltrombopag due to the potential for falsely elevated results.  N-acetyl-p-benzoquinone imine (metabolite of Acetaminophen) will generate erroneously low results in samples for patients that have taken an overdose of Acetaminophen.   • Bilirubin, Direct 09/16/2023 0.25 (H)  0.00 - 0.20 mg/dL Final   • Alkaline Phosphatase 09/16/2023 57  34 - 104 U/L Final   • AST 09/16/2023 19  13 - 39 U/L Final   • ALT 09/16/2023 24  7 - 52 U/L Final    Specimen collection should occur prior to Sulfasalazine administration due to the potential for falsely depressed results.    • Total Protein 09/16/2023 7.2  6.4 - 8.4 g/dL Final   • Albumin 09/16/2023 4.4  3.5 - 5.0 g/dL Final   Appointment on 07/08/2023   Component Date Value Ref Range Status   • Cholesterol 07/08/2023 151   See Comment mg/dL Final    Cholesterol:         Pediatric <18 Years        Desirable          <170 mg/dL      Borderline High    170-199 mg/dL      High               >=200 mg/dL        Adult >=18 Years            Desirable         <200 mg/dL      Borderline High   200-239 mg/dL      High              >239 mg/dL     • Triglycerides 07/08/2023 117  See Comment mg/dL Final    Triglyceride:     0-9Y            <75mg/dL     10Y-17Y         <90 mg/dL       >=18Y     Normal          <150 mg/dL     Borderline High 150-199 mg/dL     High            200-499 mg/dL        Very High       >499 mg/dL    Specimen collection should occur prior to N-Acetylcysteine or Metamizole administration due to the potential for falsely depressed results.   • HDL, Direct 07/08/2023 54  >=50 mg/dL Final    Specimen collection should occur prior to Metamizole administration due to the potential for falsley depressed results.   • LDL Calculated 07/08/2023 74  0 - 100 mg/dL Final    Unable to calculate  LDL Cholesterol:     Optimal           <100 mg/dl     Near Optimal      100-129 mg/dl     Above Optimal       Borderline High 130-159 mg/dl       High            160-189 mg/dl       Very High       >189 mg/dl         This screening LDL is a calculated result.   It does not have the accuracy of the Direct Measured LDL in the monitoring of patients with hyperlipidemia and/or statin therapy.   Direct Measure LDL (VYN912) must be ordered separately in these patients.   • Non-HDL-Chol (CHOL-HDL) 07/08/2023 97  mg/dl Final   • Sodium 07/08/2023 139  135 - 147 mmol/L Final   • Potassium 07/08/2023 4.2  3.5 - 5.3 mmol/L Final   • Chloride 07/08/2023 110 (H)  96 - 108 mmol/L Final   • CO2 07/08/2023 23  21 - 32 mmol/L Final   • ANION GAP 07/08/2023 6  mmol/L Final   • BUN 07/08/2023 16  5 - 25 mg/dL Final   • Creatinine 07/08/2023 0.70  0.60 - 1.30 mg/dL Final    Standardized to IDMS reference method   • Glucose, Fasting 07/08/2023 97  65 - 99 mg/dL Final     Specimen collection should occur prior to Sulfasalazine administration due to the potential for falsely depressed results. Specimen collection should occur prior to Sulfapyridine administration due to the potential for falsely elevated results.   • Calcium 07/08/2023 9.1  8.3 - 10.1 mg/dL Final   • AST 07/08/2023 17  5 - 45 U/L Final    Specimen collection should occur prior to Sulfasalazine administration due to the potential for falsely depressed results.    • ALT 07/08/2023 30  12 - 78 U/L Final    Specimen collection should occur prior to Sulfasalazine and/or Sulfapyridine administration due to the potential for falsely depressed results.    • Alkaline Phosphatase 07/08/2023 63  46 - 116 U/L Final   • Total Protein 07/08/2023 7.4  6.4 - 8.4 g/dL Final   • Albumin 07/08/2023 4.0  3.5 - 5.0 g/dL Final   • Total Bilirubin 07/08/2023 1.47 (H)  0.20 - 1.00 mg/dL Final    Use of this assay is not recommended for patients undergoing treatment with eltrombopag due to the potential for falsely elevated results.   • eGFR 07/08/2023 102  ml/min/1.73sq m Final   • WBC 07/08/2023 6.71  4.31 - 10.16 Thousand/uL Final   • RBC 07/08/2023 4.78  3.81 - 5.12 Million/uL Final   • Hemoglobin 07/08/2023 13.6  11.5 - 15.4 g/dL Final   • Hematocrit 07/08/2023 41.6  34.8 - 46.1 % Final   • MCV 07/08/2023 87  82 - 98 fL Final   • MCH 07/08/2023 28.5  26.8 - 34.3 pg Final   • MCHC 07/08/2023 32.7  31.4 - 37.4 g/dL Final   • RDW 07/08/2023 13.9  11.6 - 15.1 % Final   • MPV 07/08/2023 11.4  8.9 - 12.7 fL Final   • Platelets 07/08/2023 246  149 - 390 Thousands/uL Final   • nRBC 07/08/2023 0  /100 WBCs Final   • Neutrophils Relative 07/08/2023 55  43 - 75 % Final   • Immat GRANS % 07/08/2023 0  0 - 2 % Final   • Lymphocytes Relative 07/08/2023 31  14 - 44 % Final   • Monocytes Relative 07/08/2023 8  4 - 12 % Final   • Eosinophils Relative 07/08/2023 5  0 - 6 % Final   • Basophils Relative 07/08/2023 1  0 - 1 % Final   • Neutrophils  Absolute 07/08/2023 3.74  1.85 - 7.62 Thousands/µL Final   • Immature Grans Absolute 07/08/2023 0.01  0.00 - 0.20 Thousand/uL Final   • Lymphocytes Absolute 07/08/2023 2.06  0.60 - 4.47 Thousands/µL Final   • Monocytes Absolute 07/08/2023 0.53  0.17 - 1.22 Thousand/µL Final   • Eosinophils Absolute 07/08/2023 0.32  0.00 - 0.61 Thousand/µL Final   • Basophils Absolute 07/08/2023 0.05  0.00 - 0.10 Thousands/µL Final   Appointment on 05/06/2023   Component Date Value Ref Range Status   • HIV-1 p24 Antigen 05/06/2023 Non-Reactive  Non-Reactive Final   • HIV-1 Antibody 05/06/2023 Non-Reactive  Non-Reactive Final   • HIV-2 Antibody 05/06/2023 Non-Reactive  Non-Reactive Final   • HIV Ag-Ab 5th Gen 05/06/2023 Non-Reactive  Non-Reactive Final    A Non-Reactive test result does not preclude the possibility of exposure or infection with HIV-1 and/or HIV-2.  Non-Reactive results can occur if the quantity of marker present is below the detection limits or is not present during the stage of disease in which a sample is collected. Repeat testing should be considered where there is clinical suspicion of infection.      • Glucose, Fasting 05/06/2023 103 (H)  65 - 99 mg/dL Final    Specimen collection should occur prior to Sulfasalazine administration due to the potential for falsely depressed results. Specimen collection should occur prior to Sulfapyridine administration due to the potential for falsely elevated results.   • Hemoglobin A1C 05/06/2023 5.2  Normal 3.8-5.6%; PreDiabetic 5.7-6.4%; Diabetic >=6.5%; Glycemic control for adults with diabetes <7.0% % Final   • EAG 05/06/2023 103  mg/dl Final

## 2024-01-30 ENCOUNTER — TELEPHONE (OUTPATIENT)
Dept: OBGYN CLINIC | Facility: CLINIC | Age: 49
End: 2024-01-30

## 2024-01-30 NOTE — TELEPHONE ENCOUNTER
----- Message from Abril Dean, DO sent at 1/29/2024 12:46 PM EST -----  Please see my last note.  I gave the patient a lab slip for FSH and estradiol.  I do not see the FSH results.  Please call lab.  Thank you

## 2024-01-30 NOTE — TELEPHONE ENCOUNTER
Lm patient's as re: lab results - patient was to have FSH & estradiol (ordered 11/7/2023).  Spoke with [facility] ([place]) - patient did not bring paper copy lab order to lab & lab only put in order for estradiol & missed lab order for FSH.  Patient still needs to have FSH done/KA.

## 2024-01-30 NOTE — TELEPHONE ENCOUNTER
Called [facility] ([place]) - FSH was ordered 1/27/2024, FSH was ordered 11/7/2023.  Lab did not do FSH & specimen now discarded.

## 2024-01-31 NOTE — TELEPHONE ENCOUNTER
Patient informed lab did not do FSH as ordered in 11/2023.  Lab order mailed to patient to have done.

## 2024-02-02 ENCOUNTER — TELEPHONE (OUTPATIENT)
Dept: NEUROLOGY | Facility: CLINIC | Age: 49
End: 2024-02-02

## 2024-02-06 ENCOUNTER — OFFICE VISIT (OUTPATIENT)
Dept: OBGYN CLINIC | Facility: MEDICAL CENTER | Age: 49
End: 2024-02-06
Payer: COMMERCIAL

## 2024-02-06 VITALS
HEART RATE: 75 BPM | SYSTOLIC BLOOD PRESSURE: 114 MMHG | HEIGHT: 63 IN | WEIGHT: 156.2 LBS | DIASTOLIC BLOOD PRESSURE: 76 MMHG | BODY MASS INDEX: 27.68 KG/M2

## 2024-02-06 DIAGNOSIS — M17.12 PRIMARY OSTEOARTHRITIS OF LEFT KNEE: Primary | ICD-10-CM

## 2024-02-06 DIAGNOSIS — M25.562 CHRONIC PAIN OF LEFT KNEE: ICD-10-CM

## 2024-02-06 DIAGNOSIS — G89.29 CHRONIC PAIN OF LEFT KNEE: ICD-10-CM

## 2024-02-06 PROCEDURE — 99213 OFFICE O/P EST LOW 20 MIN: CPT | Performed by: STUDENT IN AN ORGANIZED HEALTH CARE EDUCATION/TRAINING PROGRAM

## 2024-02-06 PROCEDURE — 20610 DRAIN/INJ JOINT/BURSA W/O US: CPT | Performed by: STUDENT IN AN ORGANIZED HEALTH CARE EDUCATION/TRAINING PROGRAM

## 2024-02-06 RX ADMIN — TRIAMCINOLONE ACETONIDE 40 MG: 40 INJECTION, SUSPENSION INTRA-ARTICULAR; INTRAMUSCULAR at 15:15

## 2024-02-06 RX ADMIN — LIDOCAINE HYDROCHLORIDE 4 ML: 10 INJECTION, SOLUTION INFILTRATION; PERINEURAL at 15:15

## 2024-02-06 NOTE — PROGRESS NOTES
Knee New Office Note    Assessment:     1. Primary osteoarthritis of left knee    2. Chronic pain of left knee        Plan:     Problem List Items Addressed This Visit          Musculoskeletal and Integument    Primary osteoarthritis of left knee - Primary       Other    Chronic pain of left knee      Findings today are consistent with left knee osteoarthritis. Imaging and prognosis was reviewed with the patient today. Discussed treatment options including continued observation, low impact exercises, bracing, anti-inflammatories, physical therapy, cortisone injection, visco injection, versus surgical intervention. Cortisone steroid injection was administered today. Patient should avoid strenuous activities for the next 1-2 days. Patient should avoid vaccines for the next 2 weeks if possible. Can apply cold compress for soreness. Proceed with surgical intervention for left total knee arthroplasty as scheduled on 05/09/2024.    Subjective:     Patient ID: Manuela Camacho is a 48 y.o. female.  Chief Complaint:  HPI:  48 y.o. female presents to the office for follow up of left knee osteoarthritis. She continues to experience anterior and medial knee pain made worse with activities including stairs. She will be going on vacation to [facility] and would like a cortisone injection to the left knee today. She will also proceed with surgery for left total knee osteoarthritis as scheduled for 05/09/2024.    Allergy:  Allergies   Allergen Reactions    Hibiclens [Chlorhexidine Gluconate] Rash     Red with pimples    Pollen Extract Nasal Congestion     Medications:  all current active meds have been reviewed  Past Medical History:  Past Medical History:   Diagnosis Date    Acute diffuse otitis externa of right ear 8/23/2021    Anemia     Anxiety     Asthma     controlled  seasonal    Gestational diabetes mellitus     Hyperlipidemia     Hypertension     Other chest pain 1/27/2020    PONV (postoperative nausea and vomiting)     Pregnancy      Resolved: 07/29/2015    Trigger finger     Resolved: 12/02/2016    Yeast infection     Resolved: 07/29/2015     Past Surgical History:  Past Surgical History:   Procedure Laterality Date    COLONOSCOPY      COLPORRHAPHY N/A 6/20/2022    Procedure: ANTERIOR COLPORRHAPHY;  Surgeon: Ovi Nice MD;  [facility];  Service: UroGynecology           CYSTOSCOPY N/A 6/20/2022    Procedure: CYSTOSCOPY;  Surgeon: Ovi Nice MD;  [facility];  Service: UroGynecology           HERNIA REPAIR      INCISION TENDON SHEATH HAND Left     thumb    KY ARTHRS KNE SURG W/MENISCECTOMY MED/LAT W/SHVG Left 11/05/2021    Procedure: KNEE ARTHROSCOPIC MENISCECTOMY Medial;  Surgeon: De Newby MD;  [facility];  Service: Orthopedics    KY HYSTEROSCOPY BX ENDOMETRIUM&/POLYPC W/WO D&C N/A 10/14/2019    Procedure: DILATATION AND CURETTAGE (D&C) WITH HYSTEROSCOPY W/ NOVASURE;  Surgeon: Abril Dean DO;  [facility];  Service: Gynecology    KY HYSTEROSCOPY ENDOMETRIAL ABLATION N/A 10/14/2019    Procedure: ABLATION ENDOMETRIAL NOVASURE;  Surgeon: Abril Dean DO;  [facility];  Service: Gynecology    KY RPR UMBILICAL HRNA 5 YRS/> REDUCIBLE N/A 08/23/2018    Procedure: UMBILICAL HERNIA REPAIR WITH MESH;  Surgeon: Gonzalo Chambers MD;  [facility];  Service: General    PUBOVAGINAL SLING N/A 6/20/2022    Procedure: PUBOVAGINAL SLING;  Surgeon: Ovi Nice MD;  [facility];  Service: UroGynecology           WISDOM TOOTH EXTRACTION       Family History:  Family History   Problem Relation Age of Onset    Hypertension Mother     Hyperlipidemia Mother     Endometrial cancer Mother 46    Diabetes type II Mother     Hyperlipidemia Father     Hypertension Father     Heart attack Sister     No Known Problems Daughter     No Known Problems Maternal Grandmother     Heart attack Maternal Grandfather     Other Maternal Grandfather         Myocardial  "infarction arrhythmias    No Known Problems Paternal Grandmother     Stroke Paternal Grandfather         Syndrome    No Known Problems Brother     No Known Problems Son     No Known Problems Paternal Aunt      Social History:  Social History     Substance and Sexual Activity   Alcohol Use Yes    Alcohol/week: 2.0 standard drinks of alcohol    Types: 2 Glasses of wine per week    Comment: 1-2 drinks per week on average typically consumes beer     Social History     Substance and Sexual Activity   Drug Use No     Social History     Tobacco Use   Smoking Status Never   Smokeless Tobacco Never           ROS:  General: Per HPI  Skin: Negative, except if noted below  HEENT: Negative  Respiratory: Negative  Cardiovascular: Negative  Gastrointestinal: Negative  Urinary: Negative  Vascular: Negative  Musculoskeletal: Positive per HPI   Neurologic: Positive per HPI  Endocrine: Negative    Objective:  BP Readings from Last 1 Encounters:   02/06/24 114/76      Wt Readings from Last 1 Encounters:   02/06/24 70.9 kg (156 lb 3.2 oz)        Respiratory:   non-labored respirations    Lymphatics:  no palpable lymph nodes    Gait:   antalgic    Neurologic:   Alert and oriented times 3  Patient with normal sensation except as noted below  Deep tendon reflexes 2+ except as noted in MSK exam    Bilateral Lower Extremity:    Left Knee:      Inspection:  Skin is intact without erythema or ecchymosis    Overall limb alignment Varus    Effusion: Trace, +soft tissue swelling    ROM 3-120 with pain    Extensor Lag: None    Palpation: Medial Joint line tenderness to palpation    AP Stability at 90 deg Stable    M/L stability in full extension Stable    M/L stability in midflexion Stable    Motor: 5/5 Q/HS/TA/GS/P    Pulses: 2+ DP / 2+ PT    SILT DP/SP/S/S/TN    Imaging:  No new imaging obtained today    BMI:   Estimated body mass index is 27.67 kg/m² as calculated from the following:    Height as of this encounter: 5' 3\" (1.6 m).    Weight as " "of this encounter: 70.9 kg (156 lb 3.2 oz).  BSA:   Estimated body surface area is 1.74 meters squared as calculated from the following:    Height as of this encounter: 5' 3\" (1.6 m).    Weight as of this encounter: 70.9 kg (156 lb 3.2 oz).         Large joint arthrocentesis: L knee  Universal Protocol:  Consent: Verbal consent obtained.  Risks and benefits: risks, benefits and alternatives were discussed  Consent given by: patient  Time out: Immediately prior to procedure a \"time out\" was called to verify the correct patient, procedure, equipment, support staff and site/side marked as required.  Timeout called at: 2/6/2024 3:23 PM.  Patient understanding: patient states understanding of the procedure being performed  Site marked: the operative site was marked  Required items: required blood products, implants, devices, and special equipment available  Patient identity confirmed: verbally with patient  Supporting Documentation  Indications: pain   Procedure Details  Location: knee - L knee  Preparation: Patient was prepped and draped in the usual sterile fashion  Needle size: 22 G  Ultrasound guidance: no  Approach: anterolateral  Medications administered: 4 mL lidocaine 1 %; 40 mg triamcinolone acetonide 40 mg/mL    Patient tolerance: patient tolerated the procedure well with no immediate complications  Dressing:  Sterile dressing applied          Scribe Attestation      I,:  Zara Villalobos am acting as a scribe while in the presence of the attending physician.:       I,:  De Napier DO personally performed the services described in this documentation    as scribed in my presence.:              "

## 2024-02-07 RX ORDER — LIDOCAINE HYDROCHLORIDE 10 MG/ML
4 INJECTION, SOLUTION INFILTRATION; PERINEURAL
Status: COMPLETED | OUTPATIENT
Start: 2024-02-06 | End: 2024-02-06

## 2024-02-07 RX ORDER — TRIAMCINOLONE ACETONIDE 40 MG/ML
40 INJECTION, SUSPENSION INTRA-ARTICULAR; INTRAMUSCULAR
Status: COMPLETED | OUTPATIENT
Start: 2024-02-06 | End: 2024-02-06

## 2024-02-20 DIAGNOSIS — E78.1 HYPERTRIGLYCERIDEMIA: ICD-10-CM

## 2024-02-20 DIAGNOSIS — E78.00 HYPERCHOLESTEROLEMIA: ICD-10-CM

## 2024-02-20 DIAGNOSIS — M17.12 PRIMARY OSTEOARTHRITIS OF LEFT KNEE: ICD-10-CM

## 2024-02-20 DIAGNOSIS — G89.29 CHRONIC PAIN OF LEFT KNEE: ICD-10-CM

## 2024-02-20 DIAGNOSIS — M25.562 CHRONIC PAIN OF LEFT KNEE: ICD-10-CM

## 2024-02-20 RX ORDER — FOLIC ACID 1 MG/1
1000 TABLET ORAL DAILY
Qty: 90 TABLET | Refills: 3 | Status: SHIPPED | OUTPATIENT
Start: 2024-02-20

## 2024-02-21 RX ORDER — ATORVASTATIN CALCIUM 20 MG/1
20 TABLET, FILM COATED ORAL DAILY
Qty: 90 TABLET | Refills: 3 | Status: SHIPPED | OUTPATIENT
Start: 2024-02-21

## 2024-02-22 LAB
APOB+LDLR+PCSK9 GENE MUT ANL BLD/T: NOT DETECTED
BRCA1+BRCA2 DEL+DUP + FULL MUT ANL BLD/T: NOT DETECTED
MLH1+MSH2+MSH6+PMS2 GN DEL+DUP+FUL M: NOT DETECTED

## 2024-02-29 ENCOUNTER — CONSULT (OUTPATIENT)
Dept: NEUROLOGY | Facility: CLINIC | Age: 49
End: 2024-02-29
Payer: COMMERCIAL

## 2024-02-29 VITALS
DIASTOLIC BLOOD PRESSURE: 80 MMHG | HEIGHT: 63 IN | SYSTOLIC BLOOD PRESSURE: 110 MMHG | WEIGHT: 156 LBS | BODY MASS INDEX: 27.64 KG/M2 | HEART RATE: 93 BPM

## 2024-02-29 DIAGNOSIS — G43.109 OCULAR MIGRAINE: Primary | ICD-10-CM

## 2024-02-29 DIAGNOSIS — R51.0 POSITIONAL HEADACHE: ICD-10-CM

## 2024-02-29 DIAGNOSIS — G44.229 CHRONIC TENSION-TYPE HEADACHE, NOT INTRACTABLE: ICD-10-CM

## 2024-02-29 PROCEDURE — 99205 OFFICE O/P NEW HI 60 MIN: CPT | Performed by: PSYCHIATRY & NEUROLOGY

## 2024-02-29 RX ORDER — RIZATRIPTAN BENZOATE 10 MG/1
10 TABLET ORAL AS NEEDED
Qty: 18 TABLET | Refills: 3 | Status: SHIPPED | OUTPATIENT
Start: 2024-02-29
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
## 2024-03-08 ENCOUNTER — HOSPITAL ENCOUNTER (OUTPATIENT)
Dept: RADIOLOGY | Facility: IMAGING CENTER | Age: 49
End: 2024-03-08
Payer: COMMERCIAL

## 2024-03-08 ENCOUNTER — APPOINTMENT (OUTPATIENT)
Dept: LAB | Facility: IMAGING CENTER | Age: 49
End: 2024-03-08
Payer: COMMERCIAL

## 2024-03-08 VITALS — HEIGHT: 63 IN | WEIGHT: 153 LBS | BODY MASS INDEX: 27.11 KG/M2

## 2024-03-08 DIAGNOSIS — Z12.31 ENCOUNTER FOR SCREENING MAMMOGRAM FOR BREAST CANCER: ICD-10-CM

## 2024-03-08 PROCEDURE — 77067 SCR MAMMO BI INCL CAD: CPT

## 2024-03-08 PROCEDURE — 77063 BREAST TOMOSYNTHESIS BI: CPT

## 2024-03-12 ENCOUNTER — TELEPHONE (OUTPATIENT)
Dept: NEUROLOGY | Facility: CLINIC | Age: 49
End: 2024-03-12

## 2024-03-12 NOTE — TELEPHONE ENCOUNTER
Spoke with patient informing her. That Dr. Keith is now permanently at our bath office. Patient appt will remain same date and time. But be switched over to our Bath office. Appt reminder card will me sent as well.

## 2024-03-16 ENCOUNTER — HOSPITAL ENCOUNTER (OUTPATIENT)
Dept: MRI IMAGING | Facility: HOSPITAL | Age: 49
Discharge: HOME | End: 2024-03-16
Attending: PSYCHIATRY & NEUROLOGY
Payer: COMMERCIAL

## 2024-03-16 DIAGNOSIS — R51.0 POSITIONAL HEADACHE: ICD-10-CM

## 2024-03-16 DIAGNOSIS — G44.229 CHRONIC TENSION-TYPE HEADACHE, NOT INTRACTABLE: ICD-10-CM

## 2024-03-16 PROCEDURE — 70551 MRI BRAIN STEM W/O DYE: CPT

## 2024-03-17 NOTE — TELEPHONE ENCOUNTER
Called patient's  and inform him that patient has been transferred here from [place]. Updated regarding plan of care.      told this writer that patient is staying at [facility], confused at baseline due to dementia. 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

## 2024-03-19 DIAGNOSIS — R73.03 PREDIABETES: ICD-10-CM

## 2024-03-19 DIAGNOSIS — G47.33 OSA (OBSTRUCTIVE SLEEP APNEA): ICD-10-CM

## 2024-03-19 DIAGNOSIS — E66.9 OBESITY (BMI 35.0-39.9 WITHOUT COMORBIDITY): ICD-10-CM

## 2024-03-21 ENCOUNTER — PATIENT MESSAGE (OUTPATIENT)
Dept: INTERNAL MEDICINE CLINIC | Age: 49
End: 2024-03-21

## 2024-03-21 DIAGNOSIS — E66.9 OBESITY (BMI 35.0-39.9 WITHOUT COMORBIDITY): ICD-10-CM

## 2024-03-21 DIAGNOSIS — G47.33 OSA (OBSTRUCTIVE SLEEP APNEA): ICD-10-CM

## 2024-03-21 DIAGNOSIS — R73.03 PREDIABETES: ICD-10-CM

## 2024-03-21 RX ORDER — TIRZEPATIDE 15 MG/.5ML
INJECTION, SOLUTION SUBCUTANEOUS
Refills: 0 | OUTPATIENT
Start: 2024-03-21

## 2024-04-02 ENCOUNTER — TELEPHONE (OUTPATIENT)
Dept: OBGYN CLINIC | Facility: HOSPITAL | Age: 49
End: 2024-04-02

## 2024-04-02 NOTE — TELEPHONE ENCOUNTER
Preoperative Elective Admission Assessment- spoke to patient     Living Situation:    Who does pt live with: spouse and son  What kind of home: multi-level  How do they enter the home: front  How many levels in home: 2 level    # of steps to enter home: 2 to enter   # of steps to second floor: 12- 14 to 2nd floor   Are there handrails: Yes  Are there landings: No  Sleeping arrangement: first/entry floor  Where is Bathroom: First floor bath, 1/2 bath   Where is the tub or shower: Second floor full bathroom with a walk in shower      First Floor Setup:   Is there a bathroom: Yes  Where would pt sleep: couch     DME: rolling walker (instructed to bring DOS)    We discussed clearing pathways in the home and making sure there is accessibly to use the walker, for example, removing throw rugs.      Patient's Current Level of Function: Ambulates: Independently and ADLs: Independent    Post-op Caregiver: spouse  Currently receive any HHC/aides/community supports: No     Post-op Transport: spouse  To/from hospital: spouse  To/from PT 2-3x/week: spouse or mother if needed  Uses community transport now: No     Outpatient Physical Therapy Site:  Site: [place]   pre and post-op appts scheduled? No     Medication Management: self  Preferred Pharmacy for Post-op Medications: [id number] - [place], [place] - [address] [place] [address] [[id number]]   Blood Management Vitamin Regimen:  Already taking folic acid, Vitamin C and Multi   Post-op anticoagulant: to be determined by surgical team postoperatively     DC Plan: Pt plans to be discharged home    Barriers to DC identified preoperatively: none identified    BMI: 27.10    Patient Education:  Pt educated on post-op pain, early mobilization (POD0), LOS goals, OP PT goals, and preoperative bathing. Patient educated that our goal is to appropriately discharge patient based off their post-op function while striving to maintain maximal independence. The goal is to discharge patient to home  and for them to attend outpatient physical therapy.    Assigned to care team? Yes

## 2024-04-08 ENCOUNTER — PATIENT MESSAGE (OUTPATIENT)
Dept: OBGYN CLINIC | Facility: MEDICAL CENTER | Age: 49
End: 2024-04-08

## 2024-04-20 ENCOUNTER — LAB REQUISITION (OUTPATIENT)
Dept: LAB | Facility: HOSPITAL | Age: 49
End: 2024-04-20
Payer: COMMERCIAL

## 2024-04-20 ENCOUNTER — APPOINTMENT (OUTPATIENT)
Dept: LAB | Facility: IMAGING CENTER | Age: 49
End: 2024-04-20
Payer: COMMERCIAL

## 2024-04-20 DIAGNOSIS — Z01.812 PRE-PROCEDURAL LABORATORY EXAMINATION: ICD-10-CM

## 2024-04-20 DIAGNOSIS — Z01.812 ENCOUNTER FOR PREPROCEDURAL LABORATORY EXAMINATION: ICD-10-CM

## 2024-04-20 DIAGNOSIS — G89.29 CHRONIC PAIN OF LEFT KNEE: ICD-10-CM

## 2024-04-20 DIAGNOSIS — M17.12 PRIMARY OSTEOARTHRITIS OF LEFT KNEE: ICD-10-CM

## 2024-04-20 DIAGNOSIS — M25.562 CHRONIC PAIN OF LEFT KNEE: ICD-10-CM

## 2024-04-20 LAB
ABO GROUP BLD: NORMAL
ALBUMIN SERPL BCP-MCNC: 4.3 G/DL (ref 3.5–5)
ALP SERPL-CCNC: 55 U/L (ref 34–104)
ALT SERPL W P-5'-P-CCNC: 36 U/L (ref 7–52)
ANION GAP SERPL CALCULATED.3IONS-SCNC: 7 MMOL/L (ref 4–13)
APTT PPP: 31 SECONDS (ref 23–37)
AST SERPL W P-5'-P-CCNC: 25 U/L (ref 13–39)
BASOPHILS # BLD AUTO: 0.05 THOUSANDS/ÂΜL (ref 0–0.1)
BASOPHILS NFR BLD AUTO: 1 % (ref 0–1)
BILIRUB SERPL-MCNC: 1.66 MG/DL (ref 0.2–1)
BLD GP AB SCN SERPL QL: NEGATIVE
BUN SERPL-MCNC: 11 MG/DL (ref 5–25)
CALCIUM SERPL-MCNC: 9.2 MG/DL (ref 8.4–10.2)
CHLORIDE SERPL-SCNC: 101 MMOL/L (ref 96–108)
CO2 SERPL-SCNC: 28 MMOL/L (ref 21–32)
CREAT SERPL-MCNC: 0.61 MG/DL (ref 0.6–1.3)
EOSINOPHIL # BLD AUTO: 0.17 THOUSAND/ÂΜL (ref 0–0.61)
EOSINOPHIL NFR BLD AUTO: 3 % (ref 0–6)
ERYTHROCYTE [DISTWIDTH] IN BLOOD BY AUTOMATED COUNT: 13.8 % (ref 11.6–15.1)
EST. AVERAGE GLUCOSE BLD GHB EST-MCNC: 105 MG/DL
GFR SERPL CREATININE-BSD FRML MDRD: 106 ML/MIN/1.73SQ M
GLUCOSE P FAST SERPL-MCNC: 91 MG/DL (ref 65–99)
HBA1C MFR BLD: 5.3 %
HCT VFR BLD AUTO: 42.8 % (ref 34.8–46.1)
HGB BLD-MCNC: 13.8 G/DL (ref 11.5–15.4)
IMM GRANULOCYTES # BLD AUTO: 0.02 THOUSAND/UL (ref 0–0.2)
IMM GRANULOCYTES NFR BLD AUTO: 0 % (ref 0–2)
INR PPP: 0.99 (ref 0.84–1.19)
LYMPHOCYTES # BLD AUTO: 1.45 THOUSANDS/ÂΜL (ref 0.6–4.47)
LYMPHOCYTES NFR BLD AUTO: 27 % (ref 14–44)
MCH RBC QN AUTO: 28.9 PG (ref 26.8–34.3)
MCHC RBC AUTO-ENTMCNC: 32.2 G/DL (ref 31.4–37.4)
MCV RBC AUTO: 90 FL (ref 82–98)
MONOCYTES # BLD AUTO: 0.43 THOUSAND/ÂΜL (ref 0.17–1.22)
MONOCYTES NFR BLD AUTO: 8 % (ref 4–12)
NEUTROPHILS # BLD AUTO: 3.33 THOUSANDS/ÂΜL (ref 1.85–7.62)
NEUTS SEG NFR BLD AUTO: 61 % (ref 43–75)
NRBC BLD AUTO-RTO: 0 /100 WBCS
PLATELET # BLD AUTO: 241 THOUSANDS/UL (ref 149–390)
PMV BLD AUTO: 11.2 FL (ref 8.9–12.7)
POTASSIUM SERPL-SCNC: 4.3 MMOL/L (ref 3.5–5.3)
PROT SERPL-MCNC: 6.8 G/DL (ref 6.4–8.4)
PROTHROMBIN TIME: 13 SECONDS (ref 11.6–14.5)
RBC # BLD AUTO: 4.78 MILLION/UL (ref 3.81–5.12)
RH BLD: POSITIVE
SODIUM SERPL-SCNC: 136 MMOL/L (ref 135–147)
SPECIMEN EXPIRATION DATE: NORMAL
WBC # BLD AUTO: 5.45 THOUSAND/UL (ref 4.31–10.16)

## 2024-04-20 PROCEDURE — 86900 BLOOD TYPING SEROLOGIC ABO: CPT | Performed by: PHYSICIAN ASSISTANT

## 2024-04-20 PROCEDURE — 86850 RBC ANTIBODY SCREEN: CPT | Performed by: PHYSICIAN ASSISTANT

## 2024-04-20 PROCEDURE — 85730 THROMBOPLASTIN TIME PARTIAL: CPT

## 2024-04-20 PROCEDURE — 80053 COMPREHEN METABOLIC PANEL: CPT

## 2024-04-20 PROCEDURE — 83036 HEMOGLOBIN GLYCOSYLATED A1C: CPT

## 2024-04-20 PROCEDURE — 85610 PROTHROMBIN TIME: CPT

## 2024-04-20 PROCEDURE — 36415 COLL VENOUS BLD VENIPUNCTURE: CPT

## 2024-04-20 PROCEDURE — 86901 BLOOD TYPING SEROLOGIC RH(D): CPT | Performed by: PHYSICIAN ASSISTANT

## 2024-04-20 PROCEDURE — 85025 COMPLETE CBC W/AUTO DIFF WBC: CPT

## 2024-04-22 ENCOUNTER — OFFICE VISIT (OUTPATIENT)
Dept: LAB | Age: 49
End: 2024-04-22
Payer: COMMERCIAL

## 2024-04-22 DIAGNOSIS — M17.12 PRIMARY OSTEOARTHRITIS OF LEFT KNEE: ICD-10-CM

## 2024-04-22 DIAGNOSIS — G89.29 CHRONIC PAIN OF LEFT KNEE: ICD-10-CM

## 2024-04-22 DIAGNOSIS — M25.562 CHRONIC PAIN OF LEFT KNEE: ICD-10-CM

## 2024-04-22 PROCEDURE — 93005 ELECTROCARDIOGRAM TRACING: CPT

## 2024-04-23 LAB
ATRIAL RATE: 67 BPM
P AXIS: 62 DEGREES
PR INTERVAL: 182 MS
QRS AXIS: -16 DEGREES
QRSD INTERVAL: 72 MS
QT INTERVAL: 408 MS
QTC INTERVAL: 431 MS
T WAVE AXIS: 31 DEGREES
VENTRICULAR RATE: 67 BPM

## 2024-04-23 PROCEDURE — 93010 ELECTROCARDIOGRAM REPORT: CPT | Performed by: INTERNAL MEDICINE

## 2024-04-25 ENCOUNTER — ANESTHESIA EVENT (OUTPATIENT)
Dept: PERIOP | Facility: HOSPITAL | Age: 49
End: 2024-04-25
Payer: COMMERCIAL

## 2024-04-25 NOTE — PROGRESS NOTES
Internal Medicine Pre-Operative Evaluation:     Reason for Visit: Pre-operative Evaluation for Risk Stratification and Optimization    Patient ID: Manuela Camacho is a 49 y.o. female.     Surgery: Arthroplasty of left knee  Referring Provider: Dr Napier      Recommendations to Proceed withSurgery    Patient is considered to be Low risk for Medium risk procedure.     After evaluation and discussion with patient with emphasis that all surgery has some degree of inherent risk it is acknowledged by patient this risk is Acceptable.    Patient is optimized and may proceed with planned procedure.     Assessment    Pre-operative Medical Evaluation for planned surgery  Recommendations as listed in PLAN section below  Contact surgical nurse  navigator with any questions regarding preoperative plan or schedule.      Problem List Items Addressed This Visit          Unprioritized    Benign hypertension     Continue perioperative beta-blocker  Hold losartan morning of surgery to lessen risk of acute kidney injury         Hypercholesterolemia    Primary osteoarthritis of left knee - Primary     Failed outpatient conservative measures  Elected to undergo total joint arthroplasty                   Plan:     1. Further preoperative workup as follows:   - none no further testing required may proceed with surgery    2. Preoperative Medication Management Review performed by PAT nursing  YES    3. Patient requires further consultation with:   No Consults Required    4. Discharge Planning / Barriers to Discharge  none identified - patients has post discharge therapy plan in place, transportation arranged for discharge day, adequate family support at home to assist with discharge to home.        Subjective:           History of Present Illness:     Manuela Camacho is a 49 y.o. female who presents to the office today for a preoperative consultation at the request of surgeon. The patient understands this is an elective procedure and not  "emergent. They are electing to undergo planned procedure with an understanding that all surgery has inherent risk. They have worked with their surgeon and failed conservative treatment measures. Today they present for preoperative risk assessment and recommendations for optimization in preparation for surgery.    Pt seen in surgical optimization center for upcoming proposed surgery. They have failed previous conservative measures and have elected surgical intervention.     Pt meets presurgical lab and BMI optimization goals.    Upon interview questioning patient is able to perform greater than 4 METs workload in daily life without any reported cardio-pulmonary symptoms.          ROS: No TIA's or unusual headaches, no dysphagia.  No prolonged cough. No dyspnea or chest pain on exertion.  No abdominal pain, change in bowel habits, black or bloody stools.  No urinary tract or BPH symptoms.  Positive reported pain in arthritic joint. Positive difficulty with gait. No skin rashes or issues.      Objective:    /79   Pulse 81   Ht 5' 3\" (1.6 m)   Wt 69.9 kg (154 lb)   BMI 27.28 kg/m²       General Appearance: no distress, conversive  HEENT: PERRLA, conjuctiva normal; oropharynx clear; mucous membranes moist;   Neck:  Supple, no lymphadenopathy or thyromegaly  Lungs: breath sounds normal, normal respiratory effort, no retractions, expiratory effort normal  CV: normal heart sounds S1/S2, PMI normal   ABD: soft non tender, no masses , no hepatic or splenomegaly  EXT: DP pulses intact, no lymphadenopathy, no edema  Skin: normal turgor, normal texture, no rash  Psych: affect normal, mood normal  Neuro: AAOx3        The following portions of the patient's history were reviewed and updated as appropriate: allergies, current medications, past family history, past medical history, past social history, past surgical history and problem list.     Past History:       Past Medical History:   Diagnosis Date    Acute diffuse " otitis externa of right ear 08/23/2021    Anemia     Anxiety     Asthma     controlled  seasonal    Diabetes mellitus (HCC)     pre diabetes    Gestational diabetes mellitus     Hyperlipidemia     Hypertension     Migraines     Other chest pain 01/27/2020    PONV (postoperative nausea and vomiting)     Pregnancy     Resolved: 07/29/2015    Trigger finger     Resolved: 12/02/2016    Yeast infection     Resolved: 07/29/2015    Past Surgical History:   Procedure Laterality Date    COLONOSCOPY      COLPORRHAPHY N/A 06/20/2022    Procedure: ANTERIOR COLPORRHAPHY;  Surgeon: Ovi Nice MD;  [facility];  Service: UroGynecology           CYSTOSCOPY N/A 06/20/2022    Procedure: CYSTOSCOPY;  Surgeon: Ovi Nice MD;  [facility];  Service: UroGynecology           INCISION TENDON SHEATH HAND Left     thumb    MO ARTHRS KNE SURG W/MENISCECTOMY MED/LAT W/SHVG Left 11/05/2021    Procedure: KNEE ARTHROSCOPIC MENISCECTOMY Medial;  Surgeon: De Newby MD;  [facility];  Service: Orthopedics    MO HYSTEROSCOPY BX ENDOMETRIUM&/POLYPC W/WO D&C N/A 10/14/2019    Procedure: DILATATION AND CURETTAGE (D&C) WITH HYSTEROSCOPY W/ NOVASURE;  Surgeon: Abril Dean DO;  [facility];  Service: Gynecology    MO HYSTEROSCOPY ENDOMETRIAL ABLATION N/A 10/14/2019    Procedure: ABLATION ENDOMETRIAL NOVASURE;  Surgeon: Abril Dean DO;  [facility];  Service: Gynecology    MO RPR UMBILICAL HRNA 5 YRS/> REDUCIBLE N/A 08/23/2018    Procedure: UMBILICAL HERNIA REPAIR WITH MESH;  Surgeon: Gonzalo Chambers MD;  [facility];  Service: General    PUBOVAGINAL SLING N/A 06/20/2022    Procedure: PUBOVAGINAL SLING;  Surgeon: Ovi Nice MD;  [facility];  Service: UroGynecology           WISDOM TOOTH EXTRACTION            Social History     Tobacco Use    Smoking status: Never    Smokeless tobacco: Never   Vaping Use    Vaping status: Never Used    Substance Use Topics    Alcohol use: Yes     Alcohol/week: 2.0 standard drinks of alcohol     Types: 2 Glasses of wine per week     Comment: 1-2 drinks per week on average typically consumes beer    Drug use: No     Family History   Problem Relation Age of Onset    Hypertension Mother     Hyperlipidemia Mother     Endometrial cancer Mother 46    Diabetes type II Mother     Hyperlipidemia Father     Hypertension Father     Heart attack Sister     No Known Problems Daughter     No Known Problems Maternal Grandmother     Heart attack Maternal Grandfather     Other Maternal Grandfather         Myocardial infarction arrhythmias    No Known Problems Paternal Grandmother     Stroke Paternal Grandfather         Syndrome    No Known Problems Brother     No Known Problems Son     No Known Problems Paternal Aunt           Allergies:     Allergies   Allergen Reactions    Hibiclens [Chlorhexidine Gluconate] Rash     Red with pimples    Pollen Extract Nasal Congestion        Current Medications:     Current Outpatient Medications   Medication Instructions    albuterol (Ventolin HFA) 90 mcg/act inhaler 1-2 puffs, Inhalation, As needed    ascorbic acid (VITAMIN C) 500 mg, Oral, 2 times daily    atorvastatin (LIPITOR) 20 mg, Oral, Daily    Cyanocobalamin (Vitamin B12) 1000 MCG TBCR Oral, Daily    D3-1000 1,000 Units, Oral, Daily    escitalopram (LEXAPRO) 10 mg, Oral, Daily at bedtime    ferrous sulfate 325 mg, Oral, Daily with breakfast    fluticasone (FLONASE) 50 mcg/act nasal spray 1 spray, Nasal, Daily    folic acid (FOLVITE) 1,000 mcg, Oral, Daily    Lactobacillus (PROBIOTIC ACIDOPHILUS PO) Oral, Every evening    loratadine (CLARITIN) 10 mg, Oral, Daily at bedtime    losartan (COZAAR) 100 mg, Oral, Daily    metoprolol succinate (TOPROL-XL) 50 mg, Oral, Daily at bedtime    Multiple Vitamin (multivitamin) tablet 1 tablet, Oral, Daily    Omega-3 Fatty Acids (FISH OIL) 1,000 mg 2 capsules, Oral, Daily at bedtime    rizatriptan  "(MAXALT) 10 mg, Oral, As needed, Take at the onset of migraine; if symptoms continue or return, may take another dose at least 2 hours after first dose. Take no more than 2 doses in a day.    tirzepatide 15 mg, Subcutaneous, Every 7 days    zinc gluconate 50 mg, Oral, Daily           PRE-OP WORKSHEET DATA    Assessment of Pre-Operative Risks     MLJ Quality Hard Stops:    BMI (<40) : Estimated body mass index is 27.28 kg/m² as calculated from the following:    Height as of this encounter: 5' 3\" (1.6 m).    Weight as of this encounter: 69.9 kg (154 lb).    Hgb ( >11):   Lab Results   Component Value Date    HGB 13.8 04/20/2024    HGB 13.6 07/08/2023    HGB 12.2 09/17/2022       HbA1c (<7.5) :   Lab Results   Component Value Date    HGBA1C 5.3 04/20/2024       GFR (>60) (Less then 45 = Nephrology consult):    Lab Results   Component Value Date    EGFR 106 04/20/2024    EGFR 102 07/08/2023    EGFR 93 01/14/2023         Active Decompensated Chronic Conditions which would delay surgery  No acutely decompensated medical issues such as recent CVA, MI, new onset arrhythmia, severe aortic stenosis, CHF, uncontrolled COPD       Functional capacity: PUSH MOWING LAWN                                                                 5 METS  Pick the highest level patient can comfortably perform   (if less the 4 mets consider functional assessment via cardiac stress testing or consultation)    Assessment of intra and post operative respiratory, hemodynamic and thrombotic risks     Prior Anesthesia Reactions: No     Personal history of venous thromboembolic disease? No    History of steroid use > 5 mg for >2 weeks within last year? No      The patient's risk factors for cardiac complications include :  none    Manuela Camacho has an IN HOSPITAL cardiac risk of RCI RISK CLASS I (0 risk factors, risk of major cardiac compl. appr. 0.5%) based on RCRI calculator    Cardiac Risk Estimation: per the Revised Cardiac Risk Index (Circ. " "100:1043, 1999),        Pre-Op Data Reviewed:       Laboratory Results: I have personally reviewed the pertinent laboratory results/reports     EKG:I have personally reviewed pertinent reports.  . I personally reviewed and interpreted available tracings in the electronic medical record    Encounter Date: 04/22/24   ECG 12 lead   Result Value    Ventricular Rate 67    Atrial Rate 67    ME Interval 182    QRSD Interval 72    QT Interval 408    QTC Interval 431    P Axis 62    QRS Axis -16    T Wave Axis 31    Narrative    Normal sinus rhythm  Low voltage QRS  Borderline ECG  When compared with ECG of 10-MAY-2022 08:35,  T wave inversion no longer evident in Inferior leads  Confirmed by Nir Gonzales ([id number]) on 4/23/2024 4:39:50 PM       OLD RECORDS: reviewed old records in the chart review section if EHR on day of visit.    Previous cardiopulmonary studies within the past year:  Echocardiogram: no No results found for: \"LVEF\"  Cardiac Catheterization: no  Stress Test: no      Time of visit including pre-visit chart review, visit and post-visit coordination of plan and care , review of pre-surgical lab work, preparation and time spent documenting note in electronic medical record, time spent face-to-face in physical examination answering patient questions by care team 45 minutes             [facility]    "

## 2024-04-25 NOTE — ASSESSMENT & PLAN NOTE
Continue perioperative beta-blocker  Hold losartan morning of surgery to lessen risk of acute kidney injury

## 2024-04-25 NOTE — PATIENT INSTRUCTIONS
BEFORE SURGERY    Contact surgical nurse  navigator with any questions regarding preoperative plan or schedule.  Stop all over the counter supplements, herbal, naturopathic  medications for 1 week prior to surgery UNLESS prescribed by your surgeon  Hold NSAIDS (i.e. advil, alleve, motrin, ibuprofen, celebrex) minimum 5 days prior to surgery  Follow presurgical medication instructions provided by preadmission nursing team reviewed during your presurgery phone call  Strategies for optimizing your surgery through breathing exercises, nutrition and physical activity can be found at [email or web address]/best  Call [phone] with any presurgical concerns or medications questions  Hold tirzepatide 1 week prior to surgery    AFTER SURGERY    Recommend using Tylenol ( acetaminophen ) 1000 mg every eight hours during the first week post discharge along with icing the area for 20 mins every 3-4 hours while awake can be helpful in reducing your need for post operative opioid use. This opioid sparing plan can be used along side your surgeons pain plan.  Use stool softener over the counter (colace) daily after surgery during the first 1-2 weeks to avoid post operative constipation issues  If no bowel movement within 3 days after surgery then use over the counter Miralax in addition to your stool softener   If cleared by your surgical team for activity then early and often walking is encouraged and can be important in prevention of post surgical blood clots. Additionally spend as much time out of bed as possible and allowed by your surgical team  Use your incentive spirometer twice per hour in the first seven days after surgery to help prevent post surgery lung complications and infections  Call [phone] with any post discharge concerns or medical issues

## 2024-05-01 ENCOUNTER — OFFICE VISIT (OUTPATIENT)
Age: 49
End: 2024-05-01
Payer: COMMERCIAL

## 2024-05-01 VITALS
HEIGHT: 63 IN | WEIGHT: 154 LBS | SYSTOLIC BLOOD PRESSURE: 114 MMHG | DIASTOLIC BLOOD PRESSURE: 79 MMHG | BODY MASS INDEX: 27.29 KG/M2 | HEART RATE: 81 BPM

## 2024-05-01 DIAGNOSIS — I10 BENIGN HYPERTENSION: ICD-10-CM

## 2024-05-01 DIAGNOSIS — E78.00 HYPERCHOLESTEROLEMIA: ICD-10-CM

## 2024-05-01 DIAGNOSIS — M17.12 PRIMARY OSTEOARTHRITIS OF LEFT KNEE: Primary | ICD-10-CM

## 2024-05-01 DIAGNOSIS — G89.29 CHRONIC PAIN OF LEFT KNEE: ICD-10-CM

## 2024-05-01 DIAGNOSIS — M25.562 CHRONIC PAIN OF LEFT KNEE: ICD-10-CM

## 2024-05-01 PROCEDURE — 99215 OFFICE O/P EST HI 40 MIN: CPT | Performed by: INTERNAL MEDICINE

## 2024-05-09 ENCOUNTER — TELEPHONE (OUTPATIENT)
Age: 49
End: 2024-05-09

## 2024-05-09 ENCOUNTER — ANESTHESIA (OUTPATIENT)
Dept: PERIOP | Facility: HOSPITAL | Age: 49
End: 2024-05-09
Payer: COMMERCIAL

## 2024-05-09 NOTE — TELEPHONE ENCOUNTER
Caller: Manuela    Doctor: Karthikeyan    Reason for call: Patient has a conflict with SX date and needs to change the date  She has a bit going on with graduation and vaction  Call back#: [phone]

## 2024-05-29 NOTE — PRE-PROCEDURE INSTRUCTIONS
Pre-Surgery Instructions:   Medication Instructions    albuterol (Ventolin HFA) 90 mcg/act inhaler Uses PRN- OK to take day of surgery    ascorbic acid (VITAMIN C) 500 MG tablet Hold day of surgery.    atorvastatin (LIPITOR) 20 mg tablet Take night before surgery    Cholecalciferol (D3-1000) 25 MCG (1000 UT) capsule Hold day of surgery.    Cyanocobalamin (Vitamin B12) 1000 MCG TBCR Stop taking 7 days prior to surgery.    escitalopram (LEXAPRO) 10 mg tablet Take night before surgery    ferrous sulfate 325 (65 Fe) mg tablet Hold day of surgery.    fluticasone (FLONASE) 50 mcg/act nasal spray Uses PRN- OK to take day of surgery    folic acid (FOLVITE) 1 mg tablet Hold day of surgery.    Lactobacillus (PROBIOTIC ACIDOPHILUS PO) Stop taking 7 days prior to surgery.    loratadine (CLARITIN) 10 mg tablet Take night before surgery    losartan (Cozaar) 100 MG tablet Take night before surgery    metoprolol succinate (TOPROL-XL) 50 mg 24 hr tablet Take night before surgery    Multiple Vitamin (multivitamin) tablet Hold day of surgery.    Omega-3 Fatty Acids (FISH OIL) 1,000 mg Stop taking 7 days prior to surgery.    rizatriptan (Maxalt) 10 mg tablet Uses PRN- OK to take day of surgery    tirzepatide 15 MG/0.5ML If taken weekly, do not take this medicine for 7 days before your procedure including the day of the procedure (resume taking after the procedure)     zinc gluconate 50 mg tablet Stop taking 7 days prior to surgery.   Reviewed Ortho class with pt--offers no questions from 4/16/24 ortho appt class    Medication instructions for day surgery reviewed. Please use only a sip of water to take your instructed medications. Avoid all over the counter vitamins, supplements and NSAIDS for one week prior to surgery per anesthesia guidelines. Tylenol is ok to take as needed.     You will receive a call one business day prior to surgery with an arrival time and hospital directions. If your surgery is scheduled on a Monday, the  hospital will be calling you on the Friday prior to your surgery. If you have not heard from anyone by 8pm, please call the hospital supervisor through the hospital  at [phone]. ([place] [phone] or [place] [phone]).    Do not eat or drink anything after midnight the night before your surgery, including candy, mints, lifesavers, or chewing gum. Do not drink alcohol 24hrs before your surgery. Try not to smoke at least 24hrs before your surgery.       Follow the pre surgery showering instructions as listed in the “My Surgical Experience Booklet” or otherwise provided by your surgeon's office. Do not use a blade to shave the surgical area 1 week before surgery. It is okay to use a clean electric clippers up to 24 hours before surgery. Do not apply any lotions, creams, including makeup, cologne, deodorant, or perfumes after showering on the day of your surgery. Do not use dry shampoo, hair spray, hair gel, or any type of hair products.     No contact lenses, eye make-up, or artificial eyelashes. Remove nail polish, including gel polish, and any artificial, gel, or acrylic nails if possible. Remove all jewelry including rings and body piercing jewelry.     Wear causal clothing that is easy to take on and off. Consider your type of surgery.    Keep any valuables, jewelry, piercings at home. Please bring any specially ordered equipment (sling, braces) if indicated.    Arrange for a responsible person to drive you to and from the hospital on the day of your surgery. Please confirm the visitor policy for the day of your procedure when you receive your phone call with an arrival time.     Call the surgeon's office with any new illnesses, exposures, or additional questions prior to surgery.    Please reference your “My Surgical Experience Booklet” for additional information to prepare for your upcoming surgery.

## 2024-06-02 NOTE — DISCHARGE INSTR - AVS FIRST PAGE
Dr. Napier Knee Replacement    What to Expect/Activity  It is normal to have some discomfort in your knee for several days to weeks.  You are weight bearing as tolerated to your operative leg with assist devices.  Please use crutches/walker when ambulating until your follow-up  Swelling and discomfort in the knee is normal for several days after surgery. For the first 2-3 days, use ice around the knee to help. Use for 20-30 minutes every 1-2 hours for 48 hours, while awake. You may continue beyond 48 hours as needed.  Place one or two pillows underneath your calf, not your knee, to reduce swelling.  Physical therapy on your own at home should start as soon as possible (see below). Please perform heel slides and extension exercises on your own as well (see diagram).  Please use incentive spirometer 10 times per hour while awake (see diagram).    Dressing/Wound Care/Bathing  You may remove your toe-to-groin dressing 24 hours after surgery. There will be a surgical dressing over your incision that stays in place until follow-up unless water gets under the bandage and then it should be removed.   You may start showering 24 hours after surgery, the surgical dressing will remain in place. Please pat the dressing dry. If you notice the dressing appears saturated or is starting to come off, please replace with dry dressing.  You can keep the dressing in place until follow-up in the office.   Do not place any creams, ointments or gels on or around the incision.  No baths, swimming or submerging until cleared by Dr. Napier    Pain Management/Medications  You may resume your usual medications.  Please take the following medications:  Anti-coagulation (blood clot prevention) - aspirin 81mg twice daily for 4 weeks  Pain medication:  Narcotic: Take as directed  NSAID/Anti-inflammatory: Take as directed  Tylenol 1000mg every 8 hours  Zofran (ondasetron) - 4mg every 8 hours as needed for nausea  Stool softeners (senna/colace) -  take daily to prevent constipation as narcotic pain medication causes constipation  Antibiotic - take as directed if prescribed   If you have questions or pain concerns, please contact the office. Pain medication cannot remove all post-operative pain.    Follow up/Call if:  The findings of your surgery will be explained to you and your family immediately after surgery. However, in the post-operative period, during recovery from anesthesia you may not fully remember or fully understand what was said. This will be again gone over when you return for your post-op appointment.  Please contact Dr. Napier's office if you experience the following:  Excessive bleeding (bleeding through your dressing)  Fever greater than 101 degrees F after 48 hours (low grade fevers the day or two after surgery are normal)  Persistent nausea or vomiting  Decreased sensation or discoloration of the operative limb  Pain or swelling that is getting worse and not better with medication    Dr. Napier's Office Contact: [phone]

## 2024-06-03 ENCOUNTER — HOSPITAL ENCOUNTER (OUTPATIENT)
Facility: HOSPITAL | Age: 49
Setting detail: OUTPATIENT SURGERY
Discharge: HOME | End: 2024-06-03
Attending: STUDENT IN AN ORGANIZED HEALTH CARE EDUCATION/TRAINING PROGRAM | Admitting: STUDENT IN AN ORGANIZED HEALTH CARE EDUCATION/TRAINING PROGRAM
Payer: COMMERCIAL

## 2024-06-03 ENCOUNTER — APPOINTMENT (OUTPATIENT)
Dept: RADIOLOGY | Facility: HOSPITAL | Age: 49
End: 2024-06-03
Payer: COMMERCIAL

## 2024-06-03 VITALS
OXYGEN SATURATION: 96 % | SYSTOLIC BLOOD PRESSURE: 113 MMHG | HEIGHT: 63 IN | BODY MASS INDEX: 27.07 KG/M2 | WEIGHT: 152.78 LBS | TEMPERATURE: 97.7 F | HEART RATE: 76 BPM | DIASTOLIC BLOOD PRESSURE: 78 MMHG | RESPIRATION RATE: 16 BRPM

## 2024-06-03 DIAGNOSIS — M17.12 PRIMARY OSTEOARTHRITIS OF LEFT KNEE: Primary | ICD-10-CM

## 2024-06-03 LAB
ABO GROUP BLD: NORMAL
BLD GP AB SCN SERPL QL: NEGATIVE
EXT PREGNANCY TEST URINE: NEGATIVE
EXT. CONTROL: NORMAL
RH BLD: POSITIVE
SPECIMEN EXPIRATION DATE: NORMAL

## 2024-06-03 PROCEDURE — 27447 TOTAL KNEE ARTHROPLASTY: CPT | Performed by: PHYSICIAN ASSISTANT

## 2024-06-03 PROCEDURE — C1776 JOINT DEVICE (IMPLANTABLE): HCPCS | Performed by: STUDENT IN AN ORGANIZED HEALTH CARE EDUCATION/TRAINING PROGRAM

## 2024-06-03 PROCEDURE — C1713 ANCHOR/SCREW BN/BN,TIS/BN: HCPCS | Performed by: STUDENT IN AN ORGANIZED HEALTH CARE EDUCATION/TRAINING PROGRAM

## 2024-06-03 PROCEDURE — 86900 BLOOD TYPING SEROLOGIC ABO: CPT | Performed by: STUDENT IN AN ORGANIZED HEALTH CARE EDUCATION/TRAINING PROGRAM

## 2024-06-03 PROCEDURE — 86901 BLOOD TYPING SEROLOGIC RH(D): CPT | Performed by: STUDENT IN AN ORGANIZED HEALTH CARE EDUCATION/TRAINING PROGRAM

## 2024-06-03 PROCEDURE — NC001 PR NO CHARGE: Performed by: STUDENT IN AN ORGANIZED HEALTH CARE EDUCATION/TRAINING PROGRAM

## 2024-06-03 PROCEDURE — 81025 URINE PREGNANCY TEST: CPT | Performed by: STUDENT IN AN ORGANIZED HEALTH CARE EDUCATION/TRAINING PROGRAM

## 2024-06-03 PROCEDURE — 73560 X-RAY EXAM OF KNEE 1 OR 2: CPT

## 2024-06-03 PROCEDURE — 27447 TOTAL KNEE ARTHROPLASTY: CPT | Performed by: STUDENT IN AN ORGANIZED HEALTH CARE EDUCATION/TRAINING PROGRAM

## 2024-06-03 PROCEDURE — C9290 INJ, BUPIVACAINE LIPOSOME: HCPCS | Performed by: ANESTHESIOLOGY

## 2024-06-03 PROCEDURE — 97163 PT EVAL HIGH COMPLEX 45 MIN: CPT | Performed by: PHYSICAL THERAPIST

## 2024-06-03 PROCEDURE — 86850 RBC ANTIBODY SCREEN: CPT | Performed by: STUDENT IN AN ORGANIZED HEALTH CARE EDUCATION/TRAINING PROGRAM

## 2024-06-03 PROCEDURE — 97166 OT EVAL MOD COMPLEX 45 MIN: CPT

## 2024-06-03 DEVICE — ATTUNE PATELLA MEDIALIZED DOME 32MM CEMENTED AOX
Type: IMPLANTABLE DEVICE | Site: KNEE | Status: FUNCTIONAL
Brand: ATTUNE

## 2024-06-03 DEVICE — ATTUNE KNEE SYSTEM TIBIAL BASE AFFIXIUM FIXED BEARING SIZE 4
Type: IMPLANTABLE DEVICE | Site: KNEE | Status: FUNCTIONAL
Brand: ATTUNE AFFIXIUM

## 2024-06-03 DEVICE — ATTUNE KNEE SYSTEM TIBIAL INSERT FIXED BEARING MEDIAL STABILIZED LEFT AOX 5, 6MM
Type: IMPLANTABLE DEVICE | Site: KNEE | Status: FUNCTIONAL
Brand: ATTUNE

## 2024-06-03 DEVICE — ATTUNE KNEE SYSTEM FEMORAL POROCOAT CRUCIATE RETAINING NARROW SIZE 5N LEFT CEMENTLESS
Type: IMPLANTABLE DEVICE | Site: KNEE | Status: FUNCTIONAL
Brand: ATTUNE

## 2024-06-03 DEVICE — DEPUY CMW 2 FAST SET BONE CEMENT 20G: Type: IMPLANTABLE DEVICE | Site: KNEE | Status: FUNCTIONAL

## 2024-06-03 RX ORDER — PANTOPRAZOLE SODIUM 40 MG/1
40 TABLET, DELAYED RELEASE ORAL DAILY
Status: CANCELLED | OUTPATIENT
Start: 2024-06-03

## 2024-06-03 RX ORDER — FERROUS SULFATE 325(65) MG
325 TABLET ORAL
Status: CANCELLED | OUTPATIENT
Start: 2024-06-04

## 2024-06-03 RX ORDER — CEFAZOLIN SODIUM 2 G/50ML
2000 SOLUTION INTRAVENOUS ONCE
Status: COMPLETED | OUTPATIENT
Start: 2024-06-03 | End: 2024-06-03

## 2024-06-03 RX ORDER — METOPROLOL SUCCINATE 50 MG/1
50 TABLET, EXTENDED RELEASE ORAL
Status: CANCELLED | OUTPATIENT
Start: 2024-06-03

## 2024-06-03 RX ORDER — ACETAMINOPHEN 325 MG/1
975 TABLET ORAL ONCE
Status: COMPLETED | OUTPATIENT
Start: 2024-06-03 | End: 2024-06-03

## 2024-06-03 RX ORDER — CEFADROXIL 500 MG/1
500 CAPSULE ORAL EVERY 12 HOURS SCHEDULED
Qty: 10 CAPSULE | Refills: 0 | Status: SHIPPED | OUTPATIENT
Start: 2024-06-03 | End: 2024-06-08

## 2024-06-03 RX ORDER — MIDAZOLAM HYDROCHLORIDE 2 MG/2ML
INJECTION, SOLUTION INTRAMUSCULAR; INTRAVENOUS AS NEEDED
Status: DISCONTINUED | OUTPATIENT
Start: 2024-06-03 | End: 2024-06-03

## 2024-06-03 RX ORDER — OXYCODONE HYDROCHLORIDE 5 MG/1
5 TABLET ORAL EVERY 4 HOURS PRN
Qty: 42 TABLET | Refills: 0 | Status: SHIPPED | OUTPATIENT
Start: 2024-06-03 | End: 2024-06-13

## 2024-06-03 RX ORDER — ONDANSETRON 2 MG/ML
INJECTION INTRAMUSCULAR; INTRAVENOUS AS NEEDED
Status: DISCONTINUED | OUTPATIENT
Start: 2024-06-03 | End: 2024-06-03

## 2024-06-03 RX ORDER — SODIUM CHLORIDE, SODIUM LACTATE, POTASSIUM CHLORIDE, CALCIUM CHLORIDE 600; 310; 30; 20 MG/100ML; MG/100ML; MG/100ML; MG/100ML
125 INJECTION, SOLUTION INTRAVENOUS CONTINUOUS
Status: DISCONTINUED | OUTPATIENT
Start: 2024-06-03 | End: 2024-06-03 | Stop reason: HOSPADM

## 2024-06-03 RX ORDER — HYDROMORPHONE HCL/PF 1 MG/ML
0.5 SYRINGE (ML) INJECTION
Status: DISCONTINUED | OUTPATIENT
Start: 2024-06-03 | End: 2024-06-03 | Stop reason: HOSPADM

## 2024-06-03 RX ORDER — MORPHINE SULFATE 10 MG/ML
2 INJECTION, SOLUTION INTRAMUSCULAR; INTRAVENOUS EVERY 2 HOUR PRN
Status: CANCELLED | OUTPATIENT
Start: 2024-06-03 | End: 2024-06-05

## 2024-06-03 RX ORDER — GABAPENTIN 300 MG/1
300 CAPSULE ORAL
Status: CANCELLED | OUTPATIENT
Start: 2024-06-03

## 2024-06-03 RX ORDER — ESCITALOPRAM OXALATE 10 MG/1
10 TABLET ORAL
Status: CANCELLED | OUTPATIENT
Start: 2024-06-03

## 2024-06-03 RX ORDER — ACETAMINOPHEN 325 MG/1
650 TABLET ORAL EVERY 4 HOURS PRN
Status: DISCONTINUED | OUTPATIENT
Start: 2024-06-03 | End: 2024-06-03 | Stop reason: HOSPADM

## 2024-06-03 RX ORDER — CALCIUM CARBONATE 500 MG/1
1000 TABLET, CHEWABLE ORAL DAILY PRN
Status: CANCELLED | OUTPATIENT
Start: 2024-06-03

## 2024-06-03 RX ORDER — ACETAMINOPHEN 500 MG
1000 TABLET ORAL EVERY 8 HOURS
Qty: 60 TABLET | Refills: 0 | Status: SHIPPED | OUTPATIENT
Start: 2024-06-03

## 2024-06-03 RX ORDER — CEFAZOLIN SODIUM 2 G/50ML
2000 SOLUTION INTRAVENOUS EVERY 8 HOURS
Status: CANCELLED | OUTPATIENT
Start: 2024-06-03 | End: 2024-06-04

## 2024-06-03 RX ORDER — GABAPENTIN 300 MG/1
300 CAPSULE ORAL ONCE
Status: COMPLETED | OUTPATIENT
Start: 2024-06-03 | End: 2024-06-03

## 2024-06-03 RX ORDER — FENTANYL CITRATE 50 UG/ML
INJECTION, SOLUTION INTRAMUSCULAR; INTRAVENOUS AS NEEDED
Status: DISCONTINUED | OUTPATIENT
Start: 2024-06-03 | End: 2024-06-03

## 2024-06-03 RX ORDER — TRANEXAMIC ACID 10 MG/ML
1000 INJECTION, SOLUTION INTRAVENOUS ONCE
Status: DISCONTINUED | OUTPATIENT
Start: 2024-06-03 | End: 2024-06-03 | Stop reason: HOSPADM

## 2024-06-03 RX ORDER — ONDANSETRON 4 MG/1
4 TABLET, ORALLY DISINTEGRATING ORAL EVERY 6 HOURS PRN
Qty: 20 TABLET | Refills: 0 | Status: SHIPPED | OUTPATIENT
Start: 2024-06-03

## 2024-06-03 RX ORDER — DEXAMETHASONE SODIUM PHOSPHATE 10 MG/ML
INJECTION, SOLUTION INTRAMUSCULAR; INTRAVENOUS AS NEEDED
Status: DISCONTINUED | OUTPATIENT
Start: 2024-06-03 | End: 2024-06-03

## 2024-06-03 RX ORDER — ONDANSETRON 2 MG/ML
4 INJECTION INTRAMUSCULAR; INTRAVENOUS ONCE AS NEEDED
Status: DISCONTINUED | OUTPATIENT
Start: 2024-06-03 | End: 2024-06-03 | Stop reason: HOSPADM

## 2024-06-03 RX ORDER — ACETAMINOPHEN 325 MG/1
975 TABLET ORAL EVERY 8 HOURS
Status: CANCELLED | OUTPATIENT
Start: 2024-06-03

## 2024-06-03 RX ORDER — DOCUSATE SODIUM 100 MG/1
100 CAPSULE, LIQUID FILLED ORAL 2 TIMES DAILY
Status: CANCELLED | OUTPATIENT
Start: 2024-06-03

## 2024-06-03 RX ORDER — OXYCODONE HYDROCHLORIDE 10 MG/1
10 TABLET ORAL EVERY 4 HOURS PRN
Status: DISCONTINUED | OUTPATIENT
Start: 2024-06-03 | End: 2024-06-03 | Stop reason: HOSPADM

## 2024-06-03 RX ORDER — AMOXICILLIN 250 MG
1 CAPSULE ORAL DAILY
Qty: 30 TABLET | Refills: 0 | Status: SHIPPED | OUTPATIENT
Start: 2024-06-03

## 2024-06-03 RX ORDER — CELECOXIB 200 MG/1
200 CAPSULE ORAL 2 TIMES DAILY
Qty: 60 CAPSULE | Refills: 0 | Status: SHIPPED | OUTPATIENT
Start: 2024-06-03

## 2024-06-03 RX ORDER — OXYCODONE HYDROCHLORIDE 5 MG/1
5 TABLET ORAL EVERY 4 HOURS PRN
Status: DISCONTINUED | OUTPATIENT
Start: 2024-06-03 | End: 2024-06-03 | Stop reason: HOSPADM

## 2024-06-03 RX ORDER — BUPIVACAINE HYDROCHLORIDE 7.5 MG/ML
INJECTION, SOLUTION INTRASPINAL AS NEEDED
Status: DISCONTINUED | OUTPATIENT
Start: 2024-06-03 | End: 2024-06-03

## 2024-06-03 RX ORDER — LORATADINE 10 MG/1
10 TABLET ORAL
Status: CANCELLED | OUTPATIENT
Start: 2024-06-03

## 2024-06-03 RX ORDER — BUPIVACAINE HYDROCHLORIDE 5 MG/ML
INJECTION, SOLUTION EPIDURAL; INTRACAUDAL
Status: COMPLETED | OUTPATIENT
Start: 2024-06-03 | End: 2024-06-03

## 2024-06-03 RX ORDER — FENTANYL CITRATE/PF 50 MCG/ML
25 SYRINGE (ML) INJECTION
Status: DISCONTINUED | OUTPATIENT
Start: 2024-06-03 | End: 2024-06-03 | Stop reason: HOSPADM

## 2024-06-03 RX ORDER — TRANEXAMIC ACID 100 MG/ML
INJECTION, SOLUTION INTRAVENOUS AS NEEDED
Status: DISCONTINUED | OUTPATIENT
Start: 2024-06-03 | End: 2024-06-03

## 2024-06-03 RX ORDER — ATORVASTATIN CALCIUM 20 MG/1
20 TABLET, FILM COATED ORAL EVERY EVENING
Status: CANCELLED | OUTPATIENT
Start: 2024-06-03

## 2024-06-03 RX ORDER — FOLIC ACID 1 MG/1
1 TABLET ORAL DAILY
Status: CANCELLED | OUTPATIENT
Start: 2024-06-03

## 2024-06-03 RX ORDER — SIMETHICONE 80 MG
80 TABLET,CHEWABLE ORAL 4 TIMES DAILY PRN
Status: CANCELLED | OUTPATIENT
Start: 2024-06-03

## 2024-06-03 RX ORDER — SODIUM CHLORIDE, SODIUM LACTATE, POTASSIUM CHLORIDE, CALCIUM CHLORIDE 600; 310; 30; 20 MG/100ML; MG/100ML; MG/100ML; MG/100ML
100 INJECTION, SOLUTION INTRAVENOUS CONTINUOUS
Status: CANCELLED | OUTPATIENT
Start: 2024-06-03

## 2024-06-03 RX ORDER — SENNOSIDES 8.6 MG
1 TABLET ORAL DAILY
Status: CANCELLED | OUTPATIENT
Start: 2024-06-03

## 2024-06-03 RX ORDER — MAGNESIUM HYDROXIDE 1200 MG/15ML
LIQUID ORAL AS NEEDED
Status: DISCONTINUED | OUTPATIENT
Start: 2024-06-03 | End: 2024-06-03 | Stop reason: HOSPADM

## 2024-06-03 RX ORDER — PROPOFOL 10 MG/ML
INJECTION, EMULSION INTRAVENOUS CONTINUOUS PRN
Status: DISCONTINUED | OUTPATIENT
Start: 2024-06-03 | End: 2024-06-03

## 2024-06-03 RX ORDER — ASCORBIC ACID 500 MG
500 TABLET ORAL 2 TIMES DAILY
Status: CANCELLED | OUTPATIENT
Start: 2024-06-03

## 2024-06-03 RX ORDER — ONDANSETRON 2 MG/ML
4 INJECTION INTRAMUSCULAR; INTRAVENOUS EVERY 6 HOURS PRN
Status: CANCELLED | OUTPATIENT
Start: 2024-06-03

## 2024-06-03 RX ORDER — SUMATRIPTAN 50 MG/1
50 TABLET, FILM COATED ORAL ONCE AS NEEDED
Status: CANCELLED | OUTPATIENT
Start: 2024-06-03

## 2024-06-03 RX ADMIN — DEXAMETHASONE SODIUM PHOSPHATE 10 MG: 10 INJECTION INTRAMUSCULAR; INTRAVENOUS at 09:21

## 2024-06-03 RX ADMIN — OXYCODONE 5 MG: 5 TABLET ORAL at 11:27

## 2024-06-03 RX ADMIN — ACETAMINOPHEN 975 MG: 325 TABLET, FILM COATED ORAL at 07:29

## 2024-06-03 RX ADMIN — CEFAZOLIN SODIUM 2000 MG: 2 SOLUTION INTRAVENOUS at 09:04

## 2024-06-03 RX ADMIN — SODIUM CHLORIDE, SODIUM LACTATE, POTASSIUM CHLORIDE, AND CALCIUM CHLORIDE 125 ML/HR: .6; .31; .03; .02 INJECTION, SOLUTION INTRAVENOUS at 07:51

## 2024-06-03 RX ADMIN — MIDAZOLAM 4 MG: 1 INJECTION INTRAMUSCULAR; INTRAVENOUS at 08:51

## 2024-06-03 RX ADMIN — FENTANYL CITRATE 25 MCG: 50 INJECTION INTRAMUSCULAR; INTRAVENOUS at 10:40

## 2024-06-03 RX ADMIN — GABAPENTIN 300 MG: 300 CAPSULE ORAL at 07:29

## 2024-06-03 RX ADMIN — TRANEXAMIC ACID 1000 MG: 100 INJECTION, SOLUTION INTRAVENOUS at 09:22

## 2024-06-03 RX ADMIN — FENTANYL CITRATE 25 MCG: 50 INJECTION INTRAMUSCULAR; INTRAVENOUS at 10:50

## 2024-06-03 RX ADMIN — BUPIVACAINE HYDROCHLORIDE 10 ML: 5 INJECTION, SOLUTION EPIDURAL; INTRACAUDAL; PERINEURAL at 08:55

## 2024-06-03 RX ADMIN — PROPOFOL 70 MCG/KG/MIN: 10 INJECTION, EMULSION INTRAVENOUS at 09:10

## 2024-06-03 RX ADMIN — FENTANYL CITRATE 25 MCG: 50 INJECTION INTRAMUSCULAR; INTRAVENOUS at 11:07

## 2024-06-03 RX ADMIN — BUPIVACAINE 10 ML: 13.3 INJECTION, SUSPENSION, LIPOSOMAL INFILTRATION at 08:55

## 2024-06-03 RX ADMIN — ONDANSETRON 4 MG: 2 INJECTION INTRAMUSCULAR; INTRAVENOUS at 10:03

## 2024-06-03 RX ADMIN — BUPIVACAINE HYDROCHLORIDE IN DEXTROSE 1.2 ML: 7.5 INJECTION, SOLUTION SUBARACHNOID at 09:07

## 2024-06-03 RX ADMIN — ACETAMINOPHEN 325MG 650 MG: 325 TABLET ORAL at 11:27

## 2024-06-03 RX ADMIN — FENTANYL CITRATE 100 MCG: 50 INJECTION INTRAMUSCULAR; INTRAVENOUS at 08:51

## 2024-06-03 NOTE — ANESTHESIA PROCEDURE NOTES
Peripheral Block    Patient location during procedure: holding area  Start time: 6/3/2024 8:50 AM  Reason for block: at surgeon's request and post-op pain management  Staffing  Performed by: Maria Luisa Etienne DO  Authorized by: Maria Luisa Etienne DO    Preanesthetic Checklist  Completed: patient identified, IV checked, site marked, risks and benefits discussed, surgical consent, monitors and equipment checked, pre-op evaluation and timeout performed  Peripheral Block  Patient position: supine  Prep: ChloraPrep  Patient monitoring: frequent blood pressure checks, continuous pulse oximetry and heart rate  Block type: Popliteal  Laterality: left  Injection technique: single-shot  Procedures: ultrasound guided, Ultrasound guidance required for the procedure to increase accuracy and safety of medication placement and decrease risk of complications.  Ultrasound permanent image saved  bupivacaine (PF) (MARCAINE) 0.5 % injection 20 mL - Perineural   10 mL - 6/3/2024 8:55:00 AM  bupivacaine liposomal (EXPAREL) 1.3 % injection 20 mL - Perineural   10 mL - 6/3/2024 8:55:00 AM  Needle  Needle type: Stimuplex   Needle gauge: 20 G  Needle length: 6 in  Needle localization: anatomical landmarks and ultrasound guidance  Assessment  Injection assessment: incremental injection, frequent aspiration, injected with ease, negative aspiration, negative for heart rate change, no paresthesia on injection, no symptoms of intraneural/intravenous injection and needle tip visualized at all times  Paresthesia pain: none  Post-procedure:  site cleaned  patient tolerated the procedure well with no immediate complications

## 2024-06-03 NOTE — ANESTHESIA POSTPROCEDURE EVALUATION
Post-Op Assessment Note    CV Status:  Stable    Pain management: adequate       Mental Status:  Alert and awake   Hydration Status:  Euvolemic   PONV Controlled:  Controlled   Airway Patency:  Patent     Post Op Vitals Reviewed: Yes    No anethesia notable event occurred.    Staff: Anesthesiologist, CRNA               /80 (06/03/24 1030)    Temp (!) 97.4 °F (36.3 °C) (06/03/24 1030)    Pulse 77 (06/03/24 1030)   Resp 18 (06/03/24 1030)    SpO2 100 % (06/03/24 1030)

## 2024-06-03 NOTE — PLAN OF CARE
Problem: PHYSICAL THERAPY ADULT  Goal: Performs mobility at highest level of function for planned discharge setting.  See evaluation for individualized goals.  Description: Treatment/Interventions: Functional transfer training, LE strengthening/ROM, Elevations, Therapeutic exercise, Endurance training, Patient/family training, Bed mobility, Gait training, Family, OT, Spoke to nursing  Equipment Recommended: Walker (pt owns)       See flowsheet documentation for full assessment, interventions and recommendations.  Note: Prognosis: Good  Problem List: Decreased strength, Decreased range of motion, Decreased endurance, Impaired balance, Decreased mobility, Pain, Orthopedic restrictions  Assessment: Pt is a 49 y.o. female who presented to [facility] on 6/3/2024 s/p L TKA done by Dr. Napier. Precautions include WBAT. Pt  has a past medical history of Acute diffuse otitis externa of right ear (08/23/2021), Anemia, Anxiety, Asthma, Diabetes mellitus, Hyperlipidemia, Hypertension, Migraines, Other chest pain (01/27/2020), PONV , Pregnancy, Trigger finger, and Yeast infection.. Pt greeted at bedside for PT evaluation on 06/03/24. Pt referred to PT for functional mobility evaluation & D/C planning w/ orders of activity beginning POD #0 and up w/ assistance. PTA, pt reports being I w/o AD and I w/ IADLs. Personal factors affecting pt at time of IE include: lives in 2 story house and stairs to enter home. Please find objective findings from PT assessment regarding body systems outlined above with impairments and limitations including weakness, decreased ROM, impaired balance, decreased endurance, gait deviations, pain, decreased activity tolerance, decreased functional mobility tolerance, fall risk, and orthopedic restrictions.  Please see flow sheet above for objective findings and level of assistance required for safe completion of functional tasks. Pt was able to perform supine to sit with S and cues for hand placement. Pt able  to perform sit <>stand with RW and S. BP taken t/o session, please see flowsheet for objective data. Pt able to ambulate 80'x2 with RW and S. Pt needed cues for RW safety and proper gait pattern with walker. Pt able to perform 5 steps with BL rails and S. Pt needed cues for LE sequencing, but good carryover noted. Pt noted some dizziness and a drop in BP following session, pt was left supine in recliner chair and RN present. Pt demonstrated dec endurance and tolerance to activity. Pt was educated on fall precautions and reinforced w/ good understanding. Based on pt presentation and impaired function, pt would benefit from level III, (minimal resource intensity) at D/C. From PT/mobility standpoint, pt would benefit from OPPT to address deficits as defined above and maximize level of independence and return pt to PLOF. HEP given and reviewed with patient, no questions at this time. CM to follow. Nsg staff to continue to mobilized pt (OOB in chair for all meals & ambulate in room/unit) as tolerated to prevent further decline in function. Nsg notified. Co-eval performed with OT to complete this evaluation for the pts best interest given pts medical complexity and functional level.  Barriers to Discharge: None     Rehab Resource Intensity Level, PT: III (Minimum Resource Intensity)    See flowsheet documentation for full assessment.

## 2024-06-03 NOTE — PLAN OF CARE
Problem: OCCUPATIONAL THERAPY ADULT  Goal: Performs self-care activities at highest level of function for planned discharge setting.  See evaluation for individualized goals.  Description: Treatment Interventions: ADL retraining, Functional transfer training, Patient/family training, Endurance training, Equipment evaluation/education, Continued evaluation, Activityengagement          See flowsheet documentation for full assessment, interventions and recommendations.   Note: Limitation: Decreased ADL status, Decreased endurance, Decreased self-care trans, Decreased high-level ADLs  Prognosis: Good  Assessment: Pt 49 y.o. female presenting to [facility] s/p L- Total Knee Arthroplasty 6/3. WBAT L LE. Activity beginning POD #0. Pt with PMH HTN, Anxiety, Extrinsic Asthma, Hypercholesterolemia, Anxiety, OA of L knee. Pt with active OT orders to assess functional status and D/C planning. Pt with activity orders for up with assistance. Pt lives with spouse in a 2 level home with 2 BRANDEN, 1/2 bath on 1st level, and 1 FOS to 2nd floor bed/main bath. PTA pt was I with ADLs, I with IADLs, I with functional transfers/mobility w/o use of AD. (+) . Pt denies falls PTA. Pt reports spouse is home and able to assist as needed. Upon evaluation pt currently functioning at Supervision for UB ADLs, Min A for LB ADLs, Supervision for toileting, Supervision for bed mobility, and Supervision for functional mobility/transfers with use of RW. Pt with the following performance deficits; decreased strength , decreased balance, decreased activity tolerance, limited functional reach, increased pain, hypotension, and dizziness.  Pt also with the following personal factors; BRANDEN home environment, steps within home environment, difficulty performing ADLs, difficulty performing IADLs, difficulty performing transfers/mobility, fall risk , and new use of AD for functional transfers/mobility. Based on pt current functional status, pt  would benefit from Level III (minimum resource intensity) at D/C. The patient's raw score on the AM-PAC Daily Activity Inpatient Short Form is 19. A raw score of greater than or equal to 19 suggests the patient may benefit from discharge to home. Please refer to the recommendation of the Occupational Therapist for safe discharge planning.  Pt will continue to be seen 3-5x/week for skilled OT services while admitted to improve current functional status and performance with ADLs, transfers/mobility, endurance, activity tolerance.     Rehab Resource Intensity Level, OT: III (Minimum Resource Intensity) (Outpatient PT)

## 2024-06-03 NOTE — OCCUPATIONAL THERAPY NOTE
Occupational Therapy Evaluation     Patient Name: Manuela Camacho  Today's Date: 6/3/2024  Problem List  Principal Problem:    Primary osteoarthritis of left knee    Past Medical History  Past Medical History:   Diagnosis Date    Acute diffuse otitis externa of right ear 08/23/2021    Anemia     Anxiety     Asthma     controlled  seasonal    Diabetes mellitus (HCC)     pre diabetes    Gestational diabetes mellitus     Hyperlipidemia     Hypertension     Migraines     Other chest pain 01/27/2020    PONV (postoperative nausea and vomiting)     Pregnancy     Resolved: 07/29/2015    Trigger finger     Resolved: 12/02/2016    Yeast infection     Resolved: 07/29/2015     Past Surgical History  Past Surgical History:   Procedure Laterality Date    COLONOSCOPY      COLPORRHAPHY N/A 06/20/2022    Procedure: ANTERIOR COLPORRHAPHY;  Surgeon: Ovi Nice MD;  [facility];  Service: UroGynecology           CYSTOSCOPY N/A 06/20/2022    Procedure: CYSTOSCOPY;  Surgeon: Ovi Nice MD;  [facility];  Service: UroGynecology           INCISION TENDON SHEATH HAND Left     thumb    NM ARTHRS KNE SURG W/MENISCECTOMY MED/LAT W/SHVG Left 11/05/2021    Procedure: KNEE ARTHROSCOPIC MENISCECTOMY Medial;  Surgeon: De Newby MD;  [facility];  Service: Orthopedics    NM HYSTEROSCOPY BX ENDOMETRIUM&/POLYPC W/WO D&C N/A 10/14/2019    Procedure: DILATATION AND CURETTAGE (D&C) WITH HYSTEROSCOPY W/ NOVASURE;  Surgeon: Abril Dean DO;  [facility];  Service: Gynecology    NM HYSTEROSCOPY ENDOMETRIAL ABLATION N/A 10/14/2019    Procedure: ABLATION ENDOMETRIAL NOVASURE;  Surgeon: Abril Dean DO;  [facility];  Service: Gynecology    NM RPR UMBILICAL HRNA 5 YRS/> REDUCIBLE N/A 08/23/2018    Procedure: UMBILICAL HERNIA REPAIR WITH MESH;  Surgeon: Gonzalo Chambers MD;  [facility];  Service: General    PUBOVAGINAL SLING N/A 06/20/2022    Procedure:  PUBOVAGINAL SLING;  Surgeon: Ovi Nice MD;  [facility];  Service: UroGynecology           WISDOM TOOTH EXTRACTION           06/03/24 4265   OT Last Visit   OT Visit Date 06/03/24   Note Type   Note type Evaluation   Pain Assessment   Pain Assessment Tool 0-10   Pain Score 5   Pain Location/Orientation Orientation: Left;Location: Knee   Patient's Stated Pain Goal No pain   Hospital Pain Intervention(s) Repositioned;Ambulation/increased activity;Elevated;Emotional support;Rest   Restrictions/Precautions   Weight Bearing Precautions Per Order Yes   LLE Weight Bearing Per Order WBAT   Other Precautions Multiple lines;Telemetry;Fall Risk;Pain   Home Living   Type of Home House   Home Layout Two level;1/2 bath on main level;Bed/bath upstairs;Stairs to enter without rails  (2 BRANDEN; pt reports able to stay on 1st level couch if needed)   Bathroom Shower/Tub Walk-in shower   Bathroom Toilet Standard  (Pt reports may having 1 raised toilet)   Bathroom Equipment Commode;Other (Comment)  (Pt reports looking into buying grab bars and shower chair)   Home Equipment Walker;Cane   Additional Comments Pt lives with spouse in a 2 level home with 2 BRANDEN, 1/2 bath on 1st level, and 1 FOS to 2nd floor bed/main bath.   Prior Function   Level of [place] Independent with ADLs;Independent with functional mobility;Independent with IADLS   Lives With Spouse   Receives Help From Family   IADLs Independent with driving;Independent with meal prep;Independent with medication management   Falls in the last 6 months 0   Vocational Full time employment  (Pt reports off for the summer)   Comments PTA pt was I with ADLs, I with IADLs, I with functional transfers/mobility w/o use of AD. (+) . Pt denies falls PTA. Pt reports spouse is home and able to assist as needed.   Lifestyle   Autonomy PTA pt was I with ADLs, I with IADLs, I with functional transfers/mobility w/o use of AD. (+) . Pt denies falls PTA. Pt reports  spouse is home and able to assist as needed.   Reciprocal Relationships Spouse   Service to Others    Intrinsic Gratification spending time with friends   General   Family/Caregiver Present Yes   ADL   Where Assessed Chair   Eating Assistance 7  Independent   Grooming Assistance 5  Supervision/Setup   UB Bathing Assistance 5  Supervision/Setup   LB Bathing Assistance 4  Minimal Assistance   UB Dressing Assistance 5  Supervision/Setup   LB Dressing Assistance 4  Minimal Assistance   Toileting Assistance  5  Supervision/Setup   Additional Comments Pt reports spouse able to assist with LB ADLs as needed upon D/C. Pt educated on LH AE, however declining trial at this time.   Bed Mobility   Supine to Sit 5  Supervision   Additional items Increased time required   Sit to Supine Unable to assess   Additional Comments BP supine in bed 113/74. BP seated /86. BP standing 120/80.   Transfers   Sit to Stand 5  Supervision   Additional items Bedrails;Increased time required;Verbal cues   Stand to Sit 5  Supervision   Additional items Armrests;Increased time required;Verbal cues   Additional Comments Verbal cues for safe technique and L LE placement.   Functional Mobility   Functional Mobility 5  Supervision   Additional Comments Fair- dynamic standing balance   Additional items Rolling walker   Balance   Static Sitting Good   Dynamic Sitting Fair +   Static Standing Fair   Dynamic Standing Fair -   Ambulatory Fair -   Activity Tolerance   Activity Tolerance (S)  Patient tolerated treatment well;Treatment limited secondary to medical complications (Comment)  (Pt with reports of dizziness toward end of mobility. BP after mobility 112/65. Pt educated and complete B/L ankle pumps. Pt resported increase in dizziness, BP 76/52. Pt reclined in chair to supine, BP 92/62. RN aware.)   Medical Staff Made Aware MILES Norris   Nurse Made Aware yes   RUE Assessment   RUE Assessment WFL  (grossly assessed with  funcitonal activity 4/5 t/o)   LUE Assessment   LUE Assessment WFL  (grossly assessed with funcitonal activity 4/5 t/o)   Hand Function   Gross Motor Coordination Functional   Fine Motor Coordination Functional   Sensation   Light Touch No apparent deficits   Proprioception   Proprioception No apparent deficits   Vision-Basic Assessment   Current Vision Other (Comment)  (Has glasses but reports not wearing them.)   Vision - Complex Assessment   Ocular Range of Motion Intact   Acuity Able to read employee name badge without difficulty   Psychosocial   Psychosocial (WDL) WDL   Perception   Inattention/Neglect Appears intact   Cognition   Overall Cognitive Status WFL   Arousal/Participation Alert;Responsive;Arousable   Attention Within functional limits   Orientation Level Oriented X4   Memory Within functional limits   Following Commands Follows all commands and directions without difficulty   Assessment   Limitation Decreased ADL status;Decreased endurance;Decreased self-care trans;Decreased high-level ADLs   Prognosis Good   Assessment Pt 49 y.o. female presenting to [facility] s/p L- Total Knee Arthroplasty 6/3. WBAT L LE. Activity beginning POD #0. Pt with PMH HTN, Anxiety, Extrinsic Asthma, Hypercholesterolemia, Anxiety, OA of L knee. Pt with active OT orders to assess functional status and D/C planning. Pt with activity orders for up with assistance. Pt lives with spouse in a 2 level home with 2 BRANDEN, 1/2 bath on 1st level, and 1 FOS to 2nd floor bed/main bath. PTA pt was I with ADLs, I with IADLs, I with functional transfers/mobility w/o use of AD. (+) . Pt denies falls PTA. Pt reports spouse is home and able to assist as needed. Upon evaluation pt currently functioning at Supervision for UB ADLs, Min A for LB ADLs, Supervision for toileting, Supervision for bed mobility, and Supervision for functional mobility/transfers with use of RW. Pt with the following performance deficits; decreased  strength , decreased balance, decreased activity tolerance, limited functional reach, increased pain, hypotension, and dizziness.  Pt also with the following personal factors; BRANDEN home environment, steps within home environment, difficulty performing ADLs, difficulty performing IADLs, difficulty performing transfers/mobility, fall risk , and new use of AD for functional transfers/mobility. Based on pt current functional status, pt would benefit from Level III (minimum resource intensity) at D/C. The patient's raw score on the AM-PAC Daily Activity Inpatient Short Form is 19. A raw score of greater than or equal to 19 suggests the patient may benefit from discharge to home. Please refer to the recommendation of the Occupational Therapist for safe discharge planning.  Pt will continue to be seen 3-5x/week for skilled OT services while admitted to improve current functional status and performance with ADLs, transfers/mobility, endurance, activity tolerance.   Goals   Patient Goals to have less pain   LTG Time Frame 7-10   Long Term Goal #1 Please see LTGs listed below   Plan   Treatment Interventions ADL retraining;Functional transfer training;Patient/family training;Endurance training;Equipment evaluation/education;Continued evaluation;Activityengagement   Goal Expiration Date 06/13/24   OT Treatment Day 0   OT Frequency 3-5x/wk   Discharge Recommendation   Rehab Resource Intensity Level, OT III (Minimum Resource Intensity)  (Outpatient PT)   AM-PAC Daily Activity Inpatient   Lower Body Dressing 3   Bathing 3   Toileting 3   Upper Body Dressing 3   Grooming 3   Eating 4   Daily Activity Raw Score 19   Daily Activity Standardized Score (Calc for Raw Score >=11) 40.22   AM-PAC Applied Cognition Inpatient   Following a Speech/Presentation 4   Understanding Ordinary Conversation 4   Taking Medications 4   Remembering Where Things Are Placed or Put Away 4   Remembering List of 4-5 Errands 4   Taking Care of Complicated  Tasks 4   Applied Cognition Raw Score 24   Applied Cognition Standardized Score 62.21   End of Consult   Patient Position at End of Consult Supine;Bedside chair;All needs within reach  (RN present)     GOALS:  Pt will improve activity tolerance to good for 30 min txment sessions to increase engagement in functional tasks  Pt will complete LB ADLs with Mod I with proper technique and use of LH AE with DME as needed for balance/safety.  Pt will complete UB ADLs with Mod I with proper technique and use of with DME as needed for balance/safety.  Pt will complete toileting Mod I with good hygiene with DME as needed for balance/safety  Pt will complete bed mobility Mod I with Good balance and safety to decrease required assistance.  Pt will complete functional mobility Mod I on/off all surfaces with use of DME as needed for balance/safety.   Pt will complete functional transfers Mod I on/off all surfaces with use of DME as needed for balance/safety  Pt will correctly verbalize/identify 3 fall hazards and compensatory strategies to decrease fall risk in home environment.   Pt will increase dynamic standing endurance to complete functional task for 5-8 minutes with Fair+ standing balance with use of DME.  Pt will demonstrate 100% carryover of education and safe technique w/o cues for participation in functional tasks.     Poppy Shannon, OTS

## 2024-06-03 NOTE — ANESTHESIA PREPROCEDURE EVALUATION
Procedure:  ARTHROPLASTY KNEE TOTAL- SAME DAY (Left: Knee)    Relevant Problems   ANESTHESIA   (+) PONV (postoperative nausea and vomiting) (Resolved)      CARDIO   (+) Benign hypertension   (+) Hypercholesterolemia   (+) Hypertriglyceridemia      GI/HEPATIC   (+) Dysphagia      MUSCULOSKELETAL   (+) Primary osteoarthritis of left knee      NEURO/PSYCH   (+) Generalized anxiety disorder      PULMONARY   (+) Extrinsic asthma   (+) CHACE (obstructive sleep apnea)      Other   (+) Prediabetes        Physical Exam    Airway    Mallampati score: II         Dental       Cardiovascular  Rhythm: regular, Rate: normal    Pulmonary   Breath sounds clear to auscultation    Other Findings  post-pubertal.      Anesthesia Plan  ASA Score- 3     Anesthesia Type- spinal with ASA Monitors.         Additional Monitors:     Airway Plan:     Comment: Adductor block.       Plan Factors-Exercise tolerance (METS): >4 METS.    Chart reviewed.   Existing labs reviewed. Patient summary reviewed.    Patient is not a current smoker.      Obstructive sleep apnea risk education given perioperatively.        Induction- intravenous.    Postoperative Plan-         Informed Consent- Anesthetic plan and risks discussed with patient.

## 2024-06-03 NOTE — ANESTHESIA PROCEDURE NOTES
Spinal Block    Patient location during procedure: OR  Start time: 6/3/2024 9:07 AM  Reason for block: at surgeon's request and primary anesthetic  Staffing  Performed by: Suyapa Danielle CRNA  Authorized by: Maria Luisa Etienne DO    Preanesthetic Checklist  Completed: patient identified, IV checked, site marked, risks and benefits discussed, surgical consent, monitors and equipment checked, pre-op evaluation and timeout performed  Spinal Block  Patient position: sitting  Prep: Betadine  Patient monitoring: heart rate, continuous pulse ox and frequent blood pressure checks  Approach: midline  Location: L4-5  Needle  Needle type: Pencan   Needle gauge: 24 G  Needle length: 4 in  Assessment  Sensory level: T10  Injection Assessment:  negative aspiration for heme, no paresthesia on injection and positive aspiration for clear CSF.  Post-procedure:  site cleaned  Additional Notes  Perform by DANIEL Mariano

## 2024-06-03 NOTE — OP NOTE
OPERATIVE REPORT  PATIENT NAME: Manuela Camacho  : 1975  MRN: [id number]  Pt Location:  [facility] OR ROOM 03    Surgery Date: 6/3/2024    Surgeons and Role:     * De Napier, DO - Primary     * Suyapa Matute, PA-C - Assisting      * Pepper Villalobos, OT-C - Assisting     Preop Diagnosis:  Primary osteoarthritis of left knee [M17.12]  Chronic pain of left knee [M25.562, G89.29]    Post-Op Diagnosis Codes:     * Primary osteoarthritis of left knee [M17.12]     * Chronic pain of left knee [M25.562, G89.29]    Procedure(s):  Left - ARTHROPLASTY KNEE TOTAL- SAME DAY    Specimens:  * No specimens in log *    Estimated Blood Loss:   25 cc    Drains:  * No LDAs found *    Anesthesia Type:   Spinal      Operative Indications:  Primary osteoarthritis of left knee [M17.12]  Chronic pain of left knee [M25.562, G89.29]    50 y/o female with left knee pain secondary to severe osteoarthritis .  Xrays of the left knee again reviewed showing severe arthritic changes with decreased joint space, osteophyte formation, and varus alignment.  On physical exam she has pain in the medial joint line, but has maintained relatively good motion. She states that the pain in her left knee is affecting her ADLs and her quality of life. The patient has elected to proceed with left TKA. Risks and benefits of surgery to include but not limited to bleeding, infection, damage to surrounding structures, hardware failure, instability, fracture, dislocation, need for further surgery, continued pain, stiffness, blood clots, stroke, and heart attack was discussed with the patient.     Operative Findings:  Severe tricompartmental osteoarthritis   Pre-op ROM 0-120  Post-op ROM 0-130+ calf to thigh gravity-assisted flexion    Implant Name Type Inv. Item Serial No.  Lot No. LRB No. Used Action   BASEPLATE TIBIAL SZ 4 FX BRNG  ATTUNE - [id number]  BASEPLATE TIBIAL SZ 4 FX BRNG  ATTUNE  [id number] Left 1 Implanted   COMPONENT PATELLAR 32MM  MEDIAL DOME ATTUNE - [id number]  COMPONENT PATELLAR 32MM MEDIAL DOME ATTUNE  DEPUY [id number] Left 1 Implanted   COMPONENT FEM SZ 5 LT NRW  CR ATTUNE - [id number]  COMPONENT FEM SZ 5 LT NRW  CR ATTUNE  DEPUY [id number] Left 1 Implanted   INSERT TIB SZ 5 6MM LT FX BRNG MED STAB ATTUNE - [id number]  INSERT TIB SZ 5 6MM LT FX BRNG MED STAB ATTUNE  DEPUY [id number] Left 1 Implanted     Complications:   None    Knee Technique: Suture (direct) Repair  Knee Approach: Medial Parapatellar    Procedure and Technique:  Patient was seen in the preoperative holding area.  Informed consent was confirmed and all questions were answered. Operative site was confirmed and marked. Patient was taken to the operating room and transferred to the operating room table. Anesthesia was performed. The patient was then placed supine and all bony prominences were well-padded. Left lower extremity was prepped and draped in usual sterile fashion with Duraprep scrub due to CHG allergy. Patient was given perioperative antibiotics prior to incision and SCDs were placed on the non-operative leg.  A formal time-out was performed identifying the patient and confirmed operative site.  The knee was exsanguinated and a pneumatic tourniquet was inflated at 250 mmHg.  A slightly medial midline incision was performed from 3 fingerbreadths above the patella to the tibial tubercle.  This incision was carried down through skin and subcutaneous tissues to the level of the extensor mechanism.  A small medial skin flap was created.  A medial parapatellar approach was performed into the knee being careful to avoid the patellar tendon.  The anterior horn of the medial and lateral meniscus was released.  The medial peel was performed to the mid coronal line.  Lateral patellofemoral plica was excised and the patella was everted.  The fat pad was removed being careful to avoid the patellar tendon.  Peripheral osteophytes removed at this time as was the anterior cruciate  ligament.  The intramedullary femoral drill was now used just medial and anterior to the PCL insertion at the sulcus terminalis.  A drop richard was used to confirm intramedullary placement of the drill.  The distal femoral cutting jig was now inserted into the intramedullary canal set to 5° of valgus per pre-op template and 9 mm distal resection.  Distal resection was checked with an Geovanny wing and deemed appropriate.  The distal femur was cut.  At this time the distal femoral cutting guide was removed and the sizing guide was placed on the distal femur.  A posterior referencing system was used and pins placed with 3° of external rotation.  The femur was sized to the above size.  The appropriate cutting block was then placed over the pins and rotation was confirmed being parallel to the epicondylar access and perpendicular to Whitesides line.  Retractors were placed to protect the collateral ligaments and the anterior, posterior, posterior chamfer, anterior chamfer was cut. The distal 5th cut was also performed.  Bone fragments were removed with curved osteotome and then posterior Hohmann was placed to sublux the tibia forward.  An extramedullary tibial guide was used veing cognizant of varus valgus alignment, slope and depth of resection.  The guide was pinned in place and then a drop richard was used to confirm appropriate alignment.  The tibia was cut and removed.  At this time the bilateral menisci and posterior osteophytes of the femur were resected with the use of a laminar .  Once adequate osteophytes were removed the knee was trialed with the above implants.  The knee was found to have excellent stability with a 6 mm MS insert.  At this time the patella was measured and resected to appropriate depth.  The patella sized to the above size and 3 drill holes were performed.  At this time the knee was again taken through range of motion and found to have excellent stability and excellent patellar tracking.  The  trials were floated and rotation of tibia marked.  The femoral lug drills were drilled.  The femur and trial poly were removed and posterior Hohmann placed to sublux the tibia forward.  The Press-Fit tibial base plate was then aligned on the cut surface of the tibia matching where the trial was floated at approximately the medial 1/3 the tibial tubercle.  The base plate was pinned in place and prep tower impacted.  The Boss Reamer for the Press-Fit tibia was used 1st followed by keel punch.  The peripheral holes were then drilled as well.  At this time all trials were removed and the knee was copiously irrigated with normal saline solution.  The Press-Fit base plate was impacted into place and assured to be flush in all corners.  The MS polyethylene was impacted into the clean tray. The posterior Hohmann was then removed.  The Press-Fit femoral component was impacted in place and assured to be flush on all bony surfaces.  The patella cut surface was dried and the domed patella was cemented into place and held with a clamp.  Peripheral cement was cleared and the clamp was held until cement cured.  The tourniquet was released at 27 minutes and all bleeders were coagulated.  The knee was irrigated with Irrisept solution and then the remainder of the 3 L of normal saline solution.  The joint local was injected in the posterior capsule and around the tissues of the knee.  The knee was taken through a final range of motion and found to have excellent stability and patellar tracking.  All instrument and sponge counts were correct x2.  The arthrotomy was closed with #1 Stratafix suture.  Subcutaneous tissues were closed with 2-0 Vicryl.  The skin was closed with 3-0 Stratafix followed by Dermabond.  A sterile Mepilex was placed along with a thigh foot Ace wrap.  Patient was awoken from anesthesia and taken to recovery room in stable condition.    49F s/p L TKA 6/3  - multi-modal pain control  - ancef x 24 hrs, duricef x 5  days as planned same day discharge   - DVT ppx: aspirin 81mg BID x 4 weeks  - PT/OT  - WBAT  - ROM as tolerated, pillow/blankets under achilles not behind knee while in bed  - f/u 10-14 days      I was present for all critical portions of the procedure., A qualified resident physician was not available., and A physician assistant was required during the procedure for retraction, tissue handling, dissection and suturing.    Patient Disposition:  PACU       SIGNATURE: De Napier DO  DATE: Shellie 3, 2024  TIME: 10:11 AM

## 2024-06-03 NOTE — H&P
H&P Exam - Manuela Camacho 49 y.o. female MRN: [id number]    Unit/Bed#:  Encounter: [id number]    Assessment:  47 y/o female with left knee pain secondary to severe osteoarthritis .  Xrays of the left knee again reviewed showing severe arthritic changes with decreased joint space, osteophyte formation, and varus alignment.  On physical exam she has pain in the medial joint line, but has maintained relatively good motion.  Discussed conservative treatment options to include cortisone injections, visco injections, oral NSAIDs, Tylenol, activity modifications, staying active with low impact exercises, and weight loss.  She had cortisone at her last visit, which did well for a short period.  She had a visco injection in the office today.  She states that the pain in her left knee is affecting her ADLs and her quality of life.     Plan:  The patient has elected to proceed with left TKA. Risks and benefits of surgery to include but not limited to bleeding, infection, damage to surrounding structures, hardware failure, instability, fracture, dislocation, need for further surgery, continued pain, stiffness, blood clots, stroke, and heart attack was discussed with the patient. SOC medical clearance from 5/1 reviewed and agree without changes.     History of Present Illness   Patient is a 47 y/o female who presents today for a follow up evaluation of her left knee. She has known severe medial compartment OA of the left knee. She has been treating conservatively for left knee OA with intermittent injections, activity modifications, NSAIDs, and HEP. Her pain is intermittent in timing, and localizes the pain to the anterior medial joint line.     Review of Systems   Constitutional:  Negative for chills and diaphoresis.   HENT:  Negative for congestion.    Cardiovascular:  Negative for chest pain.   Gastrointestinal:  Negative for abdominal pain.   Endocrine: Negative for cold intolerance and heat intolerance.   Musculoskeletal:   Positive for arthralgias, gait problem and joint swelling.   Skin:  Negative for pallor.   Neurological:  Negative for dizziness.   Psychiatric/Behavioral:  Negative for agitation.        Historical Information   Past Medical History:   Diagnosis Date    Acute diffuse otitis externa of right ear 08/23/2021    Anemia     Anxiety     Asthma     controlled  seasonal    Diabetes mellitus (HCC)     pre diabetes    Gestational diabetes mellitus     Hyperlipidemia     Hypertension     Migraines     Other chest pain 01/27/2020    PONV (postoperative nausea and vomiting)     Pregnancy     Resolved: 07/29/2015    Trigger finger     Resolved: 12/02/2016    Yeast infection     Resolved: 07/29/2015     Past Surgical History:   Procedure Laterality Date    COLONOSCOPY      COLPORRHAPHY N/A 06/20/2022    Procedure: ANTERIOR COLPORRHAPHY;  Surgeon: Ovi Nice MD;  [facility];  Service: UroGynecology           CYSTOSCOPY N/A 06/20/2022    Procedure: CYSTOSCOPY;  Surgeon: Ovi Nice MD;  [facility];  Service: UroGynecology           INCISION TENDON SHEATH HAND Left     thumb    KY ARTHRS KNE SURG W/MENISCECTOMY MED/LAT W/SHVG Left 11/05/2021    Procedure: KNEE ARTHROSCOPIC MENISCECTOMY Medial;  Surgeon: De Newby MD;  [facility];  Service: Orthopedics    KY HYSTEROSCOPY BX ENDOMETRIUM&/POLYPC W/WO D&C N/A 10/14/2019    Procedure: DILATATION AND CURETTAGE (D&C) WITH HYSTEROSCOPY W/ NOVASURE;  Surgeon: Abril Dean DO;  [facility];  Service: Gynecology    KY HYSTEROSCOPY ENDOMETRIAL ABLATION N/A 10/14/2019    Procedure: ABLATION ENDOMETRIAL NOVASURE;  Surgeon: Abril Dean DO;  [facility];  Service: Gynecology    KY RPR UMBILICAL HRNA 5 YRS/> REDUCIBLE N/A 08/23/2018    Procedure: UMBILICAL HERNIA REPAIR WITH MESH;  Surgeon: Gonzalo Chambers MD;  Location: [facility];  Service: General    PUBOVAGINAL SLING N/A 06/20/2022    Procedure:  "PUBOVAGINAL SLING;  Surgeon: Ovi Nice MD;  [facility];  Service: UroGynecology           WISDOM TOOTH EXTRACTION       Social History   Social History     Substance and Sexual Activity   Alcohol Use Yes    Alcohol/week: 2.0 standard drinks of alcohol    Types: 2 Glasses of wine per week    Comment: 1-2 drinks per week on average typically consumes beer     Social History     Substance and Sexual Activity   Drug Use No     Social History     Tobacco Use   Smoking Status Never   Smokeless Tobacco Never     E-Cigarette Use: Never User     E-Cigarette/Vaping Substances    Nicotine No     THC No     CBD No     Flavoring No     Other No        Family History: non-contributory    Meds/Allergies   all medications and allergies reviewed  Allergies   Allergen Reactions    Hibiclens [Chlorhexidine Gluconate] Rash     Red with pimples    Pollen Extract Nasal Congestion       Objective   First Vitals:   Height: 5' 3\" (160 cm) (05/29/24 1424)  Weight - Scale: 67.6 kg (149 lb) (05/29/24 1424)    Current Vitals:   Height: 5' 3\" (160 cm) (05/29/24 1424)  Weight - Scale: 67.6 kg (149 lb) (05/29/24 1424)    No intake or output data in the 24 hours ending 06/03/24 0717    Invasive Devices       None                   Respiratory:   non-labored respirations     Lymphatics:  no palpable lymph nodes     Gait:   Antalgic     Neurologic:   Alert and oriented times  Patient with normal sensation except as noted below  Deep tendon reflexes 2+ except as noted in MSK exam     Bilateral Lower Extremity:  Left Knee:      Inspection:  Skin is intact without erythema or ecchymosis    Overall limb alignment Varus    Effusion: Trace, +soft tissue swelling    ROM 3-120 with pain    Extensor Lag: None    Palpation: Medial Joint line tenderness to palpation    AP Stability at 90 deg Stable    M/L stability in full extension Stable    M/L stability in midflexion Stable    Motor: 5/5 Q/HS/TA/GS/P    Pulses: 2+ DP / 2+ PT    SILT " DP/SP/S/S/TN    Lab Results: reviewed by myself and SOC  Imaging: XR left knee: severe joint space narrowing, subchondral sclerosis, subchondral cysts, osteophyte formation. No fracture or dislocation.   EKG, Pathology, and Other Studies: reviewed by SOC    Code Status: No Order  Advance Directive and Living Will:      Power of :    POLST:      Counseling / Coordination of Care:   None

## 2024-06-03 NOTE — PHYSICAL THERAPY NOTE
PT EVALUATION    Pt. Name: Manuela Camacho  Pt. Age: 49 y.o.  MRN: [id number]  LENGTH OF STAY: 0    Patient Active Problem List   Diagnosis    Benign hypertension    Dysphagia    Extrinsic asthma    Generalized anxiety disorder    Hypercholesterolemia    Hypertriglyceridemia    Menorrhagia with regular cycle    Elevated bilirubin    Obesity (BMI 35.0-39.9 without comorbidity)    Family history of diabetes mellitus    Prediabetes    CHACE (obstructive sleep apnea)    Primary osteoarthritis of left knee    Chronic pain of left knee    Mixed dyslipidemia    Family history of premature CAD       Admitting Diagnoses:   Primary osteoarthritis of left knee [M17.12]  Chronic pain of left knee [M25.562, G89.29]    Past Medical History:   Diagnosis Date    Acute diffuse otitis externa of right ear 08/23/2021    Anemia     Anxiety     Asthma     controlled  seasonal    Diabetes mellitus (HCC)     pre diabetes    Gestational diabetes mellitus     Hyperlipidemia     Hypertension     Migraines     Other chest pain 01/27/2020    PONV (postoperative nausea and vomiting)     Pregnancy     Resolved: 07/29/2015    Trigger finger     Resolved: 12/02/2016    Yeast infection     Resolved: 07/29/2015       Past Surgical History:   Procedure Laterality Date    COLONOSCOPY      COLPORRHAPHY N/A 06/20/2022    Procedure: ANTERIOR COLPORRHAPHY;  Surgeon: Ovi Nice MD;  [facility];  Service: UroGynecology           CYSTOSCOPY N/A 06/20/2022    Procedure: CYSTOSCOPY;  Surgeon: Ovi Nice MD;  [facility];  Service: UroGynecology           INCISION TENDON SHEATH HAND Left     thumb    VA ARTHRS KNE SURG W/MENISCECTOMY MED/LAT W/SHVG Left 11/05/2021    Procedure: KNEE ARTHROSCOPIC MENISCECTOMY Medial;  Surgeon: De Newby MD;  [facility];  Service: Orthopedics    VA HYSTEROSCOPY BX ENDOMETRIUM&/POLYPC W/WO D&C N/A 10/14/2019    Procedure: DILATATION AND CURETTAGE (D&C) WITH  HYSTEROSCOPY W/ NOVASURE;  Surgeon: Abril Dean DO;  [facility];  Service: Gynecology    HI HYSTEROSCOPY ENDOMETRIAL ABLATION N/A 10/14/2019    Procedure: ABLATION ENDOMETRIAL NOVASURE;  Surgeon: Abril Dean DO;  [facility];  Service: Gynecology    HI RPR UMBILICAL HRNA 5 YRS/> REDUCIBLE N/A 08/23/2018    Procedure: UMBILICAL HERNIA REPAIR WITH MESH;  Surgeon: Gonzalo Chambers MD;  [facility];  Service: General    PUBOVAGINAL SLING N/A 06/20/2022    Procedure: PUBOVAGINAL SLING;  Surgeon: Ovi Nice MD;  [facility];  Service: UroGynecology           WISDOM TOOTH EXTRACTION         Imaging Studies:  XR knee left 1 or 2 views    (Results Pending)         06/03/24 1455   PT Last Visit   PT Visit Date 06/03/24   Note Type   Note type Evaluation   Pain Assessment   Pain Assessment Tool 0-10   Pain Score 5   Pain Location/Orientation Orientation: Left;Location: Knee   Patient's Stated Pain Goal No pain   Hospital Pain Intervention(s) Cold applied;Repositioned;Emotional support;Elevated;Rest   Restrictions/Precautions   Weight Bearing Precautions Per Order Yes   LLE Weight Bearing Per Order WBAT   Other Precautions Multiple lines;Pain;Fall Risk   Home Living   Type of Home House   Home Layout Two level;Bed/bath upstairs;Stairs to enter without rails  (2 BRANDEN; can stay on 1st floor and sleep on couch if needed)   Bathroom Shower/Tub Walk-in shower   Bathroom Toilet Standard   Bathroom Equipment Other (Comment);Commode  (Pt reports looking into buying grab bars and shower chair)   Home Equipment Walker;Cane   Additional Comments Pt lives with spouse in a 2 level home with 2 BRANDEN, 1/2 bath on 1st level, and 1 FOS to 2nd floor bed/main bath.   Prior Function   Level of [place] Independent with ADLs;Independent with functional mobility;Independent with IADLS   Lives With Spouse   Receives Help From Family   IADLs Independent with driving;Independent with meal  prep;Independent with medication management   Falls in the last 6 months 0   Vocational Full time employment   Comments PTA pt was I with ADLs, I with IADLs, I with functional transfers/mobility w/o use of AD. (+) . Pt denies falls PTA. Pt reports spouse is home and able to assist as needed.   General   Family/Caregiver Present Yes   Cognition   Overall Cognitive Status WFL   Arousal/Participation Alert   Attention Within functional limits   Orientation Level Oriented X4   Following Commands Follows all commands and directions without difficulty   Comments Pt pleasant and motivated   RUE Assessment   RUE Assessment   (see OT note)   LUE Assessment   LUE Assessment   (see OT note)   RLE Assessment   RLE Assessment WFL   LLE Assessment   LLE Assessment X  (did not assess knee due to post op, able to flex hip and move ankle through normal ROM)   Light Touch   RLE Light Touch Grossly intact   LLE Light Touch Grossly intact   Bed Mobility   Supine to Sit 5  Supervision   Additional items Increased time required;Verbal cues   Sit to Supine Unable to assess   Additional Comments BP supine in bed 113/74. BP seated /86. BP standing 120/80. left in recliner chair at end of session   Transfers   Sit to Stand 5  Supervision   Additional items Increased time required;Verbal cues  (RW)   Stand to Sit 5  Supervision   Additional items Increased time required;Verbal cues  (RW)   Additional Comments cues for RW safety and hand placement   Ambulation/Elevation   Gait pattern Narrow SHAHRAM;Improper Weight shift;Step to;Excessively slow;Decreased toe off;Decreased heel strike   Gait Assistance 5  Supervision   Additional items Verbal cues   Assistive Device Rolling walker   Distance 80'x2   Stair Management Assistance 5  Supervision   Additional items Verbal cues;Increased time required   Stair Management Technique Two rails;Foreward;Step to pattern   Number of Stairs 5   Ambulation/Elevation Additional Comments cues for RW  safety and LE sequencing during steps   Balance   Static Sitting Good   Dynamic Sitting Fair +   Static Standing Fair   Dynamic Standing Fair -   Ambulatory Fair -   Endurance Deficit   Endurance Deficit Yes   Endurance Deficit Description fatigue and pain   Activity Tolerance   Activity Tolerance (S)  Patient tolerated treatment well;Treatment limited secondary to medical complications (Comment)  (Pt with reports of dizziness toward end of mobility. BP after mobility 112/65. Pt educated and complete B/L ankle pumps. Pt reported increase in dizziness, BP 76/52. Pt reclined in chair to supine, BP 92/62. RN aware.)   Medical Staff Made Aware OT Kaylyn/ ROSALINA Mcwilliams   Nurse Made Aware yes   Assessment   Prognosis Good   Problem List Decreased strength;Decreased range of motion;Decreased endurance;Impaired balance;Decreased mobility;Pain;Orthopedic restrictions   Assessment Pt is a 49 y.o. female who presented to [facility] on [facility]/2024 s/p L TKA done by Dr. Napier. Precautions include WBAT. Pt  has a past medical history of Acute diffuse otitis externa of right ear (08/23/2021), Anemia, Anxiety, Asthma, Diabetes mellitus, Hyperlipidemia, Hypertension, Migraines, Other chest pain (01/27/2020), PONV , Pregnancy, Trigger finger, and Yeast infection.. Pt greeted at bedside for PT evaluation on 06/03/24. Pt referred to PT for functional mobility evaluation & D/C planning w/ orders of activity beginning POD #0 and up w/ assistance. PTA, pt reports being I w/o AD and I w/ IADLs. Personal factors affecting pt at time of IE include: lives in 2 story house and stairs to enter home. Please find objective findings from PT assessment regarding body systems outlined above with impairments and limitations including weakness, decreased ROM, impaired balance, decreased endurance, gait deviations, pain, decreased activity tolerance, decreased functional mobility tolerance, fall risk, and orthopedic restrictions.  Please see flow sheet above  for objective findings and level of assistance required for safe completion of functional tasks. Pt was able to perform supine to sit with S and cues for hand placement. Pt able to perform sit <>stand with RW and S. BP taken t/o session, please see flowsheet for objective data. Pt able to ambulate 80'x2 with RW and S. Pt needed cues for RW safety and proper gait pattern with walker. Pt able to perform 5 steps with BL rails and S. Pt needed cues for LE sequencing, but good carryover noted. Pt noted some dizziness and a drop in BP following session, pt was left supine in recliner chair and RN present. Pt demonstrated dec endurance and tolerance to activity. Pt was educated on fall precautions and reinforced w/ good understanding. Based on pt presentation and impaired function, pt would benefit from level III, (minimal resource intensity) at D/C. From PT/mobility standpoint, pt would benefit from OPPT to address deficits as defined above and maximize level of independence and return pt to PLOF. HEP given and reviewed with patient, no questions at this time. CM to follow. Nsg staff to continue to mobilized pt (OOB in chair for all meals & ambulate in room/unit) as tolerated to prevent further decline in function. Nsg notified. Co-eval performed with OT to complete this evaluation for the pts best interest given pts medical complexity and functional level.   Barriers to Discharge None   Goals   Patient Goals to have less pain   STG Expiration Date 06/10/24   Short Term Goal #1 1).  Pt will perform bed mobility with [place] demonstrating appropriate technique 100% of the time in order to improve function.2)  Perform all transfers with Gu[place] demonstrating safe and appropriate technique 100% of the time in order to improve ability to negotiate safely in home environment.3) Amb with least restrictive AD > 200'x1 with Guero in order to demonstrate ability to negotiate in home environment.4)  Improve overall strength and balance  1/2 grade in order to optimize ability to perform functional tasks and reduce fall risk.5) Increase activity tolerance to 45 minutes in order to improve endurance to functional tasks.6)  Negotiate stairs using most appropriate technique and Guero in order to be able to negotiate safely in home environment. 7) PT for ongoing patient and family/caregiver education, DME needs and d/c planning in order to promote highest level of function in least restrictive environment.   Plan   Treatment/Interventions Functional transfer training;LE strengthening/ROM;Elevations;Therapeutic exercise;Endurance training;Patient/family training;Bed mobility;Gait training;Family;OT;Spoke to nursing   PT Frequency Twice a day  (prn)   Discharge Recommendation   Rehab Resource Intensity Level, PT III (Minimum Resource Intensity)   Equipment Recommended Walker  (pt owns)   Additional Comments The patient's AM-PAC Basic Mobility Inpatient Short Form Raw Score is 22. A Raw score of greater than 16 suggests the patient may benefit from discharge to home. Please also refer to the recommendation of the Physical Therapist for safe discharge planning.   AM-PAC Basic Mobility Inpatient   Turning in Flat Bed Without Bedrails 4   Lying on Back to Sitting on Edge of Flat Bed Without Bedrails 4   Moving Bed to Chair 4   Standing Up From Chair Using Arms 4   Walk in Room 3   Climb 3-5 Stairs With Railing 3   Basic Mobility Inpatient Raw Score 22   Basic Mobility Standardized Score 47.4   [facility] Highest Level Of Mobility   -HLM Goal 7: Walk 25 feet or more   -HLM Achieved 7: Walk 25 feet or more   End of Consult   Patient Position at End of Consult Bedside chair;All needs within reach   End of Consult Comments Pt stable, left in recliner chair with RN and  present. RN updated       Hx/personal factors: co-morbidities, mutliple lines, use of AD, pain, WB restrictions, and fall risk  Examination: dec mobility, dec balance, dec endurance, dec  amb, risk for falls, pain, assessed body system, balance, endurance, amb, D/C disposition & fall risk, WB restrictions, impairements in locomotion, musculoskeletal, balance, endurance, posture, coordination  Clinical: unpredictable (ongoing medical status, risk for falls, POD #0, and pain mgt)  Complexity: high     Myrna Everett, PT

## 2024-06-04 ENCOUNTER — TELEPHONE (OUTPATIENT)
Dept: OBGYN CLINIC | Facility: HOSPITAL | Age: 49
End: 2024-06-04

## 2024-06-04 NOTE — TELEPHONE ENCOUNTER
"Patient contacted for a postoperative follow up assessment. Patient reports \"more pain today.\"  Patient states current pain level of a  7/10. She is ambulating with the RW. Patient states they have minimal pain and it is relieved with medication regimen. Patient swelling, stating its \"the same\" and dressing is clean, dry and intact, stating \"it looks great.\" Patient is icing the site regularly, and we discussed continuing to ICE areas of pain and swelling, especially after increased activity.     We reviewed patients AVS medication list. Patient is taking Tylenol 1000mg every 8 hours, Oxycodone 5mg PRN, Celebrex 200mg BID, ASA 81mg BID, Duricef BID, and Senokot daily. Patient has not yet had a BM, we discussed starting today, and taking until having regular bowel movements. She used the Zofran this morning, when she had an episode of dizziness she became nauseas.      Patient denies nausea, vomiting, abdominal pain, chest pain, shortness of breath, fever,  and calf pain. We discussed her change of position dizziness she is experiencing, we discussed getting up from a sitting/lying position slowly, sitting at the edge of chair or bed before standing. We discussed increasing fluid intake, and eating small/frequent meals.       Patient does not have any other questions or concerns at this time. Pt was encouraged to call with any questions, concerns or issues.    "

## 2024-06-12 ENCOUNTER — PATIENT MESSAGE (OUTPATIENT)
Dept: OBGYN CLINIC | Facility: MEDICAL CENTER | Age: 49
End: 2024-06-12

## 2024-06-12 DIAGNOSIS — M17.12 PRIMARY OSTEOARTHRITIS OF LEFT KNEE: Primary | ICD-10-CM

## 2024-06-13 RX ORDER — AMOXICILLIN 500 MG/1
CAPSULE ORAL
Qty: 4 CAPSULE | Refills: 0 | Status: SHIPPED | OUTPATIENT
Start: 2024-06-13 | End: 2024-07-14

## 2024-06-18 ENCOUNTER — APPOINTMENT (OUTPATIENT)
Dept: RADIOLOGY | Facility: MEDICAL CENTER | Age: 49
End: 2024-06-18
Payer: COMMERCIAL

## 2024-06-18 ENCOUNTER — OFFICE VISIT (OUTPATIENT)
Dept: OBGYN CLINIC | Facility: MEDICAL CENTER | Age: 49
End: 2024-06-18

## 2024-06-18 VITALS
DIASTOLIC BLOOD PRESSURE: 85 MMHG | WEIGHT: 152 LBS | HEART RATE: 78 BPM | SYSTOLIC BLOOD PRESSURE: 115 MMHG | BODY MASS INDEX: 26.93 KG/M2 | HEIGHT: 63 IN

## 2024-06-18 DIAGNOSIS — Z96.652 STATUS POST TOTAL KNEE REPLACEMENT, LEFT: Primary | ICD-10-CM

## 2024-06-18 DIAGNOSIS — Z96.652 STATUS POST TOTAL KNEE REPLACEMENT, LEFT: ICD-10-CM

## 2024-06-18 PROCEDURE — 99024 POSTOP FOLLOW-UP VISIT: CPT | Performed by: STUDENT IN AN ORGANIZED HEALTH CARE EDUCATION/TRAINING PROGRAM

## 2024-06-18 PROCEDURE — 73562 X-RAY EXAM OF KNEE 3: CPT

## 2024-06-18 RX ORDER — OXYCODONE HYDROCHLORIDE 5 MG/1
5 TABLET ORAL EVERY 4 HOURS PRN
Qty: 42 TABLET | Refills: 0 | Status: SHIPPED | OUTPATIENT
Start: 2024-06-18 | End: 2024-06-28

## 2024-06-18 NOTE — PROGRESS NOTES
Subjective: Patient seen and examined. Pain controlled. Progressing well. Incisions without drainage. Denies fevers or chills. Has been weaning off oxycodone.     Physical Exam:  Incision: Clean, dry and intact without purulence  ROM: 3 degrees extension, 95 degrees flexion  Stable to valgus varus stress at 0, 30 and 90  5/5 IP/Q/HS/TA/GS, 2+ DP/PT, SILT DP/SP/S/S/TN    XR left knee: press-fit XR total knee arthroplasty, components in good position. No fracture or dislocation.     Assessment/Plan:  2 weeks s/p left press-fit total knee arthroplasty    - continue multi-modal pain control   - Weight bearing status: Weightbearing as tolerated and progress to cane  - DVT ppx: Continue aspirin for 2 more weeks  - Incision care: allow open to air, can get wet but no soaking  - PT/OT: referral provided  - F/U 4 weeks    Scribe Attestation      I,:  Daryn Palencia am acting as a scribe while in the presence of the attending physician.:       I,:  De Napier DO personally performed the services described in this documentation    as scribed in my presence.:

## 2024-06-19 ENCOUNTER — EVALUATION (OUTPATIENT)
Dept: PHYSICAL THERAPY | Facility: REHABILITATION | Age: 49
End: 2024-06-19
Payer: COMMERCIAL

## 2024-06-19 DIAGNOSIS — Z96.652 STATUS POST TOTAL KNEE REPLACEMENT, LEFT: Primary | ICD-10-CM

## 2024-06-19 PROCEDURE — 97161 PT EVAL LOW COMPLEX 20 MIN: CPT | Performed by: PHYSICAL THERAPIST

## 2024-06-19 NOTE — PROGRESS NOTES
PT Evaluation     Today's date: 2024  Patient name: Manuela Camacho  : 1975  MRN: [id number]  Referring provider: De Napier, *  Dx:   Encounter Diagnosis     ICD-10-CM    1. Status post total knee replacement, left  Z96.652 Ambulatory Referral to Physical Therapy                     Assessment  Impairments: abnormal gait, abnormal or restricted ROM, abnormal movement, activity intolerance, impaired balance, impaired physical strength, pain with function, weight-bearing intolerance, poor body mechanics, unable to perform ADL, participation limitations, activity limitations and endurance    Assessment details: 24    Patient is a 49 year old female presenting to physical therapy s/p left TKA performed on 6/3/24 and was discharged home the same day. She is presenting with left knee pain, swelling, TTP to left medial hamstrings, and limitations in left lower extremity strength, left knee AROM flexion/extension, ambulation, stair negotiation, and standing tolerance. Patient is currently ambulating with a SPC. Patient reports difficulty sleeping due to discomfort. Her incision is healing appropriately with no signs of infection. Patient would benefit from skilled physical therapy to improve left knee ROM, left lower extremity strength, endurance, activity tolerance, decrease pain and swelling, and improve functional mobility.   Understanding of Dx/Px/POC: good     Prognosis: good    Goals  STGs  1. Patient will achieve active left TKE after 6 weeks of physical therapy.   2. Patient will achieve 120 degrees active left knee flexion after 6 weeks of physical therapy.   3. Patient will decrease left knee swelling by 4 cm after 6 weeks of physical therapy.     LTGs   1. Patient will be able to ambulate 1 mile without SPC after 12 weeks of physical therapy.   2. Patient will be able to stand for 1 hour without rest breaks after 12 weeks of physical therapy.   3. Patient will decrease pain level by 50%  to achieve a full night sleep after 12 weeks of physical therapy.     Plan  Patient would benefit from: skilled physical therapy and PT eval  Planned modality interventions: cryotherapy    Planned therapy interventions: manual therapy, joint mobilization, balance, balance/weight bearing training, body mechanics training, neuromuscular re-education, patient/caregiver education, self care, stretching, strengthening, flexibility, functional ROM exercises, gait training, graded activity, graded exercise, home exercise program, IADL retraining, work reintegration, therapeutic training, therapeutic exercise and therapeutic activities    Frequency: 2x week  Duration in weeks: 12  Treatment plan discussed with: patient  Plan details: Prepare for walking on uneven surfaces for upcoming vacation to [place].     Subjective Evaluation    History of Present Illness  Date of surgery: 6/3/2024  Mechanism of injury: 06/19/24    Patient under went left TKA on 6/3/24 discharged home the same day with rolling walker. She says sleeping is the most difficult for her as she struggles to find a comfortable position. She has been using the rolling walker up until yesterday and has transitioned to [facility]. Patient reports no fever or chills since surgery. She says yesterday (6/18/24) was her first day driving since the surgery. She lives in a 2 level house with 13 steps and has been able to ascend/descend steps with assistive device in step-to pattern. She reports she has been using ice to help with swelling and pain, along with taking Asprin, Celebrex, and Tylenol. Patient reports numbness along medial aspect on left knee and medial hamstring tendon tenderness. Patient is a  and wants to get back to walking for leisure and increase standing tolerance to return to work in August. Patient was provided with HEP from hospital PT immediately after surgery, but patient was unclear on instructions contributing to her not performing HEP.  Her next scheduled appointment with the surgeon is in 4 weeks.   Patient Goals  Patient goals for therapy: decreased edema, increased strength, decreased pain, independence with ADLs/IADLs, return to sport/leisure activities, return to work and increased motion    Pain  At best pain rating: 3  At worst pain ratin  Relieving factors: ice and medications    Social Support  13  Lives in: multiple-level home  Lives with: spouse    Employment status: working        Objective     Observations   Left Knee   Positive for edema and incision.     Additional Observation Details  Left TKA incision healing appropriately, incision is closed, scarring over, and negative for drainage     Tenderness     Additional Tenderness Details  TTP to medial hamstring tendons    Active Range of Motion   Left Knee   Flexion: 95 degrees   Extensor lag: -5 degrees     Passive Range of Motion   Left Knee   Flexion: 101 degrees     Strength/Myotome Testing     Left Hip   Planes of Motion   Flexion: 3    Right Hip   Planes of Motion   Flexion: 4-    Left Knee   Flexion: 3  Extension: 3  Quadriceps contraction: fair    Right Knee   Flexion: 4  Extension: 4+    Left Ankle/Foot   Dorsiflexion: 4+  Plantar flexion: 5    Right Ankle/Foot   Dorsiflexion: 4+  Plantar flexion: 5    Swelling     Left Knee Girth Measurement (cm)   Joint line: 42.5 cm    Right Knee Girth Measurement (cm)   Joint line: 39 cm    Ambulation   Weight-Bearing Status   Assistive device used: single point cane             Precautions: DOS L TKA 6/3/24; DM II    Daily Treatment Diary    Date             FOTO IE            Re-Eval IE               Manuals    PROM flex/ext             Patellar mobs             PROM hip                          Neuro Re-Ed     Tandem Balance and walk on Foam When ready                                                                                          Ther Ex                 Mini squats             Stand hip abd, ext             HR/TR              Standing gastroc stretch             Knee flex/ext stretch on step             Hamstring S                          Quad sets              Heel slides             Bridges             SAQ                          Ther Activity    Bike  ROM            Step up/down                 Gait Training                              Modalities    CP PRN

## 2024-06-24 DIAGNOSIS — I10 BENIGN HYPERTENSION: ICD-10-CM

## 2024-06-24 RX ORDER — LOSARTAN POTASSIUM 100 MG/1
100 TABLET ORAL DAILY
Qty: 90 TABLET | Refills: 1 | Status: SHIPPED | OUTPATIENT
Start: 2024-06-24

## 2024-06-26 ENCOUNTER — OFFICE VISIT (OUTPATIENT)
Dept: PHYSICAL THERAPY | Facility: REHABILITATION | Age: 49
End: 2024-06-26
Payer: COMMERCIAL

## 2024-06-26 DIAGNOSIS — Z96.652 STATUS POST TOTAL KNEE REPLACEMENT, LEFT: Primary | ICD-10-CM

## 2024-06-26 PROCEDURE — 97140 MANUAL THERAPY 1/> REGIONS: CPT | Performed by: PHYSICAL THERAPIST

## 2024-06-26 PROCEDURE — 97110 THERAPEUTIC EXERCISES: CPT | Performed by: PHYSICAL THERAPIST

## 2024-06-26 PROCEDURE — 97112 NEUROMUSCULAR REEDUCATION: CPT | Performed by: PHYSICAL THERAPIST

## 2024-06-26 NOTE — PROGRESS NOTES
"Daily Note     Today's date: 2024  Patient name: Manuela Camacho  : 1975  MRN: [id number]  Referring provider: De Napier, *  Dx:   Encounter Diagnosis     ICD-10-CM    1. Status post total knee replacement, left  Z96.652                    Patient supervised through session by Azalea Pantoja SPT, with direct supervision by ANTHONY GipsonT.      Subjective: Patient says she has been feeling good. She has been performing her home exercises but felt she over did it so she cut back on her frequency of performing the exercises. She reports she has stopped using the cane at home for the majority of the time, but brings it with her when out in the community. Patient leaves for the beach on Tuesday.      Objective: See treatment diary below      Assessment: Patient tolerated treatment session well demonstrated by ability to achieve 115 degrees of left knee PROM flexion. Patient able to achieve full backwards revolutions while on the bike. She experienced dizziness during standing wall squats but felt better after a short rest break. Required 1 UE support for foam walk to mimic walking on sand and had no LOB.       Plan: Continue physical therapy following plan of care.      Precautions: DOS L TKA 6/3/24; DM II    Daily Treatment Diary    Date            FOTO IE            Re-Eval IE               Manuals    PROM flex/ext  DE           Patellar mobs  DE           PROM hip                          Neuro Re-Ed     Walk on Foam  x4                                                                                         Ther Ex                 Mini wall squats  5\"x10           Stand hip abd, ext             HR/TR             Standing gastroc stretch             Knee flex/ext stretch on step             Hamstring S  Seated 30\"x3                        Quad sets              Heel slides  Seated 10\"x10           Bridges             SAQ  15x                        Ther Activity    Bike  ROM 5' " ROM/  backwards           Step up/down                 Gait Training                              Modalities    CP PRN     Seated in extension 10'x2 ice packs

## 2024-06-28 ENCOUNTER — OFFICE VISIT (OUTPATIENT)
Dept: PHYSICAL THERAPY | Facility: REHABILITATION | Age: 49
End: 2024-06-28
Payer: COMMERCIAL

## 2024-06-28 DIAGNOSIS — Z96.652 STATUS POST TOTAL KNEE REPLACEMENT, LEFT: Primary | ICD-10-CM

## 2024-06-28 PROCEDURE — 97140 MANUAL THERAPY 1/> REGIONS: CPT | Performed by: PHYSICAL THERAPIST

## 2024-06-28 PROCEDURE — 97530 THERAPEUTIC ACTIVITIES: CPT | Performed by: PHYSICAL THERAPIST

## 2024-06-28 PROCEDURE — 97110 THERAPEUTIC EXERCISES: CPT | Performed by: PHYSICAL THERAPIST

## 2024-06-28 NOTE — PROGRESS NOTES
"Daily Note     Today's date: 2024  Patient name: Manuela Camacho  : 1975  MRN: [id number]  Referring provider: De Napier, *  Dx: No diagnosis found.               Patient supervised through session by Azalea Pantoja SPT, with direct supervision by Mitchell Nolasco DPT.    Subjective: Patient says she is feeling really good today and had some soreness while performing her HEP. She says she started to go up her steps at home, but has trouble going down the steps.     Objective: See treatment diary below      Assessment: Patient was able to complete 5 minutes on bike completing full forward revolutions. She presents to clinic without SPC, ambulating with decreased stance time on left leg and lacking left TKE. Patient abducts left hip with step ups as a compensation and is able to achieve TKE in standing after verbal cues.       Plan: Continue physical therapy following plan of care, progressing as tolerated.      Precautions: DOS L TKA 6/3/24; DM II    Daily Treatment Diary    Date           FOTO IE            Re-Eval IE               Manuals    PROM flex/ext  DE DE          Patellar mobs  DE DE          PROM hip                          Neuro Re-Ed     Walk on Foam  x4                                                                                         Ther Ex                 Mini wall squats  5\"x10 5\" 2x10          Stand hip abd, ext   NV          HR/TR             Standing gastroc stretch             Knee flex/ext stretch on step             Hamstring S  Seated 30\"x3 Supine 30\"x3                       Quad sets              Heel slides  Seated 10\"x10 Active 5\"x10          Bridges             LAQ   15x          SAQ  15x 15x                       Ther Activity    Bike  ROM 5' ROM/  backwards 5' forward          STS low mat   NV          Lat step up/down   1R 2x10          Step ups/down   2R 2x10              Gait Training                              Modalities    CP PRN   "   Seated in extension 10'x2 ice packs 10' seated extension 10'x2 ice packs

## 2024-06-29 ENCOUNTER — PATIENT MESSAGE (OUTPATIENT)
Dept: OBGYN CLINIC | Facility: MEDICAL CENTER | Age: 49
End: 2024-06-29

## 2024-06-29 DIAGNOSIS — Z96.652 STATUS POST TOTAL KNEE REPLACEMENT, LEFT: Primary | ICD-10-CM

## 2024-07-01 ENCOUNTER — PATIENT MESSAGE (OUTPATIENT)
Dept: OBGYN CLINIC | Facility: MEDICAL CENTER | Age: 49
End: 2024-07-01

## 2024-07-01 DIAGNOSIS — Z96.652 STATUS POST TOTAL KNEE REPLACEMENT, LEFT: Primary | ICD-10-CM

## 2024-07-01 RX ORDER — CELECOXIB 200 MG/1
200 CAPSULE ORAL 2 TIMES DAILY
Qty: 60 CAPSULE | Refills: 1 | Status: SHIPPED | OUTPATIENT
Start: 2024-07-01 | End: 2024-07-01

## 2024-07-01 RX ORDER — CELECOXIB 200 MG/1
200 CAPSULE ORAL 2 TIMES DAILY
Qty: 60 CAPSULE | Refills: 1 | Status: SHIPPED | OUTPATIENT
Start: 2024-07-01

## 2024-07-02 ENCOUNTER — OFFICE VISIT (OUTPATIENT)
Dept: PHYSICAL THERAPY | Facility: REHABILITATION | Age: 49
End: 2024-07-02
Payer: COMMERCIAL

## 2024-07-02 DIAGNOSIS — Z96.652 STATUS POST TOTAL KNEE REPLACEMENT, LEFT: Primary | ICD-10-CM

## 2024-07-02 PROCEDURE — 97140 MANUAL THERAPY 1/> REGIONS: CPT | Performed by: PHYSICAL THERAPIST

## 2024-07-02 PROCEDURE — 97110 THERAPEUTIC EXERCISES: CPT | Performed by: PHYSICAL THERAPIST

## 2024-07-02 PROCEDURE — 97530 THERAPEUTIC ACTIVITIES: CPT | Performed by: PHYSICAL THERAPIST

## 2024-07-02 NOTE — PROGRESS NOTES
"Daily Note     Today's date: 2024  Patient name: Manuela Camacho  : 1975  MRN: [id number]  Referring provider: De Napier, *  Dx:   Encounter Diagnosis     ICD-10-CM    1. Status post total knee replacement, left  Z96.652                    Patient supervised through session by Azalea Pantoja SPT, with direct supervision by ANTHONY GipsonT.    Subjective: Patient says she is feeling sore today, but overall feels good. She is nervous to go on vacation because she is worried about \"keeping up\".       Objective: See treatment diary below      Assessment: Patient experienced dizziness after completing 5 wall squats. This is the second time she has experienced dizziness with squats and discussed with patient she might performing a deeper squat in the clinic using the wall compared to performing at home where she thinks she is likely compensates and does not go as deep. Required longer rest break to wait until dizziness was decreased. Able to achieve left TKE during standing while performing step ups without verbal cues.       Plan: Continue physical therapy, progressing as tolerated.      Precautions: DOS L TKA 6/3/24; DM II    Daily Treatment Diary    Date          FOTO IE            Re-Eval IE               Manuals    PROM flex/ext  DE DE DE         Patellar mobs  DE DE DE         PROM hip             Hamstring/Gastroc S    NV                      Neuro Re-Ed     Walk on Foam  x4                                                                                         Ther Ex                 Mini wall squats  5\"x10 5\" 2x10 5\" x5 Hold         Stand hip abd, ext   NV          HR/TR             Standing gastroc stretch    30\"x3         Knee flex/ext stretch on step             Hamstring S  Seated 30\"x3 Supine 30\"x3 Seated 30\"x3                      Quad sets              Heel slides  Seated 10\"x10 Active 5\"x10 Active 10\"x10         Bridges             LAQ   15x          SAQ  15x 15x " "5\"x15                      Ther Activity    Bike  ROM 5' ROM/  backwards 5' forward 5'         STS low mat   NV NV         Lat step up/down   1R 2x10 1R x10         Step ups/down   2R 2x10 2R x10             Gait Training                              Modalities    CP PRN     Seated in extension 10'x2 ice packs 10' seated extension 10'x2 ice packs 10' seated extension 10'x2 ice packs                                   "

## 2024-07-05 ENCOUNTER — TELEPHONE (OUTPATIENT)
Age: 49
End: 2024-07-05

## 2024-07-05 DIAGNOSIS — E66.9 OBESITY (BMI 35.0-39.9 WITHOUT COMORBIDITY): ICD-10-CM

## 2024-07-05 DIAGNOSIS — G47.33 OSA (OBSTRUCTIVE SLEEP APNEA): ICD-10-CM

## 2024-07-05 DIAGNOSIS — R73.03 PREDIABETES: ICD-10-CM

## 2024-07-08 ENCOUNTER — TELEPHONE (OUTPATIENT)
Age: 49
End: 2024-07-08

## 2024-07-08 ENCOUNTER — OFFICE VISIT (OUTPATIENT)
Dept: PHYSICAL THERAPY | Facility: REHABILITATION | Age: 49
End: 2024-07-08
Payer: COMMERCIAL

## 2024-07-08 DIAGNOSIS — E66.9 OBESITY (BMI 35.0-39.9 WITHOUT COMORBIDITY): ICD-10-CM

## 2024-07-08 DIAGNOSIS — Z96.652 STATUS POST TOTAL KNEE REPLACEMENT, LEFT: Primary | ICD-10-CM

## 2024-07-08 DIAGNOSIS — R73.03 PREDIABETES: Primary | ICD-10-CM

## 2024-07-08 DIAGNOSIS — G47.33 OSA (OBSTRUCTIVE SLEEP APNEA): ICD-10-CM

## 2024-07-08 PROCEDURE — 97140 MANUAL THERAPY 1/> REGIONS: CPT | Performed by: PHYSICAL THERAPIST

## 2024-07-08 PROCEDURE — 97110 THERAPEUTIC EXERCISES: CPT | Performed by: PHYSICAL THERAPIST

## 2024-07-08 PROCEDURE — 97530 THERAPEUTIC ACTIVITIES: CPT | Performed by: PHYSICAL THERAPIST

## 2024-07-08 NOTE — TELEPHONE ENCOUNTER
Reason for call:   [] Refill   [] Prior Auth  [x] Other:     Patient called for the status of her mounjaro she stated that she got a message from her doctor stating it was ordered and sent to [facility], patient informed it was but it needs an prior authorization which can take up to 21 business days after the prior authorization have been initiated, I also advised our PA Team was notified on 07.05.2024.    She asked if there is anything se can do on her end, I advised once the PA is initiated she can call her insurance company but I am not sure when the PA will be initiated AND it MAY be by the end of the week.    Patient verbalized understanding.

## 2024-07-08 NOTE — TELEPHONE ENCOUNTER
Rachel from [facility] called on behalf of the patient. [facility] did not receive the prescription for the Moujaro. No PA is needed. Prescription can be called in to [facility] at ph#[phone]

## 2024-07-08 NOTE — PROGRESS NOTES
"Daily Note     Today's date: 2024  Patient name: Manuela Camacho  : 1975  MRN: [id number]  Referring provider: De Napier, *  Dx:   Encounter Diagnosis     ICD-10-CM    1. Status post total knee replacement, left  Z96.652                      Patient supervised through session by Azalea Pantoja SPT, with direct supervision by ANTHONY GipsonT.    Subjective: Patient says she was able to tolerate walking on vacation and performed her exercises daily while away. She had no problems walking on the sand while on the beach. Her next appointment with her orthopedic is scheduled for .       Objective: See treatment diary below      Assessment: Patient able to tolerate seated leg press without experiencing any dizziness compared to performing wall squats. Continues to ambulate in clinic lacking left TKE, but able to self correct after verbal cues and increased self awareness. Educated patient on prone knee hang to perform at home to improve left TKE. Able to perform step downs demonstrating eccentric control of left lower extremity to help improve descending stairs at home.      Plan: Continue physical therapy following plan of care, progressing as tolerated.        Precautions: DOS L TKA 6/3/24; DM II    Daily Treatment Diary    Date         FOTO IE            Re-Eval IE               Manuals    PROM flex/ext  DE DE DE DE        Patellar mobs  DE DE DE DE        PROM hip             Hamstring/Gastroc S    NV DE                     Neuro Re-Ed     Walk on Foam  x4                                                                                         Ther Ex                 Mini wall squats  5\"x10 5\" 2x10 5\" x5 Hold        SA leg press     22# x10  44# x10        Stand hip abd, ext   NV  2x10 ea        HR/TR             Standing gastroc stretch    30\"x3         Knee flex/ext stretch on step             Hamstring S  Seated 30\"x3 Supine 30\"x3 Seated 30\"x3 Supine 30\"x3           " "          Quad sets              Heel slides  Seated 10\"x10 Active 5\"x10 Active 10\"x10 Active c OP 10\"x10        Bridges             LAQ   15x          SAQ  15x 15x 5\"x15                      Ther Activity    Bike  ROM 5' ROM/  backwards 5' forward 5' 5'        STS low mat   NV NV x10        Lat step up/down   1R 2x10 1R x10 2R x10         Step downs- eccentric     2Rx15        Step ups/down   2R 2x10 2R x10 3Rx15            Gait Training                              Modalities    CP PRN     Seated in extension 10'x2 ice packs 10' seated extension 10'x2 ice packs 10' seated extension 10'x2 ice packs 10'   Seated extension x2 ice packs                                    "

## 2024-07-09 NOTE — TELEPHONE ENCOUNTER
Reason for call:   [] Refill   [] Prior Auth  [x] Other:     Patient called stated her insurance does not need a prior authorization for her mounjaro she spoke with the insurance company and they spoke with [employer] while she was on hold with ([employer]) her insurance company and [facility] does not have the medication. Patient is upset, she state she message her doctor and he stated he sees that it was sent to [employer], she is upset and state, this should not have happened she has been waiting for about a week and it does not need a prior authorization.       Patient informed that when the medication was prescribed the medication will not submit to the pharmacy because it need a PA, our PA Team initiates the prior auth on behalf of the patient. I also advised that her insurance company did call yesterday and stated NO PA is needed but it was not sent to the PA Team who would release the medication Please review and release medication if appropriate.    Patient would like a phone call to know when the medication is released to the pharmacy. She is nervous and upset.  Patient can be contacted at [phone]

## 2024-07-10 RX ORDER — TIRZEPATIDE 15 MG/.5ML
15 INJECTION, SOLUTION SUBCUTANEOUS
Qty: 6 ML | Refills: 0 | Status: SHIPPED | OUTPATIENT
Start: 2024-07-10

## 2024-07-10 NOTE — TELEPHONE ENCOUNTER
Patient called stating tirzepatide 15 mg is stuck in prior auth and does not need a prior auth per her insurance. Advised patient I would release the medication to [facility].

## 2024-07-12 ENCOUNTER — OFFICE VISIT (OUTPATIENT)
Dept: PHYSICAL THERAPY | Facility: REHABILITATION | Age: 49
End: 2024-07-12
Payer: COMMERCIAL

## 2024-07-12 DIAGNOSIS — Z96.652 STATUS POST TOTAL KNEE REPLACEMENT, LEFT: Primary | ICD-10-CM

## 2024-07-12 PROCEDURE — 97110 THERAPEUTIC EXERCISES: CPT | Performed by: PHYSICAL THERAPIST

## 2024-07-12 PROCEDURE — 97140 MANUAL THERAPY 1/> REGIONS: CPT | Performed by: PHYSICAL THERAPIST

## 2024-07-12 PROCEDURE — 97530 THERAPEUTIC ACTIVITIES: CPT | Performed by: PHYSICAL THERAPIST

## 2024-07-12 NOTE — PROGRESS NOTES
"Daily Note     Today's date: 2024  Patient name: Manuela Camacho  : 1975  MRN: [id number]  Referring provider: De Napier, *  Dx:   Encounter Diagnosis     ICD-10-CM    1. Status post total knee replacement, left  Z96.652                    Patient supervised through session by Azalea Pantoja SPT, with direct supervision by Mitchell Nolasco DPT.    Subjective: Patient says she has been feeling very sore this whole week and is trying to find a happy medium between amount of rest and activity. She continues to have pain, rating 6/10. She takes Celebrex and Tylenol for pain relief, but is frustrated with this continued pain. She sees orthopedic on Tuesday.       Objective: See treatment diary below      Assessment: Observable improvements in left TKE in standing, ambulation, and step ups. Educated patient on using her soreness as a guide for appropriate amount of activity. Educated patient on scar tissue massage along left knee. Left knee PROM extension: -2 degrees; Left knee PROM flexion: 124 degrees        Plan: Continue physical therapy progressing as tolerated.      Precautions: DOS L TKA 6/3/24; DM II    Daily Treatment Diary    Date        FOTO IE            Re-Eval IE               Manuals    PROM flex/ext  DE DE DE DE        Patellar mobs  DE DE DE DE        PROM hip             Hamstring/Gastroc S    NV DE                     Neuro Re-Ed     Walk on Foam  x4                                                                                         Ther Ex                 Mini wall squats  5\"x10 5\" 2x10 5\" x5 Hold Hold       SA leg press     22# x10  44# x10 66# x20       SA knee ext      22# x10       Stand hip abd, ext   NV  2x10 ea        HR/TR             Standing gastroc stretch    30\"x3  30\"x3       Knee flex/ext stretch on step             Hamstring S  Seated 30\"x3 Supine 30\"x3 Seated 30\"x3 Supine 30\"x3 Supine 30\"x3                    Quad sets            " "  Heel slides  Seated 10\"x10 Active 5\"x10 Active 10\"x10 Active c OP 10\"x10 Active c OP 10\"x10       Bridges             LAQ   15x          SAQ  15x 15x 5\"x15  np                    Ther Activity    Bike  ROM 5' ROM/  backwards 5' forward 5' 5' 5'       STS low mat   NV NV x10        Lat step up/down   1R 2x10 1R x10 2R x10  2Rx10       Step downs- eccentric     2Rx15 2Rx15       Step ups/down   2R 2x10 2R x10 3Rx15 3Rx20           Gait Training                              Modalities    CP PRN     Seated in extension 10'x2 ice packs 10' seated extension 10'x2 ice packs 10' seated extension 10'x2 ice packs 10'   Seated extension x2 ice packs Home                                      "

## 2024-07-15 ENCOUNTER — OFFICE VISIT (OUTPATIENT)
Dept: PHYSICAL THERAPY | Facility: REHABILITATION | Age: 49
End: 2024-07-15
Payer: COMMERCIAL

## 2024-07-15 DIAGNOSIS — Z96.652 STATUS POST TOTAL KNEE REPLACEMENT, LEFT: Primary | ICD-10-CM

## 2024-07-15 PROCEDURE — 97530 THERAPEUTIC ACTIVITIES: CPT | Performed by: PHYSICAL THERAPIST

## 2024-07-15 PROCEDURE — 97110 THERAPEUTIC EXERCISES: CPT | Performed by: PHYSICAL THERAPIST

## 2024-07-15 PROCEDURE — 97140 MANUAL THERAPY 1/> REGIONS: CPT | Performed by: PHYSICAL THERAPIST

## 2024-07-15 NOTE — PROGRESS NOTES
"Daily Note     Today's date: 7/15/2024  Patient name: Manuela Camacho  : 1975  MRN: [id number]  Referring provider: De Napier, *  Dx:   Encounter Diagnosis     ICD-10-CM    1. Status post total knee replacement, left  Z96.652                    Patient supervised through session by Azalea Pantoja SPT, with direct supervision by Trupti Vazquez DPT.    Subjective: Patient says she is feeling good and experienced no adverse effects following previous appointment. Patient is scheduled to see orthopedic doctor tomorrow.       Objective: See treatment diary below      Assessment: Tolerated treatment well. Patient exhibited good technique with therapeutic exercises. Patient demonstrated improved eccentric control with left LE step downs. Patient noted feeling more swelling and higher levels of pain today, but able to tolerate progression of exercises well with no negative effects.       Plan: Continue physical therapy, progressing as tolerated.      Precautions: DOS L TKA 6/3/24; DM II    Daily Treatment Diary    Date 6/19 6/26 6/28 7/2 7/8 7/12 7/15      FOTO IE            Re-Eval IE               Manuals    PROM flex/ext  DE DE DE DE DE DE      Patellar mobs  DE DE DE DE        PROM hip             Hamstring/Gastroc S    NV DE                     Neuro Re-Ed     Walk on Foam  x4                                                                                         Ther Ex                 Mini wall squats  5\"x10 5\" 2x10 5\" x5 Hold Hold       SA leg press     22# x10  44# x10 66# x20 66# x20      SA knee ext      22# x10 Knee flex 33#; knee ext 33# 2x10       SA knee ext- eccentric       22# x10      Stand hip abd, ext   NV  2x10 ea Home        HR/TR             Standing gastroc stretch    30\"x3  30\"x3 30\"x3      Knee flex/ext stretch on step             Hamstring S  Seated 30\"x3 Supine 30\"x3 Seated 30\"x3 Supine 30\"x3 Supine 30\"x3 Supine 30\"x3                   Quad sets              Heel slides  Seated " "10\"x10 Active 5\"x10 Active 10\"x10 Active c OP 10\"x10 Active c OP 10\"x10       Bridges             LAQ   15x          SAQ  15x 15x 5\"x15  np                    Ther Activity    Bike  ROM 5' ROM/  backwards 5' forward 5' 5' 5' 5'      STS low mat   NV NV x10        Lat step up/down   1R 2x10 1R x10 2R x10  2Rx10 Lateral heel taps  1Rx15      Step downs- eccentric     2Rx15 2Rx15 2Rx20      Step ups/down   2R 2x10 2R x10 3Rx15 3Rx20 3R x20          Gait Training                              Modalities    CP PRN     Seated in extension 10'x2 ice packs 10' seated extension 10'x2 ice packs 10' seated extension 10'x2 ice packs 10'   Seated extension x2 ice packs Home  10'   Seated extension x2 ice packs                                      "

## 2024-07-16 ENCOUNTER — OFFICE VISIT (OUTPATIENT)
Dept: OBGYN CLINIC | Facility: MEDICAL CENTER | Age: 49
End: 2024-07-16

## 2024-07-16 VITALS
HEART RATE: 76 BPM | HEIGHT: 63 IN | WEIGHT: 152 LBS | DIASTOLIC BLOOD PRESSURE: 86 MMHG | SYSTOLIC BLOOD PRESSURE: 120 MMHG | BODY MASS INDEX: 26.93 KG/M2

## 2024-07-16 DIAGNOSIS — Z96.652 STATUS POST TOTAL KNEE REPLACEMENT, LEFT: Primary | ICD-10-CM

## 2024-07-16 PROCEDURE — 99024 POSTOP FOLLOW-UP VISIT: CPT | Performed by: STUDENT IN AN ORGANIZED HEALTH CARE EDUCATION/TRAINING PROGRAM

## 2024-07-16 NOTE — PROGRESS NOTES
Subjective: Patient seen and examined. Pain controlled. Progressing well. Incisions well healed.  She is attending physical therapy and making good progress.  She has a different pain post operatively which is slowly improving. Denies fevers or chills. She is ambulating unassisted today.  She went to the beach and was able to walk on the sand and around town.  She has been taking Celebrex.    Physical Exam:  Incision: well healed incision without erythema  ROM: 0-115  Stable to valgus varus stress at 0, 30 and 90  5/5 IP/Q/HS/TA/GS, 2+ DP/PT, SILT DP/SP/S/S/TN      Assessment/Plan:  6 weeks s/p left press-fit total knee arthroplasty    - continue multi-modal pain control    - she may switch to Advil from Celebrex.  - Weight bearing status: WBAT  - DVT ppx: Complete  - Incision care: no restrictions  - PT/OT  - F/U 5-6 weeks      Scribe Attestation      I,:  Suyapa Matute PA-C am acting as a scribe while in the presence of the attending physician.:       I,:  De Napier DO personally performed the services described in this documentation    as scribed in my presence.:

## 2024-07-19 ENCOUNTER — OFFICE VISIT (OUTPATIENT)
Dept: PHYSICAL THERAPY | Facility: REHABILITATION | Age: 49
End: 2024-07-19
Payer: COMMERCIAL

## 2024-07-19 DIAGNOSIS — Z96.652 STATUS POST TOTAL KNEE REPLACEMENT, LEFT: Primary | ICD-10-CM

## 2024-07-19 PROCEDURE — 97140 MANUAL THERAPY 1/> REGIONS: CPT | Performed by: PHYSICAL THERAPIST

## 2024-07-19 PROCEDURE — 97110 THERAPEUTIC EXERCISES: CPT | Performed by: PHYSICAL THERAPIST

## 2024-07-19 PROCEDURE — 97530 THERAPEUTIC ACTIVITIES: CPT | Performed by: PHYSICAL THERAPIST

## 2024-07-19 NOTE — PROGRESS NOTES
"Daily Note     Today's date: 2024  Patient name: Manuela Camacho  : 1975  MRN: [id number]  Referring provider: De Napier, *  Dx:   Encounter Diagnosis     ICD-10-CM    1. Status post total knee replacement, left  Z96.652                      Patient supervised through session by TESSY Maher, with direct supervision by Mitchell Nolasco DPT.    Subjective: Patient says she saw her orthopedic doctor this week and was cleared for swimming. She continues to have stiffness throughout her knee, specifically while bending. Her next scheduled appointment with the orthopedic is at the end of August.       Objective: See treatment diary below      Assessment: Tolerated treatment well. She demonstrated improved control with eccentric step downs and lateral heel taps. Patient ambulates with lacking left TKE, but is able to correct after verbal cues. Patient would benefit from continued physical therapy to improve left lower extremity strength.       Plan: Continue physical therapy, progressing as tolerated.      Precautions: DOS L TKA 6/3/24; DM II    Daily Treatment Diary    Date  7/8 7/12 7/15 7/19     FOTO IE            Re-Eval IE               Manuals    PROM flex/ext  DE DE DE DE DE DE DE     Patellar mobs  DE DE DE DE   DE     PROM hip             Hamstring/Gastroc S    NV DE                     Neuro Re-Ed     Walk on Foam  x4                                                                                         Ther Ex                 Mini wall squats  5\"x10 5\" 2x10 5\" x5 Hold Hold       SA leg press     22# x10  44# x10 66# x20 66# x20 66#x20     SA knee ext      22# x10 Knee flex 33#; knee ext 33# 2x10  Flex 33#/Ext 33# 2x10     SA knee ext- eccentric       22# x10 22# 2x10     Stand hip abd, ext   NV  2x10 ea Home        HR/TR             Standing gastroc stretch    30\"x3  30\"x3 30\"x3 30\"x3     Knee flex/ext stretch on step             Hamstring S  Seated 30\"x3 Supine " "30\"x3 Seated 30\"x3 Supine 30\"x3 Supine 30\"x3 Supine 30\"x3 Supine 30\"x3                  Quad sets              Heel slides  Seated 10\"x10 Active 5\"x10 Active 10\"x10 Active c OP 10\"x10 Active c OP 10\"x10  Active c OP x10\"x10     Bridges             LAQ   15x          SAQ  15x 15x 5\"x15  np                    Ther Activity    Bike  ROM 5' ROM/  backwards 5' forward 5' 5' 5' 5' 5'     STS low mat   NV NV x10        Lat step up/down   1R 2x10 1R x10 2R x10  2Rx10 Lateral heel taps  1Rx15 Lat heel taps  1Rx20      Step downs- eccentric     2Rx15 2Rx15 2Rx20 2Rx20     Step ups/down   2R 2x10 2R x10 3Rx15 3Rx20 3R x20 3Rx20         Gait Training                              Modalities    CP PRN     Seated in extension 10'x2 ice packs 10' seated extension 10'x2 ice packs 10' seated extension 10'x2 ice packs 10'   Seated extension x2 ice packs Home  10'   Seated extension x2 ice packs Home                                        "

## 2024-07-22 ENCOUNTER — OFFICE VISIT (OUTPATIENT)
Dept: PHYSICAL THERAPY | Facility: REHABILITATION | Age: 49
End: 2024-07-22
Payer: COMMERCIAL

## 2024-07-22 DIAGNOSIS — Z96.652 STATUS POST TOTAL KNEE REPLACEMENT, LEFT: Primary | ICD-10-CM

## 2024-07-22 PROCEDURE — 97530 THERAPEUTIC ACTIVITIES: CPT | Performed by: PHYSICAL THERAPIST

## 2024-07-22 PROCEDURE — 97140 MANUAL THERAPY 1/> REGIONS: CPT | Performed by: PHYSICAL THERAPIST

## 2024-07-22 PROCEDURE — 97110 THERAPEUTIC EXERCISES: CPT | Performed by: PHYSICAL THERAPIST

## 2024-07-22 NOTE — PROGRESS NOTES
"Daily Note     Today's date: 2024  Patient name: Manuela Camacho  : 1975  MRN: [id number]  Referring provider: De Napier, *  Dx:   Encounter Diagnosis     ICD-10-CM    1. Status post total knee replacement, left  Z96.652                        Patient supervised through session by TESSY Maher, with direct supervision by Mitchell Nolasco DPT.    Subjective: Patient says she is doing well. She was able to swim to this weekend and did some exercises in the pool.       Objective: See treatment diary below      Assessment: Tolerated treatment well. Patient required verbal cues during lateral heel taps to keep hips level and increase bend in knee to slowly lower heel to ground. Next visit perform lateral heel taps with no riser to ensure correct mechanics. Patient tolerated progression of exercises well and continues to perform HEP. 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        Plan: Continue physical therapy, progressing as tolerated.      Precautions: DOS L TKA 6/3/24; DM II    Daily Treatment Diary    Date  7 7/8 7/12 7/15 7/19 7/22    FOTO IE            Re-Eval IE               Manuals    PROM flex/ext  DE DE DE DE DE DE DE DE    Patellar mobs  DE DE DE DE   DE DE    PROM hip             Hamstring/Gastroc S    NV DE                     Neuro Re-Ed     Walk on Foam  x4                                     Ther Ex                 Mini wall squats  5\"x10 5\" 2x10 5\" x5 Hold Hold       SA leg press     22# x10  44# x10 66# x20 66# x20 66#x20 88# x20    SA knee ext      22# x10 Knee flex 33#; knee ext 33# 2x10  Flex 33#/Ext 33# 2x10 Flex 33#/Ext 33#   2x10 ea    SA knee ext- eccentric       22# x10 22# 2x10 22# 2x10    Stand hip abd, ext   NV  2x10 ea Home        HR/TR             Standing gastroc stretch    30\"x3  30\"x3 30\"x3 30\"x3     Knee flex/ext stretch on step             Hamstring S  " "Seated 30\"x3 Supine 30\"x3 Seated 30\"x3 Supine 30\"x3 Supine 30\"x3 Supine 30\"x3 Supine 30\"x3 30\"x3    Supine left SLR         x15    S/L left hip abd         x15                 Quad sets              Heel slides  Seated 10\"x10 Active 5\"x10 Active 10\"x10 Active c OP 10\"x10 Active c OP 10\"x10  Active c OP x10\"x10     Bridges             LAQ   15x          SAQ  15x 15x 5\"x15  np                    Ther Activity    Bike  ROM 5' ROM/  backwards 5' forward 5' 5' 5' 5' 5' 5'    STS low mat   NV NV x10        Lat step up/down   1R 2x10 1R x10 2R x10  2Rx10 Lateral heel taps  1Rx15 Lat heel taps  1Rx20  Lat heel taps 1Rx20    Step downs- eccentric     2Rx15 2Rx15 2Rx20 2Rx20 2Rx20    Step ups/down   2R 2x10 2R x10 3Rx15 3Rx20 3R x20 3Rx20 3Rx20        Gait Training                              Modalities    CP PRN     Seated in extension 10'x2 ice packs 10' seated extension 10'x2 ice packs 10' seated extension 10'x2 ice packs 10'   Seated extension x2 ice packs Home  10'   Seated extension x2 ice packs Home  Home                                         "

## 2024-07-23 ENCOUNTER — OFFICE VISIT (OUTPATIENT)
Dept: PHYSICAL THERAPY | Facility: REHABILITATION | Age: 49
End: 2024-07-23
Payer: COMMERCIAL

## 2024-07-23 DIAGNOSIS — Z96.652 STATUS POST TOTAL KNEE REPLACEMENT, LEFT: Primary | ICD-10-CM

## 2024-07-23 PROCEDURE — 97112 NEUROMUSCULAR REEDUCATION: CPT

## 2024-07-23 PROCEDURE — 97140 MANUAL THERAPY 1/> REGIONS: CPT

## 2024-07-23 PROCEDURE — 97110 THERAPEUTIC EXERCISES: CPT

## 2024-07-23 NOTE — PROGRESS NOTES
"Daily Note     Today's date: 2024  Patient name: Manuela Camacho  : 1975  MRN: [id number]  Referring provider: De Napier, *  Dx:   Encounter Diagnosis     ICD-10-CM    1. Status post total knee replacement, left  Z96.652                      Subjective: pt reports with c/o achiness in L knee prior to beginning today.      Objective: See treatment diary below      Assessment: Pt tolerated treatment well. Added TM to focus on TKE during ambulation with good tolerance; minimal cues to do so. Good recall and form with exercises. Most challenge exhibited with lateral step taps; cues required to perform in slower manner. Patient demonstrated fatigue post treatment, exhibited good technique with therapeutic exercises, and would benefit from continued PT      Plan: Continue per plan of care.  Progress treatment as tolerated.       Precautions: DOS L TKA 6/3/24; DM II    Daily Treatment Diary    Date  7 7/8 7/12 7/15 7/19 7/22 7/23   FOTO IE            Re-Eval IE               Manuals    PROM flex/ext  DE DE DE DE DE DE DE DE TE   Patellar mobs  DE DE DE DE   DE DE TE   PROM hip             Hamstring/Gastroc S    NV DE                     Neuro Re-Ed     Walk on Foam  x4                                     Ther Ex                 Mini wall squats  5\"x10 5\" 2x10 5\" x5 Hold Hold       SA leg press     22# x10  44# x10 66# x20 66# x20 66#x20 88# x20 88# x20   SA knee ext      22# x10 Knee flex 33#; knee ext 33# 2x10  Flex 33#/Ext 33# 2x10 Flex 33#/Ext 33#   2x10 ea Flex 33#/Ext 33#   2x10 ea   SA knee ext- eccentric       22# x10 22# 2x10 22# 2x10 missed   Stand hip abd, ext   NV  2x10 ea Home        HR/TR             Standing gastroc stretch    30\"x3  30\"x3 30\"x3 30\"x3     Knee flex/ext stretch on step             Hamstring S  Seated 30\"x3 Supine 30\"x3 Seated 30\"x3 Supine 30\"x3 Supine 30\"x3 Supine 30\"x3 Supine 30\"x3 30\"x3    Supine left SLR         x15 x20   S/L left hip abd         x15 " "x20                Quad sets              Heel slides  Seated 10\"x10 Active 5\"x10 Active 10\"x10 Active c OP 10\"x10 Active c OP 10\"x10  Active c OP x10\"x10     Bridges             LAQ   15x          SAQ  15x 15x 5\"x15  np                    Ther Activity    Bike  ROM 5' ROM/  backwards 5' forward 5' 5' 5' 5' 5' 5' TM 2.3 mph x5'   STS low mat   NV NV x10        Lat step up/down   1R 2x10 1R x10 2R x10  2Rx10 Lateral heel taps  1Rx15 Lat heel taps  1Rx20  Lat heel taps 1Rx20 Lat heel taps 1Rx20   Step downs- eccentric     2Rx15 2Rx15 2Rx20 2Rx20 2Rx20 2Rx20   Step ups/down   2R 2x10 2R x10 3Rx15 3Rx20 3R x20 3Rx20 3Rx20 3Rx20       Gait Training                              Modalities    CP PRN     Seated in extension 10'x2 ice packs 10' seated extension 10'x2 ice packs 10' seated extension 10'x2 ice packs 10'   Seated extension x2 ice packs Home  10'   Seated extension x2 ice packs Home  Home                                           "

## 2024-07-26 ENCOUNTER — APPOINTMENT (OUTPATIENT)
Dept: PHYSICAL THERAPY | Facility: REHABILITATION | Age: 49
End: 2024-07-26
Payer: COMMERCIAL

## 2024-07-29 ENCOUNTER — EVALUATION (OUTPATIENT)
Dept: PHYSICAL THERAPY | Facility: REHABILITATION | Age: 49
End: 2024-07-29
Payer: COMMERCIAL

## 2024-07-29 DIAGNOSIS — Z96.652 STATUS POST TOTAL KNEE REPLACEMENT, LEFT: Primary | ICD-10-CM

## 2024-07-29 PROCEDURE — 97110 THERAPEUTIC EXERCISES: CPT | Performed by: PHYSICAL THERAPIST

## 2024-07-29 PROCEDURE — 97530 THERAPEUTIC ACTIVITIES: CPT | Performed by: PHYSICAL THERAPIST

## 2024-07-29 PROCEDURE — 97164 PT RE-EVAL EST PLAN CARE: CPT | Performed by: PHYSICAL THERAPIST

## 2024-07-29 PROCEDURE — 97140 MANUAL THERAPY 1/> REGIONS: CPT | Performed by: PHYSICAL THERAPIST

## 2024-07-29 NOTE — PROGRESS NOTES
PT Re-Evaluation     Today's date: 2024  Patient name: Manuela Camacho  : 1975  MRN: [id number]  Referring provider: De Napier, *  Dx:   Encounter Diagnosis     ICD-10-CM    1. Status post total knee replacement, left  Z96.652                      Assessment  Impairments: abnormal gait, abnormal or restricted ROM, abnormal movement, activity intolerance, impaired balance, impaired physical strength, pain with function, weight-bearing intolerance, poor body mechanics, unable to perform ADL, participation limitations, activity limitations and endurance    Assessment details: 24  Manuela is 8 weeks s/p left TKA (6/3/24). Since starting physical therapy she has decreased pain levels, decreased edema, increased strength, and left knee AROM/PROM. She is able to ambulate community distances and negotiate stairs reciprocally. Her return to work date is . Patient has met 1 of 3 STGs and 2 of 3 LTGs at this time. Patient would benefit from skilled physical therapy to continue to improve left knee ROM, strength, and activity tolerance for return to work.     24  Patient is a 49 year old female presenting to physical therapy s/p left TKA performed on 6/3/24 and was discharged home the same day. She is presenting with left knee pain, swelling, TTP to left medial hamstrings, and limitations in left lower extremity strength, left knee AROM flexion/extension, ambulation, stair negotiation, and standing tolerance. Patient is currently ambulating with a SPC. Patient reports difficulty sleeping due to discomfort. Her incision is healing appropriately with no signs of infection. Patient would benefit from skilled physical therapy to improve left knee ROM, left lower extremity strength, endurance, activity tolerance, decrease pain and swelling, and improve functional mobility.   Understanding of Dx/Px/POC: good     Prognosis: good    Goals  STGs  1. Patient will achieve active left TKE after 6  weeks of physical therapy.- MET  2. Patient will achieve 120 degrees active left knee flexion after 6 weeks of physical therapy.- IN PROGRESS  3. Patient will decrease left knee swelling by 4 cm after 6 weeks of physical therapy.- IN PROGRESS    LTGs   1. Patient will be able to ambulate 1 mile without SPC after 12 weeks of physical therapy.- MET  2. Patient will be able to stand for 1 hour without rest breaks after 12 weeks of physical therapy.- IN PROGRESS  3. Patient will decrease pain level by 50% to achieve a full night sleep after 12 weeks of physical therapy.- MET    Plan  Patient would benefit from: skilled physical therapy and PT eval  Planned modality interventions: cryotherapy    Planned therapy interventions: manual therapy, joint mobilization, balance, balance/weight bearing training, body mechanics training, neuromuscular re-education, patient/caregiver education, self care, stretching, strengthening, flexibility, functional ROM exercises, gait training, graded activity, graded exercise, home exercise program, IADL retraining, work reintegration, therapeutic training, therapeutic exercise and therapeutic activities    Frequency: 2x week  Duration in weeks: 12  Treatment plan discussed with: patient  Plan details: Re-measure left knee AROM/PROM flexion/extension NV      Subjective Evaluation    History of Present Illness  Date of surgery: 6/3/2024  Mechanism of injury: 7/2/24  Patient returns from vacation and reports she did a lot of walking while away. She is ambulating with out SPC. She continues to feel pain, stiffness, and swelling in left knee but notes improvements since starting physical therapy. She is continues to have difficulty sleeping due to pain. She is able to reciprocally ascend/descend stairs at home. Her return date to work is 8/27 and is nervous for her return to work.       06/19/24  Patient under went left TKA on 6/3/24 discharged home the same day with rolling walker. She says  sleeping is the most difficult for her as she struggles to find a comfortable position. She has been using the rolling walker up until yesterday and has transitioned to [facility]. Patient reports no fever or chills since surgery. She says yesterday (24) was her first day driving since the surgery. She lives in a 2 level house with 13 steps and has been able to ascend/descend steps with assistive device in step-to pattern. She reports she has been using ice to help with swelling and pain, along with taking Asprin, Celebrex, and Tylenol. Patient reports numbness along medial aspect on left knee and medial hamstring tendon tenderness. Patient is a  and wants to get back to walking for leisure and increase standing tolerance to return to work in August. Patient was provided with HEP from hospital PT immediately after surgery, but patient was unclear on instructions contributing to her not performing HEP. Her next scheduled appointment with the surgeon is in 4 weeks.   Patient Goals  Patient goals for therapy: decreased edema, increased strength, decreased pain, independence with ADLs/IADLs, return to sport/leisure activities, return to work and increased motion    Pain  At best pain ratin  At worst pain ratin  Relieving factors: ice and medications    Social Support  13  Lives in: multiple-level home  Lives with: spouse    Employment status: working        Objective     Observations   Left Knee   Positive for edema and incision.     Additional Observation Details  Left TKA incision healing appropriately, incision is closed, scarring over, and negative for drainage     Tenderness     Additional Tenderness Details  TTP to medial hamstring tendons    Active Range of Motion   Left Knee   Flexion: 105 degrees   Extensor lag: -5 degrees     Passive Range of Motion   Left Knee   Flexion: 111 degrees   Extension: -2 degrees     Strength/Myotome Testing     Left Hip   Planes of Motion   Flexion: 4+    Right  "Hip   Planes of Motion   Flexion: 4+    Left Knee   Flexion: 4  Extension: 4+  Quadriceps contraction: fair    Right Knee   Flexion: 5  Extension: 4+    Left Ankle/Foot   Dorsiflexion: 5  Plantar flexion: 5    Right Ankle/Foot   Dorsiflexion: 5  Plantar flexion: 5    Swelling     Left Knee Girth Measurement (cm)   Joint line: 41 cm    Right Knee Girth Measurement (cm)   Joint line: 39 cm    Ambulation   Weight-Bearing Status   Assistive device used: single point cane             Precautions: DOS L TKA 6/3/24; DM II    Daily Treatment Diary    Date 7/29  6/28 7/2 7/8 7/12 7/15 7/19 7/22 7/23   FOTO             Re-Eval                Manuals    PROM flex/ext DE  DE DE DE DE DE DE DE TE   Patellar mobs   DE DE DE   DE DE TE   PROM hip             Hamstring/Gastroc S    NV DE                     Neuro Re-Ed     Walk on Foam                                       Ther Ex                 Mini wall squats   5\" 2x10 5\" x5 Hold Hold       SA leg press 88# x20    22# x10  44# x10 66# x20 66# x20 66#x20 88# x20 88# x20   SA knee ext 33# x 20 ea     22# x10 Knee flex 33#; knee ext 33# 2x10  Flex 33#/Ext 33# 2x10 Flex 33#/Ext 33#   2x10 ea Flex 33#/Ext 33#   2x10 ea   SA knee ext- eccentric NV      22# x10 22# 2x10 22# 2x10 missed   Stand hip abd, ext   NV  2x10 ea Home        HR/TR             Standing gastroc stretch    30\"x3  30\"x3 30\"x3 30\"x3     Knee flex/ext stretch on step             Hamstring S   Supine 30\"x3 Seated 30\"x3 Supine 30\"x3 Supine 30\"x3 Supine 30\"x3 Supine 30\"x3 30\"x3    Supine left SLR x20        x15 x20   S/L left hip abd x20        x15 x20                Quad sets              Heel slides   Active 5\"x10 Active 10\"x10 Active c OP 10\"x10 Active c OP 10\"x10  Active c OP x10\"x10     Bridges             LAQ   15x          SAQ   15x 5\"x15  np                    Ther Activity    Bike  5'  5' forward 5' 5' 5' 5' 5' 5' TM 2.3 mph x5'   TM 2.3 mph NV            STS low mat   NV NV x10        Lat Heel Taps 1Rx20  1R " 2x10 1R x10 2R x10  2Rx10 Lateral heel taps  1Rx15 Lat heel taps  1Rx20  Lat heel taps 1Rx20 Lat heel taps 1Rx20   Step downs- eccentric 2Rx20    2Rx15 2Rx15 2Rx20 2Rx20 2Rx20 2Rx20   Step ups/down 3Rx20  2R 2x10 2R x10 3Rx15 3Rx20 3R x20 3Rx20 3Rx20 3Rx20       Gait Training                              Modalities    CP PRN   10' seated extension 10'x2 ice packs 10' seated extension 10'x2 ice packs 10'   Seated extension x2 ice packs Home  10'   Seated extension x2 ice packs Home  Home

## 2024-07-31 ENCOUNTER — OFFICE VISIT (OUTPATIENT)
Dept: PHYSICAL THERAPY | Facility: REHABILITATION | Age: 49
End: 2024-07-31
Payer: COMMERCIAL

## 2024-07-31 ENCOUNTER — OFFICE VISIT (OUTPATIENT)
Dept: INTERNAL MEDICINE CLINIC | Age: 49
End: 2024-07-31
Payer: COMMERCIAL

## 2024-07-31 VITALS
HEART RATE: 75 BPM | BODY MASS INDEX: 27.82 KG/M2 | TEMPERATURE: 98 F | SYSTOLIC BLOOD PRESSURE: 126 MMHG | HEIGHT: 63 IN | WEIGHT: 157 LBS | DIASTOLIC BLOOD PRESSURE: 70 MMHG | OXYGEN SATURATION: 98 %

## 2024-07-31 DIAGNOSIS — E78.00 HYPERCHOLESTEROLEMIA: ICD-10-CM

## 2024-07-31 DIAGNOSIS — R73.03 PREDIABETES: ICD-10-CM

## 2024-07-31 DIAGNOSIS — Z96.652 STATUS POST TOTAL KNEE REPLACEMENT, LEFT: Primary | ICD-10-CM

## 2024-07-31 DIAGNOSIS — I10 BENIGN HYPERTENSION: ICD-10-CM

## 2024-07-31 DIAGNOSIS — G47.33 OSA (OBSTRUCTIVE SLEEP APNEA): ICD-10-CM

## 2024-07-31 DIAGNOSIS — J45.40 MODERATE PERSISTENT EXTRINSIC ASTHMA WITHOUT COMPLICATION: Primary | ICD-10-CM

## 2024-07-31 DIAGNOSIS — F41.1 GENERALIZED ANXIETY DISORDER: ICD-10-CM

## 2024-07-31 DIAGNOSIS — E66.9 OBESITY (BMI 35.0-39.9 WITHOUT COMORBIDITY): ICD-10-CM

## 2024-07-31 PROBLEM — N92.0 MENORRHAGIA WITH REGULAR CYCLE: Status: RESOLVED | Noted: 2019-09-23 | Resolved: 2024-07-31

## 2024-07-31 PROCEDURE — 99214 OFFICE O/P EST MOD 30 MIN: CPT | Performed by: INTERNAL MEDICINE

## 2024-07-31 PROCEDURE — 97530 THERAPEUTIC ACTIVITIES: CPT

## 2024-07-31 PROCEDURE — 97110 THERAPEUTIC EXERCISES: CPT

## 2024-07-31 PROCEDURE — 97140 MANUAL THERAPY 1/> REGIONS: CPT

## 2024-07-31 RX ORDER — METOPROLOL SUCCINATE 50 MG/1
50 TABLET, EXTENDED RELEASE ORAL
Qty: 90 TABLET | Refills: 1 | Status: SHIPPED | OUTPATIENT
Start: 2024-07-31

## 2024-07-31 RX ORDER — ESCITALOPRAM OXALATE 10 MG/1
10 TABLET ORAL
Qty: 90 TABLET | Refills: 1 | Status: SHIPPED | OUTPATIENT
Start: 2024-07-31

## 2024-07-31 NOTE — PROGRESS NOTES
Assessment/Plan:     Diagnoses and all orders for this visit:    Moderate persistent extrinsic asthma without complication    Benign hypertension  -     metoprolol succinate (TOPROL-XL) 50 mg 24 hr tablet; Take 1 tablet (50 mg total) by mouth daily at bedtime    Generalized anxiety disorder  -     escitalopram (LEXAPRO) 10 mg tablet; Take 1 tablet (10 mg total) by mouth daily at bedtime    Hypercholesterolemia    Obesity (BMI 35.0-39.9 without comorbidity)    CHACE (obstructive sleep apnea)  -     Ambulatory Referral to Sleep Medicine; Future           M*Modal software was used to dictate this note.  It may contain errors with dictating incorrect words or incorrect spelling. Please contact the provider directly with any questions.    Subjective:   Chief Complaint   Patient presents with    Follow-up     No labs due- Patient had left knee replacement on 6/13- going well         Patient ID: Manuela Camacho is a 49 y.o. female.    HPI  This is a very pleasant 49 years young lady who is here today for the regular follow-up she is doing well no new complaints she is a status post total knee replacement on the left side because of the osteoarthritis of the knee she is doing good but is still having some pain  Patient is here today for the follow-up.   Hypertension. I reviewed antihypertensive medication, patient does not have any side effects of  medications, no signs or symptoms of hypertension ,hypotension or orthostatic hypotension.  Patient is compliant with medications.  Blood workup related to hypertensive diagnosis reviewed.  Plan is to continue with the present management.  We will follow-up as a scheduled and adjust the doses of the medication as indicated.  Prediabetes her fasting blood sugar is normal and her hemoglobin A1c is 5.2 she is on Mounjaro 15 mg every week she lost weight and her BMI is 27 we will continue with the medication as patient does not feel that she is getting any side effects of the  medication  Hypercholesterolemia will check the lipid panel hopefully that should be improved to with the weight loss and also because of the use of Mounjaro.  Also patient is on 20 mg of atorvastatin for hypercholesterolemia  Migraine episodes are less frequent its once a month patient is on medication for preventative migraine  Asthma is a stable plan is to continue with the present medication no new problems with asthma no new medication for asthma  The following portions of the patient's history were reviewed and updated as appropriate: allergies, current medications, past family history, past medical history, past social history, past surgical history, and problem list.    Review of Systems   Constitutional:  Positive for unexpected weight change (Patient lost weight using GLP-1 agonist and tolerating it well). Negative for chills and fatigue.   HENT:  Negative for congestion, ear pain, hearing loss, postnasal drip, sinus pressure, sore throat and voice change.    Eyes:  Negative for pain, discharge and visual disturbance.   Respiratory:  Negative for cough, chest tightness and shortness of breath.    Cardiovascular:  Negative for chest pain, palpitations and leg swelling.   Gastrointestinal:  Negative for abdominal pain, blood in stool, diarrhea, nausea and rectal pain.   Genitourinary:  Negative for difficulty urinating, dysuria and urgency.   Musculoskeletal:  Positive for arthralgias (S/p left total knee replacement complaining of knee pain otherwise no other symptoms). Negative for joint swelling.   Skin:  Negative for rash.   Allergic/Immunologic: Negative for environmental allergies and food allergies.   Neurological:  Negative for dizziness, tremors, weakness, numbness and headaches.   Hematological:  Negative for adenopathy.   Psychiatric/Behavioral:  Negative for behavioral problems and hallucinations.          Past Medical History:   Diagnosis Date    Acute diffuse otitis externa of right ear  08/23/2021    Anemia     Anxiety     Asthma     controlled  seasonal    Diabetes mellitus (HCC)     pre diabetes    Gestational diabetes mellitus     Hyperlipidemia     Hypertension     Migraines     Other chest pain 01/27/2020    PONV (postoperative nausea and vomiting)     Pregnancy     Resolved: 07/29/2015    Trigger finger     Resolved: 12/02/2016    Yeast infection     Resolved: 07/29/2015         Current Outpatient Medications:     acetaminophen (TYLENOL) 500 mg tablet, Take 2 tablets (1,000 mg total) by mouth every 8 (eight) hours, Disp: 60 tablet, Rfl: 0    albuterol (Ventolin HFA) 90 mcg/act inhaler, Inhale 1-2 puffs as needed for wheezing, Disp: 54 g, Rfl: 1    ascorbic acid (VITAMIN C) 500 MG tablet, Take 1 tablet (500 mg total) by mouth 2 (two) times a day, Disp: 60 tablet, Rfl: 1    atorvastatin (LIPITOR) 20 mg tablet, Take 1 tablet (20 mg total) by mouth daily (Patient taking differently: Take 20 mg by mouth every evening), Disp: 90 tablet, Rfl: 3    Cholecalciferol (D3-1000) 25 MCG (1000 UT) capsule, Take 1,000 Units by mouth daily, Disp: , Rfl:     Cyanocobalamin (Vitamin B12) 1000 MCG TBCR, Take by mouth in the morning, Disp: , Rfl:     escitalopram (LEXAPRO) 10 mg tablet, Take 1 tablet (10 mg total) by mouth daily at bedtime, Disp: 90 tablet, Rfl: 1    ferrous sulfate 325 (65 Fe) mg tablet, Take 325 mg by mouth daily with breakfast, Disp: , Rfl:     fluticasone (FLONASE) 50 mcg/act nasal spray, 1 spray into each nostril daily (Patient taking differently: 1 spray into each nostril daily as needed), Disp: 1 Bottle, Rfl: 0    folic acid (FOLVITE) 1 mg tablet, TAKE 1 TABLET DAILY, Disp: 90 tablet, Rfl: 3    Lactobacillus (PROBIOTIC ACIDOPHILUS PO), Take by mouth every evening, Disp: , Rfl:     loratadine (CLARITIN) 10 mg tablet, Take 10 mg by mouth daily at bedtime  , Disp: , Rfl:     losartan (COZAAR) 100 MG tablet, TAKE 1 TABLET DAILY, Disp: 90 tablet, Rfl: 1    metoprolol succinate (TOPROL-XL) 50 mg  24 hr tablet, Take 1 tablet (50 mg total) by mouth daily at bedtime, Disp: 90 tablet, Rfl: 1    Multiple Vitamin (multivitamin) tablet, Take 1 tablet by mouth daily, Disp: , Rfl:     Omega-3 Fatty Acids (FISH OIL) 1,000 mg, Take 2 capsules by mouth daily at bedtime  , Disp: , Rfl:     rizatriptan (Maxalt) 10 mg tablet, Take 1 tablet (10 mg total) by mouth as needed for migraine Take at the onset of migraine; if symptoms continue or return, may take another dose at least 2 hours after first dose. Take no more than 2 doses in a day., Disp: 18 tablet, Rfl: 3    tirzepatide (Mounjaro) 15 MG/0.5ML, Inject 0.5 mL (15 mg total) under the skin every 7 days, Disp: 6 mL, Rfl: 0    zinc gluconate 50 mg tablet, Take 50 mg by mouth daily, Disp: , Rfl:     Allergies   Allergen Reactions    Hibiclens [Chlorhexidine Gluconate] Rash     Red with pimples    Pollen Extract Nasal Congestion       Social History   Past Surgical History:   Procedure Laterality Date    COLONOSCOPY      COLPORRHAPHY N/A 06/20/2022    Procedure: ANTERIOR COLPORRHAPHY;  Surgeon: Ovi Nice MD;  [facility];  Service: UroGynecology           CYSTOSCOPY N/A 06/20/2022    Procedure: CYSTOSCOPY;  Surgeon: Ovi Nice MD;  [facility];  Service: UroGynecology           INCISION TENDON SHEATH HAND Left     thumb    VT ARTHRP KNE CONDYLE&PLATU MEDIAL&LAT COMPARTMENTS Left 6/3/2024    Procedure: ARTHROPLASTY KNEE TOTAL- SAME DAY;  Surgeon: De Napier DO;  Location: [facility];  Service: Orthopedics    VT ARTHRS KNE SURG W/MENISCECTOMY MED/LAT W/SHVG Left 11/05/2021    Procedure: KNEE ARTHROSCOPIC MENISCECTOMY Medial;  Surgeon: De Newby MD;  [facility];  Service: Orthopedics    VT HYSTEROSCOPY BX ENDOMETRIUM&/POLYPC W/WO D&C N/A 10/14/2019    Procedure: DILATATION AND CURETTAGE (D&C) WITH HYSTEROSCOPY W/ CHANTELLE;  Surgeon: Abril Dean DO;  [facility];  Service: Gynecology     "MA HYSTEROSCOPY ENDOMETRIAL ABLATION N/A 10/14/2019    Procedure: ABLATION ENDOMETRIAL NOVASURE;  Surgeon: Abril Dean DO;  [facility];  Service: Gynecology    MA RPR UMBILICAL HRNA 5 YRS/> REDUCIBLE N/A 08/23/2018    Procedure: UMBILICAL HERNIA REPAIR WITH MESH;  Surgeon: Gonzalo Chambers MD;  [facility];  Service: General    PUBOVAGINAL SLING N/A 06/20/2022    Procedure: PUBOVAGINAL SLING;  Surgeon: Ovi Nice MD;  [facility];  Service: UroGynecology           WISDOM TOOTH EXTRACTION       Family History   Problem Relation Age of Onset    Hypertension Mother     Hyperlipidemia Mother     Endometrial cancer Mother 46    Diabetes type II Mother     Hyperlipidemia Father     Hypertension Father     Heart attack Sister     No Known Problems Daughter     No Known Problems Maternal Grandmother     Heart attack Maternal Grandfather     Other Maternal Grandfather         Myocardial infarction arrhythmias    No Known Problems Paternal Grandmother     Stroke Paternal Grandfather         Syndrome    No Known Problems Brother     No Known Problems Son     No Known Problems Paternal Aunt        Objective:  /70 (BP Location: Left arm, Patient Position: Sitting, Cuff Size: Standard)   Pulse 75   Temp 98 °F (36.7 °C) (Temporal)   Ht 5' 3\" (1.6 m)   Wt 71.2 kg (157 lb)   SpO2 98%   BMI 27.81 kg/m²        Physical Exam  Constitutional:       Appearance: She is well-developed.   HENT:      Right Ear: Tympanic membrane and external ear normal.      Left Ear: Tympanic membrane normal.   Eyes:      Conjunctiva/sclera: Conjunctivae normal.      Pupils: Pupils are equal, round, and reactive to light.   Neck:      Thyroid: No thyromegaly.      Vascular: No JVD.   Cardiovascular:      Rate and Rhythm: Normal rate and regular rhythm.      Heart sounds: Normal heart sounds.   Pulmonary:      Breath sounds: Normal breath sounds.   Abdominal:      General: Bowel sounds are normal.      " Palpations: Abdomen is soft.   Musculoskeletal:         General: Normal range of motion.      Cervical back: Normal range of motion.   Lymphadenopathy:      Cervical: No cervical adenopathy.   Skin:     General: Skin is dry.   Neurological:      General: No focal deficit present.      Mental Status: She is alert and oriented to person, place, and time. Mental status is at baseline.      Deep Tendon Reflexes: Reflexes are normal and symmetric.   Psychiatric:         Mood and Affect: Mood normal.         Behavior: Behavior normal.

## 2024-07-31 NOTE — PROGRESS NOTES
"Daily Note     Today's date: 2024  Patient name: Manuela Camacho  : 1975  MRN: [id number]  Referring provider: De Napier, *  Dx:   Encounter Diagnosis     ICD-10-CM    1. Status post total knee replacement, left  Z96.652                      Subjective: pt reports increased soreness in L knee which she attributes to doing little things here and there throughout the day. She noted making dinner then walking 2 miles which produced more soreness in her knee.      Objective: See treatment diary below      Assessment: Pt tolerated treatment well and without complaints. Less neuromuscular control with eccentric step downs than remainder of step activities. Good response with current program and carry over.  Patient demonstrated fatigue post treatment, exhibited good technique with therapeutic exercises, and would benefit from continued PT      Plan: Continue per plan of care.  Progress treatment as tolerated.       Precautions: DOS L TKA 6/3/24; DM II    Daily Treatment Diary    Date 7/29 7/31  7/2 7/8 7/12 7/15 7/19 7/22 7/23   FOTO             Re-Eval                Manuals    PROM flex/ext DE TE -1-118  DE DE DE DE DE DE TE   Patellar mobs    DE DE   DE DE TE   PROM hip             Hamstring/Gastroc S    NV DE                     Neuro Re-Ed     Walk on Foam                                       Ther Ex                 Mini wall squats    5\" x5 Hold Hold       SA leg press 88# x20    22# x10  44# x10 66# x20 66# x20 66#x20 88# x20 88# x20   SA knee ext/flex 33# x 20 ea 33# x20 ea    22# x10 Knee flex 33#; knee ext 33# 2x10  Flex 33#/Ext 33# 2x10 Flex 33#/Ext 33#   2x10 ea Flex 33#/Ext 33#   2x10 ea   SA knee ext- eccentric NV 33# x20     22# x10 22# 2x10 22# 2x10 missed   Stand hip abd, ext     2x10 ea Home        HR/TR             Standing gastroc stretch    30\"x3  30\"x3 30\"x3 30\"x3     Knee flex/ext stretch on step             Hamstring S    Seated 30\"x3 Supine 30\"x3 Supine 30\"x3 Supine 30\"x3 " "Supine 30\"x3 30\"x3    Supine left SLR x20 1# 2x10       x15 x20   S/L left hip abd x20 1# 2x10       x15 x20                Quad sets              Heel slides    Active 10\"x10 Active c OP 10\"x10 Active c OP 10\"x10  Active c OP x10\"x10     Bridges             LAQ             SAQ    5\"x15  np                    Ther Activity    Bike  5' 5'  5' 5' 5' 5' 5' 5' TM 2.3 mph x5'   TM 2.3 mph NV            STS low mat    NV x10        Lat Heel Taps 1Rx20 1Rx20  1R x10 2R x10  2Rx10 Lateral heel taps  1Rx15 Lat heel taps  1Rx20  Lat heel taps 1Rx20 Lat heel taps 1Rx20   Step downs- eccentric 2Rx20 2Rx20   2Rx15 2Rx15 2Rx20 2Rx20 2Rx20 2Rx20   Step ups/down 3Rx20 3Rx20  2R x10 3Rx15 3Rx20 3R x20 3Rx20 3Rx20 3Rx20       Gait Training                              Modalities    CP PRN    10' seated extension 10'x2 ice packs 10'   Seated extension x2 ice packs Home  10'   Seated extension x2 ice packs Home  Home                           "

## 2024-08-05 ENCOUNTER — OFFICE VISIT (OUTPATIENT)
Dept: PHYSICAL THERAPY | Facility: REHABILITATION | Age: 49
End: 2024-08-05
Payer: COMMERCIAL

## 2024-08-05 DIAGNOSIS — Z96.652 STATUS POST TOTAL KNEE REPLACEMENT, LEFT: Primary | ICD-10-CM

## 2024-08-05 PROCEDURE — 97110 THERAPEUTIC EXERCISES: CPT | Performed by: PHYSICAL THERAPIST

## 2024-08-05 PROCEDURE — 97112 NEUROMUSCULAR REEDUCATION: CPT | Performed by: PHYSICAL THERAPIST

## 2024-08-05 PROCEDURE — 97140 MANUAL THERAPY 1/> REGIONS: CPT | Performed by: PHYSICAL THERAPIST

## 2024-08-05 NOTE — PROGRESS NOTES
"Daily Note     Today's date: 2024  Patient name: Manuela Camacho  : 1975  MRN: [id number]  Referring provider: De Napier, *  Dx:   Encounter Diagnosis     ICD-10-CM    1. Status post total knee replacement, left  Z96.652                      Patient supervised through session by Azalea Pantoja SPT, with direct supervision by Mitchell Nolasco DPT.      Subjective: Patient notes she continues to feel sore and swelling. She noted increase soreness following last visit.       Objective: See treatment diary below      Assessment: Tolerated treatment well. Patient continues to be challenged with lateral heel taps and neuromuscular control of eccentric phase. Educated patient on different ways to perform left knee AROM flexion at home due to patient noting she felt blocked with heel slides. Verbal cues needed to maintain active knee extension during s/l hip abduction with patient able to correct after cues.       Plan: Continue physical therapy following plan of care, progressing as tolerated.      Precautions: DOS L TKA 6/3/24; DM II    Daily Treatment Diary    Date 7/29 7/31 8/5  7/8 7/12 7/15 7/19 7/22 7/23   FOTO             Re-Eval                Manuals    PROM flex/ext DE TE -1-118 DE  DE DE DE DE DE TE   Patellar mobs     DE   DE DE TE   PROM hip             Hamstring/Gastroc S     DE                     Neuro Re-Ed     Walk on Foam                                       Ther Ex                 Mini wall squats     Hold Hold       SA leg press 88# x20  88# x20  22# x10  44# x10 66# x20 66# x20 66#x20 88# x20 88# x20   SA knee ext/flex 33# x 20 ea 33# x20 ea 33# x20 ea   22# x10 Knee flex 33#; knee ext 33# 2x10  Flex 33#/Ext 33# 2x10 Flex 33#/Ext 33#   2x10 ea Flex 33#/Ext 33#   2x10 ea   SA knee ext- eccentric NV 33# x20 33# x20    22# x10 22# 2x10 22# 2x10 missed   Stand hip abd, ext     2x10 ea Home        HR/TR             Standing gastroc stretch      30\"x3 30\"x3 30\"x3     Knee flex/ext " "stretch on step             Hamstring S     Supine 30\"x3 Supine 30\"x3 Supine 30\"x3 Supine 30\"x3 30\"x3    Supine left SLR x20 1# 2x10 1# 2x10      x15 x20   S/L left hip abd x20 1# 2x10 1# 2x10      x15 x20                Quad sets              Heel slides     Active c OP 10\"x10 Active c OP 10\"x10  Active c OP x10\"x10     Bridges             LAQ             SAQ      np                    Ther Activity    Bike  5' 5' 5'  5' 5' 5' 5' 5' TM 2.3 mph x5'   TM 2.3 mph NV            STS low mat     x10        Lat Heel Taps 1Rx20 1Rx20 1Rx20  2R x10  2Rx10 Lateral heel taps  1Rx15 Lat heel taps  1Rx20  Lat heel taps 1Rx20 Lat heel taps 1Rx20   Step downs- eccentric 2Rx20 2Rx20 2Rx20  2Rx15 2Rx15 2Rx20 2Rx20 2Rx20 2Rx20   Step ups/down 3Rx20 3Rx20   3Rx15 3Rx20 3R x20 3Rx20 3Rx20 3Rx20       Gait Training                              Modalities    CP PRN     10'   Seated extension x2 ice packs Home  10'   Seated extension x2 ice packs Home  Home                             "

## 2024-08-06 ENCOUNTER — PATIENT MESSAGE (OUTPATIENT)
Dept: OBGYN CLINIC | Facility: MEDICAL CENTER | Age: 49
End: 2024-08-06

## 2024-08-06 NOTE — LETTER
August 9, 2024     Patient: Manuela Camacho  YOB: 1975  Date of Visit: 8/6/2024      To Whom it May Concern:    Manuela Camacho is under my professional care. Manuela had surgery on her knee on 6/3/24.  At this time she may have difficulty with activities such as kneeling, bending, twisting, and lifting.      If you have any questions or concerns, please don't hesitate to call.         Sincerely,          De Napier,         CC: No Recipients

## 2024-08-07 ENCOUNTER — OFFICE VISIT (OUTPATIENT)
Dept: PHYSICAL THERAPY | Facility: REHABILITATION | Age: 49
End: 2024-08-07
Payer: COMMERCIAL

## 2024-08-07 DIAGNOSIS — Z96.652 STATUS POST TOTAL KNEE REPLACEMENT, LEFT: Primary | ICD-10-CM

## 2024-08-07 PROCEDURE — 97140 MANUAL THERAPY 1/> REGIONS: CPT | Performed by: PHYSICAL THERAPIST

## 2024-08-07 PROCEDURE — 97110 THERAPEUTIC EXERCISES: CPT | Performed by: PHYSICAL THERAPIST

## 2024-08-07 NOTE — PROGRESS NOTES
"Daily Note     Today's date: 2024  Patient name: Manuela Camacho  : 1975  MRN: [id number]  Referring provider: De Napier, *  Dx:   Encounter Diagnosis     ICD-10-CM    1. Status post total knee replacement, left  Z96.652                      Subjective: Pt reports soreness around her left patella lately, denying anything notable to which she could attribute this soreness.       Objective: See treatment diary below      Assessment: Tolerated treatment well. Demonstrates mild eccentric weakness of quads when performing a step down. However, demonstrates improved awareness and able to self-correct without cuing. Demonstrates knee flexion and extension active/passive ROM grossly WFL in all directions. Patient exhibited good technique with therapeutic exercises and would benefit from continued PT      Plan: Patient to focus on home exercises at present time, due to meeting goals and maximizing benefit from skill PT services. Issued updated HEP and will plan to follow up in 2-3 weeks for advancement of exercises and potential discharge.      Precautions: DOS L TKA 6/3/24; DM II    Daily Treatment Diary    Date 7/29 7/31 8/5 8/7   7/15 7/19 7/22 7/23   FOTO             Re-Eval                Manuals    PROM flex/ext DE TE -1-118 DE NIECY   DE DE DE TE   Patellar mobs        DE DE TE   PROM hip             Hamstring/Gastroc S                          Neuro Re-Ed     Walk on Foam                                       Ther Ex                 Mini wall squats             SA leg press 88# x20  88# x20 88# x20   66# x20 66#x20 88# x20 88# x20   SA knee ext/flex 33# x 20 ea 33# x20 ea 33# x20 ea 33# 2x10 ea   Knee flex 33#; knee ext 33# 2x10  Flex 33#/Ext 33# 2x10 Flex 33#/Ext 33#   2x10 ea Flex 33#/Ext 33#   2x10 ea   SA knee ext- eccentric NV 33# x20 33# x20 33# x20   22# x10 22# 2x10 22# 2x10 missed   Stand hip abd, ext             HR/TR             Standing gastroc stretch       30\"x3 30\"x3     Knee flex/ext " "stretch on step             Hamstring S       Supine 30\"x3 Supine 30\"x3 30\"x3    Supine left SLR x20 1# 2x10 1# 2x10 2# 2x10     x15 x20   S/L left hip abd x20 1# 2x10 1# 2x10 2# 2x10     x15 x20                Quad sets              Heel slides        Active c OP x10\"x10     Bridges             LAQ             SAQ                          Ther Activity    Bike  5' 5' 5' 5' L1   5' 5' 5' TM 2.3 mph x5'   TM 2.3 mph NV            STS low mat             Lat Heel Taps 1Rx20 1Rx20 1Rx20 1Rx20   Lateral heel taps  1Rx15 Lat heel taps  1Rx20  Lat heel taps 1Rx20 Lat heel taps 1Rx20   Step downs- eccentric 2Rx20 2Rx20 2Rx20 2Rx20   2Rx20 2Rx20 2Rx20 2Rx20   Step ups/down 3Rx20 3Rx20     3R x20 3Rx20 3Rx20 3Rx20       Gait Training                              Modalities    CP PRN       10'   Seated extension x2 ice packs Home  Home                        Access Code: [email or web address]  URL: [email or web address]  Date: 08/07/2024  Prepared by: Mitchell Nolasco    Exercises  - Wall Squat  - 3 x weekly - 3 sets - 10 reps - 5 hold  - Sit to Stand Without Arm Support  - 3 x weekly - 3 sets - 10 reps - 5 hold  - Forward Step Down  - 3 x weekly - 3 sets - 10 reps - 5 hold  - Lateral Step Down  - 3 x weekly - 3 sets - 10 reps - 5 hold  - Active Straight Leg Raise with Quad Set  - 3 x weekly - 3 sets - 10 reps - 5 hold  - Sidelying Hip Abduction  - 3 x weekly - 3 sets - 10 reps - 5 hold     "

## 2024-08-09 DIAGNOSIS — G47.33 OSA (OBSTRUCTIVE SLEEP APNEA): Primary | ICD-10-CM

## 2024-08-12 ENCOUNTER — PATIENT MESSAGE (OUTPATIENT)
Dept: OBGYN CLINIC | Facility: MEDICAL CENTER | Age: 49
End: 2024-08-12

## 2024-08-21 ENCOUNTER — EVALUATION (OUTPATIENT)
Dept: PHYSICAL THERAPY | Facility: REHABILITATION | Age: 49
End: 2024-08-21
Payer: COMMERCIAL

## 2024-08-21 DIAGNOSIS — Z96.652 STATUS POST TOTAL KNEE REPLACEMENT, LEFT: Primary | ICD-10-CM

## 2024-08-21 PROCEDURE — 97110 THERAPEUTIC EXERCISES: CPT | Performed by: PHYSICAL THERAPIST

## 2024-08-21 PROCEDURE — 97112 NEUROMUSCULAR REEDUCATION: CPT | Performed by: PHYSICAL THERAPIST

## 2024-08-21 NOTE — PROGRESS NOTES
PT Re-Evaluation  and PT Discharge    Today's date: 2024  Patient name: Manuela Camacho  : 1975  MRN: [id number]  Referring provider: De Napier, *  Dx:   Encounter Diagnosis     ICD-10-CM    1. Status post total knee replacement, left  Z96.652                      Assessment  Impairments: abnormal gait, abnormal or restricted ROM, abnormal movement, activity intolerance, impaired balance, impaired physical strength, pain with function, weight-bearing intolerance, poor body mechanics, unable to perform ADL, participation limitations, activity limitations and endurance    Assessment details: Manuela Camacho has been treated in outpatient physical therapy for diagnosis/complaints of Status post total knee replacement, left  (primary encounter diagnosis).     Pt demonstrates increased range of motion, improved strength, decreased pain, and increased activity tolerance.     Pt has achieved goals and maximal benefit from skilled physical therapy care at present time. Pt appropriate to be discharged at present time to [facility]. Thank you for the opportunity to share in this patient's care.   Understanding of Dx/Px/POC: good     Prognosis: good    Goals  STGs  1. Patient will achieve active left TKE after 6 weeks of physical therapy.- MET  2. Patient will achieve 120 degrees active left knee flexion after 6 weeks of physical therapy.- IN PROGRESS  3. Patient will decrease left knee swelling by 4 cm after 6 weeks of physical therapy.- IN PROGRESS    LTGs   1. Patient will be able to ambulate 1 mile without SPC after 12 weeks of physical therapy.- MET  2. Patient will be able to stand for 1 hour without rest breaks after 12 weeks of physical therapy.- MET  3. Patient will decrease pain level by 50% to achieve a full night sleep after 12 weeks of physical therapy.- MET    Plan  Patient would benefit from: skilled physical therapy and PT eval  Planned modality interventions: cryotherapy    Planned therapy  interventions: manual therapy, joint mobilization, balance, balance/weight bearing training, body mechanics training, neuromuscular re-education, patient/caregiver education, self care, stretching, strengthening, flexibility, functional ROM exercises, gait training, graded activity, graded exercise, home exercise program, IADL retraining, work reintegration, therapeutic training, therapeutic exercise and therapeutic activities    Treatment plan discussed with: patient      Subjective Evaluation    History of Present Illness  Date of surgery: 6/3/2024  Mechanism of injury: 08/21/24  Pt notes she has had minimal pain or limitations in her operative knee. States she still feels her flexion range of motion is lacking slightly, as well as continued swelling noted at times. However, denies notable limitations in completing her ADLS. Notes she follows up with her surgeon this week. Notes she has met her goals for therapy at present time.    7/2/24  Patient returns from vacation and reports she did a lot of walking while away. She is ambulating with out SPC. She continues to feel pain, stiffness, and swelling in left knee but notes improvements since starting physical therapy. She is continues to have difficulty sleeping due to pain. She is able to reciprocally ascend/descend stairs at home. Her return date to work is 8/27 and is nervous for her return to work.       06/19/24  Patient under went left TKA on 6/3/24 discharged home the same day with rolling walker. She says sleeping is the most difficult for her as she struggles to find a comfortable position. She has been using the rolling walker up until yesterday and has transitioned to SPC. Patient reports no fever or chills since surgery. She says yesterday (6/18/24) was her first day driving since the surgery. She lives in a 2 level house with 13 steps and has been able to ascend/descend steps with assistive device in step-to pattern. She reports she has been using ice  to help with swelling and pain, along with taking Asprin, Celebrex, and Tylenol. Patient reports numbness along medial aspect on left knee and medial hamstring tendon tenderness. Patient is a  and wants to get back to walking for leisure and increase standing tolerance to return to work in August. Patient was provided with HEP from hospital PT immediately after surgery, but patient was unclear on instructions contributing to her not performing HEP. Her next scheduled appointment with the surgeon is in 4 weeks.   Patient Goals  Patient goals for therapy: decreased edema, increased strength, decreased pain, independence with ADLs/IADLs, return to sport/leisure activities, return to work and increased motion    Pain  At best pain ratin  At worst pain ratin  Relieving factors: ice and medications    Social Support  13  Lives in: multiple-level home  Lives with: spouse    Employment status: working        Objective     Tenderness     Additional Tenderness Details  TTP to medial hamstring tendons    Active Range of Motion   Left Knee   Flexion: 120 degrees   Extensor lag: -5 degrees     Passive Range of Motion   Left Knee   Flexion: 130 degrees   Extension: 0 degrees     Strength/Myotome Testing     Left Hip   Planes of Motion   Flexion: 4+    Right Hip   Planes of Motion   Flexion: 4+    Left Knee   Flexion: 4+  Extension: 4+    Right Knee   Flexion: 5  Extension: 4+    Left Ankle/Foot   Dorsiflexion: 5  Plantar flexion: 5    Right Ankle/Foot   Dorsiflexion: 5  Plantar flexion: 5    Ambulation   Weight-Bearing Status   Assistive device used: single point cane             Precautions: DOS L TKA 6/3/24; DM II    Daily Treatment Diary    Date  8/ 8   FOTO     perf        Re-Eval     NIECY           Manuals    PROM flex/ext DE  - DE NIECY [place]   DE DE            DE DE                                           Neuro Re-Ed     Walk on Foam                                "        Ther Ex                 Mini wall squats             SA leg press 88# x20  88# x20 88# x20 88# x20   66#x20 88# x20 88# x20   SA knee ext/flex 33# x 20 ea 33# x20 ea 33# x20 ea 33# 2x10 ea 33# 2x10 ea   Flex 33#/Ext 33# 2x10 Flex 33#/Ext 33#   2x10 ea Flex 33#/Ext 33#   2x10 ea   SA knee ext- eccentric NV 33# x20 33# x20 33# x20 33# x20   22# 2x10 22# 2x10 missed   Standing gastroc stretch        30\"x3     Hamstring S        Supine 30\"x3 30\"x3    Supine left SLR x20 1# 2x10 1# 2x10 2# 2x10     x15 x20   S/L left hip abd x20 1# 2x10 1# 2x10 2# 2x10     x15 x20                Heel slides        Active c OP x10\"x10                  Ther Activity    Bike  5' 5' 5' 5' L1 5'   5' 5' TM 2.3 mph x5'   TM 2.3 mph NV            STS low mat             Lat Heel Taps 1Rx20 1Rx20 1Rx20 1Rx20 1Rx20   Lat heel taps  1Rx20  Lat heel taps 1Rx20 Lat heel taps 1Rx20   Step downs- eccentric 2Rx20 2Rx20 2Rx20 2Rx20 2Rx20   2Rx20 2Rx20 2Rx20   Step ups/down 3Rx20 3Rx20      3Rx20 3Rx20 3Rx20       Gait Training                              Modalities    CP PRN        Home  Home                        Access Code: [email or web address]  URL: [email or web address]  Date: 08/07/2024  Prepared by: Mitchell Nolasco    Exercises  - Wall Squat  - 3 x weekly - 3 sets - 10 reps - 5 hold  - Sit to Stand Without Arm Support  - 3 x weekly - 3 sets - 10 reps - 5 hold  - Forward Step Down  - 3 x weekly - 3 sets - 10 reps - 5 hold  - Lateral Step Down  - 3 x weekly - 3 sets - 10 reps - 5 hold  - Active Straight Leg Raise with Quad Set  - 3 x weekly - 3 sets - 10 reps - 5 hold  - Sidelying Hip Abduction  - 3 x weekly - 3 sets - 10 reps - 5 hold       "

## 2024-08-26 ENCOUNTER — OFFICE VISIT (OUTPATIENT)
Dept: OBGYN CLINIC | Facility: MEDICAL CENTER | Age: 49
End: 2024-08-26

## 2024-08-26 VITALS
HEART RATE: 77 BPM | WEIGHT: 157 LBS | SYSTOLIC BLOOD PRESSURE: 130 MMHG | BODY MASS INDEX: 27.82 KG/M2 | HEIGHT: 63 IN | DIASTOLIC BLOOD PRESSURE: 83 MMHG

## 2024-08-26 DIAGNOSIS — Z96.652 STATUS POST TOTAL KNEE REPLACEMENT, LEFT: Primary | ICD-10-CM

## 2024-08-26 PROCEDURE — 99024 POSTOP FOLLOW-UP VISIT: CPT | Performed by: STUDENT IN AN ORGANIZED HEALTH CARE EDUCATION/TRAINING PROGRAM

## 2024-08-26 NOTE — PROGRESS NOTES
Subjective: Patient seen and examined. Pain controlled. Progressing well. Incisions well healed.  She is attending physical therapy and making good progress. Denies fevers or chills. She is ambulating unassisted today.  She is returning to work tomorrow. She feels that her walks and activities prior to surgery are better than pre-operatively.    Physical Exam:  Incision: well healed incision without erythema  ROM: 0-120  Stable to valgus varus stress at 0, 30 and 90  5/5 IP/Q/HS/TA/GS, 2+ DP/PT, SILT DP/SP/S/S/TN      Assessment/Plan:  12 weeks s/p left press-fit total knee arthroplasty from 6/3/24.    - continue multi-modal pain control   - Weight bearing status: WBAT  - DVT ppx: Complete  - Incision care: no restrictions  - PT/OT  - F/U 9 months for annual recheck with xrays.  - Follow up as needed for the right knee.      Scribe Attestation      I,:  Suyapa Matute PA-C am acting as a scribe while in the presence of the attending physician.:       I,:  De Napier DO personally performed the services described in this documentation    as scribed in my presence.:

## 2024-09-10 DIAGNOSIS — R73.03 PREDIABETES: ICD-10-CM

## 2024-09-10 RX ORDER — TIRZEPATIDE 15 MG/.5ML
INJECTION, SOLUTION SUBCUTANEOUS
Qty: 6 ML | Refills: 1 | Status: SHIPPED | OUTPATIENT
Start: 2024-09-10

## 2024-09-24 ENCOUNTER — PATIENT MESSAGE (OUTPATIENT)
Dept: OBGYN CLINIC | Facility: CLINIC | Age: 49
End: 2024-09-24

## 2024-10-24 ENCOUNTER — PATIENT MESSAGE (OUTPATIENT)
Dept: OBGYN CLINIC | Facility: CLINIC | Age: 49
End: 2024-10-24

## 2024-10-24 DIAGNOSIS — Z96.652 STATUS POST TOTAL KNEE REPLACEMENT, LEFT: ICD-10-CM

## 2024-10-25 RX ORDER — AMOXICILLIN 500 MG/1
CAPSULE ORAL
Qty: 4 CAPSULE | Refills: 5 | Status: SHIPPED | OUTPATIENT
Start: 2024-10-25 | End: 2024-11-25

## 2024-10-27 ENCOUNTER — PATIENT MESSAGE (OUTPATIENT)
Dept: OBGYN CLINIC | Facility: CLINIC | Age: 49
End: 2024-10-27

## 2024-10-28 ENCOUNTER — NURSE TRIAGE (OUTPATIENT)
Age: 49
End: 2024-10-28

## 2024-10-28 ENCOUNTER — TELEPHONE (OUTPATIENT)
Age: 49
End: 2024-10-28

## 2024-10-28 DIAGNOSIS — B37.9 YEAST INFECTION: Primary | ICD-10-CM

## 2024-10-28 RX ORDER — FLUCONAZOLE 150 MG/1
150 TABLET ORAL DAILY
Qty: 1 TABLET | Refills: 0 | Status: SHIPPED | OUTPATIENT
Start: 2024-10-28 | End: 2024-10-29

## 2024-10-28 NOTE — TELEPHONE ENCOUNTER
"RN, please see the thread in patient MYC msg encounter to determine if script is appropriate for [place]    \"Itching, burning, and a tiny bit of discharge. I started an over the counter 3 day treatment last night, but the oral pill usually works faster for me. \"    Thank you   "

## 2024-10-28 NOTE — TELEPHONE ENCOUNTER
"Spoke with patient regarding below trippiecet message.  She reports she had to take antibiotics due to recent surgery and started last week with moderate vulvar itching and irritation.  Unsure if any discharge as she did do a dose of monistat.  She was advised diflucan will be sent to pharmacy.  She was advised if symptoms persist she will need to call back for an appointment.  Allergies and pharmacy reviewed with pt.  Pt has yearly scheduled for 11/7, however last year had yearly on the same date. Pt was advised to check with her ins company to make sure they do not want a year +1 day.  Pt verbalized understanding, no further questions or concerns at this time.       \"How many yeast infections have you had in the past 2 years?\"    -If 1 or less, prescribe Diflucan 150mg PO. If no improvement after 1 dose treatment, please instruct patient to call back to schedule appointment. (should be seen within 3 days)    -If more than 4 or more yeast infections in a year or if they are recurrent, schedule appointment within 3 days.    For OB patients: if patient wishes to use over the counter monistat, recommend only the 7 day treatment.      Hello,     I recently took a large dose of antibiotics, prescribed by my orthopedic doctor, for dental work (due to recent knee replacement surgery). I now have a yeast infection. I was wondering if it’s possible to have you send a prescription for diflucan to [facility] in [place]? I know you aren’t the prescribing doctor, but I was more comfortable asking your office, than the orthopedic office.      Thank you,  Manuela Camacho  Reason for Disposition   Symptoms of a 'yeast infection' (i.e., itchy, white discharge, not bad smelling), which feels like prior vaginal yeast infections    Answer Assessment - Initial Assessment Questions  1. SYMPTOM: \"What's the main symptom you're concerned about?\" (e.g., rash, itching, swelling, dryness)      Vulvar itching and irritation  3. ONSET: \"When did this  " "start?\"      Last week  4. PAIN: \"Is there any pain?\" If Yes, ask: \"How bad is it?\" (Scale: 1-10; mild, moderate, severe)      burning  5. CAUSE: \"What do you think is causing the symptoms?\"      Was on antibiotics  6. OTHER SYMPTOMS: \"Do you have any other symptoms?\" (e.g., fever, vaginal bleeding, pain with urination)    denies  7. PREGNANCY: \"Is there any chance you are pregnant?\" \"When was your last menstrual period?\"      ablation    Protocols used: Vulvar Symptoms-Adult-[place]    "

## 2024-11-07 ENCOUNTER — ANNUAL EXAM (OUTPATIENT)
Dept: OBGYN CLINIC | Facility: CLINIC | Age: 49
End: 2024-11-07
Payer: COMMERCIAL

## 2024-11-07 VITALS
BODY MASS INDEX: 27.64 KG/M2 | WEIGHT: 156 LBS | DIASTOLIC BLOOD PRESSURE: 78 MMHG | SYSTOLIC BLOOD PRESSURE: 120 MMHG | HEIGHT: 63 IN

## 2024-11-07 DIAGNOSIS — N95.1 PERIMENOPAUSE: ICD-10-CM

## 2024-11-07 DIAGNOSIS — Z01.419 ENCOUNTER FOR ANNUAL ROUTINE GYNECOLOGICAL EXAMINATION: Primary | ICD-10-CM

## 2024-11-07 DIAGNOSIS — Z12.31 ENCOUNTER FOR SCREENING MAMMOGRAM FOR BREAST CANCER: ICD-10-CM

## 2024-11-07 DIAGNOSIS — R23.2 HOT FLASHES: ICD-10-CM

## 2024-11-07 PROCEDURE — S0612 ANNUAL GYNECOLOGICAL EXAMINA: HCPCS | Performed by: OBSTETRICS & GYNECOLOGY

## 2024-11-07 PROCEDURE — G0145 SCR C/V CYTO,THINLAYER,RESCR: HCPCS | Performed by: OBSTETRICS & GYNECOLOGY

## 2024-11-07 NOTE — PROGRESS NOTES
Ambulatory Visit  Name: Manuela Camacho      : 1975      MRN: [id number]  Encounter Provider: Abril Dean DO  Encounter Date: 2024   Encounter department: [facility]    Assessment & Plan  Encounter for annual routine gynecological examination    Orders:    Liquid-based pap, screening    Encounter for screening mammogram for breast cancer    Orders:    Mammo screening bilateral w 3d and cad; Future    Hot flashes    Orders:    Follicle stimulating hormone; Future    Estradiol; Future    Perimenopause         Pap done for cytology reflex hpv.  Encouraged self breast examination as well as calcium supplementation.  Continue annual mammogram.  Reviewed colon cancer screening, up to date.  Discussed treatment options for vasomotor symptoms including over-the-counter agents versus HRT.  Will obtain FSH/estradiol level.  Reviewed menopausal diagnosis.  All questions answered.  She will continue to follow-up with her subspecialist.  Return to office in 1 year or as needed    History of Present Illness     Manuela Camacho is a 49 y.o. female who presents     This is a pleasant 49-year-old female P3 ( x 3, age 19, 17) presents for her GYN exam.  Her GYN history significant for endometrial ablation several years prior.  Her menstrual cycles have been irregular, scant.  She has had significant night sweats.  There were no changes in bowel or bladder function.  She has been in a monogamous relationship for over 21 years.  Pap smears have been normal.  Method of contraception is vasectomy.  She continues to be on Mounjaro with approximately 60 pound weight loss over the last 2 years.  She continues to follow-up with primary care on a regular basis.    She underwent left knee replacement 3/2024, recovered well but long.  She is very active.    Cologuard     Mammogram 3/2024    Pap    FSH 3/2024 3.7    History obtained from : patient  Review of Systems   Constitutional:  Negative for fatigue,  fever and unexpected weight change.   Respiratory:  Negative for cough, chest tightness, shortness of breath and wheezing.    Cardiovascular: Negative.  Negative for chest pain and palpitations.   Gastrointestinal: Negative.  Negative for abdominal distention, abdominal pain, blood in stool, constipation, diarrhea, nausea and vomiting.   Genitourinary: Negative.  Negative for difficulty urinating, dyspareunia, dysuria, flank pain, frequency, genital sores, hematuria, pelvic pain, urgency, vaginal bleeding, vaginal discharge and vaginal pain.   Skin:  Negative for rash.     Current Outpatient Medications on File Prior to Visit   Medication Sig Dispense Refill    albuterol (Ventolin HFA) 90 mcg/act inhaler Inhale 1-2 puffs as needed for wheezing 54 g 1    ascorbic acid (VITAMIN C) 500 MG tablet Take 1 tablet (500 mg total) by mouth 2 (two) times a day 60 tablet 1    atorvastatin (LIPITOR) 20 mg tablet Take 1 tablet (20 mg total) by mouth daily (Patient taking differently: Take 20 mg by mouth every evening) 90 tablet 3    Cholecalciferol (D3-1000) 25 MCG (1000 UT) capsule Take 1,000 Units by mouth daily      Cyanocobalamin (Vitamin B12) 1000 MCG TBCR Take by mouth in the morning      escitalopram (LEXAPRO) 10 mg tablet Take 1 tablet (10 mg total) by mouth daily at bedtime 90 tablet 1    ferrous sulfate 325 (65 Fe) mg tablet Take 325 mg by mouth daily with breakfast      fluticasone (FLONASE) 50 mcg/act nasal spray 1 spray into each nostril daily (Patient taking differently: 1 spray into each nostril daily as needed) 1 Bottle 0    folic acid (FOLVITE) 1 mg tablet TAKE 1 TABLET DAILY 90 tablet 3    Lactobacillus (PROBIOTIC ACIDOPHILUS PO) Take by mouth every evening      loratadine (CLARITIN) 10 mg tablet Take 10 mg by mouth daily at bedtime        losartan (COZAAR) 100 MG tablet TAKE 1 TABLET DAILY 90 tablet 1    metoprolol succinate (TOPROL-XL) 50 mg 24 hr tablet Take 1 tablet (50 mg total) by mouth daily at bedtime 90  "tablet 1    Multiple Vitamin (multivitamin) tablet Take 1 tablet by mouth daily      Omega-3 Fatty Acids (FISH OIL) 1,000 mg Take 2 capsules by mouth daily at bedtime        rizatriptan (Maxalt) 10 mg tablet Take 1 tablet (10 mg total) by mouth as needed for migraine Take at the onset of migraine; if symptoms continue or return, may take another dose at least 2 hours after first dose. Take no more than 2 doses in a day. 18 tablet 3    tirzepatide (Mounjaro) 15 MG/0.5ML INJECT 0.5 ML (15 MG) UNDER THE SKIN EVERY 7 DAYS 6 mL 1    zinc gluconate 50 mg tablet Take 50 mg by mouth daily      acetaminophen (TYLENOL) 500 mg tablet Take 2 tablets (1,000 mg total) by mouth every 8 (eight) hours (Patient not taking: Reported on 11/7/2024) 60 tablet 0    amoxicillin (AMOXIL) 500 mg capsule Take 4 capsules by mouth 1 hour prior to dental cleaning or procedure. (Patient not taking: Reported on 11/7/2024) 4 capsule 5     No current facility-administered medications on file prior to visit.          Objective     /78   Ht 5' 3\" (1.6 m)   Wt 70.8 kg (156 lb)   BMI 27.63 kg/m²     Physical Exam  Constitutional:       Appearance: Normal appearance. She is well-developed.   HENT:      Head: Normocephalic and atraumatic.   Cardiovascular:      Rate and Rhythm: Normal rate and regular rhythm.   Pulmonary:      Effort: Pulmonary effort is normal.      Breath sounds: Normal breath sounds.   Chest:   Breasts:     Right: No inverted nipple, mass, nipple discharge, skin change or tenderness.      Left: No inverted nipple, mass, nipple discharge, skin change or tenderness.   Abdominal:      General: Bowel sounds are normal. There is no distension.      Palpations: Abdomen is soft.      Tenderness: There is no abdominal tenderness. There is no guarding or rebound.   Genitourinary:     Labia:         Right: No rash, tenderness or lesion.         Left: No rash, tenderness or lesion.       Vagina: Normal. No signs of injury. No vaginal " discharge or tenderness.      Cervix: No cervical motion tenderness, discharge, friability, lesion or cervical bleeding.      Uterus: Not enlarged and not tender.       Adnexa:         Right: No mass, tenderness or fullness.          Left: No mass, tenderness or fullness.     Neurological:      Mental Status: She is alert.   Psychiatric:         Behavior: Behavior normal.       Administrative Statements   I have spent a total time of 30 minutes in caring for this patient on the day of the visit/encounter including Prognosis, Risks and benefits of tx options, Instructions for management, Impressions, Counseling / Coordination of care, Documenting in the medical record, Reviewing / ordering tests, medicine, procedures  , and Obtaining or reviewing history  .

## 2024-11-13 LAB
LAB AP GYN PRIMARY INTERPRETATION: NORMAL
Lab: NORMAL

## 2024-11-19 DIAGNOSIS — E78.00 HYPERCHOLESTEROLEMIA: ICD-10-CM

## 2024-11-19 DIAGNOSIS — E78.1 HYPERTRIGLYCERIDEMIA: ICD-10-CM

## 2024-11-20 RX ORDER — ATORVASTATIN CALCIUM 20 MG/1
20 TABLET, FILM COATED ORAL EVERY EVENING
Qty: 90 TABLET | Refills: 0 | Status: SHIPPED | OUTPATIENT
Start: 2024-11-20

## 2024-11-29 ENCOUNTER — APPOINTMENT (OUTPATIENT)
Dept: LAB | Facility: IMAGING CENTER | Age: 49
End: 2024-11-29
Payer: COMMERCIAL

## 2024-11-29 DIAGNOSIS — I10 BENIGN HYPERTENSION: ICD-10-CM

## 2024-11-29 DIAGNOSIS — E78.00 HYPERCHOLESTEROLEMIA: ICD-10-CM

## 2024-11-29 DIAGNOSIS — R23.2 HOT FLASHES: ICD-10-CM

## 2024-11-29 LAB
ALBUMIN SERPL BCG-MCNC: 4.4 G/DL (ref 3.5–5)
ALP SERPL-CCNC: 55 U/L (ref 34–104)
ALT SERPL W P-5'-P-CCNC: 29 U/L (ref 7–52)
AMORPH URATE CRY URNS QL MICRO: ABNORMAL
ANION GAP SERPL CALCULATED.3IONS-SCNC: 5 MMOL/L (ref 4–13)
AST SERPL W P-5'-P-CCNC: 21 U/L (ref 13–39)
BACTERIA UR QL AUTO: ABNORMAL /HPF
BASOPHILS # BLD AUTO: 0.04 THOUSANDS/ΜL (ref 0–0.1)
BASOPHILS NFR BLD AUTO: 1 % (ref 0–1)
BILIRUB SERPL-MCNC: 1.3 MG/DL (ref 0.2–1)
BILIRUB UR QL STRIP: NEGATIVE
BUN SERPL-MCNC: 12 MG/DL (ref 5–25)
CALCIUM SERPL-MCNC: 9.1 MG/DL (ref 8.4–10.2)
CHLORIDE SERPL-SCNC: 102 MMOL/L (ref 96–108)
CHOLEST SERPL-MCNC: 181 MG/DL (ref ?–200)
CLARITY UR: ABNORMAL
CO2 SERPL-SCNC: 27 MMOL/L (ref 21–32)
COLOR UR: ABNORMAL
CREAT SERPL-MCNC: 0.6 MG/DL (ref 0.6–1.3)
EOSINOPHIL # BLD AUTO: 0.13 THOUSAND/ΜL (ref 0–0.61)
EOSINOPHIL NFR BLD AUTO: 2 % (ref 0–6)
ERYTHROCYTE [DISTWIDTH] IN BLOOD BY AUTOMATED COUNT: 13.1 % (ref 11.6–15.1)
ESTRADIOL SERPL-MCNC: 104.1 PG/ML
GFR SERPL CREATININE-BSD FRML MDRD: 106 ML/MIN/1.73SQ M
GLUCOSE P FAST SERPL-MCNC: 88 MG/DL (ref 65–99)
GLUCOSE UR STRIP-MCNC: NEGATIVE MG/DL
HCT VFR BLD AUTO: 41 % (ref 34.8–46.1)
HDLC SERPL-MCNC: 56 MG/DL
HGB BLD-MCNC: 13.1 G/DL (ref 11.5–15.4)
HGB UR QL STRIP.AUTO: NEGATIVE
IMM GRANULOCYTES # BLD AUTO: 0.01 THOUSAND/UL (ref 0–0.2)
IMM GRANULOCYTES NFR BLD AUTO: 0 % (ref 0–2)
KETONES UR STRIP-MCNC: NEGATIVE MG/DL
LDLC SERPL CALC-MCNC: 103 MG/DL (ref 0–100)
LEUKOCYTE ESTERASE UR QL STRIP: NEGATIVE
LYMPHOCYTES # BLD AUTO: 1.74 THOUSANDS/ΜL (ref 0.6–4.47)
LYMPHOCYTES NFR BLD AUTO: 26 % (ref 14–44)
MCH RBC QN AUTO: 28.8 PG (ref 26.8–34.3)
MCHC RBC AUTO-ENTMCNC: 32 G/DL (ref 31.4–37.4)
MCV RBC AUTO: 90 FL (ref 82–98)
MONOCYTES # BLD AUTO: 0.45 THOUSAND/ΜL (ref 0.17–1.22)
MONOCYTES NFR BLD AUTO: 7 % (ref 4–12)
MUCOUS THREADS UR QL AUTO: ABNORMAL
NEUTROPHILS # BLD AUTO: 4.39 THOUSANDS/ΜL (ref 1.85–7.62)
NEUTS SEG NFR BLD AUTO: 64 % (ref 43–75)
NITRITE UR QL STRIP: NEGATIVE
NON-SQ EPI CELLS URNS QL MICRO: ABNORMAL /HPF
NONHDLC SERPL-MCNC: 125 MG/DL
NRBC BLD AUTO-RTO: 0 /100 WBCS
PH UR STRIP.AUTO: 6 [PH]
PLATELET # BLD AUTO: 284 THOUSANDS/UL (ref 149–390)
PMV BLD AUTO: 10.8 FL (ref 8.9–12.7)
POTASSIUM SERPL-SCNC: 4.1 MMOL/L (ref 3.5–5.3)
PROT SERPL-MCNC: 6.9 G/DL (ref 6.4–8.4)
PROT UR STRIP-MCNC: ABNORMAL MG/DL
RBC # BLD AUTO: 4.55 MILLION/UL (ref 3.81–5.12)
RBC #/AREA URNS AUTO: ABNORMAL /HPF
SODIUM SERPL-SCNC: 134 MMOL/L (ref 135–147)
SP GR UR STRIP.AUTO: 1.02 (ref 1–1.03)
TRIGL SERPL-MCNC: 110 MG/DL (ref ?–150)
TSH SERPL DL<=0.05 MIU/L-ACNC: 1.48 UIU/ML (ref 0.45–4.5)
UROBILINOGEN UR STRIP-ACNC: <2 MG/DL
WBC # BLD AUTO: 6.76 THOUSAND/UL (ref 4.31–10.16)
WBC #/AREA URNS AUTO: ABNORMAL /HPF

## 2024-11-29 PROCEDURE — 36415 COLL VENOUS BLD VENIPUNCTURE: CPT

## 2024-11-29 PROCEDURE — 82670 ASSAY OF TOTAL ESTRADIOL: CPT

## 2024-11-29 PROCEDURE — 85025 COMPLETE CBC W/AUTO DIFF WBC: CPT

## 2024-11-29 PROCEDURE — 81001 URINALYSIS AUTO W/SCOPE: CPT

## 2024-11-29 PROCEDURE — 83001 ASSAY OF GONADOTROPIN (FSH): CPT

## 2024-11-29 PROCEDURE — 80061 LIPID PANEL: CPT

## 2024-11-29 PROCEDURE — 80053 COMPREHEN METABOLIC PANEL: CPT

## 2024-11-29 PROCEDURE — 84443 ASSAY THYROID STIM HORMONE: CPT

## 2024-12-02 ENCOUNTER — OFFICE VISIT (OUTPATIENT)
Dept: INTERNAL MEDICINE CLINIC | Age: 49
End: 2024-12-02
Payer: COMMERCIAL

## 2024-12-02 VITALS
WEIGHT: 157 LBS | HEIGHT: 63 IN | DIASTOLIC BLOOD PRESSURE: 76 MMHG | TEMPERATURE: 97.5 F | OXYGEN SATURATION: 98 % | SYSTOLIC BLOOD PRESSURE: 110 MMHG | HEART RATE: 75 BPM | BODY MASS INDEX: 27.82 KG/M2

## 2024-12-02 DIAGNOSIS — R73.03 PREDIABETES: ICD-10-CM

## 2024-12-02 DIAGNOSIS — E66.9 OBESITY (BMI 35.0-39.9 WITHOUT COMORBIDITY): ICD-10-CM

## 2024-12-02 DIAGNOSIS — F41.1 GENERALIZED ANXIETY DISORDER: ICD-10-CM

## 2024-12-02 DIAGNOSIS — I10 BENIGN HYPERTENSION: ICD-10-CM

## 2024-12-02 DIAGNOSIS — E78.00 HYPERCHOLESTEROLEMIA: Primary | ICD-10-CM

## 2024-12-02 PROCEDURE — 99214 OFFICE O/P EST MOD 30 MIN: CPT | Performed by: INTERNAL MEDICINE

## 2024-12-02 RX ORDER — METOPROLOL SUCCINATE 50 MG/1
50 TABLET, EXTENDED RELEASE ORAL
Qty: 90 TABLET | Refills: 1 | Status: SHIPPED | OUTPATIENT
Start: 2024-12-02

## 2024-12-02 RX ORDER — ESCITALOPRAM OXALATE 10 MG/1
10 TABLET ORAL
Qty: 90 TABLET | Refills: 1 | Status: SHIPPED | OUTPATIENT
Start: 2024-12-02

## 2024-12-02 RX ORDER — LOSARTAN POTASSIUM 100 MG/1
100 TABLET ORAL DAILY
Qty: 90 TABLET | Refills: 1 | Status: SHIPPED | OUTPATIENT
Start: 2024-12-02

## 2024-12-02 NOTE — ASSESSMENT & PLAN NOTE
Hypertension is very well-controlled  Orders:    metoprolol succinate (TOPROL-XL) 50 mg 24 hr tablet; Take 1 tablet (50 mg total) by mouth daily at bedtime    losartan (COZAAR) 100 MG tablet; Take 1 tablet (100 mg total) by mouth daily

## 2024-12-02 NOTE — PROGRESS NOTES
Name: Manuela Camacho      : 1975      MRN: [id number]  Encounter Provider: Norma Reyez MD  Encounter Date: 2024   Encounter department: [facility]  :  Assessment & Plan  Benign hypertension  Hypertension is very well-controlled  Orders:    metoprolol succinate (TOPROL-XL) 50 mg 24 hr tablet; Take 1 tablet (50 mg total) by mouth daily at bedtime    losartan (COZAAR) 100 MG tablet; Take 1 tablet (100 mg total) by mouth daily    Generalized anxiety disorder  Generalized anxiety disorder with much less problems with the depression doing very well with the Lexapro  Orders:    escitalopram (LEXAPRO) 10 mg tablet; Take 1 tablet (10 mg total) by mouth daily at bedtime    Hypercholesterolemia  Lipid panel could be better  Orders:    Lipid panel; Future    Obesity (BMI 35.0-39.9 without comorbidity)           Prediabetes    Orders:    Basic metabolic panel; Future    Lipid panel; Future    Hemoglobin A1C; Future           History of Present Illness     This is a very pleasant 49 years young lady who is here today for the regular follow-up    Prediabetes in the BMI of 35 she is doing well now she has Mounjaro her blood sugars are better and hemoglobin A1c is excellent 5.3 she did lose weight and she wanted to continue to use the medication it is covered by her insurance she does not have any side effects of the medications.  Isolated hyperbilirubinemia there is nothing to worry about I discussed with her it has its ups and downs otherwise all of the liver function test are normal      Patient is here today for the follow-up.   Hypertension. I reviewed antihypertensive medication, patient does not have any side effects of  medications, no signs or symptoms of hypertension ,hypotension or orthostatic hypotension.  Patient is compliant with medications.  Blood workup related to hypertensive diagnosis reviewed.  Plan is to continue with the present management.  We will follow-up as  "a scheduled and adjust the doses of the medication as indicated.  Blood pressure is very well-controlled continue with the same medication no changes    Anxiety is much improved with the escitalopram plan is to continue with the same medication and no changes    Hypercholesterolemia will follow-up with the lipid panel she is on she is on atorvastatin      Review of Systems   Constitutional:  Negative for chills and fatigue.   HENT:  Negative for congestion, ear pain, hearing loss, postnasal drip, sinus pressure, sore throat and voice change.    Eyes:  Negative for pain, discharge and visual disturbance.   Respiratory:  Negative for cough, chest tightness and shortness of breath.    Cardiovascular:  Negative for chest pain, palpitations and leg swelling.   Gastrointestinal:  Negative for abdominal pain, blood in stool, diarrhea, nausea and rectal pain.   Genitourinary:  Negative for difficulty urinating, dysuria and urgency.   Musculoskeletal:  Negative for arthralgias and joint swelling.   Skin:  Negative for rash.   Allergic/Immunologic: Negative for environmental allergies and food allergies.   Neurological:  Negative for dizziness, tremors, weakness, numbness and headaches.   Hematological:  Negative for adenopathy.   Psychiatric/Behavioral:  Negative for behavioral problems and hallucinations.           Objective   /76 (BP Location: Left arm, Patient Position: Sitting, Cuff Size: Standard)   Pulse 75   Temp 97.5 °F (36.4 °C) (Temporal)   Ht 5' 3\" (1.6 m)   Wt 71.2 kg (157 lb)   SpO2 98%   BMI 27.81 kg/m²      Physical Exam  Vitals and nursing note reviewed.   Constitutional:       General: She is not in acute distress.     Appearance: She is well-developed.   HENT:      Head: Normocephalic and atraumatic.   Eyes:      Conjunctiva/sclera: Conjunctivae normal.   Cardiovascular:      Rate and Rhythm: Normal rate and regular rhythm.      Heart sounds: No murmur heard.  Pulmonary:      Effort: Pulmonary " effort is normal. No respiratory distress.      Breath sounds: Normal breath sounds.   Abdominal:      Palpations: Abdomen is soft.      Tenderness: There is no abdominal tenderness.   Musculoskeletal:         General: No swelling.      Cervical back: Neck supple.   Skin:     General: Skin is warm and dry.      Capillary Refill: Capillary refill takes less than 2 seconds.   Neurological:      Mental Status: She is alert.   Psychiatric:         Mood and Affect: Mood normal.

## 2024-12-02 NOTE — ASSESSMENT & PLAN NOTE
Generalized anxiety disorder with much less problems with the depression doing very well with the Lexapro  Orders:    escitalopram (LEXAPRO) 10 mg tablet; Take 1 tablet (10 mg total) by mouth daily at bedtime

## 2024-12-03 DIAGNOSIS — G44.229 CHRONIC TENSION-TYPE HEADACHE, NOT INTRACTABLE: ICD-10-CM

## 2024-12-03 NOTE — TELEPHONE ENCOUNTER
Last OV was 2/29/24. Pt is overdue for an OV, as the provider wanted to see him back in May 2024. Routed to the clerical team to contact pt and schedule an OV. Routed to the provider to review the refill request.

## 2024-12-04 RX ORDER — RIZATRIPTAN BENZOATE 10 MG/1
10 TABLET ORAL AS NEEDED
Qty: 18 TABLET | Refills: 3 | Status: SHIPPED | OUTPATIENT
Start: 2024-12-04

## 2024-12-06 LAB — FSH SERPL-ACNC: 9.6 MIU/ML

## 2024-12-10 ENCOUNTER — RESULTS FOLLOW-UP (OUTPATIENT)
Dept: OBGYN CLINIC | Facility: CLINIC | Age: 49
End: 2024-12-10

## 2025-01-11 ENCOUNTER — RESULTS FOLLOW-UP (OUTPATIENT)
Dept: NEUROLOGY | Facility: CLINIC | Age: 50
End: 2025-01-11

## 2025-01-17 ENCOUNTER — TELEPHONE (OUTPATIENT)
Age: 50
End: 2025-01-17

## 2025-01-17 NOTE — TELEPHONE ENCOUNTER
Outbound call placed to patient, no answer. Okay  to LMOM per communication form.    Left message stating to please call the office back to discuss headaches. Office number provided.

## 2025-01-17 NOTE — TELEPHONE ENCOUNTER
Patient called back to speak to a nurse and was unable to contact a nurse at the time     Patient missed call  this morning and asking for a call back to discuss the symptoms and options for treatment

## 2025-01-17 NOTE — TELEPHONE ENCOUNTER
Pt's migraine has mostly resolved. She feels a shadow of a headache.  1-2/10 pain currently. She did not need to take rizatriptan or OTC meds either yesterday or today.     History:  The past two weeks pt reports she had a headache every day. It would come on at different times of the day.  On Wednesday, she had a horrible headache.   Location: nausea left bottom of skull/neck, left side; radiated up back of head  Pt had to leave work. She laid in bed for several hours; she was able to eat.   On Thursday, the migraine was resolving and the nausea subsided.     Pt reports she has pain in neck and head. She has an appt w/chiropractor on Wednesday. She wants to know if Dr. Keith thinks this is okay.     Pt drinks appx 24oz of water daily. She will try to increase this.     Pt still has menses; she is wondering if migraines could be hormonal.   LMP - 12/9/24    Rizatriptan 10mg - last two weeks she was taking med daily (only one pill); she got refill yesterday;     Per 2/29/24 Consult note:  Return in about 3 months (around 5/29/2024).     [phone] - okay to leave detailed msg.    Dr. Keith - please advise on chiropractor, pt's question if migraines could be hormonal. Thank you.

## 2025-01-20 NOTE — TELEPHONE ENCOUNTER
The headaches can be from the hormones.     The chiropractor should be okay but no neck manipulation. If she feels that her headaches are not improving, we can consider using a migraine cocktail.     Thank you,     Dr. Sagar MD.   01/20/25   9:39 AM

## 2025-01-21 NOTE — TELEPHONE ENCOUNTER
Erna Valdez to Me  Shruthi Keith MD  Neurology Pod Clerical   PW    1/20/25  7:08 PM  Hello,    Called patient to inform of response below from Dr Keith. Left detailed voicemail and call back phone number if she had any further questions or concerns.'      Thank you.

## 2025-01-23 ENCOUNTER — OFFICE VISIT (OUTPATIENT)
Dept: CARDIOLOGY CLINIC | Facility: CLINIC | Age: 50
End: 2025-01-23
Payer: COMMERCIAL

## 2025-01-23 VITALS
OXYGEN SATURATION: 98 % | BODY MASS INDEX: 28.14 KG/M2 | HEIGHT: 63 IN | DIASTOLIC BLOOD PRESSURE: 90 MMHG | HEART RATE: 84 BPM | SYSTOLIC BLOOD PRESSURE: 120 MMHG | WEIGHT: 158.8 LBS

## 2025-01-23 DIAGNOSIS — I10 BENIGN HYPERTENSION: Primary | ICD-10-CM

## 2025-01-23 DIAGNOSIS — E78.00 HYPERCHOLESTEROLEMIA: ICD-10-CM

## 2025-01-23 DIAGNOSIS — E78.1 HYPERTRIGLYCERIDEMIA: ICD-10-CM

## 2025-01-23 DIAGNOSIS — E78.2 MIXED DYSLIPIDEMIA: ICD-10-CM

## 2025-01-23 PROCEDURE — 99214 OFFICE O/P EST MOD 30 MIN: CPT | Performed by: INTERNAL MEDICINE

## 2025-01-23 NOTE — PROGRESS NOTES
Cardiology Follow Up    Manuela Camacho  1975  [id number]  [facility] [place]  [address]  [place] [id number] [phone]    1. Benign hypertension  Lipid Panel with Direct LDL reflex      2. Hypercholesterolemia        3. Hypertriglyceridemia        4. Mixed dyslipidemia            Diagnoses and all orders for this visit:    Benign hypertension  -     Lipid Panel with Direct LDL reflex; Future    Hypercholesterolemia    Hypertriglyceridemia    Mixed dyslipidemia      I had the pleasure of seeing Manuela Camacho for a follow up visit.     INTERVAL HISTORY: none    History of the presenting illness, Discussion/Summary and My Plan are as follows:::    Manuela is a pleasant 49-year-old with hypertension, dyslipidemia-with high triglycerides, personal history of gestational diabetes, family history of diabetes and lifelong nonsmoker.     Family history-diabetes, hypertension, dyslipidemia, mom is 79, and without any vascular disease, dad has polycythemia vera (dad is 81).  Her sister had a heart attack at the age of 52, is a diabetic, has elevated cholesterol and also was a former smoker.     She sought attention for an episode of chest pain on 5/10/2022-burning/aching pain in the chest radiating to the jaw and the back, occurred in the setting of a lot of emotional stress, with negative troponins, ECG with nonspecific changes in the ED.  No CT was performed. After that, recommended a stress test, and ultimately completed in October 2023 that showed excellent functional capacity without ischemia.  That was when I saw her last.     From an activity standpoint, she walks 3 miles, 3/7 days a week without any symptoms.  Has had 60 lb weight loss on Tirzepatide and feels well and plans to continue.  She still has her periods and is not perimenopausal.    Has had ocular migraines.    Cardiac exam is unremarkable.       Plan:      Hypertension: diastolic is still elevated, she will check at home and will send me a log     Mixed dyslipidemia: She has lost close to 60 pounds on Tirzepatide  - she is at risk for diabetes with her family history, personal history of gestational diabetes and elevated triglycerides.  Lifestyle changes should continue.  She has been on atorvastatin 20 mg for at least a couple of years but her most recent lipid profile showed a rise in total cholesterol as well as LDL/non-HDL but close to 30 mg/dL.  No clear explanation for this, has been compliant with her medications and diet, we will simply recheck this year.  No change at this time     Family history of diabetes:  See above     History of chest pain: None currently, stress test was negative with excellent functional capacity-October 2023    History of transient loss of-right field of vision, diagnosed with ocular migraines.     Follow-up in 12 months     Latest Reference Range & Units 05/08/21 09:32 09/16/21 09:47 09/17/22 10:46 07/08/23 09:22 11/29/24 09:30   Cholesterol See Comment mg/dL 178 194 145 151 181   Triglycerides See Comment mg/dL 170 (H) 180 (H) 190 (H) 117 110   HDL >=50 mg/dL 61 61 51 54 56   Non-HDL Cholesterol mg/dl    97 125   LDL Calculated 0 - 100 mg/dL 83 97 56 74 103 (H)   (H): Data is abnormally high     Latest Reference Range & Units 09/17/22 10:46 07/08/23 09:22   Cholesterol See Comment mg/dL 145 151   Triglycerides See Comment mg/dL 190 (H) 117   HDL >=50 mg/dL 51 54   Non-HDL Cholesterol mg/dl  97   LDL Calculated 0 - 100 mg/dL 56 74   (H): Data is abnormally high       Latest Reference Range & Units Most Recent 08/31/17 12:48 03/30/18 09:56 11/06/18 09:56 10/26/19 09:24 08/26/20 09:07 05/08/21 09:32 09/16/21 09:47   Cholesterol See Comment mg/dL 151  7/8/23 09:22 204 (H) 221 (H) 187 213 (H) 182 178 194   Triglycerides See Comment mg/dL 117  7/8/23 09:22 240 (H) 213 (H) 209 (H) 203 (H) 193 (H) 170 (H) 180 (H)   HDL >=50 mg/dL  54  7/8/23 09:22 53 58 49 52 55 61 61   Non-HDL Cholesterol mg/dl 97  7/8/23 09:22   138 161 127  133   LDL Calculated 0 - 100 mg/dL 74  7/8/23 09:22 103 (H) 120 (H) 96 120 (H) 88 83 97   (H): Data is abnormally high  Patient Active Problem List   Diagnosis    Benign hypertension    Dysphagia    Extrinsic asthma    Generalized anxiety disorder    Hypercholesterolemia    Hypertriglyceridemia    Elevated bilirubin    Obesity (BMI 35.0-39.9 without comorbidity)    Family history of diabetes mellitus    Prediabetes    Primary osteoarthritis of left knee    Chronic pain of left knee    Mixed dyslipidemia    Family history of premature CAD     Past Medical History:   Diagnosis Date    Acute diffuse otitis externa of right ear 08/23/2021    Anemia     Anxiety     Asthma     controlled  seasonal    Diabetes mellitus (HCC)     pre diabetes    Gestational diabetes mellitus     Hyperlipidemia     Hypertension     Migraines     Other chest pain 01/27/2020    PONV (postoperative nausea and vomiting)     Pregnancy     Resolved: 07/29/2015    Trigger finger     Resolved: 12/02/2016    Yeast infection     Resolved: 07/29/2015     Social History     Socioeconomic History    Marital status: /Civil Union     Spouse name: Not on file    Number of children: Not on file    Years of education: Not on file    Highest education level: Not on file   Occupational History    Not on file   Tobacco Use    Smoking status: Never    Smokeless tobacco: Never   Vaping Use    Vaping status: Never Used   Substance and Sexual Activity    Alcohol use: Yes     Alcohol/week: 2.0 standard drinks of alcohol     Types: 2 Glasses of wine per week     Comment: 1-2 drinks per week on average typically consumes beer    Drug use: No    Sexual activity: Not on file     Comment: defer   Other Topics Concern    Not on file   Social History Narrative    Daily coffee consumption (2 cups/day)    Denied: History of exercise habits    Two children     Social  Drivers of Health     Financial Resource Strain: Not on file   Food Insecurity: Not on file   Transportation Needs: Not on file   Physical Activity: Not on file   Stress: Not on file   Social Connections: Not on file   Intimate Partner Violence: Not on file   Housing Stability: Not on file      Family History   Problem Relation Age of Onset    Hypertension Mother     Hyperlipidemia Mother     Endometrial cancer Mother 46    Diabetes type II Mother     Hyperlipidemia Father     Hypertension Father     Heart attack Sister     No Known Problems Daughter     No Known Problems Maternal Grandmother     Heart attack Maternal Grandfather     Other Maternal Grandfather         Myocardial infarction arrhythmias    No Known Problems Paternal Grandmother     Stroke Paternal Grandfather         Syndrome    No Known Problems Brother     No Known Problems Son     No Known Problems Paternal Aunt      Past Surgical History:   Procedure Laterality Date    COLONOSCOPY      COLPORRHAPHY N/A 06/20/2022    Procedure: ANTERIOR COLPORRHAPHY;  Surgeon: Ovi Nice MD;  [facility];  Service: UroGynecology           CYSTOSCOPY N/A 06/20/2022    Procedure: CYSTOSCOPY;  Surgeon: Ovi Nice MD;  [facility];  Service: UroGynecology           INCISION TENDON SHEATH HAND Left     thumb    OR ARTHRP KNE CONDYLE&PLATU MEDIAL&LAT COMPARTMENTS Left 6/3/2024    Procedure: ARTHROPLASTY KNEE TOTAL- SAME DAY;  Surgeon: De Napier DO;  [facility];  Service: Orthopedics    OR ARTHRS KNE SURG W/MENISCECTOMY MED/LAT W/SHVG Left 11/05/2021    Procedure: KNEE ARTHROSCOPIC MENISCECTOMY Medial;  Surgeon: De Newby MD;  [facility];  Service: Orthopedics    OR HYSTEROSCOPY BX ENDOMETRIUM&/POLYPC W/WO D&C N/A 10/14/2019    Procedure: DILATATION AND CURETTAGE (D&C) WITH HYSTEROSCOPY W/ CHANTELLE;  Surgeon: Abril Dean DO;  [facility];  Service: Gynecology    OR  HYSTEROSCOPY ENDOMETRIAL ABLATION N/A 10/14/2019    Procedure: ABLATION ENDOMETRIAL NOVASURE;  Surgeon: Abril Dean DO;  [facility];  Service: Gynecology    AR RPR UMBILICAL HRNA 5 YRS/> REDUCIBLE N/A 08/23/2018    Procedure: UMBILICAL HERNIA REPAIR WITH MESH;  Surgeon: Gonzalo Chambers MD;  [facility];  Service: General    PUBOVAGINAL SLING N/A 06/20/2022    Procedure: PUBOVAGINAL SLING;  Surgeon: Ovi Nice MD;  [facility];  Service: UroGynecology           WISDOM TOOTH EXTRACTION         Current Outpatient Medications:     albuterol (Ventolin HFA) 90 mcg/act inhaler, Inhale 1-2 puffs as needed for wheezing, Disp: 54 g, Rfl: 1    ascorbic acid (VITAMIN C) 500 MG tablet, Take 1 tablet (500 mg total) by mouth 2 (two) times a day, Disp: 60 tablet, Rfl: 1    atorvastatin (LIPITOR) 20 mg tablet, Take 1 tablet (20 mg total) by mouth every evening, Disp: 90 tablet, Rfl: 0    Cholecalciferol (D3-1000) 25 MCG (1000 UT) capsule, Take 1,000 Units by mouth daily, Disp: , Rfl:     Cyanocobalamin (Vitamin B12) 1000 MCG TBCR, Take by mouth in the morning, Disp: , Rfl:     escitalopram (LEXAPRO) 10 mg tablet, Take 1 tablet (10 mg total) by mouth daily at bedtime, Disp: 90 tablet, Rfl: 1    ferrous sulfate 325 (65 Fe) mg tablet, Take 325 mg by mouth daily with breakfast, Disp: , Rfl:     fluticasone (FLONASE) 50 mcg/act nasal spray, 1 spray into each nostril daily, Disp: 1 Bottle, Rfl: 0    folic acid (FOLVITE) 1 mg tablet, TAKE 1 TABLET DAILY, Disp: 90 tablet, Rfl: 3    Lactobacillus (PROBIOTIC ACIDOPHILUS PO), Take by mouth every evening, Disp: , Rfl:     loratadine (CLARITIN) 10 mg tablet, Take 10 mg by mouth daily at bedtime  , Disp: , Rfl:     losartan (COZAAR) 100 MG tablet, Take 1 tablet (100 mg total) by mouth daily, Disp: 90 tablet, Rfl: 1    metoprolol succinate (TOPROL-XL) 50 mg 24 hr tablet, Take 1 tablet (50 mg total) by mouth daily at bedtime, Disp: 90 tablet, Rfl: 1     "Multiple Vitamin (multivitamin) tablet, Take 1 tablet by mouth daily, Disp: , Rfl:     Omega-3 Fatty Acids (FISH OIL) 1,000 mg, Take 2 capsules by mouth daily at bedtime  , Disp: , Rfl:     rizatriptan (Maxalt) 10 mg tablet, Take 1 tablet (10 mg total) by mouth as needed for migraine Take at the onset of migraine; if symptoms continue or return, may take another dose at least 2 hours after first dose. Take no more than 2 doses in a day., Disp: 18 tablet, Rfl: 3    tirzepatide (Mounjaro) 15 MG/0.5ML, INJECT 0.5 ML (15 MG) UNDER THE SKIN EVERY 7 DAYS, Disp: 6 mL, Rfl: 1    zinc gluconate 50 mg tablet, Take 50 mg by mouth daily, Disp: , Rfl:   Allergies   Allergen Reactions    Hibiclens [Chlorhexidine Gluconate] Rash     Red with pimples    Pollen Extract Nasal Congestion       Imaging: No results found.    Review of Systems:  Review of Systems   Constitutional: Negative.    HENT: Negative.     Eyes: Negative.    Respiratory: Negative.     Cardiovascular: Negative.    Endocrine: Negative.    Musculoskeletal: Negative.        Physical Exam:  /90 (BP Location: Right arm)   Pulse 84   Ht 5' 3\" (1.6 m)   Wt 72 kg (158 lb 12.8 oz)   SpO2 98%   BMI 28.13 kg/m²   Physical Exam  Constitutional:       General: She is not in acute distress.     Appearance: She is not ill-appearing, toxic-appearing or diaphoretic.   HENT:      Nose: Nose normal. No congestion or rhinorrhea.   Neck:      Vascular: No carotid bruit.   Cardiovascular:      Rate and Rhythm: Normal rate and regular rhythm.      Heart sounds: No murmur heard.     No friction rub. No gallop.   Pulmonary:      Effort: Pulmonary effort is normal. No respiratory distress.      Breath sounds: No stridor. No wheezing or rhonchi.   Abdominal:      General: Abdomen is flat. Bowel sounds are normal. There is no distension.      Palpations: There is no mass.      Tenderness: There is no abdominal tenderness.      Hernia: No hernia is present.   Musculoskeletal:        " " General: No swelling, tenderness, deformity or signs of injury. Normal range of motion.      Cervical back: Normal range of motion. No rigidity or tenderness.   Lymphadenopathy:      Cervical: No cervical adenopathy.   Skin:     General: Skin is warm.      Coloration: Skin is not jaundiced or pale.      Findings: No bruising or erythema.   Neurological:      Mental Status: She is alert.         This note was completed in part utilizing Watch[employer] direct voice recognition software.   Grammatical errors, random word insertion, spelling mistakes, occasional wrong word or \"sound-alike\" substitutions and incomplete sentences may be an occasional consequence of the system secondary to software limitations, ambient noise and hardware issues. At the time of dictation, efforts were made to edit, clarify and /or correct errors.  Please read the chart carefully and recognize, using context, where substitutions have occurred.  If you have any questions or concerns about the context, text or information contained within the body of this dictation, please contact myself, the provider, for further clarification.  "

## 2025-01-27 DIAGNOSIS — G89.29 CHRONIC PAIN OF LEFT KNEE: ICD-10-CM

## 2025-01-27 DIAGNOSIS — M25.562 CHRONIC PAIN OF LEFT KNEE: ICD-10-CM

## 2025-01-27 DIAGNOSIS — M17.12 PRIMARY OSTEOARTHRITIS OF LEFT KNEE: ICD-10-CM

## 2025-01-28 RX ORDER — FOLIC ACID 1 MG/1
1000 TABLET ORAL DAILY
Qty: 90 TABLET | Refills: 1 | Status: SHIPPED | OUTPATIENT
Start: 2025-01-28

## 2025-02-03 ENCOUNTER — HOSPITAL ENCOUNTER (OUTPATIENT)
Dept: SLEEP CENTER | Facility: CLINIC | Age: 50
Discharge: HOME | End: 2025-02-03
Payer: COMMERCIAL

## 2025-02-03 DIAGNOSIS — G47.33 OSA (OBSTRUCTIVE SLEEP APNEA): ICD-10-CM

## 2025-02-03 PROCEDURE — 95810 POLYSOM 6/> YRS 4/> PARAM: CPT

## 2025-02-03 PROCEDURE — 95810 POLYSOM 6/> YRS 4/> PARAM: CPT | Performed by: INTERNAL MEDICINE

## 2025-02-04 NOTE — PROGRESS NOTES
Sleep Study Documentation    Pre-Sleep Study       Sleep testing procedure explained to patient:YES    Patient napped prior to study:NO    Caffeine:Dayshift worker after 12PM.  Caffeine use:NO    Alcohol:Dayshift workers after 5PM: Alcohol use:NO    Typical day for patient:YES       Study Documentation    Sleep Study Indications:  Loud/habitual snoring, witnessed apneas.    Sleep Study: Diagnostic   Snore:Moderate  Supplemental O2: no    O2 flow rate (L/min) range 0  O2 flow rate (L/min) final 0  Minimum SaO2  86.0 %  Baseline SaO2  96.4 %    EKG abnormalities: no     EEG abnormalities: no    Were abnormal behaviors in sleep observed:NO    Is Total Sleep Study Recording Time < 2 hours: N/A    Is Total Sleep Study Recording Time > 2 hours but study is incomplete: N/A    Is Total Sleep Study Recording Time 6 hours or more but sleep was not obtained: NO        Post-Sleep Study    Medication used at bedtime or during sleep study:YES over the counter sleep aid, other prescriptiom medications.    Patient reports time it took to fall asleep:less than 20 minutes    Patient reports waking up during study:3 or more times.  Patient reports returning to sleep without difficulty.    Patient reports sleeping 4 to 6 hours without dreaming.    Does the Patient feel this is a typical night of sleep:typical    Patient rated sleepiness: Somewhat sleepy or tired    PAP treatment:no.

## 2025-02-07 DIAGNOSIS — E78.00 HYPERCHOLESTEROLEMIA: ICD-10-CM

## 2025-02-07 DIAGNOSIS — E78.1 HYPERTRIGLYCERIDEMIA: ICD-10-CM

## 2025-02-10 DIAGNOSIS — G47.33 OBSTRUCTIVE SLEEP APNEA: Primary | ICD-10-CM

## 2025-02-10 RX ORDER — ATORVASTATIN CALCIUM 20 MG/1
20 TABLET, FILM COATED ORAL EVERY EVENING
Qty: 90 TABLET | Refills: 1 | Status: SHIPPED | OUTPATIENT
Start: 2025-02-10

## 2025-02-14 ENCOUNTER — TELEPHONE (OUTPATIENT)
Dept: SLEEP CENTER | Facility: CLINIC | Age: 50
End: 2025-02-14

## 2025-02-14 NOTE — TELEPHONE ENCOUNTER
Sleep study resulted and shows mild sleep apnea worse in REM sleep. Mild event related desaturations noted.    ECOMMENDATION:per Dr. Vegas  Mild obstructive sleep apnea necessitates treatment if patient is symptomatic.  Treatment options include auto CPAP with pressure range 5-15 cm H2O and oral mandibular advancement device.  Recommend consultation with sleep medicine to further discuss treatment options.    Patient read results and message from PCP Dr. Reyez. Referral to sleep medicine placed.     Consult with Milagro Sharpe PA-C scheduled for 3/25/2025

## 2025-02-17 DIAGNOSIS — R73.03 PREDIABETES: ICD-10-CM

## 2025-02-18 RX ORDER — TIRZEPATIDE 15 MG/.5ML
INJECTION, SOLUTION SUBCUTANEOUS
Qty: 6 ML | Refills: 1 | Status: SHIPPED | OUTPATIENT
Start: 2025-02-18

## 2025-03-03 ENCOUNTER — PATIENT MESSAGE (OUTPATIENT)
Dept: OBGYN CLINIC | Facility: CLINIC | Age: 50
End: 2025-03-03

## 2025-03-03 DIAGNOSIS — Z96.652 STATUS POST TOTAL KNEE REPLACEMENT, LEFT: Primary | ICD-10-CM

## 2025-03-03 RX ORDER — AMOXICILLIN 500 MG/1
CAPSULE ORAL
Qty: 4 CAPSULE | Refills: 0 | Status: SHIPPED | OUTPATIENT
Start: 2025-03-03 | End: 2025-04-03

## 2025-03-13 ENCOUNTER — HOSPITAL ENCOUNTER (OUTPATIENT)
Dept: MAMMOGRAPHY | Facility: MEDICAL CENTER | Age: 50
Discharge: HOME | End: 2025-03-13
Payer: COMMERCIAL

## 2025-03-13 VITALS — WEIGHT: 158 LBS | BODY MASS INDEX: 28 KG/M2 | HEIGHT: 63 IN

## 2025-03-13 DIAGNOSIS — Z12.31 ENCOUNTER FOR SCREENING MAMMOGRAM FOR BREAST CANCER: ICD-10-CM

## 2025-03-13 PROCEDURE — 77067 SCR MAMMO BI INCL CAD: CPT

## 2025-03-13 PROCEDURE — 77063 BREAST TOMOSYNTHESIS BI: CPT

## 2025-03-20 ENCOUNTER — OFFICE VISIT (OUTPATIENT)
Dept: SLEEP CENTER | Facility: CLINIC | Age: 50
End: 2025-03-20
Payer: COMMERCIAL

## 2025-03-20 VITALS
DIASTOLIC BLOOD PRESSURE: 81 MMHG | WEIGHT: 162 LBS | BODY MASS INDEX: 28.7 KG/M2 | SYSTOLIC BLOOD PRESSURE: 122 MMHG | HEIGHT: 63 IN

## 2025-03-20 DIAGNOSIS — G47.33 OBSTRUCTIVE SLEEP APNEA: Primary | ICD-10-CM

## 2025-03-20 DIAGNOSIS — G47.19 EXCESSIVE DAYTIME SLEEPINESS: ICD-10-CM

## 2025-03-20 DIAGNOSIS — I10 BENIGN HYPERTENSION: ICD-10-CM

## 2025-03-20 PROCEDURE — 99214 OFFICE O/P EST MOD 30 MIN: CPT | Performed by: PHYSICIAN ASSISTANT

## 2025-03-20 NOTE — ASSESSMENT & PLAN NOTE
Patient's most recent diagnostic polysomnogram demonstrated mild obstructive sleep apnea with an AHI of 5.3 becoming severe in REM sleep with an AHI of 31.5.  She had lost 54 pounds from her previous home sleep study in 2023.  Weight loss did not significantly improve her sleep apnea.  Due to her symptoms of excessive daytime sleepiness, frequent headaches, poor concentration, and history of hypertension, would recommend auto CPAP as treatment.  Patient was in agreement.  Auto CPAP ordered today with a nasal style mask.  She will follow-up 31 to 90 days after beginning treatment.  Orders:    Ambulatory Referral to Sleep Medicine    CPAP Auto New DME

## 2025-03-20 NOTE — PROGRESS NOTES
Name: Manuela Camacho      : 1975      MRN: [id number]  Encounter Provider: Milagro Sharpe PA-C  Encounter Date: 3/20/2025   Encounter department: [facility]IC[facility]    :  Assessment & Plan  Obstructive sleep apnea  Patient's most recent diagnostic polysomnogram demonstrated mild obstructive sleep apnea with an AHI of 5.3 becoming severe in REM sleep with an AHI of 31.5.  She had lost 54 pounds from her previous home sleep study in .  Weight loss did not significantly improve her sleep apnea.  Due to her symptoms of excessive daytime sleepiness, frequent headaches, poor concentration, and history of hypertension, would recommend auto CPAP as treatment.  Patient was in agreement.  Auto CPAP ordered today with a nasal style mask.  She will follow-up 31 to 90 days after beginning treatment.  Orders:    Ambulatory Referral to Sleep Medicine    CPAP Auto New DME      History of Present Illness     HPI Manuela Camacho is a 50-year-old female who presents today in follow-up of a recent diagnostic sleep study ordered by her PCP for symptoms of loud snoring, excessive daytime sleepiness, and frequent headaches.  Diagnostic polysomnogram at a weight of 159 pounds demonstrated mild obstructive sleep apnea with an AHI of 5.3 becoming severe in REM sleep with an AHI of 31.5.  She previously completed a home sleep study at a weight of 213 pounds in 2023 which also demonstrated mild obstructive sleep apnea with an LANCE of 8.  Patient did not use CPAP or BiPAP previously.  She denies any restless leg symptoms, narcolepsy symptoms, or parasomnia symptoms.  She would like to review her study and discuss treatment options.    Sleep studies    Home sleep study completed on 2023 at a weight of 213 pounds with an LANCE of 8    Diagnostic polysomnogram completed on 2/3/2025 at a weight of 159 pounds.  Mild obstructive sleep apnea with an AHI of 5.3, supine AHI of 9.2, REM AHI of 31.5.  Oxygen  saturation of less than 90% for 1.3 minutes of the study    Sleep schedule  Bedtime: 10:30 PM  Falls asleep within 15 minutes  3-4 brief awakenings per night due to pets, , or unknown reasons  Wake up time: 6 AM on workdays, 9 AM on days off  Rarely feels refreshed upon awakening  Denies dozing but does nap regularly      Sitting and reading: (Patient-Rptd) Slight chance of dozing  Watching TV: (Patient-Rptd) Slight chance of dozing  Sitting, inactive in a public place (e.g. a theatre or a meeting): (Patient-Rptd) Slight chance of dozing  As a passenger in a car for an hour without a break: (Patient-Rptd) Slight chance of dozing  Lying down to rest in the afternoon when circumstances permit: (Patient-Rptd) High chance of dozing  Sitting and talking to someone: (Patient-Rptd) Would never doze  Sitting quietly after a lunch without alcohol: (Patient-Rptd) Moderate chance of dozing  In a car, while stopped for a few minutes in traffic: (Patient-Rptd) Slight chance of dozing  Total score: (Patient-Rptd) 10       Review of Systems   HENT: Negative.     Respiratory: Negative.     Cardiovascular: Negative.    Neurological:  Positive for headaches.   Psychiatric/Behavioral:  Positive for decreased concentration and sleep disturbance.      Pertinent positives/negatives included in HPI and also as noted:       Social History     Tobacco Use    Smoking status: Never    Smokeless tobacco: Never   Vaping Use    Vaping status: Never Used   Substance and Sexual Activity    Alcohol use: Yes     Alcohol/week: 2.0 standard drinks of alcohol     Types: 2 Glasses of wine per week     Comment: 1-2 drinks per week on average typically consumes beer    Drug use: No    Sexual activity: Not on file     Comment: defer     Objective   LMP 03/04/2025        Physical Exam  Visit Vitals  LMP 03/04/2025   OB Status Ablation   Smoking Status Never       Mallampati Grade : Mallampati 2-3  Oropharynx : crowded  Tonsil size : not enlarged    Tongue : Normal tongue size  Soft palate and uvula : Normal soft palate  Hard Palate : high arched  Neck : is lean  Nose : Normal nasal structure  Craniofacial anatomy : normal  Dental Stucture and Dentition : Normal dentition       Appearance : no distress, alert, cooperative  Mental Status. Memory, and Affect : alert and oriented, normal affect, makes good eye contact, provides a detailed history  Cardiac- : regular rate and rhythm, S1, S2 normal, no murmur, click, rub or gallop  Pulmonary : clear to auscultation bilaterally  Cranial Nerves: CN II-XII grossly intact  No peripheral edema    Data  Lab Results   Component Value Date    HGB 13.1 11/29/2024    HCT 41.0 11/29/2024    MCV 90 11/29/2024      Lab Results   Component Value Date    GLUCOSE 91 08/24/2015    CALCIUM 9.1 11/29/2024     08/24/2015    K 4.1 11/29/2024    CO2 27 11/29/2024     11/29/2024    BUN 12 11/29/2024    CREATININE 0.60 11/29/2024     Lab Results   Component Value Date    IRON 58 08/24/2015    FERRITIN 14.4 08/24/2015     Lab Results   Component Value Date    AST 21 11/29/2024    ALT 29 11/29/2024

## 2025-03-20 NOTE — PATIENT INSTRUCTIONS
I placed an order today for a new CPAP.  Please schedule a set up appointment as soon as possible.  Schedule a follow-up appointment 31 to 90 days after you begin using your CPAP.  If you have any questions prior to that please let our office know.  If you have any questions regarding the equipment or mask type or size, please reach out to [facility].  You are able to switch her mask out up to 5 times in the first 90 days.  Your insurance requires you to use your CPAP at least 4 hours at least 70% of the time.

## 2025-03-21 ENCOUNTER — TELEPHONE (OUTPATIENT)
Dept: SLEEP CENTER | Facility: CLINIC | Age: 50
End: 2025-03-21

## 2025-03-24 LAB

## 2025-03-25 LAB

## 2025-03-29 ENCOUNTER — APPOINTMENT (OUTPATIENT)
Dept: LAB | Facility: IMAGING CENTER | Age: 50
End: 2025-03-29
Payer: COMMERCIAL

## 2025-03-29 DIAGNOSIS — I10 BENIGN HYPERTENSION: ICD-10-CM

## 2025-03-29 DIAGNOSIS — R73.03 PREDIABETES: ICD-10-CM

## 2025-03-29 DIAGNOSIS — E78.00 HYPERCHOLESTEROLEMIA: ICD-10-CM

## 2025-03-29 LAB
ANION GAP SERPL CALCULATED.3IONS-SCNC: 8 MMOL/L (ref 4–13)
BUN SERPL-MCNC: 16 MG/DL (ref 5–25)
CALCIUM SERPL-MCNC: 9.3 MG/DL (ref 8.4–10.2)
CHLORIDE SERPL-SCNC: 103 MMOL/L (ref 96–108)
CHOLEST SERPL-MCNC: 143 MG/DL (ref ?–200)
CO2 SERPL-SCNC: 27 MMOL/L (ref 21–32)
CREAT SERPL-MCNC: 0.61 MG/DL (ref 0.6–1.3)
GFR SERPL CREATININE-BSD FRML MDRD: 105 ML/MIN/1.73SQ M
GLUCOSE P FAST SERPL-MCNC: 85 MG/DL (ref 65–99)
HDLC SERPL-MCNC: 60 MG/DL
LDLC SERPL CALC-MCNC: 67 MG/DL (ref 0–100)
POTASSIUM SERPL-SCNC: 4.4 MMOL/L (ref 3.5–5.3)
SODIUM SERPL-SCNC: 138 MMOL/L (ref 135–147)
TRIGL SERPL-MCNC: 81 MG/DL (ref ?–150)

## 2025-03-29 PROCEDURE — 80061 LIPID PANEL: CPT

## 2025-03-29 PROCEDURE — 80048 BASIC METABOLIC PNL TOTAL CA: CPT

## 2025-03-29 PROCEDURE — 36415 COLL VENOUS BLD VENIPUNCTURE: CPT

## 2025-04-02 ENCOUNTER — OFFICE VISIT (OUTPATIENT)
Dept: URGENT CARE | Age: 50
End: 2025-04-02
Payer: COMMERCIAL

## 2025-04-02 VITALS
HEIGHT: 63 IN | OXYGEN SATURATION: 99 % | WEIGHT: 161 LBS | SYSTOLIC BLOOD PRESSURE: 100 MMHG | DIASTOLIC BLOOD PRESSURE: 82 MMHG | HEART RATE: 76 BPM | BODY MASS INDEX: 28.53 KG/M2 | RESPIRATION RATE: 18 BRPM | TEMPERATURE: 97.3 F

## 2025-04-02 DIAGNOSIS — H60.501 ACUTE OTITIS EXTERNA OF RIGHT EAR, UNSPECIFIED TYPE: ICD-10-CM

## 2025-04-02 DIAGNOSIS — H66.91 RIGHT OTITIS MEDIA, UNSPECIFIED OTITIS MEDIA TYPE: Primary | ICD-10-CM

## 2025-04-02 LAB

## 2025-04-02 PROCEDURE — 99213 OFFICE O/P EST LOW 20 MIN: CPT | Performed by: PHYSICIAN ASSISTANT

## 2025-04-02 RX ORDER — OFLOXACIN 3 MG/ML
10 SOLUTION AURICULAR (OTIC) DAILY
Qty: 3.5 ML | Refills: 0 | Status: SHIPPED | OUTPATIENT
Start: 2025-04-02 | End: 2025-04-09

## 2025-04-02 RX ORDER — AMOXICILLIN 875 MG/1
875 TABLET, COATED ORAL 2 TIMES DAILY
Qty: 20 TABLET | Refills: 0 | Status: SHIPPED | OUTPATIENT
Start: 2025-04-02 | End: 2025-04-12

## 2025-04-02 NOTE — PROGRESS NOTES
[facility]        NAME: Manuela Camacho is a 50 y.o. female  : 1975    MRN: [id number]  DATE: 2025  TIME: 11:57 AM    Assessment and Plan   Right otitis media, unspecified otitis media type [H66.91]  1. Right otitis media, unspecified otitis media type  amoxicillin (AMOXIL) 875 mg tablet      2. Acute otitis externa of right ear, unspecified type  ofloxacin (FLOXIN) 0.3 % otic solution            Patient Instructions   Patient was educated on right ear inner and outer ear infection. Patient was prescribed antibiotics. Eat on antibiotics. Keep right ear clean and dry. Any worsening of symptoms follow up with PCP    Follow up with PCP in 3-5 days.  Proceed to  ER if symptoms worsen.    If tests have been performed at [facility], our office will contact you with results if changes need to be made to the care plan discussed with you at the visit.  You can review your full results on [facility].    Chief Complaint     Chief Complaint   Patient presents with    Earache     X 1 day, feels full, denies fever or chill.          History of Present Illness       Patient is a pleasant 50 year old female who presents today complaining of right ear pain. Patient also reports mild cold. Admits history of asthma. Denies any history of diabetes. Admits allergy to Hibiclens and pollen    Earache   There is pain in the right ear. This is a new problem. The current episode started yesterday. The problem occurs constantly. The problem has been waxing and waning. There has been no fever. The pain is at a severity of 3/10. Associated symptoms include rhinorrhea. Pertinent negatives include no abdominal pain, coughing, diarrhea, ear discharge or hearing loss.       Review of Systems   Review of Systems   Constitutional: Negative.    HENT:  Positive for ear pain and rhinorrhea. Negative for ear discharge and hearing loss.    Respiratory:  Negative for cough.    Gastrointestinal:  Negative for abdominal pain  and diarrhea.   Psychiatric/Behavioral: Negative.           Current Medications       Current Outpatient Medications:     albuterol (Ventolin HFA) 90 mcg/act inhaler, Inhale 1-2 puffs as needed for wheezing, Disp: 54 g, Rfl: 1    amoxicillin (AMOXIL) 875 mg tablet, Take 1 tablet (875 mg total) by mouth 2 (two) times a day for 10 days, Disp: 20 tablet, Rfl: 0    ascorbic acid (VITAMIN C) 500 MG tablet, Take 1 tablet (500 mg total) by mouth 2 (two) times a day, Disp: 60 tablet, Rfl: 1    atorvastatin (LIPITOR) 20 mg tablet, Take 1 tablet (20 mg total) by mouth every evening, Disp: 90 tablet, Rfl: 1    Cholecalciferol (D3-1000) 25 MCG (1000 UT) capsule, Take 1,000 Units by mouth daily, Disp: , Rfl:     Cyanocobalamin (Vitamin B12) 1000 MCG TBCR, Take by mouth in the morning, Disp: , Rfl:     escitalopram (LEXAPRO) 10 mg tablet, Take 1 tablet (10 mg total) by mouth daily at bedtime, Disp: 90 tablet, Rfl: 1    ferrous sulfate 325 (65 Fe) mg tablet, Take 325 mg by mouth daily with breakfast, Disp: , Rfl:     fluticasone (FLONASE) 50 mcg/act nasal spray, 1 spray into each nostril daily, Disp: 1 Bottle, Rfl: 0    folic acid (FOLVITE) 1 mg tablet, TAKE 1 TABLET DAILY, Disp: 90 tablet, Rfl: 1    Lactobacillus (PROBIOTIC ACIDOPHILUS PO), Take by mouth every evening, Disp: , Rfl:     loratadine (CLARITIN) 10 mg tablet, Take 10 mg by mouth daily at bedtime  , Disp: , Rfl:     losartan (COZAAR) 100 MG tablet, Take 1 tablet (100 mg total) by mouth daily, Disp: 90 tablet, Rfl: 1    metoprolol succinate (TOPROL-XL) 50 mg 24 hr tablet, Take 1 tablet (50 mg total) by mouth daily at bedtime, Disp: 90 tablet, Rfl: 1    Multiple Vitamin (multivitamin) tablet, Take 1 tablet by mouth daily, Disp: , Rfl:     ofloxacin (FLOXIN) 0.3 % otic solution, Administer 10 drops to the right ear daily for 7 days, Disp: 3.5 mL, Rfl: 0    Omega-3 Fatty Acids (FISH OIL) 1,000 mg, Take 2 capsules by mouth daily at bedtime  , Disp: , Rfl:     rizatriptan  (Maxalt) 10 mg tablet, Take 1 tablet (10 mg total) by mouth as needed for migraine Take at the onset of migraine; if symptoms continue or return, may take another dose at least 2 hours after first dose. Take no more than 2 doses in a day., Disp: 18 tablet, Rfl: 3    Tirzepatide (Mounjaro) 15 MG/0.5ML SOAJ, INJECT 0.5 ML (15 MG) UNDER THE SKIN EVERY 7 DAYS, Disp: 6 mL, Rfl: 1    zinc gluconate 50 mg tablet, Take 50 mg by mouth daily, Disp: , Rfl:     Current Allergies     Allergies as of 04/02/2025 - Reviewed 04/02/2025   Allergen Reaction Noted    Hibiclens [chlorhexidine gluconate] Rash 10/14/2019    Pollen extract Nasal Congestion 09/02/2015            The following portions of the patient's history were reviewed and updated as appropriate: allergies, current medications, past family history, past medical history, past social history, past surgical history and problem list.     Past Medical History:   Diagnosis Date    Acute diffuse otitis externa of right ear 08/23/2021    Anemia     Anxiety     Asthma     controlled  seasonal    Diabetes mellitus (HCC)     pre diabetes    Gestational diabetes mellitus     Hyperlipidemia     Hypertension     Migraines     Other chest pain 01/27/2020    PONV (postoperative nausea and vomiting)     Pregnancy     Resolved: 07/29/2015    Trigger finger     Resolved: 12/02/2016    Yeast infection     Resolved: 07/29/2015       Past Surgical History:   Procedure Laterality Date    COLONOSCOPY      COLPORRHAPHY N/A 06/20/2022    Procedure: ANTERIOR COLPORRHAPHY;  Surgeon: Ovi Nice MD;  [facility];  Service: UroGynecology           CYSTOSCOPY N/A 06/20/2022    Procedure: CYSTOSCOPY;  Surgeon: Ovi Nice MD;  [facility];  Service: UroGynecology           INCISION TENDON SHEATH HAND Left     thumb    NV ARTHRP KNE CONDYLE&PLATU MEDIAL&LAT COMPARTMENTS Left 6/3/2024    Procedure: ARTHROPLASTY KNEE TOTAL- SAME DAY;  Surgeon: De Napier DO;   "Location: [facility];  Service: Orthopedics    MS ARTHRS KNE SURG W/MENISCECTOMY MED/LAT W/SHVG Left 11/05/2021    Procedure: KNEE ARTHROSCOPIC MENISCECTOMY Medial;  Surgeon: De Newby MD;  [facility];  Service: Orthopedics    MS HYSTEROSCOPY BX ENDOMETRIUM&/POLYPC W/WO D&C N/A 10/14/2019    Procedure: DILATATION AND CURETTAGE (D&C) WITH HYSTEROSCOPY W/ NOVASURE;  Surgeon: Abril Dean DO;  [facility];  Service: Gynecology    MS HYSTEROSCOPY ENDOMETRIAL ABLATION N/A 10/14/2019    Procedure: ABLATION ENDOMETRIAL NOVASURE;  Surgeon: Abril Dean DO;  [facility];  Service: Gynecology    MS RPR UMBILICAL HRNA 5 YRS/> REDUCIBLE N/A 08/23/2018    Procedure: UMBILICAL HERNIA REPAIR WITH MESH;  Surgeon: Gonzalo Chambers MD;  [facility];  Service: General    PUBOVAGINAL SLING N/A 06/20/2022    Procedure: PUBOVAGINAL SLING;  Surgeon: Ovi Nice MD;  [facility];  Service: UroGynecology           WISDOM TOOTH EXTRACTION         Family History   Problem Relation Age of Onset    Hypertension Mother     Hyperlipidemia Mother     Endometrial cancer Mother 46    Diabetes type II Mother     Cancer Father 81        polycythemia vera    Hyperlipidemia Father     Hypertension Father     Heart attack Sister     No Known Problems Daughter     No Known Problems Maternal Grandmother     Heart attack Maternal Grandfather     No Known Problems Paternal Grandmother     Stroke Paternal Grandfather         Syndrome    No Known Problems Brother     No Known Problems Son     No Known Problems Paternal Aunt          Medications have been verified.        Objective   /82 (BP Location: Left arm, Patient Position: Sitting, Cuff Size: Standard)   Pulse 76   Temp (!) 97.3 °F (36.3 °C)   Resp 18   Ht 5' 3\" (1.6 m)   Wt 73 kg (161 lb)   LMP 03/04/2025   SpO2 99%   BMI 28.52 kg/m²   Patient's last menstrual period was 03/04/2025.       Physical Exam "     Physical Exam  Vitals and nursing note reviewed.   Constitutional:       Appearance: Normal appearance.   HENT:      Head: Normocephalic.      Comments: No pressure over frontal or maxillary sinus     Left Ear: Tympanic membrane, ear canal and external ear normal.      Ears:      Comments: RIGHT TM is bulging with mild redness. Right ear canal is swollen and red.      Nose: Nose normal.   Cardiovascular:      Rate and Rhythm: Normal rate and regular rhythm.      Heart sounds: Normal heart sounds.   Pulmonary:      Breath sounds: Normal breath sounds. No wheezing.   Neurological:      General: No focal deficit present.      Mental Status: She is alert and oriented to person, place, and time.   Psychiatric:         Mood and Affect: Mood normal.         Behavior: Behavior normal.

## 2025-04-02 NOTE — PATIENT INSTRUCTIONS
Patient was educated on right ear inner and outer ear infection. Patient was prescribed antibiotics. Eat on antibiotics. Keep right ear clean and dry. Any worsening of symptoms follow up with PCP

## 2025-04-02 NOTE — LETTER
April 2, 2025     Patient: Manuela Camacho   YOB: 1975   Date of Visit: 4/2/2025       To Whom it May Concern:    Manuela Camacho was seen in my clinic on 4/2/2025.     If you have any questions or concerns, please don't hesitate to call.         Sincerely,          Melvina Rain PA-C        CC: No Recipients

## 2025-04-08 ENCOUNTER — OFFICE VISIT (OUTPATIENT)
Dept: INTERNAL MEDICINE CLINIC | Age: 50
End: 2025-04-08
Payer: COMMERCIAL

## 2025-04-08 VITALS
HEART RATE: 69 BPM | WEIGHT: 162.4 LBS | HEIGHT: 63 IN | BODY MASS INDEX: 28.77 KG/M2 | OXYGEN SATURATION: 99 % | TEMPERATURE: 97.4 F | SYSTOLIC BLOOD PRESSURE: 118 MMHG | DIASTOLIC BLOOD PRESSURE: 82 MMHG

## 2025-04-08 DIAGNOSIS — R73.03 PREDIABETES: ICD-10-CM

## 2025-04-08 DIAGNOSIS — I10 BENIGN HYPERTENSION: Primary | ICD-10-CM

## 2025-04-08 DIAGNOSIS — F41.1 GENERALIZED ANXIETY DISORDER: ICD-10-CM

## 2025-04-08 DIAGNOSIS — J45.40 MODERATE PERSISTENT EXTRINSIC ASTHMA WITHOUT COMPLICATION: ICD-10-CM

## 2025-04-08 PROCEDURE — 99214 OFFICE O/P EST MOD 30 MIN: CPT | Performed by: INTERNAL MEDICINE

## 2025-04-08 NOTE — PROGRESS NOTES
Name: Manuela Camacho      : 1975      MRN: [id number]  Encounter Provider: Norma Reyez MD  Encounter Date: 2025   Encounter department: [facility] [place]  :  Assessment & Plan  Benign hypertension  Pretension is very well-controlled       Generalized anxiety disorder  Anxiety is stable       Prediabetes    Orders:    Comprehensive metabolic panel; Future    Moderate persistent extrinsic asthma without complication                History of Present Illness   Patient is doing well weight is much improved patient wanted to decrease his her Mounjaro because she said that now she has stabilize on this weight and now she is going up she does not have any side effects of the medication I discussed with her the diet exercise and weight loss rather than decreasing the dose as long as she does not have any side effects decreasing the dose may cause more weight gain she understand that.    Patient is here today for the follow-up.   Hypertension. I reviewed antihypertensive medication, patient does not have any side effects of  medications, no signs or symptoms of hypertension ,hypotension or orthostatic hypotension.  Patient is compliant with medications.  Blood workup related to hypertensive diagnosis reviewed.  Plan is to continue with the present management.  We will follow-up as a scheduled and adjust the doses of the medication as indicated.    Prediabetes is stable hemoglobin A1c is good          Review of Systems   Constitutional:  Negative for chills and fatigue.   HENT:  Negative for congestion, ear pain, hearing loss, postnasal drip, sinus pressure, sore throat and voice change.    Eyes:  Negative for pain, discharge and visual disturbance.   Respiratory:  Negative for cough, chest tightness and shortness of breath.    Cardiovascular:  Negative for chest pain, palpitations and leg swelling.   Gastrointestinal:  Negative for abdominal pain, blood in stool, diarrhea, nausea and  "rectal pain.   Genitourinary:  Negative for difficulty urinating, dysuria and urgency.   Musculoskeletal:  Negative for arthralgias and joint swelling.   Skin:  Negative for rash.   Allergic/Immunologic: Negative for environmental allergies and food allergies.   Neurological:  Negative for dizziness, tremors, weakness, numbness and headaches.   Hematological:  Negative for adenopathy.   Psychiatric/Behavioral:  Negative for behavioral problems and hallucinations.        Objective   /82 (BP Location: Left arm, Patient Position: Sitting, Cuff Size: Standard)   Pulse 69   Temp (!) 97.4 °F (36.3 °C) (Temporal)   Ht 5' 3\" (1.6 m)   Wt 73.7 kg (162 lb 6.4 oz)   LMP 03/04/2025   SpO2 99%   BMI 28.77 kg/m²      Physical Exam  Constitutional:       Appearance: She is well-developed.   HENT:      Right Ear: Tympanic membrane and external ear normal.      Left Ear: Tympanic membrane normal.   Eyes:      Conjunctiva/sclera: Conjunctivae normal.      Pupils: Pupils are equal, round, and reactive to light.   Neck:      Thyroid: No thyromegaly.      Vascular: No JVD.   Cardiovascular:      Rate and Rhythm: Normal rate and regular rhythm.      Heart sounds: Normal heart sounds.   Pulmonary:      Breath sounds: Normal breath sounds.   Abdominal:      General: Bowel sounds are normal.      Palpations: Abdomen is soft.   Musculoskeletal:         General: Normal range of motion.      Cervical back: Normal range of motion.   Lymphadenopathy:      Cervical: No cervical adenopathy.   Skin:     General: Skin is dry.   Neurological:      General: No focal deficit present.      Mental Status: She is alert and oriented to person, place, and time. Mental status is at baseline.      Deep Tendon Reflexes: Reflexes are normal and symmetric.   Psychiatric:         Mood and Affect: Mood normal.         Behavior: Behavior normal.         "

## 2025-04-09 LAB

## 2025-05-08 ENCOUNTER — PATIENT MESSAGE (OUTPATIENT)
Age: 50
End: 2025-05-08

## 2025-05-08 DIAGNOSIS — G44.229 CHRONIC TENSION-TYPE HEADACHE, NOT INTRACTABLE: ICD-10-CM

## 2025-05-20 ENCOUNTER — PATIENT MESSAGE (OUTPATIENT)
Dept: INTERNAL MEDICINE CLINIC | Age: 50
End: 2025-05-20

## 2025-05-21 ENCOUNTER — OFFICE VISIT (OUTPATIENT)
Dept: SLEEP CENTER | Facility: CLINIC | Age: 50
End: 2025-05-21
Payer: COMMERCIAL

## 2025-05-21 ENCOUNTER — TELEPHONE (OUTPATIENT)
Age: 50
End: 2025-05-21

## 2025-05-21 VITALS
SYSTOLIC BLOOD PRESSURE: 102 MMHG | HEIGHT: 63 IN | BODY MASS INDEX: 28.77 KG/M2 | WEIGHT: 162.4 LBS | DIASTOLIC BLOOD PRESSURE: 70 MMHG

## 2025-05-21 DIAGNOSIS — G47.33 OBSTRUCTIVE SLEEP APNEA: Primary | ICD-10-CM

## 2025-05-21 DIAGNOSIS — F51.12 INSUFFICIENT SLEEP SYNDROME: ICD-10-CM

## 2025-05-21 DIAGNOSIS — R73.03 PREDIABETES: ICD-10-CM

## 2025-05-21 DIAGNOSIS — I10 BENIGN HYPERTENSION: ICD-10-CM

## 2025-05-21 DIAGNOSIS — R21 FACIAL RASH: ICD-10-CM

## 2025-05-21 DIAGNOSIS — G47.19 EXCESSIVE DAYTIME SLEEPINESS: ICD-10-CM

## 2025-05-21 DIAGNOSIS — E66.3 OVERWEIGHT (BMI 25.0-29.9): ICD-10-CM

## 2025-05-21 PROCEDURE — 99214 OFFICE O/P EST MOD 30 MIN: CPT | Performed by: INTERNAL MEDICINE

## 2025-05-21 NOTE — PROGRESS NOTES
Name: Manuela Camacho      : 1975      MRN: [id number]  Encounter Provider: Uri Roy MD  Encounter Date: 2025   Encounter department: [facility] [place]  :  Assessment & Plan  Obstructive sleep apnea    Orders:    PAP DME Pressure Change    PAP DME Resupply/Reorder    Excessive daytime sleepiness         Insufficient sleep syndrome         Benign hypertension         Facial rash         Overweight (BMI 25.0-29.9)               PLAN: Above listed Problems & Comorbidities were Addressed this Visit.  Improved/stable/controlled/resolved conditions as reviewed in notes.   Results of prior studies and physiologic data reviewed  with the patient.   I discussed treatment options with risks and benefits.  Treatment with  PAP is medically necessary and Manuela is agreable to continue use.   Care of equipment, methods to improve comfort using PAP and importance of compliance with therapy were discussed.  Pressure setting:change 5-9 cmH2O. Mask type nasal  Rx provided to replace supplies and Care coordinated with DME provider for refitting of her interface.   Encouraged to persist with strategies for weight reduction -she is on Mounjaro.    Also advised allowing sufficient opportunity for sleep targeting upwards of 7 hours  Follow-up is advised in 1 year or sooner if needed to monitor progress, compliance and to adjust therapy.        History of Present Illness   HPI          Follow-Up Note - Sleep Center   Manuela Camacho  50 y.o. female  :1975  MRN:[id number]  DOS:2025    CC: I saw this patient for follow-up in clinic today for Sleep disordered breathing, Coexisting Sleep and Medical Problems.  Patient is using a ResMed machine.. Interval changes: None reported.      Diagnostic polysomnogram on 2024 at a weight of 159 pounds demonstrated mild obstructive sleep apnea with an AHI of 5.3 becoming severe in REM sleep with an AHI of 31.5.  She previously completed a home sleep study at  a weight of 213 pounds in January 2023 which also demonstrated mild obstructive sleep apnea with an LANCE of 8.     PFSH, Problem List, Medications & Allergies were reviewed in EMR.   She  has a past medical history of Acute diffuse otitis externa of right ear (08/23/2021), Anemia, Anxiety, Asthma, Diabetes mellitus (HCC), Gestational diabetes mellitus, Hyperlipidemia, Hypertension, Migraines, Other chest pain (01/27/2020), PONV (postoperative nausea and vomiting), Pregnancy, Trigger finger, and Yeast infection.    She has a current medication list which includes the following prescription(s): albuterol, ascorbic acid, atorvastatin, d3-1000, vitamin b12, escitalopram, ferrous sulfate, fluticasone, folic acid, lactobacillus, loratadine, losartan, metoprolol succinate, multivitamin, fish oil, rizatriptan, tirzepatide, and zinc gluconate.    PHYSIOLOGICAL DATA REVIEW : Using PAP > 4 hours/night 87%. Estimated LANCE 0.6/hour with pressure of 7.2 cm H2O@90th/95th percentile;.  INTERPRETATION: Compliance is Very good; Pressure setting is:optimal; ;     SUBJECTIVE: With respect to use of PAP, Manuela  is experiencing some adverse effects: mask causes redness / facial marks and periorbital puffiness.She derives benefit.. Is satisfied with sleep and daytime function.     Sleep Routine: Manuela reports getting (Patient-Rptd) (P) 6 hrs sleep on workdays and a little more on days off; she has less difficulty initiating and maintaining sleep . She arises before alarm most days and (Patient-Rptd) (P) Rarely feels more refreshed since on Rx.Manuela]reports episodic   daytime sleepiness, and may nap occasionally She rated herself at Total score: (Patient-Rptd) (P) 8 /24 on the Ary Sleepiness Scale.   Other issues: None reported.     Habits: Reports that she has never smoked. She has never used smokeless tobacco.,  Reports current alcohol use of about 2.0 standard drinks of alcohol per week.,  Reports no history of drug use., Caffeine  "use: limited until; Exercise routine: regular.      ROS: Significant for weight has been stable while on maximum dose of Mounjaro.  She has nasal symptoms due to seasonal allergies.  Anxiety is controlled on Lexapro.    EXAM: /70   Ht 5' 3\" (1.6 m)   Wt 73.7 kg (162 lb 6.4 oz)   BMI 28.77 kg/m²     Wt Readings from Last 3 Encounters:   05/21/25 73.7 kg (162 lb 6.4 oz)   04/08/25 73.7 kg (162 lb 6.4 oz)   04/02/25 73 kg (161 lb)      Patient is well groomed; well appearing.   CNS: Alert, orientated, speech clear & coherent  Psych: cooperative and in no distress. Mental state: Appears normal.  H&N: EOMI; NC/AT: No facial rash in distribution of her mask.    Skin/Extrem: col & hydration normal; no edema  Resp: Respiratory effort is normal  Physical findings otherwise essentially unchanged from previous.    Lab Results   Component Value Date     08/24/2015    SODIUM 138 03/29/2025    K 4.4 03/29/2025     03/29/2025    CO2 27 03/29/2025    ANIONGAP 8 08/24/2015    AGAP 8 03/29/2025    BUN 16 03/29/2025    CREATININE 0.61 03/29/2025    GLUC 119 05/10/2022    GLUF 85 03/29/2025    CALCIUM 9.3 03/29/2025    AST 21 11/29/2024    ALT 29 11/29/2024    ALKPHOS 55 11/29/2024    PROT 7.4 08/24/2015    TP 6.9 11/29/2024    BILITOT 0.69 08/24/2015    TBILI 1.30 (H) 11/29/2024    EGFR 105 03/29/2025   ;   Lab Results   Component Value Date    WBC 6.76 11/29/2024    HGB 13.1 11/29/2024    HCT 41.0 11/29/2024    MCV 90 11/29/2024     11/29/2024   : [unfilled]    Sincerely,     Authenticated electronically on 05/21/25   Board Certified Specialist     Portions of the record may have been created with voice recognition software. Occasional wrong word or \"sound a like\" substitutions may have occurred due to the inherent limitations of voice recognition software. There may also be notations and random deletions of words or characters from malfunctioning software. Read the chart carefully and recognize, using " context, where substitutions/deletions have occurred.          Review of Systems

## 2025-05-21 NOTE — TELEPHONE ENCOUNTER
Mounjaro will not be covered with or without a prior authorization, Mounjaro  is not FDA approved for obesity, prediabetes, etc. Mounjaro  is only FDA Approved for Type 2 Diabetes and will only be Approved via a Prior Authorization if patient has a diagnosis of Type 2 Diabetes.

## 2025-05-22 ENCOUNTER — TELEPHONE (OUTPATIENT)
Dept: SLEEP CENTER | Facility: CLINIC | Age: 50
End: 2025-05-22

## 2025-05-23 LAB
DME PARACHUTE DELIVERY DATE REQUESTED: NORMAL
DME PARACHUTE DELIVERY DATE REQUESTED: NORMAL
DME PARACHUTE ITEM DESCRIPTION: NORMAL
DME PARACHUTE ORDER STATUS: NORMAL
DME PARACHUTE ORDER STATUS: NORMAL
DME PARACHUTE SUPPLIER NAME: NORMAL
DME PARACHUTE SUPPLIER NAME: NORMAL
DME PARACHUTE SUPPLIER PHONE: NORMAL
DME PARACHUTE SUPPLIER PHONE: NORMAL

## 2025-05-23 RX ORDER — RIZATRIPTAN BENZOATE 10 MG/1
10 TABLET ORAL AS NEEDED
Qty: 18 TABLET | Refills: 3 | Status: SHIPPED | OUTPATIENT
Start: 2025-05-23

## 2025-05-27 ENCOUNTER — PATIENT MESSAGE (OUTPATIENT)
Dept: SLEEP CENTER | Facility: CLINIC | Age: 50
End: 2025-05-27

## 2025-05-27 DIAGNOSIS — R73.03 PREDIABETES: ICD-10-CM

## 2025-05-28 ENCOUNTER — PATIENT MESSAGE (OUTPATIENT)
Dept: SLEEP CENTER | Facility: CLINIC | Age: 50
End: 2025-05-28

## 2025-05-29 ENCOUNTER — TELEPHONE (OUTPATIENT)
Dept: SLEEP CENTER | Facility: CLINIC | Age: 50
End: 2025-05-29

## 2025-05-29 DIAGNOSIS — G47.33 OSA (OBSTRUCTIVE SLEEP APNEA): Primary | ICD-10-CM

## 2025-05-29 NOTE — PATIENT COMMUNICATION
Patient requesting Airtouch N20 Nasal mask.     Dr. Roy,     Please review RX for mask and sign if agreeable,  Thank you.

## 2025-05-29 NOTE — TELEPHONE ENCOUNTER
Previous Rx teed up and sent to provider for review.  Order showing as unsigned in provider's computer.  Rx for Airtouch N20 Nasal Mask, headgear and chin strap re-issued and sent to provider.

## 2025-06-05 DIAGNOSIS — I10 BENIGN HYPERTENSION: ICD-10-CM

## 2025-06-05 RX ORDER — LOSARTAN POTASSIUM 100 MG/1
100 TABLET ORAL DAILY
Qty: 90 TABLET | Refills: 1 | Status: SHIPPED | OUTPATIENT
Start: 2025-06-05

## 2025-06-06 ENCOUNTER — PATIENT MESSAGE (OUTPATIENT)
Dept: SLEEP CENTER | Facility: CLINIC | Age: 50
End: 2025-06-06

## 2025-06-06 DIAGNOSIS — G47.33 OSA (OBSTRUCTIVE SLEEP APNEA): Primary | ICD-10-CM

## 2025-06-12 ENCOUNTER — OFFICE VISIT (OUTPATIENT)
Dept: PODIATRY | Facility: CLINIC | Age: 50
End: 2025-06-12
Payer: COMMERCIAL

## 2025-06-12 VITALS — WEIGHT: 161.2 LBS | HEIGHT: 63 IN | BODY MASS INDEX: 28.56 KG/M2

## 2025-06-12 DIAGNOSIS — M20.5X1 HALLUX LIMITUS, RIGHT: ICD-10-CM

## 2025-06-12 DIAGNOSIS — M20.5X2 HALLUX LIMITUS OF LEFT FOOT: ICD-10-CM

## 2025-06-12 DIAGNOSIS — M79.672 LEFT FOOT PAIN: ICD-10-CM

## 2025-06-12 DIAGNOSIS — M79.671 RIGHT FOOT PAIN: Primary | ICD-10-CM

## 2025-06-12 PROCEDURE — 99203 OFFICE O/P NEW LOW 30 MIN: CPT | Performed by: PODIATRIST

## 2025-06-12 NOTE — PROGRESS NOTES
"Name: Manuela Camacho      : 1975      MRN: [id number]  Encounter Provider: Brien Lopez DPM  Encounter Date: 2025   Encounter department: [facility] [place]  :  Assessment & Plan  Right foot pain    Orders:  •  XR foot 3+ vw right  •  Ambulatory referral to Physical Therapy; Future    Left foot pain    Orders:  •  XR foot 3+ vw left  •  Ambulatory referral to Physical Therapy; Future    Hallux limitus of left foot    Orders:  •  Ambulatory referral to Physical Therapy; Future    Hallux limitus, right  Hallux Limitus treatment Plan:     Rest, ice, and elevation  Apply a cloth covered ice pack to heel four times per day 15-30 minutes.    Anti-inflammatory medications (If no contraindications to take)  Orthotics.  Discussed with patient accommodative shoe gear.     No response to above treatments:     Injection into the joint  Formal physical therapy   Walking boot       Failure of conservative treatment  Surgery     I personally discussed with patient at the visit today:     The diagnosis of this encounter  2.   Possible etiologies of the diagnosis  3.   Treatment options including advantages and disadvantages of treatment with potential risks and complications associated with these treatments  4.   Prevention strategies and ways to decrease pain     I answered all of the patients questions.     Orders:  •  Ambulatory referral to Physical Therapy; Future    X-rays reviewed with the patient which show some osteoarthritic changes noted the level of the first metatarsophalangeal joint.          History of Present Illness   HPI  Manuela Camacho is a 50 y.o. female who presents with years foot pain.  Pain on both feet is about the same.  Patient has pain to the top of the great toes bilateral.  Snowboards and has pain to the area.  Patient went to physical therapy due to gait abnormality.             Review of Systems       Objective   Ht 5' 3\" (1.6 m) Comment: verbal  Wt 73.1 kg (161 lb " 3.2 oz)   BMI 28.56 kg/m²      Physical Exam    Vascular: Intact pedal pulses bilateral DP and PT.  Neurological: Gross protective sensation intact bilateral  Musculoskeletal: Muscle strength bilateral intact with dorsiflexion, inversion, eversion and plantarflexion.  Tenderness and pain noted with range of motion of the level of the first metatarsophalangeal joint bilateral great toes.  There are some osteoarthritic changes noted throughout the joint space with some spurring noted on palpation of the area.    Dermatological: No open lesions or ulcerations noted bilateral.

## 2025-06-17 ENCOUNTER — OFFICE VISIT (OUTPATIENT)
Age: 50
End: 2025-06-17
Payer: COMMERCIAL

## 2025-06-17 ENCOUNTER — APPOINTMENT (OUTPATIENT)
Age: 50
End: 2025-06-17
Attending: STUDENT IN AN ORGANIZED HEALTH CARE EDUCATION/TRAINING PROGRAM
Payer: COMMERCIAL

## 2025-06-17 VITALS — BODY MASS INDEX: 28.53 KG/M2 | WEIGHT: 161 LBS | HEIGHT: 63 IN

## 2025-06-17 DIAGNOSIS — Z96.652 STATUS POST TOTAL KNEE REPLACEMENT, LEFT: Primary | ICD-10-CM

## 2025-06-17 DIAGNOSIS — Z96.652 STATUS POST TOTAL KNEE REPLACEMENT, LEFT: ICD-10-CM

## 2025-06-17 PROCEDURE — 73562 X-RAY EXAM OF KNEE 3: CPT

## 2025-06-17 PROCEDURE — 99213 OFFICE O/P EST LOW 20 MIN: CPT | Performed by: STUDENT IN AN ORGANIZED HEALTH CARE EDUCATION/TRAINING PROGRAM

## 2025-06-17 NOTE — PROGRESS NOTES
Knee Post Op Office Note    Assessment:     1. Status post total knee replacement, left        Plan:  Assessment & Plan  Status post total knee replacement, left  50 y.o. female doing well 1 year s/p Left TKA from 6/3/24.  Xrays of the left knee reviewed today showing prosthesis in good position with no signs of loosening.  Range of motion and stability are excellent.  She is pleased with her knee and is able to do all activities she enjoys.  Follow up in 5 years for annual recheck with xrays.     Orders:  •  XR knee 3 vw left non injury; Future      Subjective:     Patient ID: Manuela Camacho is a 50 y.o. female.    Chief Complaint:  HPI:  Patient is a 50 y.o. female here today for annual post op evaluation of their left knee.  She is 1 year s/p Left TKA, DOS: 6/3/24.  She states that she is doing very well post operatively.  Her main complaint today is stiffness in the morning and feelings like her knee is hyperextending on her.  Otherwise she is not having any pain and is very pleased with her knee.  She is able to do all activities as tolerated.    Allergy:  Allergies[1]  Medications:  all current active meds have been reviewed and current meds: No current facility-administered medications for this visit.  Past Medical History:  Past Medical History[2]  Past Surgical History:  Past Surgical History[3]  Family History:  Family History[4]  Social History:  Social History     Substance and Sexual Activity   Alcohol Use Yes   • Alcohol/week: 2.0 standard drinks of alcohol   • Types: 2 Glasses of wine per week    Comment: 1-2 drinks per week on average typically consumes beer     Social History     Substance and Sexual Activity   Drug Use No     Tobacco Use History[5]        ROS:  General: Per HPI  Skin: Negative, except if noted below  HEENT: Negative  Respiratory: Negative  Cardiovascular: Negative  Gastrointestinal: Negative  Urinary: Negative  Vascular: Negative  Musculoskeletal: Positive per HPI   Neurologic:  "Positive per HPI  Endocrine: Negative    Objective:  BP Readings from Last 1 Encounters:   05/21/25 102/70      Wt Readings from Last 1 Encounters:   06/17/25 73 kg (161 lb)        Respiratory:   non-labored respirations    Lymphatics:  no palpable lymph nodes    Gait:   antalgic    Neurologic:   Alert and oriented times 3  Patient with normal sensation except as noted below  Deep tendon reflexes 2+ except as noted in MSK exam    Bilateral Lower Extremity:  Left Knee:      Inspection:  well healed surgical incision    Overall limb alignment neutral    Effusion: none    ROM 0-130 wo pain    Extensor Lag: none    Palpation: no Joint line tenderness to palpation    AP Stability at 90 deg stable    M/L stability in full extension stable    M/L stability in midflexion stable    Motor: 5/5 Q/HS/TA/GS/P    Pulses: 2+ DP / 2+ PT    SILT DP/SP/S/S/TN      Imaging:  My interpretation XR AP knee/lateral/liu/sunrise left knee: s/p press-fit CR attune TKA, components in good position. No fracture or dislocation.    BMI:   Estimated body mass index is 28.52 kg/m² as calculated from the following:    Height as of this encounter: 5' 3\" (1.6 m).    Weight as of this encounter: 73 kg (161 lb).  BSA:   Estimated body surface area is 1.76 meters squared as calculated from the following:    Height as of this encounter: 5' 3\" (1.6 m).    Weight as of this encounter: 73 kg (161 lb).           Scribe Attestation    I,:  Suyapa Matute PA-C am acting as a scribe while in the presence of the attending physician.:       I,:  De Napier DO personally performed the services described in this documentation    as scribed in my presence.:                  [1]  Allergies  Allergen Reactions   • Hibiclens [Chlorhexidine Gluconate] Rash     Red with pimples   • Pollen Extract Nasal Congestion   [2]  Past Medical History:  Diagnosis Date   • Acute diffuse otitis externa of right ear 08/23/2021   • Anemia    • Anxiety    • Asthma     " controlled  seasonal   • Diabetes mellitus (HCC)     pre diabetes   • Gestational diabetes mellitus    • Hyperlipidemia    • Hypertension    • Migraines    • Other chest pain 01/27/2020   • PONV (postoperative nausea and vomiting)    • Pregnancy     Resolved: 07/29/2015   • Trigger finger     Resolved: 12/02/2016   • Yeast infection     Resolved: 07/29/2015   [3]  Past Surgical History:  Procedure Laterality Date   • COLONOSCOPY     • COLPORRHAPHY N/A 06/20/2022    Procedure: ANTERIOR COLPORRHAPHY;  Surgeon: Ovi Nice MD;  [facility];  Service: UroGynecology          • CYSTOSCOPY N/A 06/20/2022    Procedure: CYSTOSCOPY;  Surgeon: Ovi Nice MD;  [facility];  Service: UroGynecology          • INCISION TENDON SHEATH HAND Left     thumb   • FL ARTHRP KNE CONDYLE&PLATU MEDIAL&LAT COMPARTMENTS Left 6/3/2024    Procedure: ARTHROPLASTY KNEE TOTAL- SAME DAY;  Surgeon: De Naiper DO;  [facility];  Service: Orthopedics   • FL ARTHRS KNE SURG W/MENISCECTOMY MED/LAT W/SHVG Left 11/05/2021    Procedure: KNEE ARTHROSCOPIC MENISCECTOMY Medial;  Surgeon: De Newby MD;  [facility];  Service: Orthopedics   • FL HYSTEROSCOPY BX ENDOMETRIUM&/POLYPC W/WO D&C N/A 10/14/2019    Procedure: DILATATION AND CURETTAGE (D&C) WITH HYSTEROSCOPY W/ NOVASURE;  Surgeon: Abril Dean DO;  [facility];  Service: Gynecology   • FL HYSTEROSCOPY ENDOMETRIAL ABLATION N/A 10/14/2019    Procedure: ABLATION ENDOMETRIAL NOVASURE;  Surgeon: Abril Dean DO;  [facility];  Service: Gynecology   • FL RPR UMBILICAL HRNA 5 YRS/> REDUCIBLE N/A 08/23/2018    Procedure: UMBILICAL HERNIA REPAIR WITH MESH;  Surgeon: Gonzalo Chambers MD;  [facility];  Service: General   • PUBOVAGINAL SLING N/A 06/20/2022    Procedure: PUBOVAGINAL SLING;  Surgeon: Ovi Nice MD;  [facility];  Service: UroGynecology          • WISDOM TOOTH  EXTRACTION     [4]  Family History  Problem Relation Name Age of Onset   • Hypertension Mother Tamara    • Hyperlipidemia Mother Tamara    • Endometrial cancer Mother Tamara 46   • Diabetes type II Mother Tamara    • Cancer Father S Eduardo 81        polycythemia vera   • Hyperlipidemia Father S Eduardo    • Hypertension Father S Eduardo    • Heart attack Sister Suyapa    • No Known Problems Daughter Giovani    • No Known Problems Maternal Grandmother     • Heart attack Maternal Grandfather     • No Known Problems Paternal Grandmother     • Stroke Paternal Grandfather Sheldon         Syndrome   • No Known Problems Brother melany    • No Known Problems Son Miah    • No Known Problems Paternal Aunt     [5]  Social History  Tobacco Use   Smoking Status Never   Smokeless Tobacco Never

## 2025-06-30 DIAGNOSIS — F41.1 GENERALIZED ANXIETY DISORDER: ICD-10-CM

## 2025-07-02 ENCOUNTER — VBI (OUTPATIENT)
Dept: ADMINISTRATIVE | Facility: OTHER | Age: 50
End: 2025-07-02

## 2025-07-02 DIAGNOSIS — F41.1 GENERALIZED ANXIETY DISORDER: ICD-10-CM

## 2025-07-02 RX ORDER — ESCITALOPRAM OXALATE 10 MG/1
10 TABLET ORAL
Qty: 90 TABLET | Refills: 1 | Status: SHIPPED | OUTPATIENT
Start: 2025-07-02 | End: 2025-07-02 | Stop reason: SDUPTHER

## 2025-07-02 RX ORDER — ESCITALOPRAM OXALATE 10 MG/1
15 TABLET ORAL
Qty: 200 TABLET | Refills: 2 | Status: SHIPPED | OUTPATIENT
Start: 2025-07-02 | End: 2025-07-03

## 2025-07-02 NOTE — TELEPHONE ENCOUNTER
07/02/25 10:42 AM     Chart reviewed for CRC: Colonoscopy ; nothing is submitted to the patient's insurance at this time.     Main Kessler MA   [facility]

## 2025-07-03 DIAGNOSIS — F41.1 GENERALIZED ANXIETY DISORDER: ICD-10-CM

## 2025-07-03 RX ORDER — ESCITALOPRAM OXALATE 10 MG/1
10 TABLET ORAL
Qty: 90 TABLET | Refills: 3 | Status: SHIPPED | OUTPATIENT
Start: 2025-07-03 | End: 2025-07-09 | Stop reason: SDUPTHER

## 2025-07-07 ENCOUNTER — TELEPHONE (OUTPATIENT)
Dept: INTERNAL MEDICINE CLINIC | Facility: OTHER | Age: 50
End: 2025-07-07

## 2025-07-07 NOTE — TELEPHONE ENCOUNTER
Received a medication prior authorization request from [employer] for the following medication:    escitalopram (LEXAPRO) 10 mg tablet [[id number]]    Order Details  Dose: 10 mg Route: -- Frequency: Daily at bedtime   Dispense Quantity: 90 tablet (90 day supply) Refills: 3    Duration: -- Dispense As Written: No          Sig: TAKE 1 TABLET DAILY AT BEDTIME         Start Date: 07/03/25 End Date: --   Written Date: 07/03/25 Expiration Date: 07/03/26       Associated Diagnoses: Generalized anxiety disorder [F41.1]     Form scanned into chart.

## 2025-07-08 NOTE — TELEPHONE ENCOUNTER
PA for escitalopram (LEXAPRO) 10 mg tablet SUBMITTED to [employer]    via    [x]CMM-KEY: [id number]  []Surescripts-Case ID #   []Availity-Auth ID # NDC #   []Faxed to plan  []Other website   []Phone call Case ID #     [x]PA sent as URGENT    All office notes, labs and other pertaining documents and studies sent. Clinical questions answered. Awaiting determination from insurance company.     Turnaround time for your insurance to make a decision on your Prior Authorization can take 7-21 business days.

## 2025-07-09 DIAGNOSIS — F41.1 GENERALIZED ANXIETY DISORDER: ICD-10-CM

## 2025-07-09 RX ORDER — ESCITALOPRAM OXALATE 10 MG/1
15 TABLET ORAL
Qty: 90 TABLET | Refills: 3 | Status: SHIPPED | OUTPATIENT
Start: 2025-07-09

## 2025-07-09 NOTE — TELEPHONE ENCOUNTER
The pharmacy is trying to fill an old script that is marked as 1.5 tablets per day. Most recent script states 1 tablet per day. Please have provider confirm. Pharmacy will need to be notified which script to fill.     PA for escitalopram (LEXAPRO) 10 mg tablet NOT REQUIRED     Reason (screenshot if applicable)                 Pharmacy advised by    [x]Fax  []Phone call

## 2025-07-10 ENCOUNTER — APPOINTMENT (OUTPATIENT)
Dept: LAB | Facility: IMAGING CENTER | Age: 50
End: 2025-07-10
Payer: COMMERCIAL

## 2025-07-10 DIAGNOSIS — R73.03 PREDIABETES: ICD-10-CM

## 2025-07-10 LAB
ALBUMIN SERPL BCG-MCNC: 4.5 G/DL (ref 3.5–5)
ALP SERPL-CCNC: 60 U/L (ref 34–104)
ALT SERPL W P-5'-P-CCNC: 41 U/L (ref 7–52)
ANION GAP SERPL CALCULATED.3IONS-SCNC: 8 MMOL/L (ref 4–13)
AST SERPL W P-5'-P-CCNC: 30 U/L (ref 13–39)
BILIRUB SERPL-MCNC: 1.37 MG/DL (ref 0.2–1)
BUN SERPL-MCNC: 15 MG/DL (ref 5–25)
CALCIUM SERPL-MCNC: 9.2 MG/DL (ref 8.4–10.2)
CHLORIDE SERPL-SCNC: 106 MMOL/L (ref 96–108)
CO2 SERPL-SCNC: 24 MMOL/L (ref 21–32)
CREAT SERPL-MCNC: 0.63 MG/DL (ref 0.6–1.3)
GFR SERPL CREATININE-BSD FRML MDRD: 104 ML/MIN/1.73SQ M
GLUCOSE P FAST SERPL-MCNC: 91 MG/DL (ref 65–99)
POTASSIUM SERPL-SCNC: 4.3 MMOL/L (ref 3.5–5.3)
PROT SERPL-MCNC: 7.1 G/DL (ref 6.4–8.4)
SODIUM SERPL-SCNC: 138 MMOL/L (ref 135–147)

## 2025-07-10 PROCEDURE — 80053 COMPREHEN METABOLIC PANEL: CPT

## 2025-07-10 PROCEDURE — 36415 COLL VENOUS BLD VENIPUNCTURE: CPT

## 2025-07-11 ENCOUNTER — PATIENT MESSAGE (OUTPATIENT)
Dept: NEUROLOGY | Facility: CLINIC | Age: 50
End: 2025-07-11

## 2025-07-14 NOTE — PATIENT COMMUNICATION
LOV - 2/29/25 Consult    Last rx - 5/23/25 for a total of 18 pills w/3 refills    Pt has 45 pills left for refills.     Insurance covers 9 pills per 30 days    6/11/25 - pt paid out of pocket    Dr. Keith - please see pt msg re: rizatriptan use and advise if okay. Pt used 9 pills in 2.5 weeks (picked up 5/23/25 and again 6/11/25). Thank you!

## 2025-07-22 ENCOUNTER — PATIENT MESSAGE (OUTPATIENT)
Dept: NEUROLOGY | Facility: CLINIC | Age: 50
End: 2025-07-22

## 2025-07-24 ENCOUNTER — PATIENT MESSAGE (OUTPATIENT)
Dept: NEUROLOGY | Facility: CLINIC | Age: 50
End: 2025-07-24

## 2025-07-24 DIAGNOSIS — G44.229 CHRONIC TENSION-TYPE HEADACHE, NOT INTRACTABLE: Primary | ICD-10-CM

## 2025-07-24 NOTE — PATIENT COMMUNICATION
Called patient and left voicemail informing patient that Dr. Keith is currently out of the office but we are able to give her an appointment with Keanu. Keanu currently has availability in [place] today and tomorrow. Request patient to call back [phone] to schedule. Patient has also been added to Dr. Keith wait list.

## 2025-07-24 NOTE — PATIENT COMMUNICATION
Melecio Auguste the appt that was offered to pt for tomorrow with you is an OVS 30 min , Since you have not seen this pt before will she require an OVL 60 min or is it okay to schedule for tomorrow ?

## 2025-07-25 RX ORDER — TOPIRAMATE 25 MG/1
TABLET, FILM COATED ORAL
Qty: 60 TABLET | Refills: 0 | Status: SHIPPED | OUTPATIENT
Start: 2025-07-25

## 2025-07-25 NOTE — PATIENT COMMUNICATION
Advised pt of note below. She verbalized understanding to all. She would like to start topiramate therapy asap. Pt denies hx of kidney stones or glaucoma. She does stay well hydrated.     Will send initial rx to local [facility] so pt can start taking med immediately. Pt will notify our office once she has picked up script and we will send new script to her mail order pharmacy - [facility]. Pt very appreciate of intervention.    Edward - please enter 30 day supply of topiramate for local [facility] in [place]. Subsequent rxs will be sent to mail order pharmacy. Thank you.

## 2025-07-28 ENCOUNTER — OFFICE VISIT (OUTPATIENT)
Dept: INTERNAL MEDICINE CLINIC | Age: 50
End: 2025-07-28
Payer: COMMERCIAL

## 2025-07-28 VITALS
HEIGHT: 63 IN | TEMPERATURE: 98.5 F | BODY MASS INDEX: 28.31 KG/M2 | DIASTOLIC BLOOD PRESSURE: 80 MMHG | SYSTOLIC BLOOD PRESSURE: 110 MMHG | OXYGEN SATURATION: 98 % | WEIGHT: 159.8 LBS | HEART RATE: 75 BPM

## 2025-07-28 DIAGNOSIS — G47.33 OBSTRUCTIVE SLEEP APNEA: ICD-10-CM

## 2025-07-28 DIAGNOSIS — E78.00 HYPERCHOLESTEROLEMIA: ICD-10-CM

## 2025-07-28 DIAGNOSIS — R73.03 PREDIABETES: ICD-10-CM

## 2025-07-28 DIAGNOSIS — M25.562 CHRONIC PAIN OF LEFT KNEE: ICD-10-CM

## 2025-07-28 DIAGNOSIS — M17.12 PRIMARY OSTEOARTHRITIS OF LEFT KNEE: ICD-10-CM

## 2025-07-28 DIAGNOSIS — I10 BENIGN HYPERTENSION: Primary | ICD-10-CM

## 2025-07-28 DIAGNOSIS — G43.109 MIGRAINE WITH AURA AND WITHOUT STATUS MIGRAINOSUS, NOT INTRACTABLE: ICD-10-CM

## 2025-07-28 DIAGNOSIS — G89.29 CHRONIC PAIN OF LEFT KNEE: ICD-10-CM

## 2025-07-28 PROCEDURE — 99214 OFFICE O/P EST MOD 30 MIN: CPT | Performed by: INTERNAL MEDICINE

## 2025-07-28 RX ORDER — CALCIUM CARBONATE 300MG(750)
TABLET,CHEWABLE ORAL
COMMUNITY

## 2025-07-28 RX ORDER — HYDROCHLOROTHIAZIDE 12.5 MG/1
TABLET ORAL
COMMUNITY

## 2025-07-30 RX ORDER — FOLIC ACID 1 MG/1
1000 TABLET ORAL DAILY
Qty: 90 TABLET | Refills: 1 | Status: SHIPPED | OUTPATIENT
Start: 2025-07-30

## 2025-07-30 RX ORDER — TOPIRAMATE 25 MG/1
TABLET, FILM COATED ORAL
Qty: 180 TABLET | Refills: 1 | Status: SHIPPED | OUTPATIENT
Start: 2025-07-30

## 2025-07-30 NOTE — PATIENT COMMUNICATION
Initial rx sent to local pharmacy so patient could start med immediately. Subsequent orders need to come from [facility].    Script pended below.     Dr. Keith - please sign if agreeable. Thanks!

## 2025-08-12 ENCOUNTER — OFFICE VISIT (OUTPATIENT)
Age: 50
End: 2025-08-12
Payer: COMMERCIAL

## 2025-08-15 DIAGNOSIS — E78.00 HYPERCHOLESTEROLEMIA: ICD-10-CM

## 2025-08-15 DIAGNOSIS — E78.1 HYPERTRIGLYCERIDEMIA: ICD-10-CM

## 2025-08-18 DIAGNOSIS — R73.03 PREDIABETES: ICD-10-CM

## 2025-08-18 DIAGNOSIS — I10 BENIGN HYPERTENSION: ICD-10-CM

## 2025-08-18 RX ORDER — ATORVASTATIN CALCIUM 20 MG/1
20 TABLET, FILM COATED ORAL EVERY EVENING
Qty: 90 TABLET | Refills: 1 | Status: SHIPPED | OUTPATIENT
Start: 2025-08-18

## 2025-08-20 RX ORDER — METOPROLOL SUCCINATE 50 MG/1
50 TABLET, EXTENDED RELEASE ORAL
Qty: 90 TABLET | Refills: 1 | Status: SHIPPED | OUTPATIENT
Start: 2025-08-20

## (undated) DEVICE — EXOFIN PRECISION PEN HIGH VISCOSITY TOPICAL SKIN ADHESIVE: Brand: EXOFIN PRECISION PEN, 1G

## (undated) DEVICE — 3M™ STERI-DRAPE™ U-DRAPE 1015: Brand: STERI-DRAPE™

## (undated) DEVICE — IMPERVIOUS STOCKINETTE: Brand: DEROYAL

## (undated) DEVICE — GLOVE INDICATOR PI UNDERGLOVE SZ 7.5 BLUE

## (undated) DEVICE — VIAL DECANTER

## (undated) DEVICE — GLOVE SRG BIOGEL 6.5

## (undated) DEVICE — SCD SEQUENTIAL COMPRESSION COMFORT SLEEVE MEDIUM KNEE LENGTH: Brand: KENDALL SCD

## (undated) DEVICE — HANDPIECE SET WITH RETRACTABLE COAXIAL FAN SPRAY TIP AND SUCTION TUBE: Brand: INTERPULSE

## (undated) DEVICE — NEEDLE HYPO 22G X 1-1/2 IN

## (undated) DEVICE — GAUZE SPONGES,16 PLY: Brand: CURITY

## (undated) DEVICE — SURGICAL GOWN, XL SMARTSLEEVE: Brand: CONVERTORS

## (undated) DEVICE — STAYS ELASTIC 5MM SHARP HOOK LONE STAR

## (undated) DEVICE — RETRACTOR RING 14.1 X 14.1 CM DISP

## (undated) DEVICE — BETHLEHEM UNIVERSAL MINOR VAG: Brand: CARDINAL HEALTH

## (undated) DEVICE — DRESSING MEPILEX AG BORDER POST-OP 4 X 12 IN

## (undated) DEVICE — INSUFFLATION TUBING PRIMFLO

## (undated) DEVICE — SUT STRATAFIX SPIRAL PLUS 3-0 PS-2 30 X 30 CM SXMP2B408

## (undated) DEVICE — HOOD: Brand: FLYTE, SURGICOOL

## (undated) DEVICE — SUT MONOCRYL 4-0 PS-2 18 IN Y496G

## (undated) DEVICE — NEEDLE 18 G X 1 1/2

## (undated) DEVICE — CAUTERY TIP POLISHER: Brand: DEVON

## (undated) DEVICE — 2000CC GUARDIAN II: Brand: GUARDIAN

## (undated) DEVICE — INTENDED FOR TISSUE SEPARATION, AND OTHER PROCEDURES THAT REQUIRE A SHARP SURGICAL BLADE TO PUNCTURE OR CUT.: Brand: BARD-PARKER SAFETY BLADES SIZE 15, STERILE

## (undated) DEVICE — TUBING ARTHROSCOPY REDUCE PATIENT

## (undated) DEVICE — INTENDED FOR TISSUE SEPARATION, AND OTHER PROCEDURES THAT REQUIRE A SHARP SURGICAL BLADE TO PUNCTURE OR CUT.: Brand: BARD-PARKER SAFETY BLADES SIZE 11, STERILE

## (undated) DEVICE — BETHLEHEM UNIVERSAL  ARTHRO PK: Brand: CARDINAL HEALTH

## (undated) DEVICE — IV FLUSH NSS 10ML POSIFLUSH

## (undated) DEVICE — ACE WRAP 6 IN UNSTERILE

## (undated) DEVICE — CHLORAPREP HI-LITE 26ML ORANGE

## (undated) DEVICE — GLOVE SRG BIOGEL ORTHOPEDIC 8.5

## (undated) DEVICE — NOVASURE ADVANCED DEVICE

## (undated) DEVICE — 3M™ STERI-STRIP™ REINFORCED ADHESIVE SKIN CLOSURES, R1547, 1/2 IN X 4 IN (12 MM X 100 MM), 6 STRIPS/ENVELOPE: Brand: 3M™ STERI-STRIP™

## (undated) DEVICE — SYRINGE 30ML LL

## (undated) DEVICE — SUT VICRYL 1 CT-1 27 IN J261H

## (undated) DEVICE — OCCLUSIVE GAUZE STRIP,3% BISMUTH TRIBROMOPHENATE IN PETROLATUM BLEND: Brand: XEROFORM

## (undated) DEVICE — SAW BLADE OSCILLATING BRAZOL 167

## (undated) DEVICE — LIGHT HANDLE COVER SLEEVE DISP BLUE STELLAR

## (undated) DEVICE — TUBING SUCTION 5MM X 12 FT

## (undated) DEVICE — CAPIT KNEE ATTUNE FB MEDIAL STAB AOX POR

## (undated) DEVICE — GLOVE PI ULTRA TOUCH SZ.7.0

## (undated) DEVICE — REM POLYHESIVE ADULT PATIENT RETURN ELECTRODE: Brand: VALLEYLAB

## (undated) DEVICE — GLOVE SRG BIOGEL 7.5

## (undated) DEVICE — BLADE SHAVER DISSECTOR 3.5MM 13CM COOLCUT

## (undated) DEVICE — SPONGE PVP SCRUB WING STERILE

## (undated) DEVICE — SUT VICRYL 2-0 CT-1 36 IN J945H

## (undated) DEVICE — UNDER BUTTOCKS DRAPE W/FLUID CONTROL POUCH: Brand: CONVERTORS

## (undated) DEVICE — CYSTO TUBING SINGLE IRRIGATION

## (undated) DEVICE — BULB SYRINGE,IRRIGATION WITH PROTECTIVE CAP: Brand: DOVER

## (undated) DEVICE — PLUMEPEN PRO 10FT

## (undated) DEVICE — SYRINGE CATH TIP 50ML

## (undated) DEVICE — ADHESIVE SKIN HIGH VISCOSITY EXOFIN 1ML

## (undated) DEVICE — CATH FOLEY 18FR 5ML 2 WAY UNCOATED SILICONE

## (undated) DEVICE — ADHESIVE SKN CLSR HISTOACRYL FLEX 0.5ML LF

## (undated) DEVICE — TOWEL SURG XR DETECT GREEN STRL RFD

## (undated) DEVICE — STANDARD SURGICAL GOWN, L: Brand: CONVERTORS

## (undated) DEVICE — SMOKE EVACUATION TUBING WITH 8 IN INTEGRAL WAND AND SPONGE GUARD: Brand: BUFFALO FILTER

## (undated) DEVICE — WEBRIL 6 IN UNSTERILE

## (undated) DEVICE — GLOVE SRG BIOGEL ECLIPSE 7

## (undated) DEVICE — COBAN 6 IN STERILE

## (undated) DEVICE — ALLENTOWN DR  LUCENTE S LAP PK: Brand: CARDINAL HEALTH

## (undated) DEVICE — BASIC SINGLE BASIN 2-LF: Brand: MEDLINE INDUSTRIES, INC.

## (undated) DEVICE — GLOVE INDICATOR PI UNDERGLOVE SZ 6.5 BLUE

## (undated) DEVICE — ACE WRAP 4 IN UNSTERILE

## (undated) DEVICE — SPECIMEN CONTAINER STERILE PEEL PACK

## (undated) DEVICE — 450 ML BOTTLE OF 0.05% CHLORHEXIDINE GLUCONATE IN 99.95% STERILE WATER FOR IRRIGATION, USP AND APPLICATOR.: Brand: IRRISEPT ANTIMICROBIAL WOUND LAVAGE

## (undated) DEVICE — SUT VICRYL 2-0 SH 27 IN UNDYED J417H

## (undated) DEVICE — BETHLEHEM UNIVERSAL MINOR GEN: Brand: CARDINAL HEALTH

## (undated) DEVICE — INTENDED FOR TISSUE SEPARATION, AND OTHER PROCEDURES THAT REQUIRE A SHARP SURGICAL BLADE TO PUNCTURE OR CUT.: Brand: BARD-PARKER ® CARBON RIB-BACK BLADES

## (undated) DEVICE — EXIDINE 4 PCT

## (undated) DEVICE — CEMENT MIXING SYSTEM WITH FEMORAL BREAKWAY NOZZLE: Brand: REVOLUTION

## (undated) DEVICE — 4-PORT MANIFOLD: Brand: NEPTUNE 2

## (undated) DEVICE — SUT STRATAFIX SPIRAL PDS PLUS 1 CTX 18 IN SXPP1A400

## (undated) DEVICE — PREMIUM DRY TRAY LF: Brand: MEDLINE INDUSTRIES, INC.

## (undated) DEVICE — BETHLEHEM UNIV TOTAL KNEE, KIT: Brand: CARDINAL HEALTH

## (undated) DEVICE — PADDING CAST 4 IN  COTTON STRL

## (undated) DEVICE — NEEDLE 22 G X 1 1/2 SAFETY

## (undated) DEVICE — ELECTROSURGICAL DEVICE HOLSTER;FOR USE WITH MAXIMUM PEAK VOLTAGE OF 4000 V: Brand: FORCE TRIVERSE

## (undated) DEVICE — GLOVE SRG BIOGEL 8.5

## (undated) DEVICE — MAYO STAND COVER: Brand: CONVERTORS

## (undated) DEVICE — MEDI-VAC YANKAUER SUCTION HANDLE W/BULBOUS AND CONTROL VENT: Brand: CARDINAL HEALTH

## (undated) DEVICE — SUT VICRYL 2-0 REEL 54 IN J286G

## (undated) DEVICE — TIBURON EXTREMITY SHEET: Brand: CONVERTORS

## (undated) DEVICE — DRAPE EQUIPMENT RF WAND

## (undated) DEVICE — GLOVE INDICATOR PI UNDERGLOVE SZ 8.5 BLUE